# Patient Record
Sex: FEMALE | Race: WHITE | Employment: OTHER | URBAN - NONMETROPOLITAN AREA
[De-identification: names, ages, dates, MRNs, and addresses within clinical notes are randomized per-mention and may not be internally consistent; named-entity substitution may affect disease eponyms.]

---

## 2017-01-03 ENCOUNTER — CARE COORDINATION (OUTPATIENT)
Dept: CARE COORDINATION | Age: 82
End: 2017-01-03

## 2017-01-03 ENCOUNTER — TELEPHONE (OUTPATIENT)
Dept: INTERNAL MEDICINE | Age: 82
End: 2017-01-03

## 2017-01-03 DIAGNOSIS — R11.0 NAUSEA: Primary | ICD-10-CM

## 2017-01-05 ENCOUNTER — ANTI-COAG VISIT (OUTPATIENT)
Dept: OTHER | Age: 82
End: 2017-01-05

## 2017-01-05 DIAGNOSIS — Z86.718 HISTORY OF DVT (DEEP VEIN THROMBOSIS): ICD-10-CM

## 2017-01-05 DIAGNOSIS — Z86.711 HISTORY OF PULMONARY EMBOLISM: ICD-10-CM

## 2017-01-05 RX ORDER — ONDANSETRON 4 MG/1
4 TABLET, FILM COATED ORAL EVERY 8 HOURS PRN
Qty: 20 TABLET | Refills: 0 | Status: SHIPPED | OUTPATIENT
Start: 2017-01-05 | End: 2017-04-05

## 2017-01-09 ENCOUNTER — TELEPHONE (OUTPATIENT)
Dept: INTERNAL MEDICINE | Age: 82
End: 2017-01-09

## 2017-01-10 ENCOUNTER — TELEPHONE (OUTPATIENT)
Dept: INTERNAL MEDICINE | Age: 82
End: 2017-01-10

## 2017-01-11 PROBLEM — E83.52 HYPERCALCEMIA: Status: ACTIVE | Noted: 2017-01-11

## 2017-01-11 PROBLEM — K56.41 FECAL IMPACTION IN RECTUM (HCC): Status: ACTIVE | Noted: 2017-01-11

## 2017-01-16 ENCOUNTER — ANTI-COAG VISIT (OUTPATIENT)
Dept: OTHER | Age: 82
End: 2017-01-16

## 2017-01-16 ENCOUNTER — TELEPHONE (OUTPATIENT)
Dept: OTHER | Age: 82
End: 2017-01-16

## 2017-01-16 DIAGNOSIS — Z86.718 HISTORY OF DVT (DEEP VEIN THROMBOSIS): ICD-10-CM

## 2017-01-16 DIAGNOSIS — Z86.711 HISTORY OF PULMONARY EMBOLISM: ICD-10-CM

## 2017-01-23 ENCOUNTER — ANTI-COAG VISIT (OUTPATIENT)
Dept: OTHER | Age: 82
End: 2017-01-23

## 2017-01-23 ENCOUNTER — OFFICE VISIT (OUTPATIENT)
Dept: INTERNAL MEDICINE | Age: 82
End: 2017-01-23

## 2017-01-23 VITALS
BODY MASS INDEX: 30.74 KG/M2 | WEIGHT: 202.2 LBS | DIASTOLIC BLOOD PRESSURE: 100 MMHG | HEART RATE: 68 BPM | SYSTOLIC BLOOD PRESSURE: 150 MMHG

## 2017-01-23 VITALS
DIASTOLIC BLOOD PRESSURE: 88 MMHG | SYSTOLIC BLOOD PRESSURE: 160 MMHG | HEIGHT: 68 IN | HEART RATE: 68 BPM | WEIGHT: 202 LBS | BODY MASS INDEX: 30.62 KG/M2

## 2017-01-23 DIAGNOSIS — I63.9 ACUTE EMBOLIC STROKE (HCC): ICD-10-CM

## 2017-01-23 DIAGNOSIS — N18.30 TYPE 2 DIABETES MELLITUS WITH STAGE 3 CHRONIC KIDNEY DISEASE, WITHOUT LONG-TERM CURRENT USE OF INSULIN (HCC): ICD-10-CM

## 2017-01-23 DIAGNOSIS — Z86.718 HISTORY OF DVT (DEEP VEIN THROMBOSIS): ICD-10-CM

## 2017-01-23 DIAGNOSIS — I48.19 PERSISTENT ATRIAL FIBRILLATION (HCC): ICD-10-CM

## 2017-01-23 DIAGNOSIS — G25.81 RLS (RESTLESS LEGS SYNDROME): ICD-10-CM

## 2017-01-23 DIAGNOSIS — E78.5 HYPERLIPIDEMIA, UNSPECIFIED HYPERLIPIDEMIA TYPE: ICD-10-CM

## 2017-01-23 DIAGNOSIS — Z95.0 PACEMAKER: ICD-10-CM

## 2017-01-23 DIAGNOSIS — L89.92 DECUBITUS SKIN ULCER, STAGE II: ICD-10-CM

## 2017-01-23 DIAGNOSIS — Z86.73 HISTORY OF CVA (CEREBROVASCULAR ACCIDENT): ICD-10-CM

## 2017-01-23 DIAGNOSIS — Z95.5 S/P DRUG ELUTING CORONARY STENT PLACEMENT: ICD-10-CM

## 2017-01-23 DIAGNOSIS — Z86.711 HISTORY OF PULMONARY EMBOLISM: ICD-10-CM

## 2017-01-23 DIAGNOSIS — I47.20 VENTRICULAR TACHYARRHYTHMIA: ICD-10-CM
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
## 2017-01-23 DIAGNOSIS — K56.41 FECAL IMPACTION IN RECTUM (HCC): Primary | ICD-10-CM

## 2017-01-23 DIAGNOSIS — I35.0 MILD AORTIC STENOSIS: ICD-10-CM

## 2017-01-23 DIAGNOSIS — Z96.642 STATUS POST TOTAL REPLACEMENT OF LEFT HIP: ICD-10-CM

## 2017-01-23 DIAGNOSIS — Z98.61 S/P PERCUTANEOUS TRANSLUMINAL CORONARY ANGIOPLASTY: ICD-10-CM

## 2017-01-23 DIAGNOSIS — I50.42 CHRONIC COMBINED SYSTOLIC AND DIASTOLIC CHF (CONGESTIVE HEART FAILURE) (HCC): ICD-10-CM

## 2017-01-23 DIAGNOSIS — E11.22 TYPE 2 DIABETES MELLITUS WITH STAGE 3 CHRONIC KIDNEY DISEASE, WITHOUT LONG-TERM CURRENT USE OF INSULIN (HCC): ICD-10-CM

## 2017-01-23 DIAGNOSIS — I20.9 ANGINA PECTORIS (HCC): ICD-10-CM

## 2017-01-23 DIAGNOSIS — Z85.72 HISTORY OF NON-HODGKIN'S LYMPHOMA: ICD-10-CM

## 2017-01-23 DIAGNOSIS — I25.10 ASCVD (ARTERIOSCLEROTIC CARDIOVASCULAR DISEASE): ICD-10-CM

## 2017-01-23 DIAGNOSIS — Z86.73 HISTORY OF EMBOLIC STROKE: ICD-10-CM

## 2017-01-23 DIAGNOSIS — I49.5 SSS (SICK SINUS SYNDROME) (HCC): ICD-10-CM

## 2017-01-23 LAB — POC INR: 1.8 (ref 0.8–1.2)

## 2017-01-23 PROCEDURE — G8419 CALC BMI OUT NRM PARAM NOF/U: HCPCS | Performed by: NURSE PRACTITIONER

## 2017-01-23 PROCEDURE — G8427 DOCREV CUR MEDS BY ELIG CLIN: HCPCS | Performed by: NURSE PRACTITIONER

## 2017-01-23 PROCEDURE — G8484 FLU IMMUNIZE NO ADMIN: HCPCS | Performed by: INTERNAL MEDICINE

## 2017-01-23 PROCEDURE — G8598 ASA/ANTIPLAT THER USED: HCPCS | Performed by: INTERNAL MEDICINE

## 2017-01-23 PROCEDURE — 99999 PR OFFICE/OUTPT VISIT,PROCEDURE ONLY: CPT | Performed by: NURSE PRACTITIONER

## 2017-01-23 PROCEDURE — 1111F DSCHRG MED/CURRENT MED MERGE: CPT | Performed by: INTERNAL MEDICINE

## 2017-01-23 PROCEDURE — 4040F PNEUMOC VAC/ADMIN/RCVD: CPT | Performed by: INTERNAL MEDICINE

## 2017-01-23 PROCEDURE — 1123F ACP DISCUSS/DSCN MKR DOCD: CPT | Performed by: INTERNAL MEDICINE

## 2017-01-23 PROCEDURE — G8428 CUR MEDS NOT DOCUMENT: HCPCS | Performed by: INTERNAL MEDICINE

## 2017-01-23 PROCEDURE — G8598 ASA/ANTIPLAT THER USED: HCPCS | Performed by: NURSE PRACTITIONER

## 2017-01-23 PROCEDURE — 1090F PRES/ABSN URINE INCON ASSESS: CPT | Performed by: INTERNAL MEDICINE

## 2017-01-23 PROCEDURE — 85610 PROTHROMBIN TIME: CPT | Performed by: NURSE PRACTITIONER

## 2017-01-23 PROCEDURE — 4040F PNEUMOC VAC/ADMIN/RCVD: CPT | Performed by: NURSE PRACTITIONER

## 2017-01-23 PROCEDURE — 99214 OFFICE O/P EST MOD 30 MIN: CPT | Performed by: INTERNAL MEDICINE

## 2017-01-23 PROCEDURE — G8419 CALC BMI OUT NRM PARAM NOF/U: HCPCS | Performed by: INTERNAL MEDICINE

## 2017-01-23 PROCEDURE — G8400 PT W/DXA NO RESULTS DOC: HCPCS | Performed by: INTERNAL MEDICINE

## 2017-01-23 PROCEDURE — 1036F TOBACCO NON-USER: CPT | Performed by: INTERNAL MEDICINE

## 2017-01-23 RX ORDER — PRAVASTATIN SODIUM 20 MG
20 TABLET ORAL EVERY EVENING
Qty: 30 TABLET | Refills: 5 | Status: SHIPPED | OUTPATIENT
Start: 2017-01-23 | End: 2017-05-02 | Stop reason: SDUPTHER

## 2017-01-23 ASSESSMENT — ENCOUNTER SYMPTOMS
BLOOD IN STOOL: 0
DIARRHEA: 0
CONSTIPATION: 0

## 2017-01-30 ENCOUNTER — OFFICE VISIT (OUTPATIENT)
Dept: CARDIOLOGY | Age: 82
End: 2017-01-30

## 2017-01-30 ENCOUNTER — TELEPHONE (OUTPATIENT)
Dept: OTHER | Age: 82
End: 2017-01-30

## 2017-01-30 ENCOUNTER — PROCEDURE VISIT (OUTPATIENT)
Dept: CARDIOLOGY | Age: 82
End: 2017-01-30

## 2017-01-30 VITALS
SYSTOLIC BLOOD PRESSURE: 130 MMHG | WEIGHT: 201 LBS | HEART RATE: 81 BPM | DIASTOLIC BLOOD PRESSURE: 78 MMHG | BODY MASS INDEX: 30.46 KG/M2 | HEIGHT: 68 IN

## 2017-01-30 DIAGNOSIS — I25.5 ISCHEMIC CARDIOMYOPATHY: ICD-10-CM

## 2017-01-30 DIAGNOSIS — Z95.0 PACEMAKER: Primary | ICD-10-CM

## 2017-01-30 DIAGNOSIS — Z95.0 S/P CARDIAC PACEMAKER PROCEDURE: Primary | ICD-10-CM

## 2017-01-30 DIAGNOSIS — I47.20 VENTRICULAR TACHYARRHYTHMIA: ICD-10-CM

## 2017-01-30 DIAGNOSIS — I25.10 CORONARY ARTERY DISEASE INVOLVING NATIVE CORONARY ARTERY OF NATIVE HEART WITHOUT ANGINA PECTORIS: ICD-10-CM

## 2017-01-30 DIAGNOSIS — Z98.61 S/P PERCUTANEOUS TRANSLUMINAL CORONARY ANGIOPLASTY: ICD-10-CM

## 2017-01-30 DIAGNOSIS — I50.22 HEART FAILURE, SYSTOLIC, CHRONIC (HCC): ICD-10-CM

## 2017-01-30 PROCEDURE — 1036F TOBACCO NON-USER: CPT | Performed by: PHYSICIAN ASSISTANT

## 2017-01-30 PROCEDURE — 93279 PRGRMG DEV EVAL PM/LDLS PM: CPT | Performed by: INTERNAL MEDICINE

## 2017-01-30 PROCEDURE — 1111F DSCHRG MED/CURRENT MED MERGE: CPT | Performed by: PHYSICIAN ASSISTANT

## 2017-01-30 PROCEDURE — G8400 PT W/DXA NO RESULTS DOC: HCPCS | Performed by: PHYSICIAN ASSISTANT

## 2017-01-30 PROCEDURE — 1123F ACP DISCUSS/DSCN MKR DOCD: CPT | Performed by: PHYSICIAN ASSISTANT

## 2017-01-30 PROCEDURE — G8419 CALC BMI OUT NRM PARAM NOF/U: HCPCS | Performed by: PHYSICIAN ASSISTANT

## 2017-01-30 PROCEDURE — G8484 FLU IMMUNIZE NO ADMIN: HCPCS | Performed by: PHYSICIAN ASSISTANT

## 2017-01-30 PROCEDURE — 99213 OFFICE O/P EST LOW 20 MIN: CPT | Performed by: PHYSICIAN ASSISTANT

## 2017-01-30 PROCEDURE — G8598 ASA/ANTIPLAT THER USED: HCPCS | Performed by: PHYSICIAN ASSISTANT

## 2017-01-30 PROCEDURE — 4040F PNEUMOC VAC/ADMIN/RCVD: CPT | Performed by: PHYSICIAN ASSISTANT

## 2017-01-30 PROCEDURE — G8427 DOCREV CUR MEDS BY ELIG CLIN: HCPCS | Performed by: PHYSICIAN ASSISTANT

## 2017-01-30 PROCEDURE — 1090F PRES/ABSN URINE INCON ASSESS: CPT | Performed by: PHYSICIAN ASSISTANT

## 2017-02-02 ENCOUNTER — CARE COORDINATION (OUTPATIENT)
Dept: CARE COORDINATION | Age: 82
End: 2017-02-02

## 2017-02-02 ENCOUNTER — ANTI-COAG VISIT (OUTPATIENT)
Dept: OTHER | Age: 82
End: 2017-02-02

## 2017-02-02 VITALS — SYSTOLIC BLOOD PRESSURE: 110 MMHG | TEMPERATURE: 97.5 F | HEART RATE: 64 BPM | DIASTOLIC BLOOD PRESSURE: 80 MMHG

## 2017-02-02 DIAGNOSIS — Z86.718 HISTORY OF DVT (DEEP VEIN THROMBOSIS): ICD-10-CM

## 2017-02-02 DIAGNOSIS — Z86.711 HISTORY OF PULMONARY EMBOLISM: ICD-10-CM

## 2017-02-02 DIAGNOSIS — I48.19 PERSISTENT ATRIAL FIBRILLATION (HCC): ICD-10-CM

## 2017-02-02 DIAGNOSIS — Z86.73 HISTORY OF EMBOLIC STROKE: ICD-10-CM

## 2017-02-02 LAB — POC INR: 2.2 (ref 0.8–1.2)

## 2017-02-02 PROCEDURE — G8400 PT W/DXA NO RESULTS DOC: HCPCS | Performed by: NURSE PRACTITIONER

## 2017-02-02 PROCEDURE — G8419 CALC BMI OUT NRM PARAM NOF/U: HCPCS | Performed by: NURSE PRACTITIONER

## 2017-02-02 PROCEDURE — 1036F TOBACCO NON-USER: CPT | Performed by: NURSE PRACTITIONER

## 2017-02-02 PROCEDURE — 99212 OFFICE O/P EST SF 10 MIN: CPT | Performed by: NURSE PRACTITIONER

## 2017-02-02 PROCEDURE — 1090F PRES/ABSN URINE INCON ASSESS: CPT | Performed by: NURSE PRACTITIONER

## 2017-02-02 PROCEDURE — G8427 DOCREV CUR MEDS BY ELIG CLIN: HCPCS | Performed by: NURSE PRACTITIONER

## 2017-02-02 PROCEDURE — G8598 ASA/ANTIPLAT THER USED: HCPCS | Performed by: NURSE PRACTITIONER

## 2017-02-02 PROCEDURE — 1111F DSCHRG MED/CURRENT MED MERGE: CPT | Performed by: NURSE PRACTITIONER

## 2017-02-02 PROCEDURE — 85610 PROTHROMBIN TIME: CPT | Performed by: NURSE PRACTITIONER

## 2017-02-02 PROCEDURE — G8484 FLU IMMUNIZE NO ADMIN: HCPCS | Performed by: NURSE PRACTITIONER

## 2017-02-02 PROCEDURE — 4040F PNEUMOC VAC/ADMIN/RCVD: CPT | Performed by: NURSE PRACTITIONER

## 2017-02-02 PROCEDURE — 1123F ACP DISCUSS/DSCN MKR DOCD: CPT | Performed by: NURSE PRACTITIONER

## 2017-02-02 ASSESSMENT — ENCOUNTER SYMPTOMS
DIARRHEA: 0
BLOOD IN STOOL: 0
SHORTNESS OF BREATH: 0
CONSTIPATION: 0

## 2017-02-08 ENCOUNTER — CARE COORDINATION (OUTPATIENT)
Dept: CARE COORDINATION | Age: 82
End: 2017-02-08

## 2017-02-09 ENCOUNTER — CARE COORDINATION (OUTPATIENT)
Dept: CARE COORDINATION | Age: 82
End: 2017-02-09

## 2017-02-13 ENCOUNTER — OFFICE VISIT (OUTPATIENT)
Dept: INTERNAL MEDICINE | Age: 82
End: 2017-02-13

## 2017-02-13 ENCOUNTER — TELEPHONE (OUTPATIENT)
Dept: INTERNAL MEDICINE | Age: 82
End: 2017-02-13

## 2017-02-13 ENCOUNTER — CARE COORDINATION (OUTPATIENT)
Dept: CARE COORDINATION | Age: 82
End: 2017-02-13

## 2017-02-13 ENCOUNTER — TELEPHONE (OUTPATIENT)
Dept: CARDIOLOGY | Age: 82
End: 2017-02-13

## 2017-02-13 VITALS
WEIGHT: 201 LBS | OXYGEN SATURATION: 97 % | SYSTOLIC BLOOD PRESSURE: 186 MMHG | HEIGHT: 68 IN | BODY MASS INDEX: 30.46 KG/M2 | HEART RATE: 66 BPM | DIASTOLIC BLOOD PRESSURE: 114 MMHG

## 2017-02-13 DIAGNOSIS — E78.5 HYPERLIPIDEMIA, UNSPECIFIED HYPERLIPIDEMIA TYPE: ICD-10-CM

## 2017-02-13 DIAGNOSIS — I48.0 PAF (PAROXYSMAL ATRIAL FIBRILLATION) (HCC): ICD-10-CM

## 2017-02-13 DIAGNOSIS — Z86.718 HISTORY OF DVT (DEEP VEIN THROMBOSIS): ICD-10-CM

## 2017-02-13 DIAGNOSIS — N18.30 TYPE 2 DIABETES MELLITUS WITH STAGE 3 CHRONIC KIDNEY DISEASE, WITHOUT LONG-TERM CURRENT USE OF INSULIN (HCC): ICD-10-CM

## 2017-02-13 DIAGNOSIS — E11.22 TYPE 2 DIABETES MELLITUS WITH STAGE 3 CHRONIC KIDNEY DISEASE, WITHOUT LONG-TERM CURRENT USE OF INSULIN (HCC): ICD-10-CM

## 2017-02-13 DIAGNOSIS — K59.00 CONSTIPATION, UNSPECIFIED CONSTIPATION TYPE: ICD-10-CM

## 2017-02-13 DIAGNOSIS — I10 ESSENTIAL HYPERTENSION: ICD-10-CM

## 2017-02-13 DIAGNOSIS — R06.02 SOB (SHORTNESS OF BREATH): Primary | ICD-10-CM

## 2017-02-13 DIAGNOSIS — I25.10 ASCVD (ARTERIOSCLEROTIC CARDIOVASCULAR DISEASE): ICD-10-CM

## 2017-02-13 PROCEDURE — 4040F PNEUMOC VAC/ADMIN/RCVD: CPT | Performed by: INTERNAL MEDICINE

## 2017-02-13 PROCEDURE — 99214 OFFICE O/P EST MOD 30 MIN: CPT | Performed by: INTERNAL MEDICINE

## 2017-02-13 PROCEDURE — G8484 FLU IMMUNIZE NO ADMIN: HCPCS | Performed by: INTERNAL MEDICINE

## 2017-02-13 PROCEDURE — G8400 PT W/DXA NO RESULTS DOC: HCPCS | Performed by: INTERNAL MEDICINE

## 2017-02-13 PROCEDURE — G8598 ASA/ANTIPLAT THER USED: HCPCS | Performed by: INTERNAL MEDICINE

## 2017-02-13 PROCEDURE — 1123F ACP DISCUSS/DSCN MKR DOCD: CPT | Performed by: INTERNAL MEDICINE

## 2017-02-13 PROCEDURE — 93000 ELECTROCARDIOGRAM COMPLETE: CPT | Performed by: INTERNAL MEDICINE

## 2017-02-13 PROCEDURE — G8427 DOCREV CUR MEDS BY ELIG CLIN: HCPCS | Performed by: INTERNAL MEDICINE

## 2017-02-13 PROCEDURE — G8417 CALC BMI ABV UP PARAM F/U: HCPCS | Performed by: INTERNAL MEDICINE

## 2017-02-13 PROCEDURE — 1036F TOBACCO NON-USER: CPT | Performed by: INTERNAL MEDICINE

## 2017-02-13 PROCEDURE — 1090F PRES/ABSN URINE INCON ASSESS: CPT | Performed by: INTERNAL MEDICINE

## 2017-02-14 RX ORDER — METOLAZONE 2.5 MG/1
2.5 TABLET ORAL DAILY
Qty: 2 TABLET | Refills: 0 | OUTPATIENT
Start: 2017-02-14 | End: 2017-03-09 | Stop reason: SDUPTHER

## 2017-02-15 ENCOUNTER — OFFICE VISIT (OUTPATIENT)
Dept: INTERNAL MEDICINE | Age: 82
End: 2017-02-15

## 2017-02-15 VITALS
HEART RATE: 76 BPM | DIASTOLIC BLOOD PRESSURE: 80 MMHG | SYSTOLIC BLOOD PRESSURE: 118 MMHG | OXYGEN SATURATION: 97 % | HEIGHT: 68 IN

## 2017-02-15 DIAGNOSIS — N18.30 TYPE 2 DIABETES MELLITUS WITH STAGE 3 CHRONIC KIDNEY DISEASE, WITHOUT LONG-TERM CURRENT USE OF INSULIN (HCC): ICD-10-CM

## 2017-02-15 DIAGNOSIS — I25.10 CORONARY ARTERY DISEASE INVOLVING NATIVE CORONARY ARTERY OF NATIVE HEART WITHOUT ANGINA PECTORIS: ICD-10-CM

## 2017-02-15 DIAGNOSIS — Z86.711 HISTORY OF PULMONARY EMBOLISM: ICD-10-CM

## 2017-02-15 DIAGNOSIS — I10 ESSENTIAL HYPERTENSION: ICD-10-CM

## 2017-02-15 DIAGNOSIS — E11.22 TYPE 2 DIABETES MELLITUS WITH STAGE 3 CHRONIC KIDNEY DISEASE, WITHOUT LONG-TERM CURRENT USE OF INSULIN (HCC): ICD-10-CM

## 2017-02-15 DIAGNOSIS — I25.10 ASCVD (ARTERIOSCLEROTIC CARDIOVASCULAR DISEASE): ICD-10-CM

## 2017-02-15 DIAGNOSIS — K56.41 FECAL IMPACTION IN RECTUM (HCC): ICD-10-CM

## 2017-02-15 DIAGNOSIS — E78.5 HYPERLIPIDEMIA, UNSPECIFIED HYPERLIPIDEMIA TYPE: ICD-10-CM

## 2017-02-15 DIAGNOSIS — Z86.718 HISTORY OF DVT (DEEP VEIN THROMBOSIS): ICD-10-CM

## 2017-02-15 DIAGNOSIS — I48.0 PAF (PAROXYSMAL ATRIAL FIBRILLATION) (HCC): ICD-10-CM

## 2017-02-15 DIAGNOSIS — I50.33 ACUTE ON CHRONIC DIASTOLIC CONGESTIVE HEART FAILURE (HCC): Primary | ICD-10-CM

## 2017-02-15 PROCEDURE — G8417 CALC BMI ABV UP PARAM F/U: HCPCS | Performed by: INTERNAL MEDICINE

## 2017-02-15 PROCEDURE — G8428 CUR MEDS NOT DOCUMENT: HCPCS | Performed by: INTERNAL MEDICINE

## 2017-02-15 PROCEDURE — 1090F PRES/ABSN URINE INCON ASSESS: CPT | Performed by: INTERNAL MEDICINE

## 2017-02-15 PROCEDURE — 1123F ACP DISCUSS/DSCN MKR DOCD: CPT | Performed by: INTERNAL MEDICINE

## 2017-02-15 PROCEDURE — 4040F PNEUMOC VAC/ADMIN/RCVD: CPT | Performed by: INTERNAL MEDICINE

## 2017-02-15 PROCEDURE — 99213 OFFICE O/P EST LOW 20 MIN: CPT | Performed by: INTERNAL MEDICINE

## 2017-02-15 PROCEDURE — G8484 FLU IMMUNIZE NO ADMIN: HCPCS | Performed by: INTERNAL MEDICINE

## 2017-02-15 PROCEDURE — G8598 ASA/ANTIPLAT THER USED: HCPCS | Performed by: INTERNAL MEDICINE

## 2017-02-15 PROCEDURE — 1036F TOBACCO NON-USER: CPT | Performed by: INTERNAL MEDICINE

## 2017-02-15 PROCEDURE — G8400 PT W/DXA NO RESULTS DOC: HCPCS | Performed by: INTERNAL MEDICINE

## 2017-02-17 RX ORDER — FUROSEMIDE 20 MG/1
20 TABLET ORAL DAILY
Qty: 30 TABLET | Refills: 5 | OUTPATIENT
Start: 2017-02-17 | End: 2017-05-02 | Stop reason: SDUPTHER

## 2017-02-28 RX ORDER — POTASSIUM CHLORIDE 750 MG/1
10 TABLET, EXTENDED RELEASE ORAL DAILY
Qty: 30 TABLET | Refills: 5 | Status: SHIPPED | OUTPATIENT
Start: 2017-02-28 | End: 2017-05-02 | Stop reason: SDUPTHER

## 2017-03-06 ENCOUNTER — ANTI-COAG VISIT (OUTPATIENT)
Dept: OTHER | Age: 82
End: 2017-03-06

## 2017-03-06 VITALS
HEART RATE: 73 BPM | WEIGHT: 206 LBS | DIASTOLIC BLOOD PRESSURE: 92 MMHG | SYSTOLIC BLOOD PRESSURE: 140 MMHG | BODY MASS INDEX: 31.22 KG/M2

## 2017-03-06 DIAGNOSIS — Z86.711 HISTORY OF PULMONARY EMBOLISM: ICD-10-CM

## 2017-03-06 DIAGNOSIS — Z86.73 HISTORY OF EMBOLIC STROKE: ICD-10-CM

## 2017-03-06 DIAGNOSIS — I48.19 PERSISTENT ATRIAL FIBRILLATION (HCC): ICD-10-CM

## 2017-03-06 DIAGNOSIS — Z86.718 HISTORY OF DVT (DEEP VEIN THROMBOSIS): ICD-10-CM

## 2017-03-06 LAB — POC INR: 2.6 (ref 0.8–1.2)

## 2017-03-06 PROCEDURE — 85610 PROTHROMBIN TIME: CPT | Performed by: NURSE PRACTITIONER

## 2017-03-06 PROCEDURE — 4040F PNEUMOC VAC/ADMIN/RCVD: CPT | Performed by: NURSE PRACTITIONER

## 2017-03-06 PROCEDURE — 99212 OFFICE O/P EST SF 10 MIN: CPT | Performed by: NURSE PRACTITIONER

## 2017-03-06 PROCEDURE — G8598 ASA/ANTIPLAT THER USED: HCPCS | Performed by: NURSE PRACTITIONER

## 2017-03-06 PROCEDURE — 1090F PRES/ABSN URINE INCON ASSESS: CPT | Performed by: NURSE PRACTITIONER

## 2017-03-06 PROCEDURE — G8427 DOCREV CUR MEDS BY ELIG CLIN: HCPCS | Performed by: NURSE PRACTITIONER

## 2017-03-06 PROCEDURE — G8484 FLU IMMUNIZE NO ADMIN: HCPCS | Performed by: NURSE PRACTITIONER

## 2017-03-06 PROCEDURE — 1123F ACP DISCUSS/DSCN MKR DOCD: CPT | Performed by: NURSE PRACTITIONER

## 2017-03-06 PROCEDURE — 1036F TOBACCO NON-USER: CPT | Performed by: NURSE PRACTITIONER

## 2017-03-06 PROCEDURE — G8417 CALC BMI ABV UP PARAM F/U: HCPCS | Performed by: NURSE PRACTITIONER

## 2017-03-06 PROCEDURE — G8400 PT W/DXA NO RESULTS DOC: HCPCS | Performed by: NURSE PRACTITIONER

## 2017-03-06 ASSESSMENT — ENCOUNTER SYMPTOMS
DIARRHEA: 0
SHORTNESS OF BREATH: 1
BLOOD IN STOOL: 0
CONSTIPATION: 0

## 2017-03-09 ENCOUNTER — CARE COORDINATION (OUTPATIENT)
Dept: CARE COORDINATION | Age: 82
End: 2017-03-09

## 2017-03-09 ENCOUNTER — TELEPHONE (OUTPATIENT)
Dept: INTERNAL MEDICINE | Age: 82
End: 2017-03-09

## 2017-03-09 DIAGNOSIS — I50.33 DIASTOLIC CHF, ACUTE ON CHRONIC (HCC): Primary | Chronic | ICD-10-CM

## 2017-03-09 RX ORDER — METOLAZONE 2.5 MG/1
2.5 TABLET ORAL DAILY
Qty: 2 TABLET | Refills: 0 | Status: SHIPPED | OUTPATIENT
Start: 2017-03-09 | End: 2017-03-15 | Stop reason: SDUPTHER

## 2017-03-15 ENCOUNTER — TELEPHONE (OUTPATIENT)
Dept: INTERNAL MEDICINE | Age: 82
End: 2017-03-15

## 2017-03-15 ENCOUNTER — CARE COORDINATION (OUTPATIENT)
Dept: CARE COORDINATION | Age: 82
End: 2017-03-15

## 2017-03-15 DIAGNOSIS — I50.33 DIASTOLIC CHF, ACUTE ON CHRONIC (HCC): Chronic | ICD-10-CM

## 2017-03-15 DIAGNOSIS — G62.9 NEUROPATHY: Primary | ICD-10-CM

## 2017-03-16 RX ORDER — METOLAZONE 2.5 MG/1
2.5 TABLET ORAL DAILY PRN
Qty: 30 TABLET | Refills: 0 | Status: SHIPPED | OUTPATIENT
Start: 2017-03-16 | End: 2018-10-25 | Stop reason: SDUPTHER

## 2017-03-20 ENCOUNTER — CARE COORDINATION (OUTPATIENT)
Dept: CARE COORDINATION | Age: 82
End: 2017-03-20

## 2017-03-20 ENCOUNTER — TELEPHONE (OUTPATIENT)
Dept: INTERNAL MEDICINE | Age: 82
End: 2017-03-20

## 2017-03-22 ENCOUNTER — OFFICE VISIT (OUTPATIENT)
Dept: INTERNAL MEDICINE | Age: 82
End: 2017-03-22

## 2017-03-22 ENCOUNTER — TELEPHONE (OUTPATIENT)
Dept: INTERNAL MEDICINE | Age: 82
End: 2017-03-22

## 2017-03-22 ENCOUNTER — CARE COORDINATION (OUTPATIENT)
Dept: CARE COORDINATION | Age: 82
End: 2017-03-22

## 2017-03-22 VITALS — HEIGHT: 68 IN | HEART RATE: 64 BPM | DIASTOLIC BLOOD PRESSURE: 80 MMHG | SYSTOLIC BLOOD PRESSURE: 130 MMHG

## 2017-03-22 DIAGNOSIS — N18.30 TYPE 2 DIABETES MELLITUS WITH STAGE 3 CHRONIC KIDNEY DISEASE, WITHOUT LONG-TERM CURRENT USE OF INSULIN (HCC): ICD-10-CM

## 2017-03-22 DIAGNOSIS — Z86.718 HISTORY OF DVT (DEEP VEIN THROMBOSIS): ICD-10-CM

## 2017-03-22 DIAGNOSIS — M79.674 PAIN IN TOES OF BOTH FEET: Primary | ICD-10-CM

## 2017-03-22 DIAGNOSIS — S72.002A CLOSED DISPLACED FRACTURE OF NECK OF LEFT FEMUR (HCC): ICD-10-CM

## 2017-03-22 DIAGNOSIS — I63.9 ACUTE EMBOLIC STROKE (HCC): ICD-10-CM

## 2017-03-22 DIAGNOSIS — I73.9 CLAUDICATION OF BOTH LOWER EXTREMITIES (HCC): ICD-10-CM

## 2017-03-22 DIAGNOSIS — I25.10 ASCVD (ARTERIOSCLEROTIC CARDIOVASCULAR DISEASE): ICD-10-CM

## 2017-03-22 DIAGNOSIS — E11.22 TYPE 2 DIABETES MELLITUS WITH STAGE 3 CHRONIC KIDNEY DISEASE, WITHOUT LONG-TERM CURRENT USE OF INSULIN (HCC): ICD-10-CM

## 2017-03-22 DIAGNOSIS — E78.5 HYPERLIPIDEMIA, UNSPECIFIED HYPERLIPIDEMIA TYPE: ICD-10-CM

## 2017-03-22 DIAGNOSIS — Z86.73 HISTORY OF CVA (CEREBROVASCULAR ACCIDENT): ICD-10-CM

## 2017-03-22 DIAGNOSIS — G25.81 RLS (RESTLESS LEGS SYNDROME): ICD-10-CM

## 2017-03-22 DIAGNOSIS — M79.675 PAIN IN TOES OF BOTH FEET: Primary | ICD-10-CM

## 2017-03-22 DIAGNOSIS — I10 ESSENTIAL HYPERTENSION: ICD-10-CM

## 2017-03-22 DIAGNOSIS — I47.20 VENTRICULAR TACHYARRHYTHMIA: ICD-10-CM

## 2017-03-22 DIAGNOSIS — Z95.0 S/P CARDIAC PACEMAKER PROCEDURE: ICD-10-CM

## 2017-03-22 PROCEDURE — G8484 FLU IMMUNIZE NO ADMIN: HCPCS | Performed by: INTERNAL MEDICINE

## 2017-03-22 PROCEDURE — 99214 OFFICE O/P EST MOD 30 MIN: CPT | Performed by: INTERNAL MEDICINE

## 2017-03-22 PROCEDURE — G8400 PT W/DXA NO RESULTS DOC: HCPCS | Performed by: INTERNAL MEDICINE

## 2017-03-22 PROCEDURE — G8598 ASA/ANTIPLAT THER USED: HCPCS | Performed by: INTERNAL MEDICINE

## 2017-03-22 PROCEDURE — G8428 CUR MEDS NOT DOCUMENT: HCPCS | Performed by: INTERNAL MEDICINE

## 2017-03-22 PROCEDURE — 1090F PRES/ABSN URINE INCON ASSESS: CPT | Performed by: INTERNAL MEDICINE

## 2017-03-22 PROCEDURE — 1123F ACP DISCUSS/DSCN MKR DOCD: CPT | Performed by: INTERNAL MEDICINE

## 2017-03-22 PROCEDURE — 1036F TOBACCO NON-USER: CPT | Performed by: INTERNAL MEDICINE

## 2017-03-22 PROCEDURE — G8417 CALC BMI ABV UP PARAM F/U: HCPCS | Performed by: INTERNAL MEDICINE

## 2017-03-22 PROCEDURE — 4040F PNEUMOC VAC/ADMIN/RCVD: CPT | Performed by: INTERNAL MEDICINE

## 2017-03-22 RX ORDER — OXYCODONE HYDROCHLORIDE AND ACETAMINOPHEN 5; 325 MG/1; MG/1
1 TABLET ORAL EVERY 6 HOURS PRN
Qty: 30 TABLET | Refills: 0 | Status: SHIPPED | OUTPATIENT
Start: 2017-03-22 | End: 2017-03-29

## 2017-03-28 ENCOUNTER — CARE COORDINATION (OUTPATIENT)
Dept: CARE COORDINATION | Age: 82
End: 2017-03-28

## 2017-03-29 DIAGNOSIS — Z86.718 HISTORY OF DVT (DEEP VEIN THROMBOSIS): Primary | ICD-10-CM

## 2017-04-03 ENCOUNTER — ANTI-COAG VISIT (OUTPATIENT)
Dept: OTHER | Age: 82
End: 2017-04-03

## 2017-04-03 ENCOUNTER — TELEPHONE (OUTPATIENT)
Dept: INTERNAL MEDICINE | Age: 82
End: 2017-04-03

## 2017-04-03 VITALS — TEMPERATURE: 99.1 F | HEART RATE: 60 BPM | DIASTOLIC BLOOD PRESSURE: 68 MMHG | SYSTOLIC BLOOD PRESSURE: 128 MMHG

## 2017-04-03 DIAGNOSIS — I48.19 PERSISTENT ATRIAL FIBRILLATION (HCC): ICD-10-CM

## 2017-04-03 DIAGNOSIS — Z86.711 HISTORY OF PULMONARY EMBOLISM: ICD-10-CM

## 2017-04-03 DIAGNOSIS — Z86.73 HISTORY OF EMBOLIC STROKE: ICD-10-CM

## 2017-04-03 DIAGNOSIS — Z86.718 HISTORY OF DVT (DEEP VEIN THROMBOSIS): ICD-10-CM

## 2017-04-03 LAB — POC INR: 3 (ref 0.8–1.2)

## 2017-04-03 ASSESSMENT — ENCOUNTER SYMPTOMS
SHORTNESS OF BREATH: 0
DIARRHEA: 0
BLOOD IN STOOL: 0
CONSTIPATION: 0

## 2017-04-04 ENCOUNTER — CARE COORDINATION (OUTPATIENT)
Dept: CARE COORDINATION | Age: 82
End: 2017-04-04

## 2017-04-04 RX ORDER — OXYCODONE HYDROCHLORIDE AND ACETAMINOPHEN 5; 325 MG/1; MG/1
1 TABLET ORAL EVERY 6 HOURS PRN
Status: ON HOLD | COMMUNITY
End: 2017-04-05 | Stop reason: RX

## 2017-04-05 ENCOUNTER — CARE COORDINATION (OUTPATIENT)
Dept: CARE COORDINATION | Age: 82
End: 2017-04-05

## 2017-04-11 ENCOUNTER — OFFICE VISIT (OUTPATIENT)
Dept: INTERNAL MEDICINE | Age: 82
End: 2017-04-11

## 2017-04-11 ENCOUNTER — CARE COORDINATION (OUTPATIENT)
Dept: CARE COORDINATION | Age: 82
End: 2017-04-11

## 2017-04-11 VITALS — HEIGHT: 68 IN | HEART RATE: 68 BPM | SYSTOLIC BLOOD PRESSURE: 132 MMHG | DIASTOLIC BLOOD PRESSURE: 70 MMHG

## 2017-04-11 DIAGNOSIS — E11.22 TYPE 2 DIABETES MELLITUS WITH STAGE 3 CHRONIC KIDNEY DISEASE, WITHOUT LONG-TERM CURRENT USE OF INSULIN (HCC): ICD-10-CM

## 2017-04-11 DIAGNOSIS — R10.30 LOWER ABDOMINAL PAIN: Primary | ICD-10-CM

## 2017-04-11 DIAGNOSIS — I50.42 CHRONIC COMBINED SYSTOLIC AND DIASTOLIC CHF (CONGESTIVE HEART FAILURE) (HCC): ICD-10-CM

## 2017-04-11 DIAGNOSIS — N18.30 TYPE 2 DIABETES MELLITUS WITH STAGE 3 CHRONIC KIDNEY DISEASE, WITHOUT LONG-TERM CURRENT USE OF INSULIN (HCC): ICD-10-CM

## 2017-04-11 DIAGNOSIS — Z86.718 HISTORY OF DVT (DEEP VEIN THROMBOSIS): ICD-10-CM

## 2017-04-11 DIAGNOSIS — I20.9 ANGINA PECTORIS (HCC): ICD-10-CM

## 2017-04-11 DIAGNOSIS — N30.00 ACUTE CYSTITIS WITHOUT HEMATURIA: ICD-10-CM

## 2017-04-11 DIAGNOSIS — I25.10 ASCVD (ARTERIOSCLEROTIC CARDIOVASCULAR DISEASE): ICD-10-CM

## 2017-04-11 DIAGNOSIS — Z85.72 HISTORY OF NON-HODGKIN'S LYMPHOMA: ICD-10-CM

## 2017-04-11 DIAGNOSIS — Z86.711 HISTORY OF PULMONARY EMBOLISM: ICD-10-CM

## 2017-04-11 DIAGNOSIS — I35.0 MILD AORTIC STENOSIS: ICD-10-CM

## 2017-04-11 DIAGNOSIS — Z95.0 S/P CARDIAC PACEMAKER PROCEDURE: ICD-10-CM

## 2017-04-11 DIAGNOSIS — E66.09 NON MORBID OBESITY DUE TO EXCESS CALORIES: ICD-10-CM

## 2017-04-11 DIAGNOSIS — Z95.5 S/P DRUG ELUTING CORONARY STENT PLACEMENT: ICD-10-CM

## 2017-04-11 DIAGNOSIS — B49 FUNGAL INFECTION: ICD-10-CM

## 2017-04-11 DIAGNOSIS — R19.7 DIARRHEA, UNSPECIFIED TYPE: ICD-10-CM

## 2017-04-11 DIAGNOSIS — I48.21 PERMANENT ATRIAL FIBRILLATION (HCC): ICD-10-CM

## 2017-04-11 DIAGNOSIS — G25.81 RLS (RESTLESS LEGS SYNDROME): ICD-10-CM

## 2017-04-11 DIAGNOSIS — R62.7 FAILURE TO THRIVE IN ADULT: ICD-10-CM

## 2017-04-11 PROCEDURE — 1123F ACP DISCUSS/DSCN MKR DOCD: CPT | Performed by: INTERNAL MEDICINE

## 2017-04-11 PROCEDURE — 1090F PRES/ABSN URINE INCON ASSESS: CPT | Performed by: INTERNAL MEDICINE

## 2017-04-11 PROCEDURE — 4040F PNEUMOC VAC/ADMIN/RCVD: CPT | Performed by: INTERNAL MEDICINE

## 2017-04-11 PROCEDURE — 99214 OFFICE O/P EST MOD 30 MIN: CPT | Performed by: INTERNAL MEDICINE

## 2017-04-11 PROCEDURE — G8598 ASA/ANTIPLAT THER USED: HCPCS | Performed by: INTERNAL MEDICINE

## 2017-04-11 PROCEDURE — G8400 PT W/DXA NO RESULTS DOC: HCPCS | Performed by: INTERNAL MEDICINE

## 2017-04-11 PROCEDURE — 1036F TOBACCO NON-USER: CPT | Performed by: INTERNAL MEDICINE

## 2017-04-11 PROCEDURE — G8427 DOCREV CUR MEDS BY ELIG CLIN: HCPCS | Performed by: INTERNAL MEDICINE

## 2017-04-11 PROCEDURE — 1111F DSCHRG MED/CURRENT MED MERGE: CPT | Performed by: INTERNAL MEDICINE

## 2017-04-11 PROCEDURE — G8417 CALC BMI ABV UP PARAM F/U: HCPCS | Performed by: INTERNAL MEDICINE

## 2017-04-11 RX ORDER — OXYCODONE HYDROCHLORIDE AND ACETAMINOPHEN 5; 325 MG/1; MG/1
1 TABLET ORAL EVERY 6 HOURS PRN
Status: ON HOLD | COMMUNITY
Start: 2017-03-22 | End: 2017-04-14 | Stop reason: SDUPTHER

## 2017-04-11 RX ORDER — OXYCODONE HYDROCHLORIDE AND ACETAMINOPHEN 5; 325 MG/1; MG/1
1 TABLET ORAL EVERY 6 HOURS PRN
Qty: 60 TABLET | Refills: 0 | Status: SHIPPED | OUTPATIENT
Start: 2017-04-11 | End: 2017-04-18

## 2017-04-11 RX ORDER — CEFUROXIME AXETIL 250 MG/1
250 TABLET ORAL 2 TIMES DAILY
Status: ON HOLD | COMMUNITY
Start: 2017-04-07 | End: 2017-04-14 | Stop reason: ALTCHOICE

## 2017-04-12 ENCOUNTER — CARE COORDINATION (OUTPATIENT)
Dept: CARE COORDINATION | Age: 82
End: 2017-04-12

## 2017-04-17 ENCOUNTER — TELEPHONE (OUTPATIENT)
Dept: OTHER | Age: 82
End: 2017-04-17

## 2017-04-18 ENCOUNTER — CARE COORDINATION (OUTPATIENT)
Dept: INTERNAL MEDICINE | Age: 82
End: 2017-04-18

## 2017-04-21 PROBLEM — R10.9 RIGHT FLANK PAIN: Status: ACTIVE | Noted: 2017-04-21

## 2017-04-21 PROBLEM — R10.9 ABDOMINAL PAIN: Status: ACTIVE | Noted: 2017-04-21

## 2017-04-22 PROBLEM — G58.0 INTERCOSTAL NEUROPATHY: Status: ACTIVE | Noted: 2017-04-22

## 2017-04-22 PROBLEM — S22.31XA CLOSED FRACTURE OF RIB OF RIGHT SIDE: Status: ACTIVE | Noted: 2017-04-22

## 2017-04-22 PROBLEM — M48.00 SPINAL STENOSIS: Status: ACTIVE | Noted: 2017-04-22

## 2017-04-22 PROBLEM — R07.81 RIB PAIN: Status: ACTIVE | Noted: 2017-04-22

## 2017-04-26 PROBLEM — M48.061 SPINAL STENOSIS OF LUMBAR REGION: Status: ACTIVE | Noted: 2017-04-22

## 2017-04-27 ENCOUNTER — CARE COORDINATION (OUTPATIENT)
Dept: CARE COORDINATION | Age: 82
End: 2017-04-27

## 2017-04-27 ENCOUNTER — TELEPHONE (OUTPATIENT)
Dept: INTERNAL MEDICINE | Age: 82
End: 2017-04-27

## 2017-05-02 ENCOUNTER — OFFICE VISIT (OUTPATIENT)
Dept: INTERNAL MEDICINE | Age: 82
End: 2017-05-02

## 2017-05-02 ENCOUNTER — ANTI-COAG VISIT (OUTPATIENT)
Dept: OTHER | Age: 82
End: 2017-05-02

## 2017-05-02 ENCOUNTER — TELEPHONE (OUTPATIENT)
Dept: INTERNAL MEDICINE | Age: 82
End: 2017-05-02

## 2017-05-02 VITALS — TEMPERATURE: 98.5 F | HEART RATE: 72 BPM | SYSTOLIC BLOOD PRESSURE: 136 MMHG | DIASTOLIC BLOOD PRESSURE: 88 MMHG

## 2017-05-02 VITALS — HEIGHT: 68 IN | HEART RATE: 64 BPM | DIASTOLIC BLOOD PRESSURE: 76 MMHG | SYSTOLIC BLOOD PRESSURE: 138 MMHG

## 2017-05-02 DIAGNOSIS — I25.5 ISCHEMIC CARDIOMYOPATHY: ICD-10-CM

## 2017-05-02 DIAGNOSIS — Z86.711 HISTORY OF PULMONARY EMBOLISM: ICD-10-CM

## 2017-05-02 DIAGNOSIS — E11.59 TYPE 2 DIABETES MELLITUS WITH OTHER CIRCULATORY COMPLICATION, WITH LONG-TERM CURRENT USE OF INSULIN (HCC): ICD-10-CM

## 2017-05-02 DIAGNOSIS — G25.81 RLS (RESTLESS LEGS SYNDROME): ICD-10-CM

## 2017-05-02 DIAGNOSIS — R53.81 DEBILITY: ICD-10-CM

## 2017-05-02 DIAGNOSIS — N18.3 CKD (CHRONIC KIDNEY DISEASE), STAGE 3 (MODERATE): ICD-10-CM

## 2017-05-02 DIAGNOSIS — I48.19 PERSISTENT ATRIAL FIBRILLATION (HCC): ICD-10-CM

## 2017-05-02 DIAGNOSIS — Z86.718 HISTORY OF DVT (DEEP VEIN THROMBOSIS): ICD-10-CM

## 2017-05-02 DIAGNOSIS — I48.20 CHRONIC ATRIAL FIBRILLATION (HCC): ICD-10-CM

## 2017-05-02 DIAGNOSIS — R10.10 PAIN OF UPPER ABDOMEN: ICD-10-CM

## 2017-05-02 DIAGNOSIS — I35.0 MILD AORTIC STENOSIS: ICD-10-CM

## 2017-05-02 DIAGNOSIS — I10 ESSENTIAL HYPERTENSION: ICD-10-CM

## 2017-05-02 DIAGNOSIS — N17.9 AKI (ACUTE KIDNEY INJURY) (HCC): ICD-10-CM

## 2017-05-02 DIAGNOSIS — Z95.5 S/P DRUG ELUTING CORONARY STENT PLACEMENT: ICD-10-CM

## 2017-05-02 DIAGNOSIS — E78.5 HYPERLIPIDEMIA, UNSPECIFIED HYPERLIPIDEMIA TYPE: ICD-10-CM

## 2017-05-02 DIAGNOSIS — Z85.72 HISTORY OF NON-HODGKIN'S LYMPHOMA: ICD-10-CM

## 2017-05-02 DIAGNOSIS — Z86.73 HISTORY OF CVA (CEREBROVASCULAR ACCIDENT): ICD-10-CM

## 2017-05-02 DIAGNOSIS — I49.5 TACHY-BRADY SYNDROME (HCC): ICD-10-CM

## 2017-05-02 DIAGNOSIS — I25.10 ASCVD (ARTERIOSCLEROTIC CARDIOVASCULAR DISEASE): ICD-10-CM

## 2017-05-02 DIAGNOSIS — Z79.4 TYPE 2 DIABETES MELLITUS WITH OTHER CIRCULATORY COMPLICATION, WITH LONG-TERM CURRENT USE OF INSULIN (HCC): ICD-10-CM

## 2017-05-02 DIAGNOSIS — K56.41 FECAL IMPACTION IN RECTUM (HCC): ICD-10-CM

## 2017-05-02 DIAGNOSIS — Z86.73 HISTORY OF EMBOLIC STROKE: ICD-10-CM

## 2017-05-02 DIAGNOSIS — G58.0 INTERCOSTAL NEUROPATHY: Primary | ICD-10-CM

## 2017-05-02 DIAGNOSIS — E66.9 OBESITY (BMI 30-39.9): ICD-10-CM

## 2017-05-02 DIAGNOSIS — I63.9 ACUTE EMBOLIC STROKE (HCC): ICD-10-CM

## 2017-05-02 LAB — POC INR: 2.4 (ref 0.8–1.2)

## 2017-05-02 PROCEDURE — 99496 TRANSJ CARE MGMT HIGH F2F 7D: CPT | Performed by: INTERNAL MEDICINE

## 2017-05-02 RX ORDER — POTASSIUM CHLORIDE 750 MG/1
10 TABLET, EXTENDED RELEASE ORAL DAILY
Qty: 90 TABLET | Refills: 3 | Status: SHIPPED | OUTPATIENT
Start: 2017-05-02 | End: 2018-04-13 | Stop reason: SDUPTHER

## 2017-05-02 RX ORDER — FUROSEMIDE 20 MG/1
20 TABLET ORAL DAILY
Qty: 90 TABLET | Refills: 3 | Status: SHIPPED | OUTPATIENT
Start: 2017-05-02 | End: 2018-04-13 | Stop reason: SDUPTHER

## 2017-05-02 RX ORDER — AMLODIPINE BESYLATE 5 MG/1
5 TABLET ORAL DAILY
Qty: 90 TABLET | Refills: 3 | Status: ON HOLD | OUTPATIENT
Start: 2017-05-02 | End: 2017-12-10

## 2017-05-02 RX ORDER — ISOSORBIDE MONONITRATE 60 MG/1
60 TABLET, EXTENDED RELEASE ORAL DAILY
Qty: 90 TABLET | Refills: 3 | Status: SHIPPED | OUTPATIENT
Start: 2017-05-02 | End: 2018-04-13 | Stop reason: SDUPTHER

## 2017-05-02 RX ORDER — METOPROLOL SUCCINATE 50 MG/1
90 TABLET, EXTENDED RELEASE ORAL DAILY
Qty: 90 TABLET | Refills: 3 | Status: SHIPPED | OUTPATIENT
Start: 2017-05-02 | End: 2017-06-05

## 2017-05-02 RX ORDER — PRAVASTATIN SODIUM 20 MG
20 TABLET ORAL EVERY EVENING
Qty: 90 TABLET | Refills: 3 | Status: SHIPPED | OUTPATIENT
Start: 2017-05-02 | End: 2018-04-13 | Stop reason: SDUPTHER

## 2017-05-02 ASSESSMENT — ENCOUNTER SYMPTOMS
CONSTIPATION: 0
DIARRHEA: 0
SHORTNESS OF BREATH: 0
BLOOD IN STOOL: 0

## 2017-05-04 ENCOUNTER — CARE COORDINATION (OUTPATIENT)
Dept: CARE COORDINATION | Age: 82
End: 2017-05-04

## 2017-05-05 ENCOUNTER — CARE COORDINATION (OUTPATIENT)
Dept: CARE COORDINATION | Age: 82
End: 2017-05-05

## 2017-05-05 ENCOUNTER — TELEPHONE (OUTPATIENT)
Dept: CARDIOLOGY | Age: 82
End: 2017-05-05

## 2017-05-18 RX ORDER — CLOPIDOGREL BISULFATE 75 MG/1
75 TABLET ORAL DAILY
Qty: 30 TABLET | Refills: 1 | Status: SHIPPED | OUTPATIENT
Start: 2017-05-18 | End: 2017-06-05 | Stop reason: SDUPTHER

## 2017-05-19 ENCOUNTER — CARE COORDINATION (OUTPATIENT)
Dept: CARE COORDINATION | Age: 82
End: 2017-05-19

## 2017-05-19 ENCOUNTER — PROCEDURE VISIT (OUTPATIENT)
Dept: CARDIOLOGY | Age: 82
End: 2017-05-19

## 2017-05-19 DIAGNOSIS — Z95.0 S/P CARDIAC PACEMAKER PROCEDURE: Primary | ICD-10-CM

## 2017-05-19 PROCEDURE — 93296 REM INTERROG EVL PM/IDS: CPT | Performed by: INTERNAL MEDICINE

## 2017-05-19 PROCEDURE — 93294 REM INTERROG EVL PM/LDLS PM: CPT | Performed by: INTERNAL MEDICINE

## 2017-05-23 ENCOUNTER — ANTI-COAG VISIT (OUTPATIENT)
Dept: OTHER | Age: 82
End: 2017-05-23

## 2017-05-23 VITALS — DIASTOLIC BLOOD PRESSURE: 62 MMHG | HEART RATE: 64 BPM | SYSTOLIC BLOOD PRESSURE: 136 MMHG

## 2017-05-23 DIAGNOSIS — I48.19 PERSISTENT ATRIAL FIBRILLATION (HCC): ICD-10-CM

## 2017-05-23 DIAGNOSIS — Z86.718 HISTORY OF DVT (DEEP VEIN THROMBOSIS): ICD-10-CM

## 2017-05-23 DIAGNOSIS — Z86.711 HISTORY OF PULMONARY EMBOLISM: ICD-10-CM

## 2017-05-23 DIAGNOSIS — Z86.73 HISTORY OF EMBOLIC STROKE: ICD-10-CM

## 2017-05-23 LAB — POC INR: 2.2 (ref 0.8–1.2)

## 2017-05-23 RX ORDER — PREDNISOLONE ACETATE 10 MG/ML
1 SUSPENSION/ DROPS OPHTHALMIC 3 TIMES DAILY
COMMUNITY
End: 2017-07-31

## 2017-05-23 ASSESSMENT — ENCOUNTER SYMPTOMS
CONSTIPATION: 0
BLOOD IN STOOL: 0
DIARRHEA: 0
SHORTNESS OF BREATH: 0

## 2017-06-05 ENCOUNTER — OFFICE VISIT (OUTPATIENT)
Dept: CARDIOLOGY | Age: 82
End: 2017-06-05

## 2017-06-05 ENCOUNTER — CARE COORDINATION (OUTPATIENT)
Dept: CARE COORDINATION | Age: 82
End: 2017-06-05

## 2017-06-05 VITALS
SYSTOLIC BLOOD PRESSURE: 112 MMHG | BODY MASS INDEX: 30.77 KG/M2 | HEIGHT: 68 IN | HEART RATE: 60 BPM | DIASTOLIC BLOOD PRESSURE: 64 MMHG | WEIGHT: 203 LBS

## 2017-06-05 DIAGNOSIS — I50.22 HEART FAILURE, SYSTOLIC, CHRONIC (HCC): ICD-10-CM

## 2017-06-05 DIAGNOSIS — Z95.0 S/P CARDIAC PACEMAKER PROCEDURE: ICD-10-CM

## 2017-06-05 DIAGNOSIS — I25.10 CORONARY ARTERY DISEASE INVOLVING NATIVE CORONARY ARTERY OF NATIVE HEART WITHOUT ANGINA PECTORIS: Primary | ICD-10-CM

## 2017-06-05 DIAGNOSIS — Z95.820 S/P ANGIOPLASTY WITH STENT: ICD-10-CM

## 2017-06-05 PROCEDURE — G8400 PT W/DXA NO RESULTS DOC: HCPCS | Performed by: PHYSICIAN ASSISTANT

## 2017-06-05 PROCEDURE — 1036F TOBACCO NON-USER: CPT | Performed by: PHYSICIAN ASSISTANT

## 2017-06-05 PROCEDURE — G8417 CALC BMI ABV UP PARAM F/U: HCPCS | Performed by: PHYSICIAN ASSISTANT

## 2017-06-05 PROCEDURE — G8598 ASA/ANTIPLAT THER USED: HCPCS | Performed by: PHYSICIAN ASSISTANT

## 2017-06-05 PROCEDURE — 1090F PRES/ABSN URINE INCON ASSESS: CPT | Performed by: PHYSICIAN ASSISTANT

## 2017-06-05 PROCEDURE — 99213 OFFICE O/P EST LOW 20 MIN: CPT | Performed by: PHYSICIAN ASSISTANT

## 2017-06-05 PROCEDURE — 4040F PNEUMOC VAC/ADMIN/RCVD: CPT | Performed by: PHYSICIAN ASSISTANT

## 2017-06-05 PROCEDURE — 1123F ACP DISCUSS/DSCN MKR DOCD: CPT | Performed by: PHYSICIAN ASSISTANT

## 2017-06-05 PROCEDURE — G8427 DOCREV CUR MEDS BY ELIG CLIN: HCPCS | Performed by: PHYSICIAN ASSISTANT

## 2017-06-05 RX ORDER — CLOPIDOGREL BISULFATE 75 MG/1
75 TABLET ORAL DAILY
Qty: 90 TABLET | Refills: 0 | Status: SHIPPED | OUTPATIENT
Start: 2017-06-05 | End: 2017-08-24 | Stop reason: SDUPTHER

## 2017-06-05 RX ORDER — HYDROCODONE BITARTRATE AND ACETAMINOPHEN 5; 325 MG/1; MG/1
1 TABLET ORAL EVERY 6 HOURS PRN
COMMUNITY
End: 2017-06-08

## 2017-06-05 RX ORDER — METOPROLOL SUCCINATE 50 MG/1
50 TABLET, EXTENDED RELEASE ORAL DAILY
Qty: 90 TABLET | Refills: 0 | Status: SHIPPED | OUTPATIENT
Start: 2017-06-05 | End: 2017-08-23 | Stop reason: SDUPTHER

## 2017-06-05 RX ORDER — METOPROLOL SUCCINATE 50 MG/1
50 TABLET, EXTENDED RELEASE ORAL DAILY
COMMUNITY
End: 2017-06-05 | Stop reason: SDUPTHER

## 2017-06-07 ENCOUNTER — CARE COORDINATION (OUTPATIENT)
Dept: INTERNAL MEDICINE | Age: 82
End: 2017-06-07

## 2017-06-07 ENCOUNTER — CARE COORDINATION (OUTPATIENT)
Dept: CARE COORDINATION | Age: 82
End: 2017-06-07

## 2017-06-08 RX ORDER — OXYCODONE HYDROCHLORIDE AND ACETAMINOPHEN 5; 325 MG/1; MG/1
1 TABLET ORAL EVERY 6 HOURS PRN
Qty: 15 TABLET | Refills: 0 | Status: SHIPPED | OUTPATIENT
Start: 2017-06-08 | End: 2017-06-15

## 2017-06-08 RX ORDER — OXYCODONE HYDROCHLORIDE AND ACETAMINOPHEN 5; 325 MG/1; MG/1
1 TABLET ORAL EVERY 6 HOURS PRN
COMMUNITY
End: 2017-06-11 | Stop reason: SDUPTHER

## 2017-06-19 ENCOUNTER — CARE COORDINATION (OUTPATIENT)
Dept: CARE COORDINATION | Age: 82
End: 2017-06-19

## 2017-06-26 ENCOUNTER — TELEPHONE (OUTPATIENT)
Dept: INTERNAL MEDICINE | Age: 82
End: 2017-06-26

## 2017-06-26 ENCOUNTER — ANTI-COAG VISIT (OUTPATIENT)
Dept: OTHER | Age: 82
End: 2017-06-26

## 2017-06-26 VITALS — DIASTOLIC BLOOD PRESSURE: 80 MMHG | SYSTOLIC BLOOD PRESSURE: 130 MMHG | HEART RATE: 68 BPM | TEMPERATURE: 99 F

## 2017-06-26 DIAGNOSIS — Z86.718 HISTORY OF DVT (DEEP VEIN THROMBOSIS): ICD-10-CM

## 2017-06-26 DIAGNOSIS — Z86.73 HISTORY OF EMBOLIC STROKE: ICD-10-CM

## 2017-06-26 DIAGNOSIS — Z86.711 HISTORY OF PULMONARY EMBOLISM: ICD-10-CM

## 2017-06-26 DIAGNOSIS — I48.19 PERSISTENT ATRIAL FIBRILLATION (HCC): ICD-10-CM

## 2017-06-26 PROBLEM — R10.9 ABDOMINAL PAIN: Status: RESOLVED | Noted: 2017-04-21 | Resolved: 2017-06-26

## 2017-06-26 PROBLEM — S22.31XA CLOSED FRACTURE OF RIB OF RIGHT SIDE: Status: RESOLVED | Noted: 2017-04-22 | Resolved: 2017-06-26

## 2017-06-26 PROBLEM — K56.41 FECAL IMPACTION IN RECTUM (HCC): Status: RESOLVED | Noted: 2017-01-11 | Resolved: 2017-06-26

## 2017-06-26 PROBLEM — R10.9 RIGHT FLANK PAIN: Status: RESOLVED | Noted: 2017-04-21 | Resolved: 2017-06-26

## 2017-06-26 PROBLEM — R07.81 RIB PAIN: Status: RESOLVED | Noted: 2017-04-22 | Resolved: 2017-06-26

## 2017-06-26 LAB — POC INR: 2.5 (ref 0.8–1.2)

## 2017-06-26 ASSESSMENT — ENCOUNTER SYMPTOMS
DIARRHEA: 0
SHORTNESS OF BREATH: 1
BLOOD IN STOOL: 0
CONSTIPATION: 0

## 2017-07-17 DIAGNOSIS — Z86.711 HISTORY OF PULMONARY EMBOLISM: ICD-10-CM

## 2017-07-17 DIAGNOSIS — Z86.718 HISTORY OF DVT (DEEP VEIN THROMBOSIS): ICD-10-CM

## 2017-07-17 RX ORDER — WARFARIN SODIUM 2.5 MG/1
TABLET ORAL
Qty: 90 TABLET | Refills: 2 | OUTPATIENT
Start: 2017-07-17

## 2017-07-17 RX ORDER — WARFARIN SODIUM 2.5 MG/1
TABLET ORAL
Qty: 90 TABLET | Refills: 0 | Status: SHIPPED | OUTPATIENT
Start: 2017-07-17 | End: 2017-10-18 | Stop reason: SDUPTHER

## 2017-07-18 ENCOUNTER — CARE COORDINATION (OUTPATIENT)
Dept: CARE COORDINATION | Age: 82
End: 2017-07-18

## 2017-07-24 ENCOUNTER — TELEPHONE (OUTPATIENT)
Dept: CARDIOLOGY CLINIC | Age: 82
End: 2017-07-24

## 2017-07-31 ENCOUNTER — HOSPITAL ENCOUNTER (OUTPATIENT)
Dept: PHARMACY | Age: 82
Setting detail: THERAPIES SERIES
Discharge: HOME | End: 2017-07-31
Payer: MEDICARE

## 2017-07-31 VITALS
BODY MASS INDEX: 32.81 KG/M2 | DIASTOLIC BLOOD PRESSURE: 82 MMHG | HEART RATE: 52 BPM | WEIGHT: 215.8 LBS | SYSTOLIC BLOOD PRESSURE: 128 MMHG

## 2017-07-31 DIAGNOSIS — Z86.718 HISTORY OF DVT (DEEP VEIN THROMBOSIS): ICD-10-CM

## 2017-07-31 DIAGNOSIS — I48.19 PERSISTENT ATRIAL FIBRILLATION (HCC): ICD-10-CM

## 2017-07-31 DIAGNOSIS — Z86.711 HISTORY OF PULMONARY EMBOLISM: ICD-10-CM

## 2017-07-31 DIAGNOSIS — Z86.73 HISTORY OF EMBOLIC STROKE: ICD-10-CM

## 2017-07-31 LAB — POC INR: 3.6 (ref 0.8–1.2)

## 2017-07-31 PROCEDURE — 36416 COLLJ CAPILLARY BLOOD SPEC: CPT

## 2017-07-31 PROCEDURE — 85610 PROTHROMBIN TIME: CPT

## 2017-07-31 PROCEDURE — 99211 OFF/OP EST MAY X REQ PHY/QHP: CPT

## 2017-07-31 ASSESSMENT — ENCOUNTER SYMPTOMS
SHORTNESS OF BREATH: 0
BLOOD IN STOOL: 0
DIARRHEA: 0
CONSTIPATION: 0

## 2017-08-02 ENCOUNTER — PROCEDURE VISIT (OUTPATIENT)
Dept: CARDIOLOGY CLINIC | Age: 82
End: 2017-08-02
Payer: MEDICARE

## 2017-08-02 DIAGNOSIS — Z95.0 S/P CARDIAC PACEMAKER PROCEDURE: Primary | ICD-10-CM

## 2017-08-02 PROCEDURE — 93296 REM INTERROG EVL PM/IDS: CPT | Performed by: INTERNAL MEDICINE

## 2017-08-02 PROCEDURE — 93294 REM INTERROG EVL PM/LDLS PM: CPT | Performed by: INTERNAL MEDICINE

## 2017-08-08 ENCOUNTER — CARE COORDINATION (OUTPATIENT)
Dept: CARE COORDINATION | Age: 82
End: 2017-08-08

## 2017-08-08 ENCOUNTER — OFFICE VISIT (OUTPATIENT)
Dept: INTERNAL MEDICINE CLINIC | Age: 82
End: 2017-08-08
Payer: MEDICARE

## 2017-08-08 VITALS
DIASTOLIC BLOOD PRESSURE: 70 MMHG | WEIGHT: 215 LBS | HEART RATE: 72 BPM | BODY MASS INDEX: 32.58 KG/M2 | SYSTOLIC BLOOD PRESSURE: 116 MMHG | HEIGHT: 68 IN

## 2017-08-08 DIAGNOSIS — E66.9 OBESITY (BMI 30-39.9): ICD-10-CM

## 2017-08-08 DIAGNOSIS — Z95.5 S/P DRUG ELUTING CORONARY STENT PLACEMENT: ICD-10-CM

## 2017-08-08 DIAGNOSIS — I48.21 PERMANENT ATRIAL FIBRILLATION (HCC): ICD-10-CM

## 2017-08-08 DIAGNOSIS — G89.4 CHRONIC PAIN SYNDROME: ICD-10-CM

## 2017-08-08 DIAGNOSIS — Z86.718 HISTORY OF DVT (DEEP VEIN THROMBOSIS): ICD-10-CM

## 2017-08-08 DIAGNOSIS — Z86.73 HISTORY OF EMBOLIC STROKE: ICD-10-CM

## 2017-08-08 DIAGNOSIS — Z95.0 S/P CARDIAC PACEMAKER PROCEDURE: ICD-10-CM

## 2017-08-08 DIAGNOSIS — R53.81 PHYSICAL DECONDITIONING: ICD-10-CM

## 2017-08-08 DIAGNOSIS — I25.10 ASCVD (ARTERIOSCLEROTIC CARDIOVASCULAR DISEASE): Primary | ICD-10-CM

## 2017-08-08 DIAGNOSIS — I35.0 MILD AORTIC STENOSIS: ICD-10-CM

## 2017-08-08 DIAGNOSIS — G25.81 RLS (RESTLESS LEGS SYNDROME): ICD-10-CM

## 2017-08-08 DIAGNOSIS — E78.5 HYPERLIPIDEMIA, UNSPECIFIED HYPERLIPIDEMIA TYPE: ICD-10-CM

## 2017-08-08 PROCEDURE — 1123F ACP DISCUSS/DSCN MKR DOCD: CPT | Performed by: INTERNAL MEDICINE

## 2017-08-08 PROCEDURE — G8598 ASA/ANTIPLAT THER USED: HCPCS | Performed by: INTERNAL MEDICINE

## 2017-08-08 PROCEDURE — 4040F PNEUMOC VAC/ADMIN/RCVD: CPT | Performed by: INTERNAL MEDICINE

## 2017-08-08 PROCEDURE — 1090F PRES/ABSN URINE INCON ASSESS: CPT | Performed by: INTERNAL MEDICINE

## 2017-08-08 PROCEDURE — G8400 PT W/DXA NO RESULTS DOC: HCPCS | Performed by: INTERNAL MEDICINE

## 2017-08-08 PROCEDURE — G8417 CALC BMI ABV UP PARAM F/U: HCPCS | Performed by: INTERNAL MEDICINE

## 2017-08-08 PROCEDURE — G8427 DOCREV CUR MEDS BY ELIG CLIN: HCPCS | Performed by: INTERNAL MEDICINE

## 2017-08-08 PROCEDURE — 99214 OFFICE O/P EST MOD 30 MIN: CPT | Performed by: INTERNAL MEDICINE

## 2017-08-08 PROCEDURE — 1036F TOBACCO NON-USER: CPT | Performed by: INTERNAL MEDICINE

## 2017-08-08 RX ORDER — OXYCODONE HYDROCHLORIDE AND ACETAMINOPHEN 5; 325 MG/1; MG/1
1 TABLET ORAL EVERY 12 HOURS PRN
Qty: 60 TABLET | Refills: 0 | Status: CANCELLED | OUTPATIENT
Start: 2017-08-08 | End: 2017-08-15

## 2017-08-08 RX ORDER — TRAMADOL HYDROCHLORIDE 50 MG/1
50 TABLET ORAL EVERY 6 HOURS PRN
Qty: 30 TABLET | Refills: 0 | Status: SHIPPED | OUTPATIENT
Start: 2017-08-08 | End: 2018-01-25 | Stop reason: SDUPTHER

## 2017-08-21 ENCOUNTER — TELEPHONE (OUTPATIENT)
Dept: INTERNAL MEDICINE CLINIC | Age: 82
End: 2017-08-21

## 2017-08-23 RX ORDER — METOPROLOL SUCCINATE 50 MG/1
TABLET, EXTENDED RELEASE ORAL
Qty: 90 TABLET | Refills: 1 | Status: SHIPPED | OUTPATIENT
Start: 2017-08-23 | End: 2018-02-19 | Stop reason: SDUPTHER

## 2017-08-24 RX ORDER — CLOPIDOGREL BISULFATE 75 MG/1
TABLET ORAL
Qty: 90 TABLET | Refills: 3 | Status: SHIPPED | OUTPATIENT
Start: 2017-08-24 | End: 2018-08-20 | Stop reason: SDUPTHER

## 2017-08-30 ENCOUNTER — HOSPITAL ENCOUNTER (OUTPATIENT)
Dept: WOUND CARE | Age: 82
Discharge: HOME | End: 2017-08-30
Payer: MEDICARE

## 2017-08-30 VITALS
BODY MASS INDEX: 34.89 KG/M2 | SYSTOLIC BLOOD PRESSURE: 168 MMHG | TEMPERATURE: 97.8 F | HEIGHT: 68 IN | RESPIRATION RATE: 18 BRPM | DIASTOLIC BLOOD PRESSURE: 79 MMHG | WEIGHT: 230.2 LBS | HEART RATE: 79 BPM | OXYGEN SATURATION: 94 %

## 2017-08-30 PROCEDURE — A4649 SURGICAL SUPPLIES: HCPCS

## 2017-08-30 PROCEDURE — A6454 SELF-ADHER BAND W>=3" <5"/YD: HCPCS

## 2017-08-30 PROCEDURE — 29581 APPL MULTLAYER CMPRN SYS LEG: CPT

## 2017-08-30 PROCEDURE — 29580 STRAPPING UNNA BOOT: CPT

## 2017-08-30 PROCEDURE — G0463 HOSPITAL OUTPT CLINIC VISIT: HCPCS

## 2017-08-30 PROCEDURE — A6197 ALGINATE DRSG >16 <=48 SQ IN: HCPCS

## 2017-08-30 RX ORDER — POLYETHYLENE GLYCOL 3350 17 G/17G
17 POWDER, FOR SOLUTION ORAL DAILY
COMMUNITY
End: 2017-10-19 | Stop reason: ALTCHOICE

## 2017-08-30 RX ORDER — TRAMADOL HYDROCHLORIDE 50 MG/1
50 TABLET ORAL EVERY 6 HOURS PRN
COMMUNITY
End: 2017-09-19 | Stop reason: SDUPTHER

## 2017-08-30 ASSESSMENT — PAIN DESCRIPTION - PAIN TYPE: TYPE: CHRONIC PAIN

## 2017-08-30 ASSESSMENT — PAIN DESCRIPTION - FREQUENCY: FREQUENCY: INTERMITTENT

## 2017-08-30 ASSESSMENT — PAIN DESCRIPTION - DESCRIPTORS: DESCRIPTORS: CONSTANT

## 2017-08-30 ASSESSMENT — PAIN DESCRIPTION - ORIENTATION: ORIENTATION: RIGHT

## 2017-08-30 ASSESSMENT — PAIN DESCRIPTION - LOCATION: LOCATION: TOE (COMMENT WHICH ONE)

## 2017-08-30 ASSESSMENT — PAIN SCALES - GENERAL: PAINLEVEL_OUTOF10: 3

## 2017-09-05 ENCOUNTER — HOSPITAL ENCOUNTER (OUTPATIENT)
Dept: PHARMACY | Age: 82
Setting detail: THERAPIES SERIES
Discharge: HOME | End: 2017-09-05
Payer: MEDICARE

## 2017-09-05 VITALS — DIASTOLIC BLOOD PRESSURE: 80 MMHG | TEMPERATURE: 96.3 F | SYSTOLIC BLOOD PRESSURE: 122 MMHG | HEART RATE: 80 BPM

## 2017-09-05 DIAGNOSIS — I48.19 PERSISTENT ATRIAL FIBRILLATION (HCC): ICD-10-CM

## 2017-09-05 DIAGNOSIS — Z86.711 HISTORY OF PULMONARY EMBOLISM: ICD-10-CM

## 2017-09-05 DIAGNOSIS — Z86.718 HISTORY OF DVT (DEEP VEIN THROMBOSIS): ICD-10-CM

## 2017-09-05 DIAGNOSIS — Z86.73 HISTORY OF EMBOLIC STROKE: ICD-10-CM

## 2017-09-05 LAB — POC INR: 2.4 (ref 0.8–1.2)

## 2017-09-05 PROCEDURE — 36416 COLLJ CAPILLARY BLOOD SPEC: CPT

## 2017-09-05 PROCEDURE — 99211 OFF/OP EST MAY X REQ PHY/QHP: CPT

## 2017-09-05 PROCEDURE — 85610 PROTHROMBIN TIME: CPT

## 2017-09-05 ASSESSMENT — ENCOUNTER SYMPTOMS
COLOR CHANGE: 0
CONSTIPATION: 0
DIARRHEA: 0
SHORTNESS OF BREATH: 0
ANAL BLEEDING: 0
NAUSEA: 0

## 2017-09-14 ENCOUNTER — CARE COORDINATION (OUTPATIENT)
Dept: CARE COORDINATION | Age: 82
End: 2017-09-14

## 2017-09-18 ENCOUNTER — CARE COORDINATION (OUTPATIENT)
Dept: CARE COORDINATION | Age: 82
End: 2017-09-18

## 2017-09-20 ENCOUNTER — HOSPITAL ENCOUNTER (OUTPATIENT)
Dept: WOUND CARE | Age: 82
Discharge: HOME | End: 2017-09-20
Payer: MEDICARE

## 2017-09-20 ENCOUNTER — TELEPHONE (OUTPATIENT)
Dept: INTERNAL MEDICINE CLINIC | Age: 82
End: 2017-09-20

## 2017-09-20 VITALS
TEMPERATURE: 97.8 F | RESPIRATION RATE: 18 BRPM | DIASTOLIC BLOOD PRESSURE: 72 MMHG | SYSTOLIC BLOOD PRESSURE: 152 MMHG | OXYGEN SATURATION: 96 % | HEART RATE: 77 BPM

## 2017-09-20 PROCEDURE — A6454 SELF-ADHER BAND W>=3" <5"/YD: HCPCS

## 2017-09-20 PROCEDURE — 99213 OFFICE O/P EST LOW 20 MIN: CPT

## 2017-09-20 RX ORDER — TRAMADOL HYDROCHLORIDE 50 MG/1
50 TABLET ORAL EVERY 6 HOURS PRN
Qty: 30 TABLET | Refills: 0 | Status: SHIPPED | OUTPATIENT
Start: 2017-09-20 | End: 2017-10-19 | Stop reason: SDUPTHER

## 2017-09-20 ASSESSMENT — PAIN SCALES - GENERAL: PAINLEVEL_OUTOF10: 0

## 2017-10-10 ENCOUNTER — HOSPITAL ENCOUNTER (OUTPATIENT)
Dept: PHARMACY | Age: 82
Setting detail: THERAPIES SERIES
Discharge: HOME | End: 2017-10-10
Payer: MEDICARE

## 2017-10-10 VITALS — HEART RATE: 73 BPM | SYSTOLIC BLOOD PRESSURE: 150 MMHG | DIASTOLIC BLOOD PRESSURE: 83 MMHG | TEMPERATURE: 96.5 F

## 2017-10-10 DIAGNOSIS — Z86.73 HISTORY OF EMBOLIC STROKE: ICD-10-CM

## 2017-10-10 DIAGNOSIS — I48.19 PERSISTENT ATRIAL FIBRILLATION (HCC): ICD-10-CM

## 2017-10-10 DIAGNOSIS — Z86.718 HISTORY OF DVT (DEEP VEIN THROMBOSIS): ICD-10-CM

## 2017-10-10 DIAGNOSIS — Z86.711 HISTORY OF PULMONARY EMBOLISM: ICD-10-CM

## 2017-10-10 LAB
HCT VFR BLD CALC: 46.3 % (ref 37–47)
HEMOGLOBIN: 15.3 GM/DL (ref 12–16)
POC INR: 2.9 (ref 0.8–1.2)

## 2017-10-10 PROCEDURE — 85610 PROTHROMBIN TIME: CPT

## 2017-10-10 PROCEDURE — 99211 OFF/OP EST MAY X REQ PHY/QHP: CPT

## 2017-10-10 PROCEDURE — 36416 COLLJ CAPILLARY BLOOD SPEC: CPT

## 2017-10-10 ASSESSMENT — ENCOUNTER SYMPTOMS
SHORTNESS OF BREATH: 0
BLOOD IN STOOL: 0
CONSTIPATION: 0
DIARRHEA: 0

## 2017-10-18 DIAGNOSIS — Z86.711 HISTORY OF PULMONARY EMBOLISM: ICD-10-CM

## 2017-10-18 DIAGNOSIS — Z86.718 HISTORY OF DVT (DEEP VEIN THROMBOSIS): ICD-10-CM

## 2017-10-19 ENCOUNTER — CARE COORDINATION (OUTPATIENT)
Dept: CARE COORDINATION | Age: 82
End: 2017-10-19

## 2017-10-19 DIAGNOSIS — G89.4 CHRONIC PAIN SYNDROME: Primary | ICD-10-CM

## 2017-10-19 RX ORDER — FOLIC ACID/MULTIVIT,IRON,MINER .4-18-35
1 TABLET,CHEWABLE ORAL DAILY
COMMUNITY
End: 2018-10-05 | Stop reason: ALTCHOICE

## 2017-10-19 NOTE — CARE COORDINATION
Received VM message from daughter Betsy Torres stating that Naila Valero is down to 2 ultram and is requesting new script. Continues to have pain. Is only taking 1 pain pill daily and that is at HS.    10/18-attempted to reach bryan Torres. No answer. Left VM to return call. Ambulatory Care Coordination Note  10/19/2017  CM Risk Score: 11  Toney Mortality Risk Score: 63.49    ACC: Bebo Mazariegos, RN    Summary Note: spoke with daughter Betsy Torres. Betsy Torres states that Naila Valero is doing ok. She is no longer receiving HH services. Was seeing Dr. Kay Leigh for ble wounds. Daughter states that she was wrapping ble at home, but did not feel they were helping so she has since stopped. Pt was recently seen by Dr. Solorio-dermatology and she evaluated wounds. Pt now applying bactroban twice daily. Daughter states that swelling she noticed improvement in swelling of ble after pt received stem cell inj in back and knee. States that she received that 1mth ago. Continues to have been, but reports that it is not as severe as it had been. Pain continues to be mainly in her bilat toes. Taking 1 ultram at bedtime for pain. Continues to receive neuro biofeedback sessions for chronic pain. Denies any recent falls. She is not monitoring daily wts at home. Concerned that pt is not steady enough to stand on scales to get accurate wt. Reviewed s/s of exac of CHF and stressed importance of early recognition and reporting of symptoms. Verbalizes understanding. Reports that appetite is fair. Encouraged elevation of ble and wrapping legs with ace wraps if swelling worsens. Also stressed importance of monitoring open areas to ble and reviewed s/s of infection. Advised to contact either Dr. Kay Leigh or Dr. Brito if develops more wounds or worsening or present wounds. Pt in need of new ultram script. Med refill encounter routed to PCP. Informed MA that med encounter was routed.     Diabetes Assessment    Medic Alert ID:  No  Meal Planning: pt. )         Goals Addressed     None          Prior to Admission medications    Medication Sig Start Date End Date Taking?  Authorizing Provider   mupirocin (BACTROBAN) 2 % ointment Apply 1 each topically 2 times daily   Yes Historical Provider, MD   multivitamin-iron-minerals-folic acid (CENTRUM) chewable tablet Take 1 tablet by mouth daily   Yes Historical Provider, MD   traMADol (ULTRAM) 50 MG tablet Take 1 tablet by mouth every 6 hours as needed for Pain (severe pain) 9/20/17  Yes Shae Epstein MD   clopidogrel (PLAVIX) 75 MG tablet TAKE 1 TABLET DAILY 8/24/17  Yes Blane Villa MD   metoprolol succinate (TOPROL XL) 50 MG extended release tablet TAKE 1 TABLET DAILY 8/23/17  Yes Woodford Oppenheim Buettner, PA-C   warfarin (COUMADIN) 2.5 MG tablet Take as directed by the [facility], 90 tablets for 90 days 7/17/17  Yes Shae Epstein MD   bisacodyl (DULCOLAX) 5 MG EC tablet Take 5 mg by mouth daily as needed for Constipation   Yes Historical Provider, MD   furosemide (LASIX) 20 MG tablet Take 1 tablet by mouth daily 5/2/17  Yes Shae Epstein MD   potassium chloride (KLOR-CON M) 10 MEQ extended release tablet Take 1 tablet by mouth daily 5/2/17  Yes Shae Epstein MD   amLODIPine (NORVASC) 5 MG tablet Take 1 tablet by mouth daily 5/2/17  Yes Shae Epstein MD   pravastatin (PRAVACHOL) 20 MG tablet Take 1 tablet by mouth every evening 5/2/17  Yes Shae Epstein MD   isosorbide mononitrate (IMDUR) 60 MG extended release tablet Take 1 tablet by mouth daily 5/2/17  Yes Shae Epstein MD   aspirin 81 MG EC tablet Take 1 tablet by mouth daily 11/22/16  Yes Arely Mcginnis MD   acetaminophen (TYLENOL) 325 MG tablet Take 2 tablets by mouth every 4 hours as needed for Pain 4/27/17   Tremayne Patel MD   metolazone (ZAROXOLYN) 2.5 MG tablet Take 1 tablet by mouth daily as needed (SOB) 3/16/17   Shae Epstein MD   Glucose Blood (BLOOD GLUCOSE TEST STRIPS) STRP 1 strip by In Vitro route daily 11/22/16   Eva Palomino MD       Future Appointments  Date Time Provider Thang Fleming   11/14/2017 10:30 AM Jenn Carson, RN [facility] [place]   12/12/2017 12:30 PM Melissa Valdovinos MD SRPX Physic [facility] [place]   1/24/2018 10:45 AM MD GUILLERMO VannX Heart [facility] AM [place]   1/29/2018 10:00 AM SCHEDULE, SRPS PACER NURSE [facility] [place]

## 2017-10-20 RX ORDER — WARFARIN SODIUM 2.5 MG/1
TABLET ORAL
Qty: 90 TABLET | Refills: 0 | Status: SHIPPED | OUTPATIENT
Start: 2017-10-20 | End: 2018-01-18 | Stop reason: SDUPTHER

## 2017-10-20 RX ORDER — TRAMADOL HYDROCHLORIDE 50 MG/1
50 TABLET ORAL EVERY 6 HOURS PRN
Qty: 30 TABLET | Refills: 0 | Status: SHIPPED | OUTPATIENT
Start: 2017-10-20 | End: 2017-11-16 | Stop reason: SDUPTHER

## 2017-11-01 ENCOUNTER — PROCEDURE VISIT (OUTPATIENT)
Dept: CARDIOLOGY CLINIC | Age: 82
End: 2017-11-01
Payer: MEDICARE

## 2017-11-01 DIAGNOSIS — Z95.0 S/P CARDIAC PACEMAKER PROCEDURE: Primary | ICD-10-CM

## 2017-11-01 PROCEDURE — 93296 REM INTERROG EVL PM/IDS: CPT | Performed by: INTERNAL MEDICINE

## 2017-11-01 PROCEDURE — 93294 REM INTERROG EVL PM/LDLS PM: CPT | Performed by: INTERNAL MEDICINE

## 2017-11-14 ENCOUNTER — HOSPITAL ENCOUNTER (OUTPATIENT)
Dept: PHARMACY | Age: 82
Setting detail: THERAPIES SERIES
Discharge: HOME | End: 2017-11-14
Payer: MEDICARE

## 2017-11-14 VITALS
HEART RATE: 68 BPM | DIASTOLIC BLOOD PRESSURE: 88 MMHG | TEMPERATURE: 97.9 F | SYSTOLIC BLOOD PRESSURE: 148 MMHG | RESPIRATION RATE: 16 BRPM

## 2017-11-14 DIAGNOSIS — Z86.718 HISTORY OF DVT (DEEP VEIN THROMBOSIS): ICD-10-CM

## 2017-11-14 DIAGNOSIS — Z86.73 HISTORY OF EMBOLIC STROKE: ICD-10-CM

## 2017-11-14 DIAGNOSIS — Z86.711 HISTORY OF PULMONARY EMBOLISM: ICD-10-CM

## 2017-11-14 DIAGNOSIS — I48.19 PERSISTENT ATRIAL FIBRILLATION (HCC): ICD-10-CM

## 2017-11-14 LAB — POC INR: 2.6 (ref 0.8–1.2)

## 2017-11-14 PROCEDURE — 99211 OFF/OP EST MAY X REQ PHY/QHP: CPT

## 2017-11-14 PROCEDURE — 36416 COLLJ CAPILLARY BLOOD SPEC: CPT

## 2017-11-14 PROCEDURE — 85610 PROTHROMBIN TIME: CPT

## 2017-11-14 ASSESSMENT — ENCOUNTER SYMPTOMS
SHORTNESS OF BREATH: 0
BLOOD IN STOOL: 0
CONSTIPATION: 0
DIARRHEA: 0

## 2017-11-16 ENCOUNTER — CARE COORDINATION (OUTPATIENT)
Dept: CARE COORDINATION | Age: 82
End: 2017-11-16

## 2017-11-16 DIAGNOSIS — G89.4 CHRONIC PAIN SYNDROME: ICD-10-CM

## 2017-11-16 RX ORDER — CLOBETASOL PROPIONATE 0.5 MG/G
1 CREAM TOPICAL 2 TIMES DAILY
COMMUNITY
End: 2018-01-29 | Stop reason: ALTCHOICE

## 2017-11-16 NOTE — CARE COORDINATION
Ambulatory Care Coordination Note  11/16/2017  CM Risk Score: 11  Toney Mortality Risk Score: 63.49    ACC: Davide Weems, RN    Summary Note: spoke with daughter Ruel George today. States that Anton Alex is doing ok. Continues to have back and bilat toe pain. Continues to see chiropractor routinely. Recently had decompression session at chiropractor. Ruel George states that it did seem to help her. Requesting new script for ultram.  Pt taking pain medicine at night. Ruel George states that she has pain all the time, but is able to manage without taking pain medicine other than at night. She denies any recent falls. Continues to have some open areas on bilat lower ext's. Reports that she was just at dermatology and dermatologist ordered another cream for her to apply to ble. Cream added to med list. States that they are healing. Med refill requests sent for bisacodyl and ultram.  Pt reports that her appetite is good and she is drinking adequate amts of fluid. Not monitoring daily wts d/t unsteady gait. Reviewed s/s of exacerbation of CHF. Advised to monitor closely and call with any concerns. Stressed importance of early symptom recognition and reporting. Denies other needs or concerns at this time. Encouraged to monitor open areas on bilat lower ext's. Encouraged close f/u with dermatologist.  [place] to call if open areas worsen. Verbalizes understanding. Diabetes Assessment    Medic Alert ID:  No  Meal Planning:  Avoidance of concentrated sweets   How often do you test your blood sugar?:  No Testing   Do you have barriers with adherence to non-pharmacologic self-management interventions?  (Nutrition/Exercise/Self-Monitoring):  Yes   Have you ever had to go to the ED for symptoms of low blood sugar?:  No       Do you have hyperglycemia symptoms?:  No   Do you have hypoglycemia symptoms?:  No   Blood Sugar Monitoring Regimen:  Not Testing       and   Congestive Heart Failure Assessment    Are you currently restricting fluids?:  No Restriction  Do you understand a low sodium diet?:  Yes  Do you understand how to read food labels?:  Yes  How many restaurant meals do you eat per week?:  0  Do you salt your food before tasting it?:  No         Symptoms:   CHF associated angina: Neg, CHF associated dyspnea on exertion: Pos, CHF associated fatigue: Neg, CHF associated leg swelling: Pos, CHF associated orthostatic hypotension: Neg, CHF associated PND: Neg, CHF associated shortness of breath: Neg, CHF associated weakness: Neg (Comment: mild leg swelling. Yennifer Courser reports chronic in nature. )      Symptom course:  stable  Patient-reported weight (lb):  (Comment: not monitoring wts daily d/t unsteady gait)  Salt intake watch compared to last visit:  stable                 Care Coordination Interventions    Program Enrollment:  Complex Care  Referral from Primary Care Provider:  Yes  Suggested Interventions and [address] [place] [address] Hwy:  Completed (Comment: completed HH)  Medi Set or Pill Pack:  Completed (Comment: daughter managing meds and setting them up weekly in pill box)  Physical Therapy:  Completed (Comment: no longer receiving PT)  Other Therapy Services:  Completed (Comment: receiving neurofeedback tx's at [facility])  Transportation Support:  Declined  Zone Management Tools:  Completed (Comment: CHF zone mgmt tool sheet mailed to pt. )         Goals Addressed             Most Recent     Conditions and Symptoms   Improving (11/16/2017)             I will schedule office visits, as directed by my provider. I will keep my appointment or reschedule if I have to cancel. I will notify my provider of any barriers to my plan of care. I will follow my Zone Management tool to seek urgent or emergent care. I will notify my provider of any symptoms that indicate a worsening of my condition.     Barriers: overwhelmed by complexity of regimen  Plan for overcoming my barriers: daughter to provide tablet by mouth daily as needed (SOB) 3/16/17   Mag Patrick MD   Glucose Blood (BLOOD GLUCOSE TEST STRIPS) STRP 1 strip by In Vitro route daily 11/22/16   Greta Libman, MD       Future Appointments  Date Time Provider Thang Fleming   12/12/2017 12:30 PM Mag Patrick MD SRPX Physic [place] - [place] [address]   12/19/2017 1:00 PM Frida Lazcano RN STR MED MGMT [facility] [place] [address]   1/24/2018 10:45 AM J[facility] Villegas [facility] [place]   1/29/2018 10:00 AM SCHEDULE, SRPS PACER NURSE SRPX PACER [facility] [place] [address] [address]

## 2017-11-16 NOTE — TELEPHONE ENCOUNTER
Spoke with daughter [place] ([facility]) today. She states that Manjit Caballero continues to take ultram for back and bilat toe  pain. Pt currently only taking 1 pill at night. Reports that she has 9 pills left. Requesting new script for ultram.  Pt also takes bisacodyl as needed for constipation and is getting low on medication. Eliseo chacko states that she does not have to use it very often. She is requesting script for this also. meds pended and routed. Please advise.

## 2017-11-17 RX ORDER — TRAMADOL HYDROCHLORIDE 50 MG/1
50 TABLET ORAL EVERY 6 HOURS PRN
Qty: 30 TABLET | Refills: 0 | Status: SHIPPED | OUTPATIENT
Start: 2017-11-17 | End: 2017-12-12 | Stop reason: SDUPTHER

## 2017-11-20 NOTE — TELEPHONE ENCOUNTER
0900 received call back from bryan Matthias Soliman. Informed her that ultram script available at office for . Verbalizes understanding. In[facility] Verbalizes understanding.

## 2017-12-04 ENCOUNTER — APPOINTMENT (OUTPATIENT)
Dept: CT IMAGING | Age: 82
DRG: 690 | End: 2017-12-04
Payer: MEDICARE

## 2017-12-04 ENCOUNTER — HOSPITAL ENCOUNTER (INPATIENT)
Age: 82
LOS: 4 days | Discharge: HOME | DRG: 690 | End: 2017-12-10
Attending: INTERNAL MEDICINE | Admitting: INTERNAL MEDICINE
Payer: MEDICARE

## 2017-12-04 ENCOUNTER — APPOINTMENT (OUTPATIENT)
Dept: GENERAL RADIOLOGY | Age: 82
DRG: 690 | End: 2017-12-04
Payer: MEDICARE

## 2017-12-04 DIAGNOSIS — I10 ESSENTIAL HYPERTENSION: ICD-10-CM

## 2017-12-04 PROBLEM — M25.559 HIP PAIN: Status: ACTIVE | Noted: 2017-12-04

## 2017-12-04 LAB
ALBUMIN SERPL-MCNC: 3.9 G/DL (ref 3.5–5.1)
ALP BLD-CCNC: 76 U/L (ref 38–126)
ALT SERPL-CCNC: 12 U/L (ref 11–66)
ANION GAP SERPL CALCULATED.3IONS-SCNC: 14 MEQ/L (ref 8–16)
ANISOCYTOSIS: ABNORMAL
AST SERPL-CCNC: 17 U/L (ref 5–40)
BACTERIA: ABNORMAL
BASOPHILS # BLD: 0.4 %
BASOPHILS ABSOLUTE: 0 THOU/MM3 (ref 0–0.1)
BILIRUB SERPL-MCNC: 1.3 MG/DL (ref 0.3–1.2)
BILIRUBIN URINE: NEGATIVE
BLOOD, URINE: ABNORMAL
BUN BLDV-MCNC: 15 MG/DL (ref 7–22)
CALCIUM SERPL-MCNC: 8.9 MG/DL (ref 8.5–10.5)
CASTS: ABNORMAL /LPF
CASTS: ABNORMAL /LPF
CHARACTER, URINE: ABNORMAL
CHLORIDE BLD-SCNC: 99 MEQ/L (ref 98–111)
CO2: 27 MEQ/L (ref 23–33)
COLOR: YELLOW
CREAT SERPL-MCNC: 1.2 MG/DL (ref 0.4–1.2)
CRYSTALS: ABNORMAL
EKG ATRIAL RATE: 66 BPM
EKG Q-T INTERVAL: 452 MS
EKG QRS DURATION: 152 MS
EKG QTC CALCULATION (BAZETT): 497 MS
EKG R AXIS: 41 DEGREES
EKG T AXIS: -30 DEGREES
EKG VENTRICULAR RATE: 73 BPM
EOSINOPHIL # BLD: 0.2 %
EOSINOPHILS ABSOLUTE: 0 THOU/MM3 (ref 0–0.4)
EPITHELIAL CELLS, UA: ABNORMAL /HPF
GFR SERPL CREATININE-BSD FRML MDRD: 43 ML/MIN/1.73M2
GLUCOSE BLD-MCNC: 191 MG/DL (ref 70–108)
GLUCOSE, URINE: NEGATIVE MG/DL
HCT VFR BLD CALC: 48.6 % (ref 37–47)
HEMOGLOBIN: 16 GM/DL (ref 12–16)
KETONES, URINE: NEGATIVE
LEUKOCYTE ESTERASE, URINE: ABNORMAL
LYMPHOCYTES # BLD: 7.9 %
LYMPHOCYTES ABSOLUTE: 0.8 THOU/MM3 (ref 1–4.8)
MCH RBC QN AUTO: 29.1 PG (ref 27–31)
MCHC RBC AUTO-ENTMCNC: 32.9 GM/DL (ref 33–37)
MCV RBC AUTO: 88.4 FL (ref 81–99)
MISCELLANEOUS LAB TEST RESULT: ABNORMAL
MONOCYTES # BLD: 7.8 %
MONOCYTES ABSOLUTE: 0.8 THOU/MM3 (ref 0.4–1.3)
NITRITE, URINE: POSITIVE
NUCLEATED RED BLOOD CELLS: 0 /100 WBC
OSMOLALITY CALCULATION: 285.4 MOSMOL/KG (ref 275–300)
PDW BLD-RTO: 17.3 % (ref 11.5–14.5)
PH UA: 7.5
PLATELET # BLD: 158 THOU/MM3 (ref 130–400)
PMV BLD AUTO: 9 MCM (ref 7.4–10.4)
POTASSIUM SERPL-SCNC: 4.1 MEQ/L (ref 3.5–5.2)
PROTEIN UA: 100 MG/DL
RBC # BLD: 5.5 MILL/MM3 (ref 4.2–5.4)
RBC URINE: > 100 /HPF
RENAL EPITHELIAL, UA: ABNORMAL
SEG NEUTROPHILS: 83.7 %
SEGMENTED NEUTROPHILS ABSOLUTE COUNT: 8.5 THOU/MM3 (ref 1.8–7.7)
SODIUM BLD-SCNC: 140 MEQ/L (ref 135–145)
SPECIFIC GRAVITY UA: 1.02 (ref 1–1.03)
TOTAL PROTEIN: 6.6 G/DL (ref 6.1–8)
TROPONIN T: < 0.01 NG/ML
UROBILINOGEN, URINE: 0.2 EU/DL
WBC # BLD: 10.1 THOU/MM3 (ref 4.8–10.8)
WBC UA: > 100 /HPF
YEAST: ABNORMAL

## 2017-12-04 PROCEDURE — 87040 BLOOD CULTURE FOR BACTERIA: CPT

## 2017-12-04 PROCEDURE — 6360000002 HC RX W HCPCS

## 2017-12-04 PROCEDURE — 6360000002 HC RX W HCPCS: Performed by: PHYSICIAN ASSISTANT

## 2017-12-04 PROCEDURE — 73700 CT LOWER EXTREMITY W/O DYE: CPT

## 2017-12-04 PROCEDURE — 93005 ELECTROCARDIOGRAM TRACING: CPT

## 2017-12-04 PROCEDURE — 83036 HEMOGLOBIN GLYCOSYLATED A1C: CPT

## 2017-12-04 PROCEDURE — G0378 HOSPITAL OBSERVATION PER HR: HCPCS

## 2017-12-04 PROCEDURE — 96374 THER/PROPH/DIAG INJ IV PUSH: CPT

## 2017-12-04 PROCEDURE — 96375 TX/PRO/DX INJ NEW DRUG ADDON: CPT

## 2017-12-04 PROCEDURE — 80053 COMPREHEN METABOLIC PANEL: CPT

## 2017-12-04 PROCEDURE — 2580000003 HC RX 258: Performed by: PHYSICIAN ASSISTANT

## 2017-12-04 PROCEDURE — 81001 URINALYSIS AUTO W/SCOPE: CPT

## 2017-12-04 PROCEDURE — 73502 X-RAY EXAM HIP UNI 2-3 VIEWS: CPT

## 2017-12-04 PROCEDURE — 85025 COMPLETE CBC W/AUTO DIFF WBC: CPT

## 2017-12-04 PROCEDURE — 72125 CT NECK SPINE W/O DYE: CPT

## 2017-12-04 PROCEDURE — 99285 EMERGENCY DEPT VISIT HI MDM: CPT

## 2017-12-04 PROCEDURE — 99222 1ST HOSP IP/OBS MODERATE 55: CPT | Performed by: INTERNAL MEDICINE

## 2017-12-04 PROCEDURE — 36415 COLL VENOUS BLD VENIPUNCTURE: CPT

## 2017-12-04 PROCEDURE — 71010 XR CHEST LIMITED ONE VIEW: CPT

## 2017-12-04 PROCEDURE — 70450 CT HEAD/BRAIN W/O DYE: CPT

## 2017-12-04 PROCEDURE — 84484 ASSAY OF TROPONIN QUANT: CPT

## 2017-12-04 RX ORDER — MORPHINE SULFATE 4 MG/ML
4 INJECTION, SOLUTION INTRAMUSCULAR; INTRAVENOUS ONCE
Status: COMPLETED | OUTPATIENT
Start: 2017-12-04 | End: 2017-12-04

## 2017-12-04 RX ORDER — ONDANSETRON 2 MG/ML
INJECTION INTRAMUSCULAR; INTRAVENOUS
Status: COMPLETED
Start: 2017-12-04 | End: 2017-12-04

## 2017-12-04 RX ORDER — PRAVASTATIN SODIUM 20 MG
20 TABLET ORAL EVERY EVENING
Status: DISCONTINUED | OUTPATIENT
Start: 2017-12-05 | End: 2017-12-10 | Stop reason: HOSPADM

## 2017-12-04 RX ORDER — ONDANSETRON 2 MG/ML
4 INJECTION INTRAMUSCULAR; INTRAVENOUS EVERY 6 HOURS PRN
Status: DISCONTINUED | OUTPATIENT
Start: 2017-12-04 | End: 2017-12-10 | Stop reason: HOSPADM

## 2017-12-04 RX ORDER — NICOTINE POLACRILEX 4 MG
15 LOZENGE BUCCAL PRN
Status: DISCONTINUED | OUTPATIENT
Start: 2017-12-04 | End: 2017-12-10 | Stop reason: HOSPADM

## 2017-12-04 RX ORDER — OXYCODONE HYDROCHLORIDE 5 MG/1
5 TABLET ORAL EVERY 4 HOURS PRN
Status: DISCONTINUED | OUTPATIENT
Start: 2017-12-04 | End: 2017-12-10 | Stop reason: HOSPADM

## 2017-12-04 RX ORDER — CLOBETASOL PROPIONATE 0.5 MG/G
CREAM TOPICAL 2 TIMES DAILY
Status: DISCONTINUED | OUTPATIENT
Start: 2017-12-05 | End: 2017-12-10 | Stop reason: HOSPADM

## 2017-12-04 RX ORDER — SODIUM CHLORIDE 0.9 % (FLUSH) 0.9 %
10 SYRINGE (ML) INJECTION PRN
Status: DISCONTINUED | OUTPATIENT
Start: 2017-12-04 | End: 2017-12-10 | Stop reason: HOSPADM

## 2017-12-04 RX ORDER — DIPHENHYDRAMINE HYDROCHLORIDE 50 MG/ML
INJECTION INTRAMUSCULAR; INTRAVENOUS
Status: COMPLETED
Start: 2017-12-04 | End: 2017-12-04

## 2017-12-04 RX ORDER — SODIUM CHLORIDE 0.9 % (FLUSH) 0.9 %
10 SYRINGE (ML) INJECTION EVERY 12 HOURS SCHEDULED
Status: DISCONTINUED | OUTPATIENT
Start: 2017-12-04 | End: 2017-12-10 | Stop reason: HOSPADM

## 2017-12-04 RX ORDER — DEXTROSE AND SODIUM CHLORIDE 5; .9 G/100ML; G/100ML
100 INJECTION, SOLUTION INTRAVENOUS PRN
Status: DISCONTINUED | OUTPATIENT
Start: 2017-12-04 | End: 2017-12-10 | Stop reason: HOSPADM

## 2017-12-04 RX ORDER — SODIUM CHLORIDE 9 MG/ML
INJECTION, SOLUTION INTRAVENOUS ONCE
Status: COMPLETED | OUTPATIENT
Start: 2017-12-05 | End: 2017-12-05

## 2017-12-04 RX ORDER — MORPHINE SULFATE 4 MG/ML
INJECTION, SOLUTION INTRAMUSCULAR; INTRAVENOUS
Status: COMPLETED
Start: 2017-12-04 | End: 2017-12-04

## 2017-12-04 RX ORDER — AMLODIPINE BESYLATE 5 MG/1
5 TABLET ORAL DAILY
Status: DISCONTINUED | OUTPATIENT
Start: 2017-12-05 | End: 2017-12-10

## 2017-12-04 RX ORDER — POTASSIUM CHLORIDE 20 MEQ/1
40 TABLET, EXTENDED RELEASE ORAL PRN
Status: DISCONTINUED | OUTPATIENT
Start: 2017-12-04 | End: 2017-12-10 | Stop reason: HOSPADM

## 2017-12-04 RX ORDER — METOPROLOL SUCCINATE 50 MG/1
50 TABLET, EXTENDED RELEASE ORAL DAILY
Status: DISCONTINUED | OUTPATIENT
Start: 2017-12-05 | End: 2017-12-10 | Stop reason: HOSPADM

## 2017-12-04 RX ORDER — POLYETHYLENE GLYCOL 3350 17 G/17G
17 POWDER, FOR SOLUTION ORAL DAILY
Status: DISCONTINUED | OUTPATIENT
Start: 2017-12-05 | End: 2017-12-10 | Stop reason: HOSPADM

## 2017-12-04 RX ORDER — DEXTROSE MONOHYDRATE 25 G/50ML
12.5 INJECTION, SOLUTION INTRAVENOUS PRN
Status: DISCONTINUED | OUTPATIENT
Start: 2017-12-04 | End: 2017-12-10 | Stop reason: HOSPADM

## 2017-12-04 RX ORDER — ONDANSETRON 2 MG/ML
4 INJECTION INTRAMUSCULAR; INTRAVENOUS ONCE
Status: COMPLETED | OUTPATIENT
Start: 2017-12-04 | End: 2017-12-04

## 2017-12-04 RX ORDER — POTASSIUM CHLORIDE 20MEQ/15ML
40 LIQUID (ML) ORAL PRN
Status: DISCONTINUED | OUTPATIENT
Start: 2017-12-04 | End: 2017-12-10 | Stop reason: HOSPADM

## 2017-12-04 RX ORDER — POTASSIUM CHLORIDE 7.45 MG/ML
10 INJECTION INTRAVENOUS PRN
Status: DISCONTINUED | OUTPATIENT
Start: 2017-12-04 | End: 2017-12-10 | Stop reason: HOSPADM

## 2017-12-04 RX ORDER — DIPHENHYDRAMINE HYDROCHLORIDE 50 MG/ML
25 INJECTION INTRAMUSCULAR; INTRAVENOUS ONCE
Status: COMPLETED | OUTPATIENT
Start: 2017-12-04 | End: 2017-12-04

## 2017-12-04 RX ORDER — ISOSORBIDE MONONITRATE 60 MG/1
60 TABLET, EXTENDED RELEASE ORAL DAILY
Status: DISCONTINUED | OUTPATIENT
Start: 2017-12-05 | End: 2017-12-10 | Stop reason: HOSPADM

## 2017-12-04 RX ORDER — ACETAMINOPHEN 325 MG/1
650 TABLET ORAL EVERY 4 HOURS PRN
Status: DISCONTINUED | OUTPATIENT
Start: 2017-12-04 | End: 2017-12-10 | Stop reason: HOSPADM

## 2017-12-04 RX ADMIN — ONDANSETRON 4 MG: 2 INJECTION INTRAMUSCULAR; INTRAVENOUS at 19:05

## 2017-12-04 RX ADMIN — CEFTRIAXONE 1 G: 1 INJECTION, POWDER, FOR SOLUTION INTRAMUSCULAR; INTRAVENOUS at 22:56

## 2017-12-04 RX ADMIN — MORPHINE SULFATE 4 MG: 4 INJECTION, SOLUTION INTRAMUSCULAR; INTRAVENOUS at 19:05

## 2017-12-04 RX ADMIN — MORPHINE SULFATE 4 MG: 4 INJECTION INTRAVENOUS at 19:05

## 2017-12-04 RX ADMIN — DIPHENHYDRAMINE HYDROCHLORIDE 25 MG: 50 INJECTION INTRAMUSCULAR; INTRAVENOUS at 19:04

## 2017-12-04 RX ADMIN — DIPHENHYDRAMINE HYDROCHLORIDE 25 MG: 50 INJECTION, SOLUTION INTRAMUSCULAR; INTRAVENOUS at 19:04

## 2017-12-04 RX ADMIN — MORPHINE SULFATE 4 MG: 4 INJECTION INTRAVENOUS at 20:53

## 2017-12-04 ASSESSMENT — PAIN SCALES - GENERAL
PAINLEVEL_OUTOF10: 10
PAINLEVEL_OUTOF10: 10
PAINLEVEL_OUTOF10: 6

## 2017-12-04 ASSESSMENT — PAIN DESCRIPTION - PAIN TYPE: TYPE: ACUTE PAIN

## 2017-12-04 NOTE — ED NOTES
Bed: 014A  Expected date: 12/4/17  Expected time: 6:25 PM  Means of arrival:   Comments:  [facility]  12/04/17 182

## 2017-12-04 NOTE — ED TRIAGE NOTES
Pt to room 14. Pt presents to ED with c/o left hip pain. Report states that last night pt stated she felt a \"pop\" in her left leg. Pt did go to the chiropractor today who told her to give it 24 hrs. When patient arrived home she went to climb stairs. Pt got to the second stair and couldn't lift her right leg. Pt's daughter states she then noticed pt's left leg angled funny. Pt then with c/o feeling dizzy so daughter helped to lower her to the ground and called 911. Currently pt is alert, oriented. Pt is moaning in pain. Deformity noted in left leg with outward turn.

## 2017-12-05 ENCOUNTER — APPOINTMENT (OUTPATIENT)
Dept: GENERAL RADIOLOGY | Age: 82
DRG: 690 | End: 2017-12-05
Payer: MEDICARE

## 2017-12-05 LAB
ANION GAP SERPL CALCULATED.3IONS-SCNC: 11 MEQ/L (ref 8–16)
AVERAGE GLUCOSE: 132 MG/DL (ref 70–126)
BUN BLDV-MCNC: 15 MG/DL (ref 7–22)
CALCIUM SERPL-MCNC: 8.6 MG/DL (ref 8.5–10.5)
CHLORIDE BLD-SCNC: 102 MEQ/L (ref 98–111)
CO2: 27 MEQ/L (ref 23–33)
CREAT SERPL-MCNC: 1 MG/DL (ref 0.4–1.2)
GFR SERPL CREATININE-BSD FRML MDRD: 53 ML/MIN/1.73M2
GLUCOSE BLD-MCNC: 123 MG/DL (ref 70–108)
GLUCOSE BLD-MCNC: 155 MG/DL (ref 70–108)
GLUCOSE BLD-MCNC: 180 MG/DL (ref 70–108)
GLUCOSE BLD-MCNC: 183 MG/DL (ref 70–108)
HBA1C MFR BLD: 6.4 % (ref 4.4–6.4)
HCT VFR BLD CALC: 43.3 % (ref 37–47)
HEMOGLOBIN: 14.6 GM/DL (ref 12–16)
MAGNESIUM: 1.9 MG/DL (ref 1.6–2.4)
MCH RBC QN AUTO: 29.8 PG (ref 27–31)
MCHC RBC AUTO-ENTMCNC: 33.6 GM/DL (ref 33–37)
MCV RBC AUTO: 88.7 FL (ref 81–99)
OSMOLALITY CALCULATION: 283.4 MOSMOL/KG (ref 275–300)
PDW BLD-RTO: 17.4 % (ref 11.5–14.5)
PLATELET # BLD: 151 THOU/MM3 (ref 130–400)
PMV BLD AUTO: 9.1 MCM (ref 7.4–10.4)
POTASSIUM SERPL-SCNC: 3.5 MEQ/L (ref 3.5–5.2)
RBC # BLD: 4.88 MILL/MM3 (ref 4.2–5.4)
SODIUM BLD-SCNC: 140 MEQ/L (ref 135–145)
WBC # BLD: 7.4 THOU/MM3 (ref 4.8–10.8)

## 2017-12-05 PROCEDURE — G0378 HOSPITAL OBSERVATION PER HR: HCPCS

## 2017-12-05 PROCEDURE — 72100 X-RAY EXAM L-S SPINE 2/3 VWS: CPT

## 2017-12-05 PROCEDURE — 85027 COMPLETE CBC AUTOMATED: CPT

## 2017-12-05 PROCEDURE — 80048 BASIC METABOLIC PNL TOTAL CA: CPT

## 2017-12-05 PROCEDURE — 6360000002 HC RX W HCPCS: Performed by: HOSPITALIST

## 2017-12-05 PROCEDURE — 36415 COLL VENOUS BLD VENIPUNCTURE: CPT

## 2017-12-05 PROCEDURE — 2580000003 HC RX 258: Performed by: HOSPITALIST

## 2017-12-05 PROCEDURE — 87086 URINE CULTURE/COLONY COUNT: CPT

## 2017-12-05 PROCEDURE — 83735 ASSAY OF MAGNESIUM: CPT

## 2017-12-05 PROCEDURE — 96375 TX/PRO/DX INJ NEW DRUG ADDON: CPT

## 2017-12-05 PROCEDURE — 99222 1ST HOSP IP/OBS MODERATE 55: CPT | Performed by: HOSPITALIST

## 2017-12-05 PROCEDURE — 82948 REAGENT STRIP/BLOOD GLUCOSE: CPT

## 2017-12-05 PROCEDURE — 6370000000 HC RX 637 (ALT 250 FOR IP): Performed by: INTERNAL MEDICINE

## 2017-12-05 PROCEDURE — 96376 TX/PRO/DX INJ SAME DRUG ADON: CPT

## 2017-12-05 PROCEDURE — 2580000003 HC RX 258: Performed by: INTERNAL MEDICINE

## 2017-12-05 RX ORDER — KETOROLAC TROMETHAMINE 30 MG/ML
15 INJECTION, SOLUTION INTRAMUSCULAR; INTRAVENOUS EVERY 6 HOURS PRN
Status: DISPENSED | OUTPATIENT
Start: 2017-12-05 | End: 2017-12-10

## 2017-12-05 RX ORDER — 0.9 % SODIUM CHLORIDE 0.9 %
500 INTRAVENOUS SOLUTION INTRAVENOUS ONCE
Status: DISCONTINUED | OUTPATIENT
Start: 2017-12-05 | End: 2017-12-10 | Stop reason: HOSPADM

## 2017-12-05 RX ADMIN — OXYCODONE HYDROCHLORIDE 5 MG: 5 TABLET ORAL at 22:20

## 2017-12-05 RX ADMIN — OXYCODONE HYDROCHLORIDE 5 MG: 5 TABLET ORAL at 09:25

## 2017-12-05 RX ADMIN — KETOROLAC TROMETHAMINE 15 MG: 30 INJECTION, SOLUTION INTRAMUSCULAR at 18:01

## 2017-12-05 RX ADMIN — Medication 10 ML: at 03:08

## 2017-12-05 RX ADMIN — CLOBETASOL PROPIONATE: 0.5 CREAM TOPICAL at 20:24

## 2017-12-05 RX ADMIN — Medication 10 ML: at 20:26

## 2017-12-05 RX ADMIN — METOPROLOL SUCCINATE 50 MG: 50 TABLET, FILM COATED, EXTENDED RELEASE ORAL at 09:21

## 2017-12-05 RX ADMIN — CLOBETASOL PROPIONATE: 0.5 CREAM TOPICAL at 09:21

## 2017-12-05 RX ADMIN — SODIUM CHLORIDE: 9 INJECTION, SOLUTION INTRAVENOUS at 03:08

## 2017-12-05 RX ADMIN — CEFTRIAXONE SODIUM 1 G: 10 INJECTION, POWDER, FOR SOLUTION INTRAVENOUS at 23:19

## 2017-12-05 RX ADMIN — AMLODIPINE BESYLATE 5 MG: 5 TABLET ORAL at 09:21

## 2017-12-05 RX ADMIN — MUPIROCIN: 20 OINTMENT TOPICAL at 20:24

## 2017-12-05 RX ADMIN — OXYCODONE HYDROCHLORIDE 5 MG: 5 TABLET ORAL at 14:58

## 2017-12-05 RX ADMIN — MUPIROCIN: 20 OINTMENT TOPICAL at 09:21

## 2017-12-05 RX ADMIN — PRAVASTATIN SODIUM 20 MG: 20 TABLET ORAL at 20:24

## 2017-12-05 RX ADMIN — ISOSORBIDE MONONITRATE 60 MG: 60 TABLET ORAL at 09:21

## 2017-12-05 ASSESSMENT — PAIN DESCRIPTION - PAIN TYPE
TYPE: ACUTE PAIN

## 2017-12-05 ASSESSMENT — PAIN DESCRIPTION - LOCATION
LOCATION: BACK;HIP

## 2017-12-05 ASSESSMENT — PAIN DESCRIPTION - ORIENTATION
ORIENTATION: LEFT

## 2017-12-05 ASSESSMENT — PAIN DESCRIPTION - DESCRIPTORS
DESCRIPTORS: ACHING

## 2017-12-05 ASSESSMENT — PAIN DESCRIPTION - FREQUENCY
FREQUENCY: CONTINUOUS

## 2017-12-05 ASSESSMENT — PAIN SCALES - GENERAL
PAINLEVEL_OUTOF10: 5
PAINLEVEL_OUTOF10: 6
PAINLEVEL_OUTOF10: 5
PAINLEVEL_OUTOF10: 6
PAINLEVEL_OUTOF10: 6
PAINLEVEL_OUTOF10: 5
PAINLEVEL_OUTOF10: 5

## 2017-12-05 ASSESSMENT — PAIN DESCRIPTION - PROGRESSION
CLINICAL_PROGRESSION: NOT CHANGED

## 2017-12-05 ASSESSMENT — PAIN DESCRIPTION - ONSET
ONSET: ON-GOING

## 2017-12-05 NOTE — ED NOTES
Pt transported to [facility] by cart in stable condition. Pt monitored on telemetry. IV fluids running and IV is patent.     NS       [employer]  12/04/17 1724

## 2017-12-05 NOTE — H&P
History & Physical        Patient:  Augusto Gramajo  YOB: 1935    MRN: [id number]     Acct: [de-identified]    PCP: Chloe Moyer MD    Date of Admission: 12/4/2017    Date of Service: Pt seen/examined on 12/4/17. Placed in Observation. Chief Complaint:  Left hip pain      History Of Present Illness:    81 y.o. female who presented to [place] [address]  the emergency department complaining of left hip pain. Patient states that she was woken up out of a sound sleep this afternoon with sudden onset of extremely sharp and strong left hip pain. She was trying to go up stairs with assit but could not make up and developed severe pain and the family called 9-1-1. Patient states is no trauma. The patient states that the pain is intense. Sharp, continuous, unremitting. Exacerbated by movement, and not relieved by rest.  Also complains of neck pain after being placed on a cervical spine board by EMS. Patient denies other complaints at this time. Denied associated chest pain,sob,n/v/d or sweating. Denied cough,fever or chills. Past Medical History:          Diagnosis Date    Arthritis     Blood circulation, collateral     Cerebral artery occlusion with cerebral infarction (HCC)     Chronic anticoagulation     Chronic combined systolic and diastolic CH[facility]congestive heart failure) [facility]     CKD (chronic kidney disease) stage 3, GFR 30-59 ml/min     Degenerative joint disease     Fracture of femoral head ([facility] 10/09/2016    Left with intrarticular extension    GERD (gastroesophageal reflux disease)     History of DVT (deep vein thrombosis)     Hyperlipidemia     Hypertension     Ischemic heart disease     Macular degeneration, wet ([facility]     NHL (non-Hodgkin's lymphoma) ([facility] 06/2012    Dr. Travis Garcia Obesity (BMI 30.0-34. 9)     Permanent atrial fibrillation (HCC)     Presence of IVC filter     Pulmonary embolism, bilateral (HCC)     S/P drug eluting coronary stent release tablet TAKE 1 TABLET DAILY 8/23/17  Yes Fidelia Chowdary PA-C   furosemide (LASIX) 20 MG tablet Take 1 tablet by mouth daily 5/2/17  Yes Wilfrido Zapata MD   potassium chloride (KLOR-CON M) 10 MEQ extended release tablet Take 1 tablet by mouth daily 5/2/17  Yes Wilfrido Zapata MD   amLODIPine (NORVASC) 5 MG tablet Take 1 tablet by mouth daily 5/2/17  Yes Wilfrido Zapata MD   pravastatin (PRAVACHOL) 20 MG tablet Take 1 tablet by mouth every evening 5/2/17  Yes Wilfrido Zapata MD   isosorbide mononitrate (IMDUR) 60 MG extended release tablet Take 1 tablet by mouth daily 5/2/17  Yes Wilfrido Zapata MD   acetaminophen (TYLENOL) 325 MG tablet Take 2 tablets by mouth every 4 hours as needed for Pain 4/27/17  Yes Shawn Siddiqui MD   metolazone (ZAROXOLYN) 2.5 MG tablet Take 1 tablet by mouth daily as needed (SOB) 3/16/17  Yes Wilfrido Zapata MD   aspirin 81 MG EC tablet Take 1 tablet by mouth daily 11/22/16  Yes Chano Del Angel MD   Glucose Blood (BLOOD GLUCOSE TEST STRIPS) STRP 1 strip by In Vitro route daily 11/22/16  Yes Chano Del Angel MD       Allergies:  Brilinta [ticagrelor]; Amoxicillin-pot clavulanate; Ezetimibe-simvastatin; Gabapentin; Levofloxacin; Lipitor; Losartan; Lyrica [pregabalin]; Morphine; Pcn [penicillins]; and Welchol [colesevelam hcl]    Social History:      The patient currently lives at home with family    TOBACCO:   reports that she has never smoked. She has never used smokeless tobacco.  ETOH:   reports that she does not drink alcohol. Family History:       Reviewed in detail and negative for DM, CAD, Cancer, CVA. Positive as follows:        Problem Relation Age of Onset    Stroke Father     Cancer Sister     Heart Disease Brother        Diet:  DIET GENERAL;    REVIEW OF SYSTEMS:   Pertinent positives as noted in the HPI. All other systems reviewed and negative.     PHYSICAL EXAM:    BP (!) 194/88   Pulse 80   Temp 98.4 °F (36.9 °C) (Oral)   Resp 26   Ht 5' 8\" (1.727 m)   Wt 220 lb (99.8 kg)   SpO2 92%   BMI 33.45 kg/m²     General appearance:  No apparent distress, appears stated age and cooperative. HEENT:  Normal cephalic, atraumatic without obvious deformity. Pupils equal, round, and reactive to light. Extra ocular muscles intact. Conjunctivae/corneas clear. Neck: Supple, with full range of motion. No jugular venous distention. Trachea midline. Respiratory:  Normal respiratory effort. Clear to auscultation, bilaterally without Rales/Wheezes/Rhonchi. Cardiovascular:  Regular rate and rhythm with normal S1/S2 without murmurs, rubs or gallops. Abdomen: Soft, non-tender, non-distended with normal bowel sounds. Musculoskeletal: left hip tender,deformed with limited RoM. Skin: Skin color, texture, turgor normal.  No rashes or lesions. Neurologic:  Neurovascularly intact without any focal sensory/motor deficits. Cranial nerves: II-XII intact, grossly non-focal.  Psychiatric:  Alert and oriented, thought content appropriate, normal insight  Capillary Refill: Brisk,< 3 seconds   Peripheral Pulses: +2 palpable, equal bilaterally       Labs:     Recent Labs      12/04/17 1912   WBC  10.1   HGB  16.0   HCT  48.6*   PLT  158     Recent Labs      12/04/17 1912   NA  140   K  4.1   CL  99   CO2  27   BUN  15   CREATININE  1.2   CALCIUM  8.9     Recent Labs      12/04/17 1912   AST  17   ALT  12   BILITOT  1.3*   ALKPHOS  76     No results for input(s): INR in the last 72 hours. No results for input(s): [place] Pitch in the last 72 hours. Urinalysis:      Lab Results   Component Value Date    NITRU POSITIVE 12/04/2017    WBCUA > 100 12/04/2017    BACTERIA MANY 12/04/2017    RBCUA > 100 12/04/2017    BLOODU LARGE 12/04/2017    SPECGRAV 1.016 12/04/2017    GLUCOSEU NEGATIVE 04/13/2017       Radiology:         CT CERVICAL SPINE WO CONTRAST   Final Result   No acute osseous abnormality.  Stable partially imaged right paraspinal mass right apex possibly a nerve genic tumor such as a schwannoma            **This report has been created using voice recognition software. It may contain minor errors which are inherent in voice recognition technology. **      Final report electronically signed by Dr. Terri Arora on 12/4/2017 8:37 PM      CT HEAD WO CONTRAST   Final Result   Chronic small vessel ischemic disease. No acute intracranial pathology and no change            **This report has been created using voice recognition software. It may contain minor errors which are inherent in voice recognition technology. **      Final report electronically signed by Dr. Terri Arora on 12/4/2017 8:33 PM      XR HIP LEFT (2-3 VIEWS)   Final Result   No acute abnormality            **This report has been created using voice recognition software. It may contain minor errors which are inherent in voice recognition technology. **      Final report electronically signed by Dr. Terri Arora on 12/4/2017 8:17 PM      XR CHEST 1 VW   Final Result   Cardiomegaly            **This report has been created using voice recognition software. It may contain minor errors which are inherent in voice recognition technology. **      Final report electronically signed by Dr. Terri Arora on 12/4/2017 8:16 PM      CT HIP LEFT WO CONTRAST    (Results Pending)            DVT prophylaxis: [] Lovenox                                 [x] SCDs                                 [] SQ Heparin                                 [] Encourage ambulation           [] Already on Anticoagulation    Code Status: Full Code      PT/OT Eval Status:pending    Disposition:    [x] Home       [] TCU       [] Rehab       [] Psych       [] SNF       [] [place]       [] Other-    ASSESSMENT:    C/Tatiana Dubois [address] Problems    Diagnosis Date Noted    Hyperglycemia [R73.9]     Polycythemia [D75.1] 08/30/2016    Urinary tract infection [N39.0] 01/16/2013    CKD stage 3 due to type 2 diabetes mellitus ([facility] [E11.22, N18.3]

## 2017-12-05 NOTE — ED PROVIDER NOTES
eMERGENCY dEPARTMENT eNCOUnter        [facility]    Chief Complaint   Patient presents with    Hip Injury     left       HPI    Kayla Escoto is a 81 y.o. female who presents to the emergency department complaining of left hip pain. Patient states that she was woken up out of a sound sleep this afternoon with sudden onset of extremely sharp and strong left hip pain. Patient states is no trauma. The patient states that the pain is intense. Sharp, continuous, unremitting. Exacerbated by movement, and not relieved by rest.  Also complains of neck pain after being placed on a cervical spine board by EMS. Patient denies other complaints at this time. Past medical history was reviewed. An 10 point review of systems was otherwise unremarkable. REVIEW OF SYSTEMS    See HPI for further details. Review of systems otherwise negative. PAST MEDICAL HISTORY    Past Medical History:   Diagnosis Date    Arthritis     Blood circulation, collateral     Cerebral artery occlusion with cerebral infarction (HCC)     Chronic anticoagulation     Chronic combined systolic and diastolic CHF (congestive heart failure) (HCC)     CKD (chronic kidney disease) stage 3, GFR 30-59 ml/min     Degenerative joint disease     Fracture of femoral head ([facility] 10/09/2016    Left with intrarticular extension    GERD (gastroesophageal reflux disease)     History of DVT (deep vein thrombosis)     Hyperlipidemia     Hypertension     Ischemic heart disease     Macular degeneration, wet ([address]     NHL (non-Hodgkin's lymphoma) ([facility] 06/2012    Dr. Cherri Campbell Obesity (BMI 30.0-34. 9)     Permanent atrial fibrillation (HCC)     Presence of IVC filter     Pulmonary embolism, bilateral (HCC)     S/P drug eluting coronary stent placement     Subdural hematoma (HCC)     s/p evacuation    Triple vessel coronary artery disease     Type II or unspecified type diabetes mellitus without mention of complication, not stated as

## 2017-12-05 NOTE — CARE COORDINATION
12/5/17, 1:50 PM      Ressie Krabbe       Admitted from: ED 12/4/2017/ [facility] day: 0   Location: 7-04/004-A Reason for admit: Hip pain [M25.559] Status: observation  Admit order signed?: yes  PMH:  has a past medical history of Arthritis; Blood circulation, collateral; Cerebral artery occlusion with cerebral infarction ([address] Chronic anticoagulation; Chronic combined systolic and diastolic CHF (congestive heart failure) (HCC); CKD (chronic kidney disease) stage 3, GFR 30-59 ml/min; Degenerative joint disease; Fracture of femoral head ([address] GERD (gastroesophageal reflux disease); History of DVT (deep vein thrombosis); Hyperlipidemia; Hypertension; Ischemic heart disease; Macular degeneration, wet ([address] NHL (non-Hodgkin's lymphoma) ([address] Obesity (BMI 30.0-34.9); Permanent atrial fibrillation ([facility] Presence of IVC filter; Pulmonary embolism, bilateral ([facility]); S/P drug eluting coronary stent placement; Subdural hematoma ([address] Triple vessel coronary artery disease; and Type II or unspecified type diabetes mellitus without mention of complication, not stated as uncontrolled. Procedure: none  Pertinent abnormal Imaging:none  Medications:  Scheduled Meds:   sodium chloride flush  10 mL Intravenous 2 times per day    polyethylene glycol  17 g Oral Daily    amLODIPine  5 mg Oral Daily    clobetasol   Topical BID    insulin lispro  0-6 Units Subcutaneous TID WC    insulin lispro  0-3 Units Subcutaneous Nightly    isosorbide mononitrate  60 mg Oral Daily    metoprolol succinate  50 mg Oral Daily    mupirocin   Topical BID    pravastatin  20 mg Oral QPM     Continuous Infusions:   dextrose 5 % and 0.9 % NaCl        Pertinent Info/Orders/Treatment Plan: Iv fluids, pain management, diabetes management, neurovascular checks  Diet: DIET GENERAL;   DVT Prophylaxis: SCD's ordered and on  Smoking status:  reports that she has never smoked.  She has never used smokeless tobacco.   Influenza Vaccination Screening Completed: yes  Pneumonia Vaccination Screening Completed: yes  Core measures: monitor, vte  PCP: Wilfrido Zapata MD  Readmission:   no  Risk Score: 24.75     Discharge Planning  Current Residence:  Private Residence  Living Arrangements:  Spouse/Significant Other, Children   Support Systems:  Spouse/Significant Other, Children  Current Services PTA:     Potential Assistance Needed:  N/A  Potential Assistance Purchasing Medications:  No  Does patient want to participate in local refill/ meds to beds program?  No  Type of Home Care Services:  None  Patient expects to be discharged to:  home with spouse and daughter  Expected Discharge date:  12/07/17  Follow Up Appointment: Best Day/ Time:  (depends on the day)    Discharge Plan:met with client. Lives at home with spouse and daughter. Has walker, quad cane and shower with bench at home. Has had HH in the past, client is receptive to receiving thatt service again.      Evaluation: yes

## 2017-12-05 NOTE — PROGRESS NOTES
Hospitalist Progress Note    Patient:  Roge Wakefield      Unit/Bed:7K-04/004-A    YOB: 1935    MRN: [id number]       Acct: [de-identified]     PCP: Melly Balbuena MD    Date of Admission: 12/4/2017    Chief Complaint: lower back and left hip pain    Hospital Course: 81 y/ obese female presenting with sudden left hip and back pain. CT unremarkable. Orthro consulted. Pain meds adjusted, as well as pain management consulted, as there appears some chronicity to pain issues. Subjective: Examined with nursing, daughter and  at bedside. Pain controlled, worse when she moves. Afebrile. Denies any parathesia, has a baron       Medications:  Reviewed    Infusion Medications    dextrose 5 % and 0.9 % NaCl       Scheduled Medications    cefTRIAXone (ROCEPHIN) IV  1 g Intravenous Q24H    sodium chloride  500 mL Intravenous Once    sodium chloride flush  10 mL Intravenous 2 times per day    polyethylene glycol  17 g Oral Daily    amLODIPine  5 mg Oral Daily    clobetasol   Topical BID    isosorbide mononitrate  60 mg Oral Daily    metoprolol succinate  50 mg Oral Daily    mupirocin   Topical BID    pravastatin  20 mg Oral QPM     PRN Meds: ketorolac, sodium chloride flush, acetaminophen, magnesium hydroxide, ondansetron, potassium chloride **OR** potassium chloride **OR** potassium chloride, magnesium sulfate, oxyCODONE, HYDROmorphone, bisacodyl, glucose, dextrose, glucagon (rDNA), dextrose 5 % and 0.9 % NaCl      Intake/Output Summary (Last 24 hours) at 12/05/17 1641  Last data filed at 12/05/17 1312   Gross per 24 hour   Intake              574 ml   Output              750 ml   Net             -176 ml       Diet:  DIET GENERAL;    Exam:  BP (!) 116/55   Pulse 61   Temp 98.4 °F (36.9 °C) (Oral)   Resp 16   Ht 5' 8\" (1.727 m)   Wt 220 lb (99.8 kg)   SpO2 91%   BMI 33.45 kg/m²     General appearance: No apparent distress, appears stated age and cooperative.   HEENT: Pupils equal, round, and reactive to light. Conjunctivae/corneas clear. Mucosa dry, lips dry  Neck: Supple, with full range of motion. No jugular venous distention. Trachea midline. Respiratory:  Normal respiratory effort. Clear to auscultation, bilaterally without Rales/Wheezes/Rhonchi. Cardiovascular: irregular irregular rate and rhythm with normal S1/S2 without murmurs, rubs or gallops. Pacer in place  Abdomen: Soft, obese, non-tender, non-distended with normal bowel sounds. Musculoskeletal: No clubbing, cyanosis or edema bilaterally. Pain with movement , especially of left hip. Skin: Skin color, texture, turgor normal.  No rashes or lesions. Small abrasion near buttock  Neurologic:  Neurovascularly intact without any focal sensory/motor deficits. Cranial nerves: II-XII intact, grossly non-focal.  Psychiatric: Alert and oriented, thought content appropriate, normal insight  Capillary Refill: Brisk,< 3 seconds   Peripheral Pulses: +2 palpable, equal bilaterally       Labs:   Recent Labs      12/04/17 1912 12/05/17   0605   WBC  10.1  7.4   HGB  16.0  14.6   HCT  48.6*  43.3   PLT  158  151     Recent Labs      12/04/17 1912 12/05/17   0605   NA  140  140   K  4.1  3.5   CL  99  102   CO2  27  27   BUN  15  15   CREATININE  1.2  1.0   CALCIUM  8.9  8.6     Recent Labs      12/04/17 1912   AST  17   ALT  12   BILITOT  1.3*   ALKPHOS  76     No results for input(s): INR in the last 72 hours. No results for input(s): Sehrrell Sandra in the last 72 hours. Urinalysis:    Lab Results   Component Value Date    NITRU POSITIVE 12/04/2017    WBCUA > 100 12/04/2017    BACTERIA MANY 12/04/2017    RBCUA > 100 12/04/2017    BLOODU LARGE 12/04/2017    SPECGRAV 1.016 12/04/2017    GLUCOSEU NEGATIVE 04/13/2017       Radiology:  CT HIP LEFT WO CONTRAST   Final Result   1. No dislocation of the proximal left femoral prosthesis. Prosthesis looks intact.  This exam does not evaluate for loosening of the prosthesis within the [] SQ Heparin                                 [] Encourage ambulation           [] Already on Anticoagulation     Disposition:    [x] Home       [] TCU       [] Rehab       [] Psych       [] SNF       [] [place]       [] Other-    Code Status: Full Code    PT/OT Eval Status: will eval after seen by Orthopedics    Assessment/Plan:    Anticipated Discharge in : in 2-3 days    Active Hospital Problems    Diagnosis Date Noted    Hip pain [M25.559] 12/04/2017    Lower back pain [M54.5]     Polycythemia [D75.1] 08/30/2016    Urinary tract infection [N39.0] 01/16/2013    CKD stage 3 due to type 2 diabetes mellitus ([facility] [address] [E11.22, N18.3] 06/27/2012    Essential hypertension [I10]     Diabetes mellitus type 2, diet-controlled ([facility] [address] [E11.9]      Asst:   1) Left  hip pain , Acute- CT unremarkable, continues to have left hip pain, and some lower lumbar pain. There is some level of chronicity, opened to pain management and seeing Othropedics. Will try toradol and multi modal treatment for pain      2)DM-2- Diet control, HbA1c 6.4     3)afib PPM on anticoagulation- being held until seen by Yonny Burks and no plans for surgical intervention.  If no intervenion , will restart home  plavix/coumadin     4)UTI by UA-check c/s, Rocephin until final cultures, she received dose in ER , with no ill affects     5)CKD stage 2-3 monitor     6)Polycythemia--monitor    7) DVT- SCDs          Electronically signed by Andres Cuenca MD on 12/5/2017 at 4:41 PM

## 2017-12-05 NOTE — PLAN OF CARE
Problem: Falls - Risk of  Goal: Absence of falls  Outcome: Ongoing  No falls this shift. Bedrest continues. Problem: Risk for Impaired Skin Integrity  Goal: Tissue integrity - skin and mucous membranes  Structural intactness and normal physiological function of skin and  mucous membranes. Outcome: Ongoing  No new skin breakdown noted. Redness to sacrum continues. Turn q2 and prn. Problem: DAILY CARE  Goal: Daily care needs are met  Outcome: Ongoing  Patient participates in adls. Problem: DISCHARGE BARRIERS  Goal: Patient's continuum of care needs are met  Outcome: Ongoing  Patient discharge pending back to home. Problem: Pain:  Goal: Pain level will decrease  Pain level will decrease   Outcome: Ongoing  Patient continues to take roxicodone for pain with relief. Comments: Care plan reviewed with patient and family. Patient and family verbalize understanding of the plan of care and contribute to goal setting.

## 2017-12-05 NOTE — PROGRESS NOTES
Nutrition Assessment    Type and Reason for Visit: Initial, Positive Nutrition Screen (chewing swallowing difficulties)    Nutrition Recommendations: Diet per Dr.  Monitor glucose. Malnutrition Assessment:  · Malnutrition Status: No malnutrition    Nutrition Diagnosis:   · Problem: Inadequate oral intake  · Etiology: related to Insufficient energy/nutrient consumption     Signs and symptoms:  as evidenced by  (decreased alertness currently)    Nutrition Assessment:  · Subjective Assessment: Pt admitted with hip pain seen lying in bed & sleeping soundly. Per Daughter pt swallows fine, has dentures in currently, but gettnig new dentures because sometimes has some issues with chewing, but does OK. Daughter reports overall pt with good intake pta. A1c 6.4%. · Wound Type: None (reported)  · Current Nutrition Therapies:  · Oral Diet Orders: General   · Oral Diet intake:  (pt too lethargic now for po, but per daughter pt with good intake pta. )  · Anthropometric Measures:  · Ht: 5' 8\" (172.7 cm)   · Current Body Wt: 220 lb 0.3 oz (99.8 kg)  · Admission Body Wt: 220 lb 0.3 oz (99.8 kg) (12/5 no edema)  · Usual Body Wt:  (215-230# per medical record)  · Ideal Body Wt: 140 lb (63.5 kg),BMI Classification: BMI 30.0 - 34.9 Obese Class I (33.5)  · Comparative Standards (Estimated Nutrition Needs):  · Estimated Daily Total Kcal: ~1800 kcals  · Estimated Daily Protein (g): ~83 grams    Estimated Intake vs Estimated Needs: Intake Less Than Needs    Nutrition Risk Level: Moderate    Nutrition Interventions:    (Diet per Dr)  Continued Inpatient Monitoring, Education not appropriate at this time    Nutrition Evaluation:   · Evaluation: Goals set   · Goals: 75% or more of po intake during LOS    · Monitoring: Diet Progression, Diet Tolerance, Skin Integrity, Weight, Pertinent Labs    See Adult Nutrition Doc Flowsheet for more detail.      Electronically signed by Manfred Montgomery RD, LD on 12/5/17 at 2:51 PM    Contact Number:

## 2017-12-06 ENCOUNTER — APPOINTMENT (OUTPATIENT)
Dept: GENERAL RADIOLOGY | Age: 82
DRG: 690 | End: 2017-12-06
Payer: MEDICARE

## 2017-12-06 LAB
ANION GAP SERPL CALCULATED.3IONS-SCNC: 12 MEQ/L (ref 8–16)
BUN BLDV-MCNC: 18 MG/DL (ref 7–22)
C-REACTIVE PROTEIN: 11.88 MG/DL (ref 0–1)
CALCIUM SERPL-MCNC: 8.4 MG/DL (ref 8.5–10.5)
CHLORIDE BLD-SCNC: 101 MEQ/L (ref 98–111)
CO2: 25 MEQ/L (ref 23–33)
CREAT SERPL-MCNC: 1.3 MG/DL (ref 0.4–1.2)
GFR SERPL CREATININE-BSD FRML MDRD: 39 ML/MIN/1.73M2
GLUCOSE BLD-MCNC: 162 MG/DL (ref 70–108)
HCT VFR BLD CALC: 43.8 % (ref 37–47)
HEMOGLOBIN: 14.4 GM/DL (ref 12–16)
INR BLD: 2.5 (ref 0.85–1.13)
MCH RBC QN AUTO: 29.7 PG (ref 27–31)
MCHC RBC AUTO-ENTMCNC: 33 GM/DL (ref 33–37)
MCV RBC AUTO: 90.1 FL (ref 81–99)
PDW BLD-RTO: 17.4 % (ref 11.5–14.5)
PLATELET # BLD: 148 THOU/MM3 (ref 130–400)
PMV BLD AUTO: 9.2 MCM (ref 7.4–10.4)
POTASSIUM SERPL-SCNC: 3.8 MEQ/L (ref 3.5–5.2)
RBC # BLD: 4.86 MILL/MM3 (ref 4.2–5.4)
SEDIMENTATION RATE, ERYTHROCYTE: 25 MM/HR (ref 0–20)
SODIUM BLD-SCNC: 138 MEQ/L (ref 135–145)
WBC # BLD: 7.8 THOU/MM3 (ref 4.8–10.8)

## 2017-12-06 PROCEDURE — 73552 X-RAY EXAM OF FEMUR 2/>: CPT

## 2017-12-06 PROCEDURE — 85651 RBC SED RATE NONAUTOMATED: CPT

## 2017-12-06 PROCEDURE — 6370000000 HC RX 637 (ALT 250 FOR IP): Performed by: INTERNAL MEDICINE

## 2017-12-06 PROCEDURE — 36415 COLL VENOUS BLD VENIPUNCTURE: CPT

## 2017-12-06 PROCEDURE — G0378 HOSPITAL OBSERVATION PER HR: HCPCS

## 2017-12-06 PROCEDURE — 73590 X-RAY EXAM OF LOWER LEG: CPT

## 2017-12-06 PROCEDURE — 2580000003 HC RX 258: Performed by: INTERNAL MEDICINE

## 2017-12-06 PROCEDURE — 2580000003 HC RX 258: Performed by: HOSPITALIST

## 2017-12-06 PROCEDURE — 99233 SBSQ HOSP IP/OBS HIGH 50: CPT | Performed by: HOSPITALIST

## 2017-12-06 PROCEDURE — 1200000000 HC SEMI PRIVATE

## 2017-12-06 PROCEDURE — 73600 X-RAY EXAM OF ANKLE: CPT

## 2017-12-06 PROCEDURE — 86140 C-REACTIVE PROTEIN: CPT

## 2017-12-06 PROCEDURE — 80048 BASIC METABOLIC PNL TOTAL CA: CPT

## 2017-12-06 PROCEDURE — 6370000000 HC RX 637 (ALT 250 FOR IP): Performed by: PHYSICIAN ASSISTANT

## 2017-12-06 PROCEDURE — 0S9B3ZZ DRAINAGE OF LEFT HIP JOINT, PERCUTANEOUS APPROACH: ICD-10-PCS | Performed by: INTERNAL MEDICINE

## 2017-12-06 PROCEDURE — 6360000002 HC RX W HCPCS: Performed by: HOSPITALIST

## 2017-12-06 PROCEDURE — 73560 X-RAY EXAM OF KNEE 1 OR 2: CPT

## 2017-12-06 PROCEDURE — 85027 COMPLETE CBC AUTOMATED: CPT

## 2017-12-06 PROCEDURE — 73620 X-RAY EXAM OF FOOT: CPT

## 2017-12-06 PROCEDURE — 85610 PROTHROMBIN TIME: CPT

## 2017-12-06 RX ORDER — PHYTONADIONE 5 MG/1
5 TABLET ORAL ONCE
Status: COMPLETED | OUTPATIENT
Start: 2017-12-06 | End: 2017-12-06

## 2017-12-06 RX ADMIN — MUPIROCIN: 20 OINTMENT TOPICAL at 21:17

## 2017-12-06 RX ADMIN — METOPROLOL SUCCINATE 50 MG: 50 TABLET, FILM COATED, EXTENDED RELEASE ORAL at 08:03

## 2017-12-06 RX ADMIN — CEFTRIAXONE SODIUM 1 G: 10 INJECTION, POWDER, FOR SOLUTION INTRAVENOUS at 23:19

## 2017-12-06 RX ADMIN — PRAVASTATIN SODIUM 20 MG: 20 TABLET ORAL at 21:17

## 2017-12-06 RX ADMIN — MUPIROCIN: 20 OINTMENT TOPICAL at 08:03

## 2017-12-06 RX ADMIN — AMLODIPINE BESYLATE 5 MG: 5 TABLET ORAL at 08:03

## 2017-12-06 RX ADMIN — PHYTONADIONE 5 MG: 5 TABLET ORAL at 16:24

## 2017-12-06 RX ADMIN — ACETAMINOPHEN 650 MG: 325 TABLET ORAL at 05:48

## 2017-12-06 RX ADMIN — CLOBETASOL PROPIONATE: 0.5 CREAM TOPICAL at 08:03

## 2017-12-06 RX ADMIN — Medication 10 ML: at 21:17

## 2017-12-06 RX ADMIN — KETOROLAC TROMETHAMINE 15 MG: 30 INJECTION, SOLUTION INTRAMUSCULAR at 07:09

## 2017-12-06 RX ADMIN — KETOROLAC TROMETHAMINE 15 MG: 30 INJECTION, SOLUTION INTRAMUSCULAR at 16:24

## 2017-12-06 RX ADMIN — ISOSORBIDE MONONITRATE 60 MG: 60 TABLET ORAL at 08:03

## 2017-12-06 RX ADMIN — KETOROLAC TROMETHAMINE 15 MG: 30 INJECTION, SOLUTION INTRAMUSCULAR at 23:19

## 2017-12-06 ASSESSMENT — PAIN DESCRIPTION - PROGRESSION
CLINICAL_PROGRESSION: NOT CHANGED

## 2017-12-06 ASSESSMENT — PAIN DESCRIPTION - PAIN TYPE
TYPE: ACUTE PAIN;SURGICAL PAIN

## 2017-12-06 ASSESSMENT — PAIN DESCRIPTION - ONSET
ONSET: ON-GOING

## 2017-12-06 ASSESSMENT — PAIN DESCRIPTION - LOCATION
LOCATION: BACK;HIP
LOCATION: HIP;BACK
LOCATION: BACK;HIP

## 2017-12-06 ASSESSMENT — PAIN DESCRIPTION - DESCRIPTORS
DESCRIPTORS: ACHING

## 2017-12-06 ASSESSMENT — PAIN DESCRIPTION - FREQUENCY
FREQUENCY: CONTINUOUS

## 2017-12-06 ASSESSMENT — PAIN DESCRIPTION - ORIENTATION
ORIENTATION: LEFT

## 2017-12-06 ASSESSMENT — PAIN SCALES - GENERAL
PAINLEVEL_OUTOF10: 6
PAINLEVEL_OUTOF10: 6
PAINLEVEL_OUTOF10: 8
PAINLEVEL_OUTOF10: 4
PAINLEVEL_OUTOF10: 8
PAINLEVEL_OUTOF10: 6
PAINLEVEL_OUTOF10: 4
PAINLEVEL_OUTOF10: 4
PAINLEVEL_OUTOF10: 3

## 2017-12-06 NOTE — PLAN OF CARE
Problem: Falls - Risk of  Goal: Absence of falls  Outcome: Ongoing  No falls this shift. Remains x 1 assist.     Problem: Risk for Impaired Skin Integrity  Goal: Tissue integrity - skin and mucous membranes  Structural intactness and normal physiological function of skin and  mucous membranes. Outcome: Ongoing  No new skin breakdown noted. Continue to turn q2 hours and prn. Problem: DAILY CARE  Goal: Daily care needs are met  Outcome: Ongoing  Patient participates in adls. Problem: DISCHARGE BARRIERS  Goal: Patient's continuum of care needs are met  Outcome: Ongoing  Discharge to home when stable. Problem: Pain:  Goal: Pain level will decrease  Pain level will decrease   Outcome: Ongoing  Patient continues to take toradol for pain with relief. Problem: Nutrition  Goal: Optimal nutrition therapy  Outcome: Ongoing  Patient with adequate appetite this shift. Comments: Care plan reviewed with patient and family. Patient and family verbalize understanding of the plan of care and contribute to goal setting.

## 2017-12-06 NOTE — PROGRESS NOTES
with full range of motion. No jugular venous distention. Trachea midline. Respiratory:  Normal respiratory effort. Clear to auscultation, bilaterally without Rales/Wheezes/Rhonchi. Cardiovascular: Regular rate and rhythm with normal S1/S2 without murmurs, rubs or gallops. Abdomen: Soft, non-tender, non-distended with normal bowel sounds. Musculoskeletal: No clubbing, cyanosis or edema bilaterally. Full range of motion without deformity. Pain to left hip joint  Skin: Skin color, texture, turgor normal.  No rashes or lesions. Neurologic:  Neurovascularly intact without any focal sensory/motor deficits. Cranial nerves: II-XII intact, grossly non-focal.  Psychiatric: Alert and oriented, thought content appropriate, normal insight  Capillary Refill: Brisk,< 3 seconds   Peripheral Pulses: +2 palpable, equal bilaterally       Labs:   Recent Labs      12/04/17 1912 12/05/17 0605 12/06/17 0619   WBC  10.1  7.4  7.8   HGB  16.0  14.6  14.4   HCT  48.6*  43.3  43.8   PLT  158  151  148     Recent Labs      12/04/17 1912 12/05/17   0605  12/06/17   0619   NA  140  140  138   K  4.1  3.5  3.8   CL  99  102  101   CO2  27  27  25   BUN  15  15  18   CREATININE  1.2  1.0  1.3*   CALCIUM  8.9  8.6  8.4*     Recent Labs      12/04/17 1912   AST  17   ALT  12   BILITOT  1.3*   ALKPHOS  76     Recent Labs      12/06/17   0905   INR  2.50*     No results for input(s): Hillman Broom in the last 72 hours. Urinalysis:    Lab Results   Component Value Date    NITRU POSITIVE 12/04/2017    WBCUA > 100 12/04/2017    BACTERIA MANY 12/04/2017    RBCUA > 100 12/04/2017    BLOODU LARGE 12/04/2017    SPECGRAV 1.016 12/04/2017    GLUCOSEU NEGATIVE 04/13/2017       Radiology:  XR TIBIA FIBULA LEFT (2 VIEWS)   Final Result       No acute fracture, dislocation or suspicious osseous lesion is identified. **This report has been created using voice recognition software.  It may contain minor errors which are

## 2017-12-06 NOTE — CARE COORDINATION
Update: AB for UTI continued. Ortho consulted for hip pain. Left message for Castillo Washburn re: status clarification.  Client plans home with spouse, has cane, walker, open to [facility] if needed,  following  12/06/17  11:09 AM

## 2017-12-06 NOTE — CARE COORDINATION
DISCHARGE BARRIERS  12/6/17, 1:40 PM    Reason for Referral: discharge needs   Mental Status:  Alert and oriented   Decision Making: daughter and  assist with decisions   Family/Social/Home Environment:  Hernandez Dickson lives at home with her  and her daughter. Her family assists with her care as needed. Hernandez Dickson has been able to manage her own shower, with daughter helping her in and out. Daughter manages household tasks and meals, can assist with medications. Current Services:  None   Current Equipment: walker, cane, quad cane, bedside commode   Payment Source: medicare   Concerns or Barriers to Discharge: family is uncertain of needs yet, wanting more information from physicians on patient's condition  Collabrative List of ECF/HH were provided: declined at this time     Teach Back Method used with daughter regarding care plan and discharge plan  Patient's daughter verbalizes understanding of the plan of care and contributes to goal setting. Anticipated Needs/Discharge Plan:  Spoke with daughter about discharge plan. Patient is present for discussion, but does not contribute. Daughter hopes patient will return home with family. Daughter can assist with care. She feels she needs more information about patient's condition before deciding on discharge plan. Patient has had [facility] before, and they would consider this again, if needed. They do not want to consider ecf at this time.      Electronically signed by MALLORY Reaves on 12/6/2017 at 1:40 PM

## 2017-12-07 ENCOUNTER — APPOINTMENT (OUTPATIENT)
Dept: INTERVENTIONAL RADIOLOGY/VASCULAR | Age: 82
DRG: 690 | End: 2017-12-07
Payer: MEDICARE

## 2017-12-07 LAB
INR BLD: 1.5 (ref 0.85–1.13)
URINE CULTURE, ROUTINE: NORMAL

## 2017-12-07 PROCEDURE — 2580000003 HC RX 258

## 2017-12-07 PROCEDURE — 1200000000 HC SEMI PRIVATE

## 2017-12-07 PROCEDURE — 2580000003 HC RX 258: Performed by: INTERNAL MEDICINE

## 2017-12-07 PROCEDURE — 77002 NEEDLE LOCALIZATION BY XRAY: CPT | Performed by: RADIOLOGY

## 2017-12-07 PROCEDURE — 2500000003 HC RX 250 WO HCPCS

## 2017-12-07 PROCEDURE — 20610 DRAIN/INJ JOINT/BURSA W/O US: CPT | Performed by: RADIOLOGY

## 2017-12-07 PROCEDURE — 36415 COLL VENOUS BLD VENIPUNCTURE: CPT

## 2017-12-07 PROCEDURE — 99231 SBSQ HOSP IP/OBS SF/LOW 25: CPT | Performed by: HOSPITALIST

## 2017-12-07 PROCEDURE — 6360000002 HC RX W HCPCS: Performed by: INTERNAL MEDICINE

## 2017-12-07 PROCEDURE — 6370000000 HC RX 637 (ALT 250 FOR IP): Performed by: INTERNAL MEDICINE

## 2017-12-07 PROCEDURE — 6370000000 HC RX 637 (ALT 250 FOR IP): Performed by: HOSPITALIST

## 2017-12-07 PROCEDURE — 6360000002 HC RX W HCPCS: Performed by: HOSPITALIST

## 2017-12-07 PROCEDURE — A6402 STERILE GAUZE <= 16 SQ IN: HCPCS

## 2017-12-07 PROCEDURE — 85610 PROTHROMBIN TIME: CPT

## 2017-12-07 PROCEDURE — A6413 ADHESIVE BANDAGE, FIRST-AID: HCPCS

## 2017-12-07 PROCEDURE — 2580000003 HC RX 258: Performed by: HOSPITALIST

## 2017-12-07 RX ORDER — WARFARIN SODIUM 5 MG/1
5 TABLET ORAL
Status: COMPLETED | OUTPATIENT
Start: 2017-12-07 | End: 2017-12-07

## 2017-12-07 RX ORDER — WARFARIN SODIUM 2.5 MG/1
2.5 TABLET ORAL DAILY
Status: DISCONTINUED | OUTPATIENT
Start: 2017-12-07 | End: 2017-12-07

## 2017-12-07 RX ORDER — CLOPIDOGREL BISULFATE 75 MG/1
75 TABLET ORAL DAILY
Status: DISCONTINUED | OUTPATIENT
Start: 2017-12-07 | End: 2017-12-10 | Stop reason: HOSPADM

## 2017-12-07 RX ADMIN — ISOSORBIDE MONONITRATE 60 MG: 60 TABLET ORAL at 10:57

## 2017-12-07 RX ADMIN — METOPROLOL SUCCINATE 50 MG: 50 TABLET, FILM COATED, EXTENDED RELEASE ORAL at 10:58

## 2017-12-07 RX ADMIN — KETOROLAC TROMETHAMINE 15 MG: 30 INJECTION, SOLUTION INTRAMUSCULAR at 06:20

## 2017-12-07 RX ADMIN — AMLODIPINE BESYLATE 5 MG: 5 TABLET ORAL at 10:57

## 2017-12-07 RX ADMIN — CLOBETASOL PROPIONATE: 0.5 CREAM TOPICAL at 08:47

## 2017-12-07 RX ADMIN — ENOXAPARIN SODIUM 100 MG: 100 INJECTION SUBCUTANEOUS at 18:07

## 2017-12-07 RX ADMIN — PRAVASTATIN SODIUM 20 MG: 20 TABLET ORAL at 21:52

## 2017-12-07 RX ADMIN — CLOBETASOL PROPIONATE: 0.5 CREAM TOPICAL at 21:53

## 2017-12-07 RX ADMIN — POLYETHYLENE GLYCOL 3350 17 G: 17 POWDER, FOR SOLUTION ORAL at 08:48

## 2017-12-07 RX ADMIN — WARFARIN SODIUM 5 MG: 5 TABLET ORAL at 18:07

## 2017-12-07 RX ADMIN — CEFTRIAXONE SODIUM 1 G: 10 INJECTION, POWDER, FOR SOLUTION INTRAVENOUS at 23:48

## 2017-12-07 RX ADMIN — OXYCODONE HYDROCHLORIDE 5 MG: 5 TABLET ORAL at 21:52

## 2017-12-07 RX ADMIN — MUPIROCIN: 20 OINTMENT TOPICAL at 08:51

## 2017-12-07 RX ADMIN — OXYCODONE HYDROCHLORIDE 5 MG: 5 TABLET ORAL at 11:24

## 2017-12-07 RX ADMIN — CLOPIDOGREL 75 MG: 75 TABLET, FILM COATED ORAL at 18:42

## 2017-12-07 RX ADMIN — Medication 10 ML: at 10:58

## 2017-12-07 RX ADMIN — ONDANSETRON 4 MG: 2 INJECTION INTRAMUSCULAR; INTRAVENOUS at 06:24

## 2017-12-07 RX ADMIN — Medication 10 ML: at 21:52

## 2017-12-07 RX ADMIN — MUPIROCIN: 20 OINTMENT TOPICAL at 21:52

## 2017-12-07 ASSESSMENT — PAIN DESCRIPTION - LOCATION: LOCATION: HEAD

## 2017-12-07 ASSESSMENT — PAIN DESCRIPTION - FREQUENCY: FREQUENCY: CONTINUOUS

## 2017-12-07 ASSESSMENT — PAIN - FUNCTIONAL ASSESSMENT: PAIN_FUNCTIONAL_ASSESSMENT: 0-10

## 2017-12-07 ASSESSMENT — PAIN SCALES - GENERAL
PAINLEVEL_OUTOF10: 4
PAINLEVEL_OUTOF10: 3
PAINLEVEL_OUTOF10: 4
PAINLEVEL_OUTOF10: 2
PAINLEVEL_OUTOF10: 0

## 2017-12-07 ASSESSMENT — PAIN DESCRIPTION - ONSET: ONSET: ON-GOING

## 2017-12-07 ASSESSMENT — PAIN DESCRIPTION - PAIN TYPE: TYPE: ACUTE PAIN

## 2017-12-07 ASSESSMENT — PAIN DESCRIPTION - DESCRIPTORS: DESCRIPTORS: ACHING

## 2017-12-07 NOTE — PLAN OF CARE
Problem: Falls - Risk of  Goal: Absence of falls  Outcome: Ongoing  PT HAS NOT BEEN UP THIS SHIFT. NO FALLS. Problem: Risk for Impaired Skin Integrity  Goal: Tissue integrity - skin and mucous membranes  Structural intactness and normal physiological function of skin and  mucous membranes. Outcome: Ongoing  REDNESS TO COCCYX. OPEN WOUNDS ON THE RIGHT LOWER EXTREMITY NOTED. NO OTHER SKIN ISSUES NOTED. PT ALLOWING TURNING AND REPOSITIONING. Problem: DAILY CARE  Goal: Daily care needs are met  Outcome: Ongoing  DAILY CARE NEEDS MET WITH ASSISTANCE FROM STAFF. Problem: DISCHARGE BARRIERS  Goal: Patient's continuum of care needs are met  Outcome: Ongoing  PT PLANS HOME AT DISCHARGE. Problem: Pain:  Goal: Pain level will decrease  Pain level will decrease   Outcome: Ongoing  PT COMPLAINING OF PAIN OF  3-4/10 THIS SHIFT. PRN PAIN MEDICATION GIVEN WHEN AVAILABLE. Problem: Nutrition  Goal: Optimal nutrition therapy  Outcome: Ongoing  PT TOLERATING GENERAL DIET WELL. Comments: Care plan reviewed with patient and family. Patient and family verbalize understanding of the plan of care and contribute to goal setting.

## 2017-12-07 NOTE — PROGRESS NOTES
Department of Radiology  Post Procedure Progress Note    Pre-Procedure Diagnosis:  Hip pain    Procedure Performed:  Attempted left hip aspiration    Anesthesia: local     Findings: No fluid could be aspirated    Immediate Complications:  None    Estimated Blood Loss: minimal    SEE DICTATED PROCEDURE NOTE FOR COMPLETE DETAILS.     Tushar Mccauley MD   12/7/2017 3:33 PM

## 2017-12-07 NOTE — PROGRESS NOTES
Clinical Pharmacy Note    Kayla Escoto is a 81 y.o. female for whom pharmacy has been asked to manage warfarin therapy. Reason for Admission: Hip pain    Consulting Physician: Devon Coleman MD  Warfarin dose prior to admission: 2.5 mg po daily  Warfarin indication: afib  Target INR range: 2-3   Outpatient warfarin provider: [facility]    Past Medical History:   Diagnosis Date    Arthritis     Blood circulation, collateral     Cerebral artery occlusion with cerebral infarction ([facility] [address]     Chronic anticoagulation     Chronic combined systolic and diastolic CHF (congestive heart failure) (HCC)     CKD (chronic kidney disease) stage 3, GFR 30-59 ml/min     Degenerative joint disease     Fracture of femoral head ([facility] [address] 10/09/2016    Left with intrarticular extension    GERD (gastroesophageal reflux disease)     History of DVT (deep vein thrombosis)     Hyperlipidemia     Hypertension     Ischemic heart disease     Macular degeneration, wet ([facility] [address]     [facility]-Hodgkin's [facility] 06[facility]2012    Dr. Cherri Campbell Obesity (BMI 30.0-34. 9)     Permanent atrial fibrillation (HCC)     Presence of IVC filter     Pulmonary embolism, bilateral (HCC)     S/P drug eluting coronary stent placement     Subdural hematoma (HCC)     s/p evacuation    Triple vessel coronary artery disease     Type II or unspecified type diabetes mellitus without mention of complication, not stated as uncontrolled                 Recent Labs      12/07/17   0654   INR  1.50*     Recent Labs      12/04/17   1912  12/05/17   0605  12/06/17   0619   HGB  16.0  14.6  14.4   HCT  48.6*  43.3  43.8   PLT  158  151  148       Current warfarin drug-drug interactions: enoxaparin 1 mg/kg q24h (corrected CrCl 33), clopidogrel      Date INR Warfarin Dose   12/7/17 1.5 5 mg                                   Daily PT/INR until stable within therapeutic range. Thank you for the consult.

## 2017-12-07 NOTE — PROGRESS NOTES
3:09 PM  Patient arrival to specials room 2. Procedure explained, allergies and blood thinners reviewed. Consent signed by patient and her . 3:18 PM  Dr. Dawson Skelton to room to speak with patient. Patient moved to procedure bed and placed in supine  position. 1521 Procedure begins for left hip aspiration per Dr. Dawson Skelton.  3:31 PM Fluid aspiration attempted multiple times but unsuccessful per Dr. Dawson Skelton. Procedure complete, site covered with bandaid, no bleeding, drainage, or swelling present. No new numbness or tingling per patient.    3:36 PM  Patient moved back to cart and transported to [address] [place] [address]

## 2017-12-07 NOTE — PROGRESS NOTES
are inherent in voice recognition technology. **      Final report electronically signed by Dr. Hodan Anthony on 12/6/2017 10:41 AM      XR KNEE LEFT (1-2 VIEWS)   Final Result       No acute fracture or dislocation is identified. There are tricompartmental degenerative changes with joint space narrowing and bony spurring. A trace joint effusion is also noted. **This report has been created using voice recognition software. It may contain minor errors which are inherent in voice recognition technology. **      Final report electronically signed by Dr. Hodan Anthony on 12/6/2017 10:38 AM      XR FOOT LEFT (2 VIEWS)   Final Result       No acute fracture or dislocation is identified. There are mild degenerative changes with bony spurring and joint space narrowing at the tarsometatarsal joints. Bony osteophyte formation is also noted at the posterior and plantar aspects of the    calcaneus. **This report has been created using voice recognition software. It may contain minor errors which are inherent in voice recognition technology. **      Final report electronically signed by Dr. Hodan Anthony on 12/6/2017 10:34 AM      XR ANKLE LEFT (2 VIEWS)   Final Result         1. There is linear lucency extending to the lateral aspect of the lateral malleolus. This may represent overlapping shadows versus a fracture. Correlation with point tenderness may be beneficial for further evaluation. 2. Bulky osteophytes at the posterior and plantar aspects of the calcaneus. **This report has been created using voice recognition software. It may contain minor errors which are inherent in voice recognition technology. **      Final report electronically signed by Dr. Hodan Anthony on 12/6/2017 10:31 AM      XR FEMUR LEFT (MIN 2 VIEWS)   Final Result      1. Intact left hip total arthroplasty.       2. Advanced degenerative changes involving the knee joint with without hematuria [N39.0] 01/16/2013    CKD stage 3 due to type 2 diabetes mellitus ([facility] [address] [E11.22, N18.3] 06/27/2012    Essential hypertension [I10]     Diabetes mellitus type 2, diet-controlled ([facility] [E11.9]      1. Left  hip pain , Acute- CT unremarkable, continues to have left hip pain, and some lower lumbar pain. Appreciate Orthopedics, Sed rate elevated, films reviewed, plan for aspiration of joint today  Her coumadin and plavix as per surgery- ordered Vit K,  if no plan for surgery, would like to restart when ok with surgery. Will ask Pharmacy to assist with dosing.    2)DM-2- Diet control, HbA1c 6.4     3)afib PPM on anticoagulation- Being held for procedure.  INR today 2.5-->1.5   Will restart coumadin/plavix when ok with Surgery     4)UTI by UA-check c/s, Rocephin until final cultures, she received dose in ER , will continue IV Abx until final cultures     5)CKD stage 2-3 monitor     6)Polycythemia--monitor     7) DVT- SCDs          Electronically signed by Dotty Longoria MD on 12/7/2017 at 4:15 PM

## 2017-12-07 NOTE — PROGRESS NOTES
Orthopaedic Progress Note      SUBJECTIVE   Ms. Luis Clayton is hospital day day # 1     Patient notes left hip left knee left ankle   No pain located in left ankle  Pain located in the left knee noted osteoarthritis       OBJECTIVE      Physical    VITALS:  BP (!) 165/77   Pulse 51   Temp 97.4 °F (36.3 °C) (Axillary)   Resp 16   Ht 5' 8\" (1.727 m)   Wt 220 lb (99.8 kg)   SpO2 94%   BMI 33.45 kg/m²   I/O last 3 completed shifts: In: 250 [P.O.:250]  Out: 525 [Urine:525]      Pain located behind left knee.  Noted osteoarthritis of the left knee, recent stem cell injection  No pain located in the left ankle  Pain with movement of the left hip log roll       Data  CBC:   Lab Results   Component Value Date    WBC 7.8 12/06/2017    HGB 14.4 12/06/2017     12/06/2017     BMP:    Lab Results   Component Value Date     12/06/2017    K 3.8 12/06/2017     12/06/2017    CO2 25 12/06/2017    BUN 18 12/06/2017    CREATININE 1.3 12/06/2017    CALCIUM 8.4 12/06/2017    GLUCOSE 162 12/06/2017    GLUCOSE 116 05/21/2012     Uric Acid:  No components found for: URIC  PT/INR:    Lab Results   Component Value Date    INR 2.50 12/06/2017     Troponin:    Lab Results   Component Value Date    TROPONINI 0.085 01/17/2013     Urine Culture:  No components found for: CURINE      Current Inpatient Medications    Current Facility-Administered Medications: cefTRIAXone (ROCEPHIN) 1 g in sterile water 10 mL IV syringe, 1 g, Intravenous, Q24H  ketorolac (TORADOL) injection 15 mg, 15 mg, Intravenous, Q6H PRN  0.9 % sodium chloride bolus, 500 mL, Intravenous, Once  sodium chloride flush 0.9 % injection 10 mL, 10 mL, Intravenous, 2 times per day  sodium chloride flush 0.9 % injection 10 mL, 10 mL, Intravenous, PRN  acetaminophen (TYLENOL) tablet 650 mg, 650 mg, Oral, Q4H PRN  magnesium hydroxide (MILK OF MAGNESIA) 400 MG/5ML suspension 30 mL, 30 mL, Oral, Daily PRN  ondansetron (ZOFRAN) injection 4 mg, 4 mg, Intravenous, Q6H PRN  potassium chloride (KLOR-CON M) extended release tablet 40 mEq, 40 mEq, Oral, PRN **OR** potassium chloride 20 MEQ/15ML (10%) oral solution 40 mEq, 40 mEq, Oral, PRN **OR** potassium chloride 10 mEq/100 mL IVPB (Peripheral Line), 10 mEq, Intravenous, PRN  magnesium sulfate 1 g in dextrose 5 % 100 mL IVPB, 1 g, Intravenous, PRN  oxyCODONE (ROXICODONE) immediate release tablet 5 mg, 5 mg, Oral, Q4H PRN  HYDROmorphone (DILAUDID) injection 0.5 mg, 0.5 mg, Intravenous, Q3H PRN  polyethylene glycol (GLYCOLAX) packet 17 g, 17 g, Oral, Daily  amLODIPine (NORVASC) tablet 5 mg, 5 mg, Oral, Daily  bisacodyl (DULCOLAX) EC tablet 5 mg, 5 mg, Oral, Daily PRN  clobetasol (TEMOVATE) 0.05 % cream, , Topical, BID  glucose (GLUTOSE) 40 % oral gel 15 g, 15 g, Oral, PRN  dextrose 50 % solution 12.5 g, 12.5 g, Intravenous, PRN  glucagon (rDNA) injection 1 mg, 1 mg, Intramuscular, PRN  dextrose 5 % and 0.9 % sodium chloride infusion, 100 mL/hr, Intravenous, PRN  isosorbide mononitrate (IMDUR) extended release tablet 60 mg, 60 mg, Oral, Daily  metoprolol succinate (TOPROL XL) extended release tablet 50 mg, 50 mg, Oral, Daily  mupirocin (BACTROBAN) 2 % ointment, , Topical, BID  pravastatin (PRAVACHOL) tablet 20 mg, 20 mg, Oral, QPM        PLAN    Awaiting hip aspiration,    Concern for hip infection

## 2017-12-07 NOTE — CONSULTS
replacement, vena cava filter placed in the past,  pacemaker placed, and cataracts removed. SOCIAL HISTORY:  There is no reported alcohol, tobacco, or illicit drug  use. REVIEW OF SYSTEMS:  No current weakness, weight change, chills, fevers,  night sweats, or fatigue. SKIN:  No rashes, cellulitis, or lesions. EYES:  No double vision or blurred vision. EARS, NOSE, AND THROAT:  No rhinorrhea  or sore throat. HEART:  No chest pains or palpitations. LUNGS:  No  wheezing or asthma. GI:  No nausea or vomiting. :  No dysuria or  hematuria. All other review of systems reviewed and per the chart. PHYSICAL EXAMINATION:  VITAL SIGNS:  Currently, her blood pressure 117/63, pulse 76, respirations  16, temperature is 98.2 degrees Fahrenheit, and pulse oximetry is 93% on  room air. GENERAL:  She is an age-appropriate 81-year-old female with appropriate  body habitus, alert and oriented to person, place, and time. Her daughter  is present at bedside. Most medical history is obtained through her. ORTHOPEDIC/NEUROVASCULAR:  Examination of the entire left lower extremity  reveals a well-healed incision at the lateral aspect of the hip. There is  no erythema, increased warmth, or cellulitis. No pain to palpation over  the hip per se. She does, however, have pain with internal and external  rotation of the hips localized into the left thigh. Examination of the  left knee reveals no obvious deformity. She does have significant crepitus  with knee flexion and extension. She can straight leg raise on her own at this time. Examination of the left ankle reveals pain to  palpation over the lateral malleolus, albeit inconsistent as she did not  have any pain there except the second attempted palpation. At this time,  she does seem to have good toe flexion and extension. She is grossly  neurovascularly more intact to the foot.     DATABASE RESULTS:  Laboratory studies at this time:  She has a white blood  cell count of 7.8. Sedimentation rate is 25. C-reactive protein is 11. Urinalysis reveals certain amount of leukocyte esterase consistent with  urinary tract infection. IMAGING STUDIES:  CT scan of her head obtained during this admission  reveals no acute intracranial pathology. CT of her cervical spine reveals  no acute abnormality. There is a stable partially imaged right paraspinal  mass that has not increased in size since the 04/22/2017 evaluation. CT  scan of the left hip reveals a stable-appearing prosthesis with no acute  bony abnormalities or abscesses identified. Left hip x-rays again reveal  well-seated press-fit hemiarthroplasty in good position. Left femur x-rays  reveal advanced general changes tricompartmental in nature in the knee, but  no acute bony abnormalities were identified. Left knee x-rays again  re-demonstrate severe tricompartmental osteoarthritis. Tib-fib x-rays  obtained during this admission on the left side reveal no acute fracture or  bony abnormalities. Left ankle x-rays at this admission do raise suspicion  for a lateral malleolus ankle fracture, difficult to say at this time as  there is overlapping of the bones. She has tenderness area however. Left  foot x-rays reveal no acute fractures or dislocations. Lumbar spine x-rays  at the time of this admission reveal advanced degenerative changes at L4-L5  and L5-S1 with moderate severe facet arthropathy noted. IMPRESSION:  1. Left hip pain. 2.  Status post left hip hemiarthroplasty a year out. 3.  Questionable lateral malleolus ankle fracture. PLAN:  This case and complete medical history were discussed in detail with  Dr. Elder Luong, orthopedic attending, who recommends the following:   Continue to hold her Coumadin. Give her 5 vitamin K p.o.   Once the INR  dropped down to acceptable level, we asked the radiologist to aspirate her  left hip checking for infection as she has had problems with infection  following her hip replacement surgery. With regards to her left ankle, we  will reevaluate this ankle tomorrow to see if she actually has pinpoint  tenderness directly over the lateral malleolus. If she does remain, put  her in a boot for nonoperative treatment of lateral malleolus ankle  fracture.         Cholo Christian P.A.-C.    D: 12/06/2017 14:42:58       T: 12/06/2017 18:52:41     JEANETTE_TREVOR  Job#: [id number]     Doc#: [id number]    CC:

## 2017-12-08 LAB
D-DIMER QUANTITATIVE: 454 NG/ML FEU (ref 0–500)
EKG ATRIAL RATE: 71 BPM
EKG ATRIAL RATE: 79 BPM
EKG Q-T INTERVAL: 422 MS
EKG Q-T INTERVAL: 432 MS
EKG QRS DURATION: 134 MS
EKG QRS DURATION: 136 MS
EKG QTC CALCULATION (BAZETT): 469 MS
EKG QTC CALCULATION (BAZETT): 483 MS
EKG R AXIS: 35 DEGREES
EKG R AXIS: 49 DEGREES
EKG T AXIS: 16 DEGREES
EKG T AXIS: 25 DEGREES
EKG VENTRICULAR RATE: 71 BPM
EKG VENTRICULAR RATE: 79 BPM
INR BLD: 1.21 (ref 0.85–1.13)
TROPONIN T: < 0.01 NG/ML

## 2017-12-08 PROCEDURE — 99232 SBSQ HOSP IP/OBS MODERATE 35: CPT | Performed by: HOSPITALIST

## 2017-12-08 PROCEDURE — 97530 THERAPEUTIC ACTIVITIES: CPT

## 2017-12-08 PROCEDURE — G8978 MOBILITY CURRENT STATUS: HCPCS

## 2017-12-08 PROCEDURE — 2580000003 HC RX 258: Performed by: HOSPITALIST

## 2017-12-08 PROCEDURE — 6360000002 HC RX W HCPCS: Performed by: HOSPITALIST

## 2017-12-08 PROCEDURE — 1200000000 HC SEMI PRIVATE

## 2017-12-08 PROCEDURE — 97161 PT EVAL LOW COMPLEX 20 MIN: CPT

## 2017-12-08 PROCEDURE — 6370000000 HC RX 637 (ALT 250 FOR IP): Performed by: PHARMACIST

## 2017-12-08 PROCEDURE — 6370000000 HC RX 637 (ALT 250 FOR IP): Performed by: HOSPITALIST

## 2017-12-08 PROCEDURE — 97535 SELF CARE MNGMENT TRAINING: CPT

## 2017-12-08 PROCEDURE — 6370000000 HC RX 637 (ALT 250 FOR IP): Performed by: INTERNAL MEDICINE

## 2017-12-08 PROCEDURE — 97166 OT EVAL MOD COMPLEX 45 MIN: CPT

## 2017-12-08 PROCEDURE — 84484 ASSAY OF TROPONIN QUANT: CPT

## 2017-12-08 PROCEDURE — 93005 ELECTROCARDIOGRAM TRACING: CPT

## 2017-12-08 PROCEDURE — 85379 FIBRIN DEGRADATION QUANT: CPT

## 2017-12-08 PROCEDURE — 2580000003 HC RX 258: Performed by: INTERNAL MEDICINE

## 2017-12-08 PROCEDURE — 85610 PROTHROMBIN TIME: CPT

## 2017-12-08 PROCEDURE — 36415 COLL VENOUS BLD VENIPUNCTURE: CPT

## 2017-12-08 PROCEDURE — G8987 SELF CARE CURRENT STATUS: HCPCS

## 2017-12-08 PROCEDURE — 97110 THERAPEUTIC EXERCISES: CPT

## 2017-12-08 PROCEDURE — G8979 MOBILITY GOAL STATUS: HCPCS

## 2017-12-08 PROCEDURE — 6360000002 HC RX W HCPCS: Performed by: INTERNAL MEDICINE

## 2017-12-08 PROCEDURE — G8988 SELF CARE GOAL STATUS: HCPCS

## 2017-12-08 RX ORDER — WARFARIN SODIUM 5 MG/1
5 TABLET ORAL ONCE
Status: COMPLETED | OUTPATIENT
Start: 2017-12-08 | End: 2017-12-08

## 2017-12-08 RX ADMIN — Medication 10 ML: at 09:24

## 2017-12-08 RX ADMIN — Medication 10 ML: at 21:02

## 2017-12-08 RX ADMIN — ENOXAPARIN SODIUM 100 MG: 100 INJECTION SUBCUTANEOUS at 09:23

## 2017-12-08 RX ADMIN — KETOROLAC TROMETHAMINE 15 MG: 30 INJECTION, SOLUTION INTRAMUSCULAR at 23:46

## 2017-12-08 RX ADMIN — CLOBETASOL PROPIONATE: 0.5 CREAM TOPICAL at 21:02

## 2017-12-08 RX ADMIN — PRAVASTATIN SODIUM 20 MG: 20 TABLET ORAL at 21:02

## 2017-12-08 RX ADMIN — KETOROLAC TROMETHAMINE 15 MG: 30 INJECTION, SOLUTION INTRAMUSCULAR at 09:23

## 2017-12-08 RX ADMIN — ONDANSETRON 4 MG: 2 INJECTION INTRAMUSCULAR; INTRAVENOUS at 04:35

## 2017-12-08 RX ADMIN — OXYCODONE HYDROCHLORIDE 5 MG: 5 TABLET ORAL at 21:11

## 2017-12-08 RX ADMIN — MAGNESIUM HYDROXIDE 30 ML: 400 SUSPENSION ORAL at 04:36

## 2017-12-08 RX ADMIN — CEFTRIAXONE SODIUM 1 G: 10 INJECTION, POWDER, FOR SOLUTION INTRAVENOUS at 23:27

## 2017-12-08 RX ADMIN — MUPIROCIN: 20 OINTMENT TOPICAL at 21:02

## 2017-12-08 RX ADMIN — ISOSORBIDE MONONITRATE 60 MG: 60 TABLET ORAL at 09:22

## 2017-12-08 RX ADMIN — WARFARIN SODIUM 5 MG: 5 TABLET ORAL at 17:37

## 2017-12-08 RX ADMIN — KETOROLAC TROMETHAMINE 15 MG: 30 INJECTION, SOLUTION INTRAMUSCULAR at 15:30

## 2017-12-08 RX ADMIN — CLOPIDOGREL 75 MG: 75 TABLET, FILM COATED ORAL at 09:22

## 2017-12-08 RX ADMIN — AMLODIPINE BESYLATE 5 MG: 5 TABLET ORAL at 09:22

## 2017-12-08 RX ADMIN — METOPROLOL SUCCINATE 50 MG: 50 TABLET, FILM COATED, EXTENDED RELEASE ORAL at 09:22

## 2017-12-08 RX ADMIN — MUPIROCIN: 20 OINTMENT TOPICAL at 09:23

## 2017-12-08 ASSESSMENT — PAIN SCALES - GENERAL
PAINLEVEL_OUTOF10: 4
PAINLEVEL_OUTOF10: 5
PAINLEVEL_OUTOF10: 8
PAINLEVEL_OUTOF10: 5
PAINLEVEL_OUTOF10: 4
PAINLEVEL_OUTOF10: 2
PAINLEVEL_OUTOF10: 0
PAINLEVEL_OUTOF10: 10
PAINLEVEL_OUTOF10: 3
PAINLEVEL_OUTOF10: 2

## 2017-12-08 ASSESSMENT — PAIN DESCRIPTION - ONSET
ONSET: ON-GOING

## 2017-12-08 ASSESSMENT — PAIN DESCRIPTION - FREQUENCY
FREQUENCY: CONTINUOUS

## 2017-12-08 ASSESSMENT — PAIN DESCRIPTION - LOCATION
LOCATION: HIP
LOCATION: TOE (COMMENT WHICH ONE)
LOCATION: ABDOMEN;FOOT
LOCATION: HIP
LOCATION: HIP
LOCATION: TOE (COMMENT WHICH ONE)
LOCATION: HIP;LEG

## 2017-12-08 ASSESSMENT — PAIN DESCRIPTION - DESCRIPTORS
DESCRIPTORS: ACHING

## 2017-12-08 ASSESSMENT — PAIN DESCRIPTION - PAIN TYPE
TYPE: ACUTE PAIN

## 2017-12-08 ASSESSMENT — PAIN DESCRIPTION - PROGRESSION
CLINICAL_PROGRESSION: NOT CHANGED

## 2017-12-08 ASSESSMENT — PAIN DESCRIPTION - ORIENTATION
ORIENTATION: LEFT
ORIENTATION: LEFT
ORIENTATION: RIGHT;LEFT
ORIENTATION: LEFT
ORIENTATION: LEFT
ORIENTATION: RIGHT;LEFT
ORIENTATION: RIGHT;LEFT

## 2017-12-08 NOTE — PROGRESS NOTES
drug eluting coronary stent placement     Subdural hematoma (HCC)     s/p evacuation    Triple vessel coronary artery disease     Type II or unspecified type diabetes mellitus without mention of complication, not stated as uncontrolled      Past Surgical History:   Procedure Laterality Date    BRAIN SURGERY      Drained brain bleed    CARDIOVASCULAR STRESS TEST      with Dr. Helton No- no acute findings    CENTRAL VENOUS CATHETER      Medi port placement    DIAGNOSTIC CARDIAC CATH LAB PROCEDURE      EYE SURGERY      FRACTURE SURGERY      Bipolar femur    HIP FRACTURE SURGERY Left 10/10/2016    Bipolar Hip    JOINT REPLACEMENT      right knee    OTHER SURGICAL HISTORY Left 10/24/2016    I and D left hip, component exchange, Dr. Elizabeth Self pacer   [address] Right     VENA CAVA FILTER PLACEMENT      VENTRICULAR CARDIAC PACEMAKER INSERTION  10/04/2016    Dr. Heaven Mcfarland           Subjective  Chart Reviewed: Yes  Patient assessed for rehabilitation services?: Yes  Family / Caregiver Present: Yes    Subjective: RN okayed OT session. Upon arrival patient was sitting on commode requesting to get off. Pt was agreeable to OT session. Spouse and daughter present during session.      General:  Overall Orientation Status: Within Functional Limits    Pain:  Pain Assessment  Patient Currently in Pain: Yes  Pain Assessment: 0-10  Pain Level: 4  Pain Type: Acute pain  Pain Location: Hip  Pain Orientation: Left       Social/Functional:  Lives With: Family (Spouse and Daughter)  Type of Home: House  Home Layout: Two level, Laundry in basement, Able to Live on Main level with bedroom/bathroom, Performs ADL's on one level  Home Access: Stairs to enter with rails  Entrance Stairs - Number of Steps: 4 MADHU  Entrance Stairs - Rails: Left  Home Equipment: Standard walker     Bathroom Shower/Tub: Walk-in shower  Bathroom Toilet: Handicap height  Bathroom Equipment: Grab bars in shower, Grab bars around toilet  Bathroom Accessibility: Accessible  IADL Comments: Daughter completes grocery shopping tasks. ADL Assistance: Needs assistance (Daughter assists with Showers and dressing tasks. Pt reports Indep with toileting. )  Homemaking Assistance: Needs assistance (Daughter and spouse assists with cleaning and laundry. Pt occasionally cooks when feeling well. )  Homemaking Responsibilities: Yes    Ambulation Assistance: Independent  Transfer Assistance: Independent    Active : No  Occupation: Retired  Additional Comments: Daughter reports pt walks short distrances around home with walker. Daughter unsure if walker is standatrd or RW. Objective  Overall Cognitive Status: Exceptions  Following Commands: Follows one step commands with increased time, Follows one step commands with repetition  Attention Span: Attends with cues to redirect  Memory: Decreased recall of precautions, Decreased short term memory  Problem Solving: Decreased awareness of errors  Insights: Decreased awareness of deficits  Initiation: Requires cues for all  Sequencing: Requires cues for all  Cognition Comment: Slow processing     Sensation  Overall Sensation Status: WFL          LUE AROM (degrees)  LUE AROM : WFL     RUE AROM (degrees)  RUE AROM : WFL     LUE Strength  Gross LUE Strength: WFL  L Hand Grasp: 4/5  L Hand Release: 4/5  LUE Strength Comment: Grossly 4/5     RUE Strength  Gross RUE Strength: WFL  R Hand Grasp: 4/5  R Hand Release: 4/5  RUE Strength Comment: Grossly 4/5    ADL  LE Dressing: Maximum assistance (To don B socks. )  Toileting: Maximum assistance (For toilet hygiene while standing. )     Bed mobility  Comment: Upon arrival patient was sitting up on BSC. Transfers  Sit to stand:  Moderate assistance (From [facility] with Mod vc for technique. )  Stand to sit: Minimal assistance (Onto recliner. )  Toilet Transfers  Toilet - Technique: Ambulating  Equipment Used: Standard bedside commode  Toilet Transfer: Moderate assistance    Balance  Sitting Balance: Stand by assistance  Standing Balance: Contact guard assistance     Time: 2 minutes  Activity: self cares      Functional Mobility  Functional - Mobility Device: Rolling Walker  Activity: Other  Assist Level: Contact guard assistance  Functional Mobility Comments: Ambulated around bed, Slow pace, No LOB, 1 standing rest breaks. Activity Tolerance:  Activity Tolerance: Patient limited by fatigue, Patient limited by pain    Treatment Initiated:  OT Evaluation completed. See above for details. Assessment:  Assessment: Pt requires skilled OT intervention to increase indep and endurance with all self cares, transfers, and functional mobility to decrease fall risk and return to PLOF. Performance deficits / Impairments: Decreased functional mobility , Decreased ADL status, Decreased safe awareness, Decreased endurance, Decreased high-level IADLs  Prognosis: Fair  Discharge Recommendations: Continue to assess pending progress, 24 hour supervision or assist, Home with Home health OT    Clinical Decision Making: Clinical Decision making was of Moderate Complexity as the result of analysis of data from a detailed assessment, a consideration of several treatment options, the presence of comorbidities affecting the plan of care and the need for minimal to moderate modifications or assistance required to complete the evaluation. Patient Education:  Patient Education: OT POC, OT Role, Transfer safety, walker safety, Self cares   Barriers to Learning: Fatigue     Equipment Recommendations:  Equipment Needed: No  Other: Monitor need for [facility]. Safety:  Safety Devices in place: Yes  Type of devices:  All fall risk precautions in place, Call light within reach, Gait belt, Patient at risk for falls, Left in chair, Chair alarm in place    Plan:  Times per week: 5x  Current Treatment Recommendations: Strengthening, Endurance Training, Functional Mobility Training,

## 2017-12-08 NOTE — PLAN OF CARE
Problem: Falls - Risk of  Goal: Absence of falls  Outcome: Ongoing  Patient absent of falls this shift. Patient in bed, side rails up 2/4, call light within reach, bed alarm on. Patient repositions with assistance. Non-skid socks on. Problem: Risk for Impaired Skin Integrity  Goal: Tissue integrity - skin and mucous membranes  Structural intactness and normal physiological function of skin and  mucous membranes. Outcome: Ongoing  Redness to bilateral lower legs. Open wound to right lower lateral leg, antibiotic ointment applied. Redness to buttocks and abdominal folds. Legs elevated off bed. Patient repositions frequently with assistance. Problem: DAILY CARE  Goal: Daily care needs are met  Outcome: Ongoing  Patient able to perform ADLs with assistance. Siu catheter in place. Patient repositions with assistance. Problem: DISCHARGE BARRIERS  Goal: Patient's continuum of care needs are met  Outcome: Ongoing  Patient plans on discharge to home with daughter     Problem: Pain:  Goal: Pain level will decrease  Pain level will decrease   Outcome: Ongoing  Patient states pain ranging from 0-4 on scale to headache and hips. Pain managed with Roxicodone and repositioning. Patient satisfied with pain management     Problem: Nutrition  Goal: Optimal nutrition therapy  Outcome: Ongoing  Patient encouraged to increase fluid intake. Patient denies nausea. Problem: Cardiovascular  Goal: No DVT, peripheral vascular complications  Outcome: Ongoing  Patient denies calf pain or tenderness. Peripheral vascular within normal limits. Patient on plavix and lovenox for history of Afib. Problem: Respiratory  Goal: O2 Sat > 90%  Outcome: Ongoing  Patient on 2L nasal cannula and sating at and above 92% on 2 liters. Patient encouraged to cough and deep breathe and use incentive spirometer. Lung sounds clear throughout. Problem: GI  Goal: No bowel complications  Outcome: Ongoing  Bowel sounds active.  Abdomen soft and non-tender     Problem:   Goal: Adequate urinary output  Outcome: Ongoing  Patient has adequate output with baron in place. Patient encouraged to increase fluid intake     Problem: Musculor/Skeletal Functional Status  Goal: Highest potential functional level  Outcome: Ongoing  Patient has strong hand  and moderate pedal push and pull bilaterally. Patient repositions in the bed with assistance. Comments: Care plan reviewed with patient. Patient verbalize understanding of the plan of care and contribute to goal setting.

## 2017-12-08 NOTE — PROGRESS NOTES
[facility]  INPATIENT PHYSICAL THERAPY  EVALUATION  [facility] ORTHOPEDICS 7K    Time In:   Time Out: 1024  Timed Code Treatment Minutes: 30 Minutes  Minutes: 46          Date: 2017  Patient Name: Roge Wakefield,  Gender:  female        MRN: [id number]  : 1935  (81 y.o.)         Diagnosis: Hip pain  Additional Pertinent Hx: Pt admitted through ED due to Lt hip pain. Hx Arthritix, CKD, DJD, Lt hip fx, GERD,Db, CAD, Rt TKR, pacemaker. UTI this admission. Past Medical History:   Diagnosis Date    Arthritis     Blood circulation, collateral     Cerebral artery occlusion with cerebral infarction (HCC)     Chronic anticoagulation     Chronic combined systolic and diastolic CHF (congestive heart failure) (HCC)     CKD (chronic kidney disease) stage 3, GFR 30-59 ml/min     Degenerative joint disease     Fracture of femoral head ([facility] [address] [facility] 10/09/2016    Left with intrarticular extension    GERD (gastroesophageal reflux disease)     History of DVT (deep vein thrombosis)     Hyperlipidemia     Hypertension     Ischemic heart disease     Macular degeneration, wet ([facility] [address] [facility]     NHL (non-Hodgkin's lymphoma) ([facility] [address] [facility] 2012    Dr. Berenice Reinoso Obesity (BMI 30.0-34. 9)     Permanent atrial fibrillation (HCC)     Presence of IVC filter     Pulmonary embolism, bilateral (HCC)     S/P drug eluting coronary stent placement     Subdural hematoma (HCC)     s/p evacuation    Triple vessel coronary artery disease     Type II or unspecified type diabetes mellitus without mention of complication, not stated as uncontrolled      Past Surgical History:   Procedure Laterality Date    BRAIN SURGERY      Drained brain bleed    CARDIOVASCULAR STRESS TEST      with Dr. Lyndsey Espino- no acute findings    CENTRAL VENOUS CATHETER      Medi port placement    DIAGNOSTIC CARDIAC CATH LAB PROCEDURE      EYE SURGERY      FRACTURE SURGERY      Bipolar femur    HIP FRACTURE SURGERY Left 10/10/2016    Bipolar Hip    JOINT REPLACEMENT      right knee    OTHER SURGICAL HISTORY Left 10/24/2016    I and D left hip, component exchange, Dr. Javad Naidu pacer   [address] Right     VENA CAVA FILTER PLACEMENT      VENTRICULAR CARDIAC PACEMAKER INSERTION  10/04/2016    Dr. Stew Guo       Restrictions/Precautions:  Restrictions/Precautions: General Precautions, Fall Risk, Contact Precautions            Position Activity Restriction  Other position/activity restrictions: Activity as tolerated          Subjective:  Chart Reviewed: Yes  Patient assessed for rehabilitation services?: Yes  Subjective: Pt on commode. Continent of bowel. Therapist assisted with pericare. General:  Overall Orientation Status: Within Normal Limits  Follows Commands: Within Functional Limits    Vision: Within Functional Limits    Hearing: Within functional limits         Pain:   .  Pain Assessment  Pain Level: 0       Social/Functional History:    Lives With: Family (Spouse and daughter.)  Type of Home: House  Home Layout: Two level, Laundry in basement, Able to Live on Main level with bedroom/bathroom, Performs ADL's on one level  Home Access: Stairs to enter with rails  Entrance Stairs - Number of Steps: 4 MADHU  Entrance Stairs - Rails: Left  Home Equipment: Rolling walker     Bathroom Shower/Tub: Walk-in shower  Bathroom Toilet: Handicap height  Bathroom Equipment: Grab bars in shower, Grab bars around toilet  Bathroom Accessibility: Accessible  IADL Comments: Daughter completes grocery shopping tasks. ADL Assistance: Needs assistance (Daughter assists with Showers and dressing tasks. Pt reports Indep with toileting. )  Homemaking Assistance: Needs assistance (Daughter and spouse assists with cleaning and laundry.  Pt occasionally cooks when feeling well. )  Homemaking Responsibilities: Yes  Ambulation Assistance: Independent  Transfer Assistance: Independent    Active : No  Mode of Transportation: SUV  Occupation: Retired  Additional Comments: Daughter reports pt walks short distrances around home with walker. Objective:     ROM RLE: WFL  ROM LLE: ankle df to neutral, pf WFL, hip WFL,  knee ext lacking approx 15 degrees to neutral.  Flexion WFL    Strength RLE: WFL    Strength LLE: WFL  Comment: except 3+ Rt knee due to arthritis       Sensation  Overall Sensation Status: WFL       Balance  Standing - Dynamic: Good (with RW support for pericare, SBA)    Sit to Supine: Stand by assistance (Bed flat, no rail.)    Transfers  Sit to Stand: Contact guard assistance (from commode with armrests, recliner)  Stand to sit: Contact guard assistance (Cues to keep self in RW and to fully align up to next seated surface.)  Stand Pivot Transfers: Contact guard assistance (with RW)       Ambulation 1  Surface: level tile  Device: Rolling Walker  Assistance: Stand by assistance  Quality of Gait: slow pace, forward head, kyphotic trunk. Improved trunk extension minimally with cues. Minimal shortness of breath at that distance. Distance: 25 ft      EXERCISES:  The following exercises were completed to increase range of motion and strength for increased independence with functional mobility:    EXERCISE REPS/SETS   Glut Sets 10/1   Quads Sets 10/1 BLEs   Ankle Pumps 10/1 BLEs   Heel Slides    Short Arc Quads 5/1 LLE, 10/1 RLE   Long Arc Quads    Hip ABDuction 10/1 BLEs with CGA   Straight Leg Raise 5/1 BLEs with CGA LLE   Hamstring Curls    Hamstring Curls with Theraband           Activity Tolerance:  Activity Tolerance: Patient Tolerated treatment well;Patient limited by endurance    Treatment Initiated: ex as noted above. Transfers trials as noted above. Assessment: Body structures, Functions, Activity limitations: Decreased functional mobility , Decreased endurance  Assessment: Pt showing good improvement with functional mobility per daughter report with overall marked less pain.   Pt is showing decreased endurance

## 2017-12-08 NOTE — PROGRESS NOTES
appropriate for left hip aspiration with VIR. She had Left knee films-showed degenerative changes, left tibia and left foot films were unremarkable. In addition, she had films of her lower back, which also illustrated degenerative changes. She could not get MRI. AOC held for VIR--> no fluid aspirated. Aggressive PT/OT, and will likely need to continue at home, ? HHS     2)DM-2- Diet control, HbA1c 6.4     3) Afib PPM on anticoagulation-  AOC was held for procedure. INR today 1.2, subtherapeutic, continue to bridge with coumadin/lovenox and Pharmacy assisting with dosing of coumadin       4) Urinary tract infection- She is also being treated empirically for UTI. Initially UA were not remarkable, however these samples were taken after she was on abx. Elected to have her complete a 3 day IV course.      5)CKD stage 2-3 monitor     6)Polycythemia--monitor     7) DVT- SCDs and [facility]        Electronically signed by Gildardo Bauer MD on 12/8/2017 at 4:23 PM

## 2017-12-09 LAB
GLUCOSE BLD-MCNC: 171 MG/DL (ref 70–108)
INR BLD: 1.26 (ref 0.85–1.13)

## 2017-12-09 PROCEDURE — 2580000003 HC RX 258: Performed by: HOSPITALIST

## 2017-12-09 PROCEDURE — 1200000000 HC SEMI PRIVATE

## 2017-12-09 PROCEDURE — 6370000000 HC RX 637 (ALT 250 FOR IP): Performed by: HOSPITALIST

## 2017-12-09 PROCEDURE — 36415 COLL VENOUS BLD VENIPUNCTURE: CPT

## 2017-12-09 PROCEDURE — 6360000002 HC RX W HCPCS: Performed by: HOSPITALIST

## 2017-12-09 PROCEDURE — 2580000003 HC RX 258: Performed by: INTERNAL MEDICINE

## 2017-12-09 PROCEDURE — 97116 GAIT TRAINING THERAPY: CPT

## 2017-12-09 PROCEDURE — 85610 PROTHROMBIN TIME: CPT

## 2017-12-09 PROCEDURE — 82948 REAGENT STRIP/BLOOD GLUCOSE: CPT

## 2017-12-09 PROCEDURE — 99232 SBSQ HOSP IP/OBS MODERATE 35: CPT | Performed by: HOSPITALIST

## 2017-12-09 PROCEDURE — 97110 THERAPEUTIC EXERCISES: CPT

## 2017-12-09 PROCEDURE — 6370000000 HC RX 637 (ALT 250 FOR IP): Performed by: INTERNAL MEDICINE

## 2017-12-09 RX ORDER — WARFARIN SODIUM 7.5 MG/1
7.5 TABLET ORAL
Status: COMPLETED | OUTPATIENT
Start: 2017-12-09 | End: 2017-12-09

## 2017-12-09 RX ADMIN — CLOPIDOGREL 75 MG: 75 TABLET, FILM COATED ORAL at 09:35

## 2017-12-09 RX ADMIN — ACETAMINOPHEN 650 MG: 325 TABLET ORAL at 21:43

## 2017-12-09 RX ADMIN — CLOBETASOL PROPIONATE: 0.5 CREAM TOPICAL at 20:32

## 2017-12-09 RX ADMIN — PRAVASTATIN SODIUM 20 MG: 20 TABLET ORAL at 20:32

## 2017-12-09 RX ADMIN — OXYCODONE HYDROCHLORIDE 5 MG: 5 TABLET ORAL at 03:26

## 2017-12-09 RX ADMIN — MUPIROCIN: 20 OINTMENT TOPICAL at 09:37

## 2017-12-09 RX ADMIN — WARFARIN SODIUM 7.5 MG: 7.5 TABLET ORAL at 18:40

## 2017-12-09 RX ADMIN — ENOXAPARIN SODIUM 100 MG: 100 INJECTION SUBCUTANEOUS at 09:35

## 2017-12-09 RX ADMIN — CLOBETASOL PROPIONATE: 0.5 CREAM TOPICAL at 09:37

## 2017-12-09 RX ADMIN — CEFTRIAXONE SODIUM 1 G: 10 INJECTION, POWDER, FOR SOLUTION INTRAVENOUS at 22:57

## 2017-12-09 RX ADMIN — MUPIROCIN: 20 OINTMENT TOPICAL at 20:32

## 2017-12-09 RX ADMIN — Medication 10 ML: at 09:35

## 2017-12-09 RX ADMIN — Medication 10 ML: at 20:31

## 2017-12-09 RX ADMIN — ISOSORBIDE MONONITRATE 60 MG: 60 TABLET ORAL at 09:35

## 2017-12-09 RX ADMIN — METOPROLOL SUCCINATE 50 MG: 50 TABLET, FILM COATED, EXTENDED RELEASE ORAL at 09:35

## 2017-12-09 RX ADMIN — AMLODIPINE BESYLATE 5 MG: 5 TABLET ORAL at 09:35

## 2017-12-09 RX ADMIN — POLYETHYLENE GLYCOL 3350 17 G: 17 POWDER, FOR SOLUTION ORAL at 09:35

## 2017-12-09 ASSESSMENT — PAIN SCALES - GENERAL
PAINLEVEL_OUTOF10: 3
PAINLEVEL_OUTOF10: 4
PAINLEVEL_OUTOF10: 3
PAINLEVEL_OUTOF10: 4

## 2017-12-09 ASSESSMENT — PAIN DESCRIPTION - PAIN TYPE: TYPE: ACUTE PAIN

## 2017-12-09 ASSESSMENT — PAIN DESCRIPTION - ONSET: ONSET: ON-GOING

## 2017-12-09 ASSESSMENT — PAIN DESCRIPTION - ORIENTATION: ORIENTATION: RIGHT;LEFT

## 2017-12-09 ASSESSMENT — PAIN DESCRIPTION - FREQUENCY: FREQUENCY: CONTINUOUS

## 2017-12-09 ASSESSMENT — PAIN DESCRIPTION - LOCATION: LOCATION: TOE (COMMENT WHICH ONE)

## 2017-12-09 ASSESSMENT — PAIN - FUNCTIONAL ASSESSMENT: PAIN_FUNCTIONAL_ASSESSMENT: 0-10

## 2017-12-09 ASSESSMENT — PAIN DESCRIPTION - DESCRIPTORS: DESCRIPTORS: ACHING

## 2017-12-09 NOTE — PROGRESS NOTES
Hospitalist Progress Note    Patient:  [place]      Unit/Bed:7K-04/004-A    YOB: 1935    MRN: [id number]       Acct: [de-identified]     PCP: Nataly Coon MD    Date of Admission: 12/4/2017    Chief Complaint: left hip pain     Hospital Course: 81 y/ obese female presenting with sudden left hip and back pain. CT unremarkable. Orthro consulted. Pain meds adjusted. She has some component of chronic pain issues. Initially pain management were consulted, however were unavailable. Orthro saw patient and felt she was appropriate for left hip aspiration with VIR. She had Left knee films-showed degenerative changes, left tibia and left foot films were unremarkable. In addition, she had films of her lower back, which also illustrated degenerative changes.      She has a pacemaker that is not compatible with MRI machine.      In order for her to have procedure, her [facility] was hold. VIR attempted aspiration of left hip, however no fluid were aspirated. She received aggressive PT/OT therapy     She is also being treated empirically for UTI. Initially UA were not remarkable, however these samples were taken after she was on abx. Elected to have her complete a 3 day IV course.      The morning on 12/8/17 she complained of chest pain in the early morning hours, this appears to be secondary to anxiety. EKG was reviewed and unchanged from previous. Trops were cycle and unremarkable. Ddimer was checked, and it was normal. With direction her symptoms improved     She is now being bridge with coumadin/hep, getting PT/OT and completing course of IV Abx. ( 3 days of Rocephin)    Discussed with daughter to give her Lovenox/Coumadin bridge at home, she is open to doing this, however did not want to leave with the snow    Subjective:  feeling better, is moving more and more awake. Less pain in left hip. Denies cp, palpitations, sob, nausea or emesis.    Examined with nursing      Medications:  Reviewed    Infusion Medications    dextrose 5 % and 0.9 % NaCl       Scheduled Medications    clopidogrel  75 mg Oral Daily    enoxaparin  1 mg/kg Subcutaneous Daily    warfarin (COUMADIN) daily dosing (placeholder)   Other RX Placeholder    cefTRIAXone (ROCEPHIN) IV  1 g Intravenous Q24H    sodium chloride  500 mL Intravenous Once    sodium chloride flush  10 mL Intravenous 2 times per day    polyethylene glycol  17 g Oral Daily    amLODIPine  5 mg Oral Daily    clobetasol   Topical BID    isosorbide mononitrate  60 mg Oral Daily    metoprolol succinate  50 mg Oral Daily    mupirocin   Topical BID    pravastatin  20 mg Oral QPM     PRN Meds: ketorolac, sodium chloride flush, acetaminophen, magnesium hydroxide, ondansetron, potassium chloride **OR** potassium chloride **OR** potassium chloride, magnesium sulfate, oxyCODONE, HYDROmorphone, bisacodyl, glucose, dextrose, glucagon (rDNA), dextrose 5 % and 0.9 % NaCl      Intake/Output Summary (Last 24 hours) at 12/09/17 1246  Last data filed at 12/09/17 0616   Gross per 24 hour   Intake             1385 ml   Output             2675 ml   Net            -1290 ml       Diet:  DIET GENERAL;    Exam:  BP (!) 149/72   Pulse 76   Temp 98.1 °F (36.7 °C) (Oral)   Resp 16   Ht 5' 8\" (1.727 m)   Wt 220 lb (99.8 kg)   SpO2 91%   BMI 33.45 kg/m²     General appearance: No apparent distress, appears stated age and cooperative. HEENT: Pupils equal, round, and reactive to light. Conjunctivae/corneas clear. Neck: Supple, with full range of motion. No jugular venous distention. Trachea midline. Respiratory:  Normal respiratory effort. Clear to auscultation, bilaterally without Rales/Wheezes/Rhonchi. Cardiovascular: irregular irregular, no rub/click/or murmur appreciated  Abdomen: Soft, non-tender, non-distended with normal bowel sounds. Musculoskeletal: No clubbing, cyanosis or edema bilaterally. Full range of motion without deformity.  Less pain with palpation to left hip  Skin: tricompartmental degenerative changes with joint space narrowing and bony spurring. A trace joint effusion is also noted. **This report has been created using voice recognition software. It may contain minor errors which are inherent in voice recognition technology. **      Final report electronically signed by Dr. Gaston Esparza on 12/6/2017 10:38 AM      XR FOOT LEFT (2 VIEWS)   Final Result       No acute fracture or dislocation is identified. There are mild degenerative changes with bony spurring and joint space narrowing at the tarsometatarsal joints. Bony osteophyte formation is also noted at the posterior and plantar aspects of the    calcaneus. **This report has been created using voice recognition software. It may contain minor errors which are inherent in voice recognition technology. **      Final report electronically signed by Dr. Gaston Esparza on 12/6/2017 10:34 AM      XR ANKLE LEFT (2 VIEWS)   Final Result         1. There is linear lucency extending to the lateral aspect of the lateral malleolus. This may represent overlapping shadows versus a fracture. Correlation with point tenderness may be beneficial for further evaluation. 2. Bulky osteophytes at the posterior and plantar aspects of the calcaneus. **This report has been created using voice recognition software. It may contain minor errors which are inherent in voice recognition technology. **      Final report electronically signed by Dr. Gaston Esparza on 12/6/2017 10:31 AM      XR FEMUR LEFT (MIN 2 VIEWS)   Final Result      1. Intact left hip total arthroplasty. 2. Advanced degenerative changes involving the knee joint with tricompartmental joint space narrowing and bony spurring. A small knee joint effusion is also present. **This report has been created using voice recognition software.  It may contain minor errors which are inherent in voice recognition small vessel ischemic disease. No acute intracranial pathology and no change            **This report has been created using voice recognition software. It may contain minor errors which are inherent in voice recognition technology. **      Final report electronically signed by Dr. Kathrin Lenz on 12/4/2017 8:33 PM      XR HIP LEFT (2-3 VIEWS)   Final Result   No acute abnormality            **This report has been created using voice recognition software. It may contain minor errors which are inherent in voice recognition technology. **      Final report electronically signed by Dr. Kathrin Lenz on 12/4/2017 8:17 PM      XR CHEST 1 VW   Final Result   Cardiomegaly            **This report has been created using voice recognition software. It may contain minor errors which are inherent in voice recognition technology. **      Final report electronically signed by Dr. Kathrin Lenz on 12/4/2017 8:16 PM          Diet: DIET GENERAL;    DVT prophylaxis: [] Lovenox                                 [] SCDs                                 [] SQ Heparin                                 [] Encourage ambulation           [x] Already on Anticoagulation     Disposition:    [x] Home       [] TCU       [] Rehab       [] Psych       [] SNF       [] [place]       [] Other-    Code Status: Full Code    PT/OT Eval Status: completed    Assessment/Plan:    Anticipated Discharge in : 1 day    RIC[facility]/Tatiana Dubois 1106 Problems    Diagnosis Date Noted    Chronic atrial fibrillation ([facility] [address] [I48.2]      Priority: High    Hip pain [M25.559] 12/04/2017    Lower back pain [M54.5]     Polycythemia [D75.1] 08/30/2016    Urinary tract infection without hematuria [N39.0] 01/16/2013    CKD stage 3 due to type 2 diabetes mellitus ([address] [E11.22, N18.3] 06/27/2012    Essential hypertension [I10]     Diabetes mellitus type 2, diet-controlled ([address] [E11.9]      . Left  hip pain , Acute- CT unremarkable. Orthro consulted. Pain meds adjusted.  She has some component of chronic pain issues. Initially pain management were consulted, however were unavailable. Orthro saw patient and felt she was appropriate for left hip aspiration with VIR. She had Left knee films-showed degenerative changes, left tibia and left foot films were unremarkable. In addition, she had films of her lower back, which also illustrated degenerative changes. She could not get MRI. AOC held for VIR--> no fluid aspirated. Aggressive PT/OT, and will likely need to continue at home, ? [facility]     2)DM-2- Diet control, HbA1c 6.4     3) Afib PPM on anticoagulation-  AOC was held for procedure. INR today 1.2-->1.26 subtherapeutic, continue to bridge with coumadin/lovenox and Pharmacy assisting with dosing of coumadin        4) Urinary tract infection- She is also being treated empirically for UTI. Initially UA were not remarkable, however these samples were taken after she was on abx.  Elected to have her complete a 3 day IV course.      5)CKD stage 2-3 monitor     6)Polycythemia--monitor     7) DVT- SCDs and [facility]        Electronically signed by Madhavi Smith MD on 12/9/2017 at 12:46 PM

## 2017-12-09 NOTE — PROGRESS NOTES
Physical Therapy   [facility]    Time In: 1520  Time Out: 0831  Timed Code Treatment Minutes: 23 Minutes  Minutes: 23          Date: 2017  Patient Name: Kathleen Stoddard,  Gender:  female        MRN: [id number]  : 1935  (81 y.o.)        Diagnosis: Hip pain  Additional Pertinent Hx: Pt admitted through ED due to Lt hip pain. Hx Arthritix, CKD, DJD, Lt hip fx, GERD,Db, CAD, Rt TKR, pacemaker. UTI this admission. Past Medical History:   Diagnosis Date    Arthritis     Blood circulation, collateral     Cerebral artery occlusion with cerebral infarction (HCC)     Chronic anticoagulation     Chronic combined systolic and diastolic CHF (congestive heart failure) (HCC)     CKD (chronic kidney disease) stage 3, GFR 30-59 ml/min     Degenerative joint disease     Fracture of femoral head ([facility] [address] 10/09/2016    Left with intrarticular extension    GERD (gastroesophageal reflux disease)     History of DVT (deep vein thrombosis)     Hyperlipidemia     Hypertension     Ischemic heart disease     Macular degeneration, wet ([facility] [address]     NHL (non-Hodgkin's lymphoma) ([facility] [address] 2012    Dr. Prashant Vigil Obesity (BMI 30.0-34. 9)     Permanent atrial fibrillation (HCC)     Presence of IVC filter     Pulmonary embolism, bilateral (HCC)     S/P drug eluting coronary stent placement     Subdural hematoma (HCC)     s/p evacuation    Triple vessel coronary artery disease     Type II or unspecified type diabetes mellitus without mention of complication, not stated as uncontrolled      Past Surgical History:   Procedure Laterality Date    BRAIN SURGERY      Drained brain bleed    CARDIOVASCULAR STRESS TEST      with Dr. Chidi Liao- no acute findings    CENTRAL VENOUS CATHETER      Medi port placement    DIAGNOSTIC CARDIAC CATH LAB PROCEDURE      EYE SURGERY      FRACTURE SURGERY      Bipolar femur    HIP FRACTURE SURGERY Left 10/10/2016

## 2017-12-09 NOTE — PLAN OF CARE
Problem: Falls - Risk of  Goal: Absence of falls  Outcome: Ongoing  Patient in bed, side rails up 2/4, call light within reach, bed alarm on, family at bedside. Patient repositions with assistance. Uses call light appropriately. Patient up with 1-2 assist to commode. Problem: Risk for Impaired Skin Integrity  Goal: Tissue integrity - skin and mucous membranes  Structural intactness and normal physiological function of skin and  mucous membranes. Outcome: Ongoing  Redness to bilateral lower legs. Abrasion to right leg. Antibiotic ointments to bilateral lower legs. Excoriation and redness under abdominal folds, EPC applied. Patient reposition with assistance and turned frequently. Legs elevated off bed. Problem: DAILY CARE  Goal: Daily care needs are met  Outcome: Ongoing  Patient able to perform ADLS with assistance. Patient repositions with assistance. Siu catheter in place. Table within reach, call light within reach. Family at bedside. Problem: DISCHARGE BARRIERS  Goal: Patient's continuum of care needs are met  Outcome: Ongoing  Patient plans on discharge to home with daughter possibly today. Problem: Pain:  Goal: Pain level will decrease  Pain level will decrease   Outcome: Completed Date Met: 12/09/17  Patient states pain ranging from 2-4 on scale to bilateral toes. Pain managed with Roxicodone, Toradol, and repositioning. Patient able to sleep. Patient able to reach pain goal of 2 on scale    Problem: Nutrition  Goal: Optimal nutrition therapy  Outcome: Ongoing  Patient has adequate intake. Patient encouraged to increase fluid intake. Denies nausea    Problem: Cardiovascular  Goal: No DVT, peripheral vascular complications  Outcome: Ongoing  SCDs to bilateral lower legs, denies calf pain or tenderness. Elevated blood pressure, will continue to monitor. Peripheral vascular within normal limits.      Problem: Respiratory  Goal: O2 Sat > 90%  Outcome: Ongoing  Oxygen saturation at 88% on room air. Patient placed on 2L and weaned down. Oxygen saturation above 90% on 2L nasal cannula. Lung sounds clear throughout. Patient encouraged to cough and deep breathe and uses incentive spirometer. Problem: GI  Goal: No bowel complications  Outcome: Ongoing  Patient passing gas, abdomen soft and non-tender. Patient had several bowel movements during the day. Problem:   Goal: Adequate urinary output  Outcome: Ongoing  Siu catheter in place draining clear yellow urine. Patient encouraged to increase fluid intake. Problem: Musculor/Skeletal Functional Status  Goal: Highest potential functional level  Outcome: Ongoing  Patient repositions with assistance and turned frequently. Patient up with 1-2 assist. Bed in lowest position, side rails up 2/4, legs elevated off bed. Family at bedside. Comments: Care plan reviewed with patient, daughter, and . Patient, daughter, and  verbalize understanding of the plan of care and contribute to goal setting.

## 2017-12-10 VITALS
DIASTOLIC BLOOD PRESSURE: 80 MMHG | HEART RATE: 72 BPM | WEIGHT: 220 LBS | HEIGHT: 68 IN | TEMPERATURE: 98.1 F | SYSTOLIC BLOOD PRESSURE: 151 MMHG | BODY MASS INDEX: 33.34 KG/M2 | OXYGEN SATURATION: 96 % | RESPIRATION RATE: 18 BRPM

## 2017-12-10 LAB
BLOOD CULTURE, ROUTINE: NORMAL
BLOOD CULTURE, ROUTINE: NORMAL
INR BLD: 1.54 (ref 0.85–1.13)

## 2017-12-10 PROCEDURE — 36415 COLL VENOUS BLD VENIPUNCTURE: CPT

## 2017-12-10 PROCEDURE — 6370000000 HC RX 637 (ALT 250 FOR IP): Performed by: INTERNAL MEDICINE

## 2017-12-10 PROCEDURE — 97116 GAIT TRAINING THERAPY: CPT

## 2017-12-10 PROCEDURE — 6360000002 HC RX W HCPCS: Performed by: HOSPITALIST

## 2017-12-10 PROCEDURE — 85610 PROTHROMBIN TIME: CPT

## 2017-12-10 PROCEDURE — 99239 HOSP IP/OBS DSCHRG MGMT >30: CPT | Performed by: INTERNAL MEDICINE

## 2017-12-10 PROCEDURE — 97110 THERAPEUTIC EXERCISES: CPT

## 2017-12-10 PROCEDURE — 6370000000 HC RX 637 (ALT 250 FOR IP): Performed by: HOSPITALIST

## 2017-12-10 PROCEDURE — 2580000003 HC RX 258: Performed by: INTERNAL MEDICINE

## 2017-12-10 PROCEDURE — 2700000000 HC OXYGEN THERAPY PER DAY

## 2017-12-10 RX ORDER — AMLODIPINE BESYLATE 10 MG/1
10 TABLET ORAL DAILY
Status: DISCONTINUED | OUTPATIENT
Start: 2017-12-11 | End: 2017-12-10 | Stop reason: HOSPADM

## 2017-12-10 RX ORDER — AMLODIPINE BESYLATE 5 MG/1
5 TABLET ORAL ONCE
Status: COMPLETED | OUTPATIENT
Start: 2017-12-10 | End: 2017-12-10

## 2017-12-10 RX ORDER — AMLODIPINE BESYLATE 10 MG/1
10 TABLET ORAL DAILY
Qty: 30 TABLET | Refills: 0 | Status: SHIPPED | OUTPATIENT
Start: 2017-12-10 | End: 2018-04-03 | Stop reason: ALTCHOICE

## 2017-12-10 RX ORDER — FUROSEMIDE 20 MG/1
20 TABLET ORAL DAILY
Status: DISCONTINUED | OUTPATIENT
Start: 2017-12-10 | End: 2017-12-10 | Stop reason: HOSPADM

## 2017-12-10 RX ORDER — POLYETHYLENE GLYCOL 3350 17 G/17G
17 POWDER, FOR SOLUTION ORAL DAILY PRN
Qty: 527 G | Refills: 0 | Status: SHIPPED | OUTPATIENT
Start: 2017-12-10 | End: 2018-01-09

## 2017-12-10 RX ORDER — OXYCODONE HYDROCHLORIDE 5 MG/1
5 TABLET ORAL EVERY 8 HOURS PRN
Qty: 15 TABLET | Refills: 0 | Status: SHIPPED | OUTPATIENT
Start: 2017-12-10 | End: 2018-01-29 | Stop reason: ALTCHOICE

## 2017-12-10 RX ADMIN — ACETAMINOPHEN 650 MG: 325 TABLET ORAL at 15:13

## 2017-12-10 RX ADMIN — FUROSEMIDE 20 MG: 20 TABLET ORAL at 13:24

## 2017-12-10 RX ADMIN — AMLODIPINE BESYLATE 5 MG: 5 TABLET ORAL at 07:55

## 2017-12-10 RX ADMIN — MAGNESIUM HYDROXIDE 30 ML: 400 SUSPENSION ORAL at 11:29

## 2017-12-10 RX ADMIN — AMLODIPINE BESYLATE 5 MG: 5 TABLET ORAL at 13:24

## 2017-12-10 RX ADMIN — METOPROLOL SUCCINATE 50 MG: 50 TABLET, FILM COATED, EXTENDED RELEASE ORAL at 07:55

## 2017-12-10 RX ADMIN — CLOPIDOGREL 75 MG: 75 TABLET, FILM COATED ORAL at 07:56

## 2017-12-10 RX ADMIN — MUPIROCIN: 20 OINTMENT TOPICAL at 11:27

## 2017-12-10 RX ADMIN — ENOXAPARIN SODIUM 100 MG: 100 INJECTION SUBCUTANEOUS at 11:28

## 2017-12-10 RX ADMIN — Medication 10 ML: at 11:30

## 2017-12-10 RX ADMIN — CLOBETASOL PROPIONATE: 0.5 CREAM TOPICAL at 11:27

## 2017-12-10 RX ADMIN — ISOSORBIDE MONONITRATE 60 MG: 60 TABLET ORAL at 07:55

## 2017-12-10 ASSESSMENT — PAIN SCALES - GENERAL
PAINLEVEL_OUTOF10: 0
PAINLEVEL_OUTOF10: 4

## 2017-12-10 NOTE — DISCHARGE SUMMARY
Bones are osteopenic. Images are limited due to patient condition. There is no fracture or dislocation. Hip prosthesis appears intact. There is heterotopic ossification adjacent to the greater trochanter. No acute abnormality **This report has been created using voice recognition software. It may contain minor errors which are inherent in voice recognition technology. ** Final report electronically signed by Dr. Monica Poole on 12/4/2017 8:17 PM    Xr Femur Left (min 2 Views)    Result Date: 12/6/2017  PROCEDURE: XR FEMUR LEFT STANDARD CLINICAL INFORMATION: pain, . COMPARISON: Correlation is made to radiographs of the left hip dated December 4, 2017 TECHNIQUE: 4 AP and lateral radiographs of the left femur. FINDINGS:  Surgical hardware is present from a total left hip arthroplasty. The components of the prosthesis are intact without evidence of dislocation or loosening. The femoral stem is well-seated within the proximal femur. The osseous structures are diffusely osteopenic. No acute fracture or dislocation is identified within the remainder of the left femur. There are advanced degenerative changes at the knee joint with tricompartmental joint space narrowing and bony spurring. A small joint effusion is also present. Degenerative changes are also noted involving the left sacroiliac joint. Vascular calcifications are present within the soft tissues of the left thigh. 1. Intact left hip total arthroplasty. 2. Advanced degenerative changes involving the knee joint with tricompartmental joint space narrowing and bony spurring. A small knee joint effusion is also present. **This report has been created using voice recognition software. It may contain minor errors which are inherent in voice recognition technology. ** Final report electronically signed by Dr. Ruben Kee on 12/6/2017 10:27 AM    Xr Knee Left (1-2 Views)    Result Date: 12/6/2017  PROCEDURE: XR KNEE LEFT LIMITED CLINICAL INFORMATION: pain. COMPARISON: Radiographs of the left knee dated October 13, 2016. TECHNIQUE: AP and lateral views of the left knee FINDINGS: The osseous structures are diffusely osteopenic. No acute fracture or dislocation is identified. There are advanced tricompartmental degenerative changes with narrowing of the joint spaces and bony osteophyte formation. These changes are similar to the prior exam. No focal, bony destructive process is identified. There is a trace joint effusion. Vascular calcifications are present within the surrounding soft tissues. No acute fracture or dislocation is identified. There are tricompartmental degenerative changes with joint space narrowing and bony spurring. A trace joint effusion is also noted. **This report has been created using voice recognition software. It may contain minor errors which are inherent in voice recognition technology. ** Final report electronically signed by Dr. Sergio Guo on 12/6/2017 10:38 AM    Xr Tibia Fibula Left (2 Views)    Result Date: 12/6/2017  PROCEDURE: XR TIBIA FIBULA LEFT STANDARD CLINICAL INFORMATION: pain, . COMPARISON: None available. TECHNIQUE: AP and lateral views of the left lower leg. FINDINGS: The osseous structures are diffusely osteopenic. There is no fracture. The tibia and fibula are intact. The soft tissues are unremarkable. No suspicious osseous lesions are present. Degenerative changes are present at the knee joint. The ankle joint  appears intact. Note is made of bony spurring at the posterior aspect of the calcaneus. No acute fracture, dislocation or suspicious osseous lesion is identified. **This report has been created using voice recognition software. It may contain minor errors which are inherent in voice recognition technology. ** Final report electronically signed by Dr. Sergio Guo on 12/6/2017 10:41 AM    Xr Ankle Left (2 Views)    Result Date: 12/6/2017  PROCEDURE: XR ANKLE LEFT LIMITED CLINICAL INFORMATION: pain, . COMPARISON: None available. TECHNIQUE: 2 views of the left ankle. FINDINGS: There is lucency which extends to the cortical surface at the lateral aspect of the lateral malleolus on the AP radiograph. The ankle mortise is otherwise congruent with the talar dome. No suspicious osseous lesions are present. There is bulky osteophytic spurring at the plantar and posterior aspects of the calcaneus. The base of the fifth metatarsal is unremarkable. The soft tissues are also unremarkable. .     1. There is linear lucency extending to the lateral aspect of the lateral malleolus. This may represent overlapping shadows versus a fracture. Correlation with point tenderness may be beneficial for further evaluation. 2. Bulky osteophytes at the posterior and plantar aspects of the calcaneus. **This report has been created using voice recognition software. It may contain minor errors which are inherent in voice recognition technology. ** Final report electronically signed by Dr. Nino Quinteros on 12/6/2017 10:31 AM    Xr Foot Left (2 Views)    Result Date: 12/6/2017  PROCEDURE: XR FOOT LEFT LIMITED CLINICAL INFORMATION: pain. COMPARISON: None available. TECHNIQUE: AP and lateral views of the left foot. FINDINGS: The osseous structures are diffusely osteopenic. There is no fracture or dislocation. A slight hallux valgus configuration is noted. The metatarsals are intact. The metatarsals are normally aligned with their respective tarsal bones. The base of the fifth metatarsal is normal. There are degenerative changes with mild joint space narrowing and spurring at the tarsometatarsal joints. The calcaneus demonstrates large osteophytic spurring at the posterior and plantar aspects. The soft tissues are unremarkable. No acute fracture or dislocation is identified. There are mild degenerative changes with bony spurring and joint space narrowing at the tarsometatarsal joints.  Bony osteophyte formation is upper chest. This does not appear to have increased in size since the examination of 4/22/2017 and could again represent a neurogenic tumor. No acute osseous abnormality. Stable partially imaged right paraspinal mass right apex possibly a nerve genic tumor such as a schwannoma **This report has been created using voice recognition software. It may contain minor errors which are inherent in voice recognition technology. ** Final report electronically signed by Dr. Alyssa Boone on 12/4/2017 8:37 PM    Ir Scott Morales Guided Needle Placement    Result Date: 12/7/2017  PROCEDURE: IR FLUORO GUIDED NEEDLE PLACEMENT CLINICAL INFORMATION: Left hip prosthesis. Left hip pain. Site for attempted aspiration: Left hip Fluid obtained: None FLUOROSCOPY TIME: 2 minutes 20 seconds FLUOROSCOPIC IMAGES: 6 Performed by: Jose Martin Florentino M.D. Procedure: Signed informed consent was obtained prior to performing this procedure. With the patient on the fluoroscopic table, the site of interest was evaluated fluoroscopically. The skin was marked, prepped, and draped in a sterile fashion. Following local anesthesia and utilizing aseptic technique, a 22-gauge spinal needle was successfully inserted, making contact with the left femoral neck anteriorly as well as anterolaterally. Multiple attempts were made to aspirate fluid from the left hip joint but no fluid could be obtained. Fluoroscopic images were obtained to confirm appropriate needle position. .. Impression: Attempted left hip aspiration, unsuccessful. No fluid could be aspirated. **This report has been created using voice recognition software. It may contain minor errors which are inherent in voice recognition technology. ** Final report electronically signed by Dr. Glee Canavan on 12/7/2017 4:30 PM    Xr Chest 1 Vw    Result Date: 12/4/2017  PROCEDURE: XR CHEST LIMITED ONE VIEW CLINICAL INFORMATION: hip pain,  . COMPARISON: 4/22/2017 TECHNIQUE: AP supine FINDINGS: Cardiomegaly. by mouth every evening     traMADol 50 MG tablet  Commonly known as:  ULTRAM  Take 1 tablet by mouth every 6 hours as needed for Pain (severe pain) . warfarin 2.5 MG tablet  Commonly known as:  COUMADIN  TAKE AS DIRECTED BY THE COUMADIN CLINIC           Where to Get Your Medications      These medications were [facility] to [facility]  [address] [place] [address] Drive, [address] [place] [address]    Phone:  [phone]   · amLODIPine 10 MG tablet     You can get these medications from any pharmacy    Bring a paper prescription for each of these medications  · enoxaparin 100 MG/ML injection  · oxyCODONE 5 MG immediate release tablet  · polyethylene glycol packet         Activity: activity as tolerated  Diet: DIET GENERAL;      Disposition: home  Discharged Condition: Stable  Follow Up:   Shae Epstein MD  [address] [address] [address]    Schedule an appointment as soon as possible for a visit in 2 days  INR check in 2 days    Bubba Kwon, [address]    Schedule an appointment as soon as possible for a visit in 1 week      Code status:  Full Code       Total time spent on discharge, finalizing medications, referrals and arranging outpatient follow up was more than 30 minutes      Thank you Dr. Shae Epstein MD for the opportunity to be involved in this patients care.

## 2017-12-10 NOTE — PROGRESS NOTES
T-Scale Score : 43.03  Mobility Inpatient CMS 0-100% Score: 47.43  Mobility Inpatient without Stair CMS G-Code Modifier : CK

## 2017-12-10 NOTE — PROGRESS NOTES
Pt discharged home, daughter transporting in private car. Discharge instructions given and explained, prescriptions given. . All belongings packed and sent with pt.  Pt and family voice no concerns about discharge at this time

## 2017-12-10 NOTE — PLAN OF CARE
Problem: Falls - Risk of  Goal: Absence of falls  Outcome: Met This Shift  Patient has not had any falls during this shift. Bed in lowest position with wheels locked and call light in reach. Patient compliant with using call light to request assistance from staff when needed. Fall prevention measures in place and patient compliant. Hourly rounding in place and bed alarm on. Problem: Risk for Impaired Skin Integrity  Goal: Tissue integrity - skin and mucous membranes  Structural intactness and normal physiological function of skin and  mucous membranes. Outcome: Ongoing  No new skin issues noted to patient this shift. Patient with redness to bilateral legs and small scratch marks. Patient has abrasion to coccyx. Patient turned in bed every two hours and heels elevated off of bed with pillow. EPC cream applied to coccyx. Will continue to monitor. Problem: DAILY CARE  Goal: Daily care needs are met  Outcome: Ongoing  Patient requires assistance from staff with ADLs. Patient is able to use call light to request assistance when needed. Will continue to address patient's needs as they arise. Problem: DISCHARGE BARRIERS  Goal: Patient's continuum of care needs are met  Outcome: Ongoing  Patient planning on returning home at discharge with help from daughter.  assisting patient and family with discharge needs. Problem: Nutrition  Goal: Optimal nutrition therapy  Outcome: Ongoing  Patient on general diet and tolerating well. No nausea/vomiting noted to patient this shift. Problem: Cardiovascular  Goal: No DVT, peripheral vascular complications  Outcome: Met This Shift  Patient requires assistance from staff with ADLs. Patient is able to use call light to request assistance when needed. Will continue to address patient's needs as they arise. Problem: Respiratory  Goal: O2 Sat > 90%  Outcome: Ongoing  Patient on 3 liters of oxygen per nasal cannula with oxygen above 92%.  Patient encouraged to use incentive spirometer and is able to achieve 750 ml. Patient states she does not wear oxygen at home. Will continue to wean patient off of oxygen as appropriate. Problem: GI  Goal: No bowel complications  Outcome: Ongoing  Patient had a large BM yesterday. Bowel sounds active. Patient states she has not been passing gas. Will monitor. Problem:   Goal: Adequate urinary output  Outcome: Met This Shift  Adequate urinary output through baron catheter this shift. Urine is josefina in color. Patient admitted for UTI. Problem: Musculor/Skeletal Functional Status  Goal: Highest potential functional level  Outcome: Ongoing  Patient up with one assist and walker and tolerating ambulation well. Comments: Care plan reviewed with patient and family. Patient and family verbalize understanding of the plan of care and contribute to goal setting.

## 2017-12-11 ENCOUNTER — CARE COORDINATION (OUTPATIENT)
Dept: CARE COORDINATION | Age: 82
End: 2017-12-11

## 2017-12-11 NOTE — CARE COORDINATION
Informed her CTC will continue to f/u for next couple of wks, but to call myself or PCP office with any new questions or concerns. Daughter very appreciative. Care Coordination Interventions    Program Enrollment:  Complex Care  Referral from Primary Care Provider:  Yes  Suggested Interventions and [address]:  Completed (Comment: completed HH)  Medi Set or Pill Pack:  Completed (Comment: daughter managing meds and setting them up weekly in pill box)  Physical Therapy:  Completed (Comment: no longer receiving PT)  Other Therapy Services:  Completed (Comment: receiving neurofeedback tx's at [facility])  Transportation Support:  Declined  Zone Management Tools:  Completed (Comment: CHF zone mgmt tool sheet mailed to pt. )         Goals Addressed             Most Recent     Conditions and Symptoms   On track (12/11/2017)             I will schedule office visits, as directed by my provider. I will keep my appointment or reschedule if I have to cancel. I will notify my provider of any barriers to my plan of care. I will follow my Zone Management tool to seek urgent or emergent care. I will notify my provider of any symptoms that indicate a worsening of my condition. Barriers: overwhelmed by complexity of regimen  Plan for overcoming my barriers: daughter to provide support  Confidence: 8/10  Anticipated Goal Completion Date: 1/7/18              Prior to Admission medications    Medication Sig Start Date End Date Taking? Authorizing Provider   oxyCODONE (ROXICODONE) 5 MG immediate release tablet Take 1 tablet by mouth every 8 hours as needed for Pain .  Earliest Fill Date: 12/10/17 12/10/17   Allison Hoyt MD   enoxaparin (LOVENOX) 100 MG/ML injection Inject 1 mL into the skin daily for 5 days 12/10/17 12/15/17  Allison Hoyt MD   polyethylene glycol Micaela Staple) packet Take 17 g by mouth daily as needed for Constipation 12/10/17 1/9/18  Vinh CHANG Alma Cervantes MD   amLODIPine (NORVASC) 10 MG tablet Take 1 tablet by mouth daily 12/10/17   Nereida Oshea MD   bisacodyl (DULCOLAX) 5 MG EC tablet Take 1 tablet by mouth daily as needed for Constipation 11/17/17   Chris Wen MD   traMADol (ULTRAM) 50 MG tablet Take 1 tablet by mouth every 6 hours as needed for Pain (severe pain) . 11/17/17   Chris Wen MD   clobetasol (TEMOVATE) 0.05 % cream Apply 1 applicator topically 2 times daily Apply topically 2 times daily.     Historical Provider, MD   warfarin (COUMADIN) 2.5 MG tablet TAKE AS DIRECTED BY THE COUMADIN CLINIC 10/20/17   Chris Wen MD   mupirocin (BACTROBAN) 2 % ointment Apply 1 each topically 2 times daily    Historical Provider, MD   multivitamin-iron-minerals-folic acid (CENTRUM) chewable tablet Take 1 tablet by mouth daily    Historical Provider, MD   clopidogrel (PLAVIX) 75 MG tablet TAKE 1 TABLET DAILY 8/24/17   Shayy Bernal MD   metoprolol succinate (TOPROL XL) 50 MG extended release tablet TAKE 1 TABLET DAILY 8/23/17   Tunde Rutledge PA-C   furosemide (LASIX) 20 MG tablet Take 1 tablet by mouth daily 5/2/17   Chris Wen MD   potassium chloride (KLOR-CON M) 10 MEQ extended release tablet Take 1 tablet by mouth daily 5/2/17   Chris Wen MD   pravastatin (PRAVACHOL) 20 MG tablet Take 1 tablet by mouth every evening 5/2/17   Chris Wen MD   isosorbide mononitrate (IMDUR) 60 MG extended release tablet Take 1 tablet by mouth daily 5/2/17   Chris Wen MD   acetaminophen (TYLENOL) 325 MG tablet Take 2 tablets by mouth every 4 hours as needed for Pain 4/27/17   Alexandr Matute MD   metolazone (ZAROXOLYN) 2.5 MG tablet Take 1 tablet by mouth daily as needed (SOB) 3/16/17   Chris Wen MD   aspirin 81 MG EC tablet Take 1 tablet by mouth daily 11/22/16   Shelby Garcia MD   Glucose Blood (BLOOD GLUCOSE TEST STRIPS) STRP 1 strip by In Vitro route daily 11/22/16   Shelby Garcia MD       Future

## 2017-12-12 ENCOUNTER — TELEPHONE (OUTPATIENT)
Dept: INTERNAL MEDICINE CLINIC | Age: 82
End: 2017-12-12

## 2017-12-12 ENCOUNTER — OFFICE VISIT (OUTPATIENT)
Dept: INTERNAL MEDICINE CLINIC | Age: 82
End: 2017-12-12
Payer: MEDICARE

## 2017-12-12 VITALS
HEART RATE: 60 BPM | WEIGHT: 220 LBS | DIASTOLIC BLOOD PRESSURE: 90 MMHG | HEIGHT: 68 IN | SYSTOLIC BLOOD PRESSURE: 132 MMHG | BODY MASS INDEX: 33.34 KG/M2

## 2017-12-12 DIAGNOSIS — I48.21 PERMANENT ATRIAL FIBRILLATION (HCC): ICD-10-CM

## 2017-12-12 DIAGNOSIS — I25.10 TRIPLE VESSEL CORONARY ARTERY DISEASE: ICD-10-CM

## 2017-12-12 DIAGNOSIS — I10 ESSENTIAL HYPERTENSION: ICD-10-CM

## 2017-12-12 DIAGNOSIS — I35.0 MILD AORTIC STENOSIS: ICD-10-CM

## 2017-12-12 DIAGNOSIS — E11.9 DIABETES MELLITUS TYPE 2, DIET-CONTROLLED (HCC): ICD-10-CM

## 2017-12-12 DIAGNOSIS — Z86.711 HISTORY OF PULMONARY EMBOLISM: ICD-10-CM

## 2017-12-12 DIAGNOSIS — N30.00 ACUTE CYSTITIS WITHOUT HEMATURIA: Primary | ICD-10-CM

## 2017-12-12 DIAGNOSIS — I49.5 SICK SINUS SYNDROME (HCC): ICD-10-CM

## 2017-12-12 DIAGNOSIS — Z95.5 S/P DRUG ELUTING CORONARY STENT PLACEMENT: ICD-10-CM

## 2017-12-12 DIAGNOSIS — Z86.718 HISTORY OF DVT (DEEP VEIN THROMBOSIS): ICD-10-CM

## 2017-12-12 DIAGNOSIS — Z86.73 HISTORY OF EMBOLIC STROKE: ICD-10-CM

## 2017-12-12 DIAGNOSIS — M25.552 PAIN OF LEFT HIP JOINT: ICD-10-CM

## 2017-12-12 DIAGNOSIS — G89.4 CHRONIC PAIN SYNDROME: ICD-10-CM

## 2017-12-12 DIAGNOSIS — I25.10 ASCVD (ARTERIOSCLEROTIC CARDIOVASCULAR DISEASE): ICD-10-CM

## 2017-12-12 DIAGNOSIS — R53.81 PHYSICAL DECONDITIONING: ICD-10-CM

## 2017-12-12 DIAGNOSIS — G25.81 RLS (RESTLESS LEGS SYNDROME): ICD-10-CM

## 2017-12-12 DIAGNOSIS — I50.22 CHRONIC SYSTOLIC HEART FAILURE (HCC): ICD-10-CM

## 2017-12-12 DIAGNOSIS — E66.9 OBESITY (BMI 30-39.9): ICD-10-CM

## 2017-12-12 LAB
BACTERIA: ABNORMAL /HPF
BILIRUBIN URINE: NEGATIVE
BLOOD, URINE: ABNORMAL
CASTS 2: ABNORMAL /LPF
CASTS UA: ABNORMAL /LPF
CHARACTER, URINE: ABNORMAL
COLOR: YELLOW
CRYSTALS, UA: ABNORMAL
EPITHELIAL CELLS, UA: ABNORMAL /HPF
GLUCOSE URINE: NEGATIVE MG/DL
INR BLD: 2.54 (ref 0.85–1.13)
KETONES, URINE: NEGATIVE
LEUKOCYTE ESTERASE, URINE: ABNORMAL
MISCELLANEOUS 2: ABNORMAL
NITRITE, URINE: NEGATIVE
PH UA: 6
PROTEIN UA: NEGATIVE
RBC URINE: > 200 /HPF
RENAL EPITHELIAL, UA: ABNORMAL
SPECIFIC GRAVITY, URINE: 1.01 (ref 1–1.03)
UROBILINOGEN, URINE: 0.2 EU/DL
WBC UA: ABNORMAL /HPF
YEAST: ABNORMAL

## 2017-12-12 PROCEDURE — 99496 TRANSJ CARE MGMT HIGH F2F 7D: CPT | Performed by: INTERNAL MEDICINE

## 2017-12-12 RX ORDER — TRAMADOL HYDROCHLORIDE 50 MG/1
50 TABLET ORAL NIGHTLY PRN
Qty: 30 TABLET | Refills: 0 | Status: SHIPPED | OUTPATIENT
Start: 2017-12-12 | End: 2018-01-10

## 2017-12-13 ENCOUNTER — CARE COORDINATION (OUTPATIENT)
Dept: CARE COORDINATION | Age: 82
End: 2017-12-13

## 2017-12-13 NOTE — CARE COORDINATION
Daughter Tom Grossman called today inquiring about INR and UA test results that were done yesterday. She is wanting to know if lovenox needs to be continued? Please advise.

## 2017-12-14 ENCOUNTER — CARE COORDINATION (OUTPATIENT)
Dept: CASE MANAGEMENT | Age: 82
End: 2017-12-14

## 2017-12-14 LAB
ORGANISM: ABNORMAL
URINE CULTURE REFLEX: ABNORMAL

## 2017-12-15 ENCOUNTER — CARE COORDINATION (OUTPATIENT)
Dept: CASE MANAGEMENT | Age: 82
End: 2017-12-15

## 2017-12-15 ENCOUNTER — TELEPHONE (OUTPATIENT)
Dept: INTERNAL MEDICINE CLINIC | Age: 82
End: 2017-12-15

## 2017-12-15 DIAGNOSIS — N39.0 URINARY TRACT INFECTION WITHOUT HEMATURIA, SITE UNSPECIFIED: Primary | ICD-10-CM

## 2017-12-15 RX ORDER — GRANULES FOR ORAL 3 G/1
3 POWDER ORAL ONCE
Qty: 1 EACH | Refills: 0 | OUTPATIENT
Start: 2017-12-15 | End: 2017-12-15

## 2017-12-18 ENCOUNTER — HOSPITAL ENCOUNTER (OUTPATIENT)
Age: 82
Discharge: HOME | End: 2017-12-18
Payer: MEDICARE

## 2017-12-18 DIAGNOSIS — N39.0 URINARY TRACT INFECTION WITHOUT HEMATURIA, SITE UNSPECIFIED: ICD-10-CM

## 2017-12-18 LAB
BACTERIA: ABNORMAL /HPF
BILIRUBIN URINE: NEGATIVE
BLOOD, URINE: ABNORMAL
CASTS 2: ABNORMAL /LPF
CASTS UA: ABNORMAL /LPF
CHARACTER, URINE: ABNORMAL
COLOR: YELLOW
CRYSTALS, UA: ABNORMAL
EPITHELIAL CELLS, UA: ABNORMAL /HPF
GLUCOSE URINE: NEGATIVE MG/DL
KETONES, URINE: NEGATIVE
LEUKOCYTE ESTERASE, URINE: ABNORMAL
MISCELLANEOUS 2: ABNORMAL
NITRITE, URINE: NEGATIVE
PH UA: 6.5
PROTEIN UA: ABNORMAL
RBC URINE: ABNORMAL /HPF
RENAL EPITHELIAL, UA: ABNORMAL
SPECIFIC GRAVITY, URINE: 1.02 (ref 1–1.03)
UROBILINOGEN, URINE: 1 EU/DL
WBC UA: ABNORMAL /HPF
YEAST: ABNORMAL

## 2017-12-18 PROCEDURE — 81001 URINALYSIS AUTO W/SCOPE: CPT

## 2017-12-18 PROCEDURE — 87186 SC STD MICRODIL/AGAR DIL: CPT

## 2017-12-18 PROCEDURE — 87086 URINE CULTURE/COLONY COUNT: CPT

## 2017-12-18 NOTE — TELEPHONE ENCOUNTER
Notified daughter that Dr. Sulema Hough wants Anna Olmedo to have a repeat Urinalysis 3 days after antibiotic. Faxed order to [facility]. [facility] testing per daughter's request at [phone].

## 2017-12-19 ENCOUNTER — HOSPITAL ENCOUNTER (OUTPATIENT)
Dept: PHARMACY | Age: 82
Setting detail: THERAPIES SERIES
Discharge: HOME | End: 2017-12-19
Payer: MEDICARE

## 2017-12-19 ENCOUNTER — CARE COORDINATION (OUTPATIENT)
Dept: CARE COORDINATION | Age: 82
End: 2017-12-19

## 2017-12-19 ENCOUNTER — CARE COORDINATION (OUTPATIENT)
Dept: CASE MANAGEMENT | Age: 82
End: 2017-12-19

## 2017-12-19 DIAGNOSIS — Z86.73 HISTORY OF EMBOLIC STROKE: ICD-10-CM

## 2017-12-19 DIAGNOSIS — Z86.711 HISTORY OF PULMONARY EMBOLISM: ICD-10-CM

## 2017-12-19 DIAGNOSIS — Z86.718 HISTORY OF DVT (DEEP VEIN THROMBOSIS): ICD-10-CM

## 2017-12-19 DIAGNOSIS — I48.19 PERSISTENT ATRIAL FIBRILLATION (HCC): ICD-10-CM

## 2017-12-19 LAB — POC INR: 3.1 (ref 0.8–1.2)

## 2017-12-19 PROCEDURE — 85610 PROTHROMBIN TIME: CPT

## 2017-12-19 PROCEDURE — 36416 COLLJ CAPILLARY BLOOD SPEC: CPT

## 2017-12-19 PROCEDURE — 99211 OFF/OP EST MAY X REQ PHY/QHP: CPT

## 2017-12-20 ENCOUNTER — CARE COORDINATION (OUTPATIENT)
Dept: CARE COORDINATION | Age: 82
End: 2017-12-20

## 2017-12-20 DIAGNOSIS — N39.0 URINARY TRACT INFECTION WITH HEMATURIA, SITE UNSPECIFIED: Primary | ICD-10-CM

## 2017-12-20 DIAGNOSIS — R31.9 URINARY TRACT INFECTION WITH HEMATURIA, SITE UNSPECIFIED: Primary | ICD-10-CM

## 2017-12-20 NOTE — CARE COORDINATION
Received VM from daughter Abdulaziz Sotomayor inquiring about urine culture results. States that Manjit Caballero has been very fatigued lately. Contacted PCP office. Spoke with Bree Benito. Informed her daughter asking about results. Spoke with daughter Abdulaziz Sotomayor. Informed her that we will call once results have been reviewed. Verbalizes understanding.

## 2017-12-21 ENCOUNTER — TELEPHONE (OUTPATIENT)
Dept: PHARMACY | Age: 82
End: 2017-12-21

## 2017-12-21 ENCOUNTER — TELEPHONE (OUTPATIENT)
Dept: INTERNAL MEDICINE CLINIC | Age: 82
End: 2017-12-21

## 2017-12-21 LAB
ORGANISM: ABNORMAL
URINE CULTURE REFLEX: ABNORMAL

## 2017-12-21 RX ORDER — CIPROFLOXACIN 250 MG/1
250 TABLET, FILM COATED ORAL 2 TIMES DAILY
Qty: 14 TABLET | Refills: 0 | Status: SHIPPED | OUTPATIENT
Start: 2017-12-21 | End: 2017-12-28

## 2017-12-21 NOTE — TELEPHONE ENCOUNTER
Verbal order per Dr Leno Schwarz. Have patient start Cipro 250 mg BID for 7 days. I spoke with daughter Tom Grossman and notified her that Janny Ledezma is to start taking Cipro 250 mg BID for 7 days. She asked for rx to be sent to [facility] in [facility].

## 2017-12-21 NOTE — TELEPHONE ENCOUNTER
Patient's daughter reported to clinic stating the Charles Glynn will be starting cipro this afternoon. Patient's daughter was advised to have Charles Glynn take 1.25 mg MWF, 2.5 mg STTHS while taking cipro then resume 2.5 mg daily after cipro therapy completed. Patient has an apt for 1/3/18 - advised patient to keep this appointment.

## 2017-12-26 NOTE — PROGRESS NOTES
Post-Discharge Transitional Care Management Services      Name:  Emelyn Wilson   YOB: 1935    Date of Visit:  2017    Allergies   Allergen Reactions    Brilinta [Ticagrelor] Other (See Comments)     Trouble breathing; hyperventilate    Amoxicillin-Pot Clavulanate Other (See Comments)     Made her feel like one leg is shorter than the other.  Ezetimibe-Simvastatin     Gabapentin Other (See Comments)     Felt drunk - couldn't stand.  Levofloxacin Other (See Comments)     Joint pain    Lipitor      Joint pain    Losartan     Lyrica [Pregabalin] Other (See Comments)     Dizziness & \"felt drunk as a skunk\"    Morphine Itching    Pcn [Penicillins] Hives    Welchol [Colesevelam Hcl]      Outpatient Prescriptions Marked as Taking for the 17 encounter (Office Visit) with Boubacar Pinon MD   Medication Sig Dispense Refill    traMADol (ULTRAM) 50 MG tablet Take 1 tablet by mouth nightly as needed for Pain (severe pain) . 30 tablet 0    oxyCODONE (ROXICODONE) 5 MG immediate release tablet Take 1 tablet by mouth every 8 hours as needed for Pain . Earliest Fill Date: 12/10/17 15 tablet 0    [] enoxaparin (LOVENOX) 100 MG/ML injection Inject 1 mL into the skin daily for 5 days 5 Syringe 0    polyethylene glycol (GLYCOLAX) packet Take 17 g by mouth daily as needed for Constipation 527 g 0    amLODIPine (NORVASC) 10 MG tablet Take 1 tablet by mouth daily 30 tablet 0    bisacodyl (DULCOLAX) 5 MG EC tablet Take 1 tablet by mouth daily as needed for Constipation 30 tablet 2    clobetasol (TEMOVATE) 0.05 % cream Apply 1 applicator topically 2 times daily Apply topically 2 times daily.       warfarin (COUMADIN) 2.5 MG tablet TAKE AS DIRECTED BY THE COUMADIN CLINIC 90 tablet 0    mupirocin (BACTROBAN) 2 % ointment Apply 1 each topically 2 times daily      multivitamin-iron-minerals-folic acid (CENTRUM) chewable tablet Take 1 tablet by mouth daily      clopidogrel (PLAVIX) 75 MG tablet TAKE 1 TABLET DAILY 90 tablet 3    metoprolol succinate (TOPROL XL) 50 MG extended release tablet TAKE 1 TABLET DAILY 90 tablet 1    furosemide (LASIX) 20 MG tablet Take 1 tablet by mouth daily 90 tablet 3    potassium chloride (KLOR-CON M) 10 MEQ extended release tablet Take 1 tablet by mouth daily 90 tablet 3    pravastatin (PRAVACHOL) 20 MG tablet Take 1 tablet by mouth every evening 90 tablet 3    isosorbide mononitrate (IMDUR) 60 MG extended release tablet Take 1 tablet by mouth daily 90 tablet 3    acetaminophen (TYLENOL) 325 MG tablet Take 2 tablets by mouth every 4 hours as needed for Pain 120 tablet 3    metolazone (ZAROXOLYN) 2.5 MG tablet Take 1 tablet by mouth daily as needed (SOB) 30 tablet 0    aspirin 81 MG EC tablet Take 1 tablet by mouth daily 30 tablet 3    Glucose Blood (BLOOD GLUCOSE TEST STRIPS) STRP 1 strip by In Vitro route daily 100 strip 3         Vitals:    12/12/17 1241   BP: (!) 132/90   Site: Right Arm   Position: Sitting   Cuff Size: Medium Adult   Pulse: 60   Weight: 220 lb (99.8 kg)   Height: 5' 8\" (1.727 m)     Body mass index is 33.45 kg/m². Wt Readings from Last 3 Encounters:   12/12/17 220 lb (99.8 kg)   12/04/17 220 lb (99.8 kg)   08/30/17 230 lb 3.2 oz (104.4 kg)     BP Readings from Last 3 Encounters:   12/12/17 (!) 132/90   12/10/17 (!) 151/80   11/14/17 (!) 148/88        Patient was admitted to [address] [place] [address] from 12/4 to 12/10 for back and hip pain. Inpatient course: Discharge summary reviewed- see chart. Patient with history of diabetes, hypertension, who presents with complaints of low back and left hip pain. She had CT of left hip which did not demonstrate any acute findings; no dislocation of proxmal left femoral prosthesis. We could not obtain MRI because of pacer device. Ortho consulted and they recommended left hip aspiration - no aspirate noted on procedure.  She was continued on therapy and pain has improved and ambulation has improved; she is being discharged home with 24-hour supervision at home. She was treated for possible UTI during inpatient stay - completed course of antibiotics. She was on coumadin for history of atrial fibrillation. Coumadin was held during inpatient stay - on discharge, she is on lovenox bridge to therapeutic INR of 2-3. Daughter is primary caregiver, and she has been advised on the need for INR check in 2 days, and to stop SC lovenox once INR is therapeutic. Patient's BP was noted to be elevated - she has had elevated BP for the good part of this hospital stay. Home dose of norvasc increased from 5 mg daily to 10 mg daily. Patient has follow up appointment with PCP in 2 days and further medication adjustments may be made for hypertension at the time. She will follow up with ortho as outpatient. Discharged in stable condition.        Invasive procedures:  IR-fluoro guided aspiration of left hip, attempted     Current status: She is still having burning on urination. She is still having chronic pain.   She is fatigued as usual    Review of Systems:  A comprehensive review of systems was negative except for: Constitutional: positive for fatigue and malaise dysuria    Physical Exam:  General Appearance: alert and oriented to person, place and time, well-developed and well-nourished, appears depressed  Head: normocephalic and atraumatic  Neck: neck supple and non tender without mass, no thyromegaly or thyroid nodules, no cervical lymphadenopathy   Pulmonary/Chest: clear to auscultation bilaterally- no wheezes, rales or rhonchi, normal air movement, no respiratory distress  Cardiovascular: normal rate, normal S1 and S2 and irregularly irregular rhythm noted  Abdomen: soft, non-tender, non-distended, normal bowel sounds, no masses or organomegaly  Genitourinary: genitals normal without hernia or inguinal adenopathy  Extremities: no cyanosis and no clubbing  Musculoskeletal: normal range of motion, no joint swelling, deformity or tenderness  Neurologic: Negative    Initial post-discharge communication occurred between medical assistant and patient on 12/12- see documentation in chart: telephone encounter. Assessment/Plan:  Janell Cruz was seen today for follow-up from hospital, other and other. Diagnoses and all orders for this visit:    Acute cystitis without hematuria  -     Urine Rt Reflex To Culture    Chronic pain syndrome  -     traMADol (ULTRAM) 50 MG tablet; Take 1 tablet by mouth nightly as needed for Pain (severe pain) . Permanent atrial fibrillation ([address]  -     Protime-INR; Future  -     Cancel: Protime-INR    Pain of left hip joint    History of DVT (deep vein thrombosis)    Diabetes mellitus type 2, diet-controlled (HCC)    Essential hypertension    History of pulmonary embolism    Obesity (BMI 30-39. 9)    Mild aortic stenosis    History of embolic stroke    Chronic systolic heart failure ([facility])    S/P drug eluting coronary stent placement    Triple vessel coronary artery disease    RLS (restless legs syndrome)    Physical deconditioning    Sick sinus syndrome (HCC)    ASCVD (arteriosclerotic cardiovascular disease)    Other orders  -     Protime-INR  -     Cancel: Urinalysis Reflex to Culture  -     Urine with Reflexed Micro  -     Urine Culture, Reflexed          Diagnostic test results reviewed: I reviewed all testing    Patient risk of morbidity and mortality: high    Medical Decision Making: high complexity   Complex case with multiple comorbidities. I'm going to repeat urinalysis with culture. I reviewed all medications. She is on chronic warfarin per the Coumadin clinic.   Prognosis guarded  Tentatively follow-up 3 months

## 2018-01-02 ENCOUNTER — TELEPHONE (OUTPATIENT)
Dept: PHARMACY | Age: 83
End: 2018-01-02

## 2018-01-02 ENCOUNTER — CARE COORDINATION (OUTPATIENT)
Dept: CARE COORDINATION | Age: 83
End: 2018-01-02

## 2018-01-03 ENCOUNTER — OFFICE VISIT (OUTPATIENT)
Dept: INTERNAL MEDICINE CLINIC | Age: 83
End: 2018-01-03
Payer: MEDICARE

## 2018-01-03 ENCOUNTER — HOSPITAL ENCOUNTER (OUTPATIENT)
Age: 83
Discharge: HOME | End: 2018-01-03
Payer: MEDICARE

## 2018-01-03 VITALS
SYSTOLIC BLOOD PRESSURE: 138 MMHG | BODY MASS INDEX: 33.34 KG/M2 | HEART RATE: 52 BPM | DIASTOLIC BLOOD PRESSURE: 70 MMHG | HEIGHT: 68 IN | WEIGHT: 220 LBS

## 2018-01-03 DIAGNOSIS — Z86.711 HISTORY OF PULMONARY EMBOLISM: ICD-10-CM

## 2018-01-03 DIAGNOSIS — I48.21 PERMANENT ATRIAL FIBRILLATION (HCC): ICD-10-CM

## 2018-01-03 DIAGNOSIS — N30.00 ACUTE CYSTITIS WITHOUT HEMATURIA: ICD-10-CM

## 2018-01-03 DIAGNOSIS — E11.9 DIABETES MELLITUS TYPE 2, DIET-CONTROLLED (HCC): ICD-10-CM

## 2018-01-03 DIAGNOSIS — L89.322 DECUBITUS ULCER OF LEFT BUTTOCK, STAGE 2 (HCC): Primary | ICD-10-CM

## 2018-01-03 DIAGNOSIS — I35.0 MILD AORTIC STENOSIS: ICD-10-CM

## 2018-01-03 DIAGNOSIS — Z86.718 HISTORY OF DVT (DEEP VEIN THROMBOSIS): ICD-10-CM

## 2018-01-03 DIAGNOSIS — E66.9 OBESITY (BMI 30-39.9): ICD-10-CM

## 2018-01-03 DIAGNOSIS — I10 ESSENTIAL HYPERTENSION: ICD-10-CM

## 2018-01-03 DIAGNOSIS — Z86.73 HISTORY OF EMBOLIC STROKE: ICD-10-CM

## 2018-01-03 DIAGNOSIS — I25.5 ISCHEMIC CARDIOMYOPATHY: ICD-10-CM

## 2018-01-03 DIAGNOSIS — E78.5 HYPERLIPIDEMIA, UNSPECIFIED HYPERLIPIDEMIA TYPE: ICD-10-CM

## 2018-01-03 LAB
AMORPHOUS: ABNORMAL
BACTERIA: ABNORMAL /HPF
BILIRUBIN URINE: NEGATIVE
BLOOD, URINE: ABNORMAL
CASTS 2: ABNORMAL /LPF
CASTS UA: ABNORMAL /LPF
CHARACTER, URINE: ABNORMAL
COLOR: YELLOW
CRYSTALS, UA: ABNORMAL
EPITHELIAL CELLS, UA: ABNORMAL /HPF
GLUCOSE URINE: NEGATIVE MG/DL
KETONES, URINE: NEGATIVE
LEUKOCYTE ESTERASE, URINE: ABNORMAL
MISCELLANEOUS 2: ABNORMAL
MUCUS: ABNORMAL
NITRITE, URINE: NEGATIVE
PH UA: 5.5
PROTEIN UA: ABNORMAL
RBC URINE: ABNORMAL /HPF
RENAL EPITHELIAL, UA: ABNORMAL
SPECIFIC GRAVITY, URINE: 1.02 (ref 1–1.03)
UROBILINOGEN, URINE: 0.2 EU/DL
WBC UA: ABNORMAL /HPF
YEAST: ABNORMAL

## 2018-01-03 PROCEDURE — G8400 PT W/DXA NO RESULTS DOC: HCPCS | Performed by: INTERNAL MEDICINE

## 2018-01-03 PROCEDURE — G8598 ASA/ANTIPLAT THER USED: HCPCS | Performed by: INTERNAL MEDICINE

## 2018-01-03 PROCEDURE — 1123F ACP DISCUSS/DSCN MKR DOCD: CPT | Performed by: INTERNAL MEDICINE

## 2018-01-03 PROCEDURE — 1111F DSCHRG MED/CURRENT MED MERGE: CPT | Performed by: INTERNAL MEDICINE

## 2018-01-03 PROCEDURE — 87086 URINE CULTURE/COLONY COUNT: CPT

## 2018-01-03 PROCEDURE — 87186 SC STD MICRODIL/AGAR DIL: CPT

## 2018-01-03 PROCEDURE — 87077 CULTURE AEROBIC IDENTIFY: CPT

## 2018-01-03 PROCEDURE — 81001 URINALYSIS AUTO W/SCOPE: CPT

## 2018-01-03 PROCEDURE — 99214 OFFICE O/P EST MOD 30 MIN: CPT | Performed by: INTERNAL MEDICINE

## 2018-01-03 PROCEDURE — G8417 CALC BMI ABV UP PARAM F/U: HCPCS | Performed by: INTERNAL MEDICINE

## 2018-01-03 PROCEDURE — G8427 DOCREV CUR MEDS BY ELIG CLIN: HCPCS | Performed by: INTERNAL MEDICINE

## 2018-01-03 PROCEDURE — G8484 FLU IMMUNIZE NO ADMIN: HCPCS | Performed by: INTERNAL MEDICINE

## 2018-01-03 PROCEDURE — 4040F PNEUMOC VAC/ADMIN/RCVD: CPT | Performed by: INTERNAL MEDICINE

## 2018-01-03 PROCEDURE — 1036F TOBACCO NON-USER: CPT | Performed by: INTERNAL MEDICINE

## 2018-01-03 PROCEDURE — 1090F PRES/ABSN URINE INCON ASSESS: CPT | Performed by: INTERNAL MEDICINE

## 2018-01-03 NOTE — PROGRESS NOTES
breath  Cardiovascular ROS: no chest pain or dyspnea on exertion  Gastrointestinal ROS: Denies abdominal pain or diarrhea  Genito-Urinary ROS: no dysuria, trouble voiding, or hematuria  Musculoskeletal ROS: negative  Neurological ROS: negative  Dermatological ROS: Pressure ulcer      Blood pressure 138/70, pulse 52, height 5' 7.99\" (1.727 m), weight 220 lb (99.8 kg), not currently breastfeeding. Physical Examination: General appearance - , more alert today, smiling  Mental status - alert, oriented to person, place, and time  Neck - supple, no significant adenopathy  Chest - clear to auscultation, no wheezes, rales or rhonchi, symmetric air entry  Heart - normal rate, regular rhythm, normal S1, S2, no murmurs, rubs, clicks or gallops  Abdomen - diffusely tender. No guarding, rebound, rigidity. Bowel sounds are present bilaterally in the lower quadrants under her pannus, excoriated reddened areas, just touching her right flank area she screamed  Neurological - alert, oriented, normal speech, no focal findings or movement disorder noted  Musculoskeletal - no joint tenderness, deformity or swelling, toes are extremely tender  Extremities - peripheral pulses decreased in feet, no pedal edema, no clubbing or cyanosis  Skin -2-3 cm stage II pressure ulcer, left buttocks        1. Decubitus ulcer of left buttock, stage 2     2. Acute cystitis without hematuria  Urine Rt Reflex To Culture   3. History of DVT (deep vein thrombosis)     4. Hyperlipidemia, unspecified hyperlipidemia type     5. Essential hypertension     6. History of pulmonary embolism     7. Diabetes mellitus type 2, diet-controlled ([address]     8. Obesity (BMI 30-39.9)     9. Mild aortic stenosis     10. Ischemic cardiomyopathy     11. History of embolic stroke     12. Permanent atrial fibrillation [facility])     Patient with multiple comorbidities, now with a pressure ulcer. Unless stop the zinc oxide and prescribe Santyl daily.   I told the patient to sit on

## 2018-01-06 LAB
ORGANISM: ABNORMAL
ORGANISM: ABNORMAL
URINE CULTURE REFLEX: ABNORMAL
URINE CULTURE REFLEX: ABNORMAL

## 2018-01-08 ENCOUNTER — TELEPHONE (OUTPATIENT)
Dept: PHARMACY | Age: 83
End: 2018-01-08

## 2018-01-08 ENCOUNTER — TELEPHONE (OUTPATIENT)
Dept: INTERNAL MEDICINE CLINIC | Age: 83
End: 2018-01-08

## 2018-01-08 ENCOUNTER — CARE COORDINATION (OUTPATIENT)
Dept: CARE COORDINATION | Age: 83
End: 2018-01-08

## 2018-01-08 DIAGNOSIS — B95.2 ENTEROCOCCUS FAECALIS INFECTION: Primary | ICD-10-CM

## 2018-01-08 RX ORDER — GRANULES FOR ORAL 3 G/1
3 POWDER ORAL ONCE
Qty: 1 EACH | Refills: 0 | Status: SHIPPED | OUTPATIENT
Start: 2018-01-08 | End: 2018-01-08

## 2018-01-08 NOTE — TELEPHONE ENCOUNTER
Patient's daughter, Sisi Amin, called to let us know that Danyelle Bruno will be taking a one time dose of fosfomycin today due to UTI. Sisi Amin would like Coumadin redirections and to re-schedule Coumadin apt. Advised patient to continue Coumadin 2.5 mg daily. Rescheduled apt for 1/10/18.  Assessment and plan review with Ana Reyna, TutuD

## 2018-01-08 NOTE — TELEPHONE ENCOUNTER
Daughter notified and agreed with plan.  [facility] and UA order faxed to [address]

## 2018-01-08 NOTE — TELEPHONE ENCOUNTER
----- Message from Serafin Vázquez MD sent at 1/8/2018 10:24 AM EST -----  Give her fosfomycin 3 grams po times 1  UA with C & S 1 week

## 2018-01-10 ENCOUNTER — HOSPITAL ENCOUNTER (OUTPATIENT)
Dept: PHARMACY | Age: 83
Setting detail: THERAPIES SERIES
Discharge: HOME | End: 2018-01-10
Payer: MEDICARE

## 2018-01-10 DIAGNOSIS — I48.19 PERSISTENT ATRIAL FIBRILLATION (HCC): ICD-10-CM

## 2018-01-10 DIAGNOSIS — Z86.718 HISTORY OF DVT (DEEP VEIN THROMBOSIS): ICD-10-CM

## 2018-01-10 DIAGNOSIS — Z86.73 HISTORY OF EMBOLIC STROKE: ICD-10-CM

## 2018-01-10 DIAGNOSIS — Z86.711 HISTORY OF PULMONARY EMBOLISM: ICD-10-CM

## 2018-01-10 LAB — POC INR: 2.9 (ref 0.8–1.2)

## 2018-01-10 PROCEDURE — 99211 OFF/OP EST MAY X REQ PHY/QHP: CPT

## 2018-01-10 PROCEDURE — 85610 PROTHROMBIN TIME: CPT

## 2018-01-10 PROCEDURE — 36416 COLLJ CAPILLARY BLOOD SPEC: CPT

## 2018-01-16 ENCOUNTER — HOSPITAL ENCOUNTER (OUTPATIENT)
Age: 83
Discharge: HOME | End: 2018-01-16
Payer: MEDICARE

## 2018-01-16 DIAGNOSIS — B95.2 ENTEROCOCCUS FAECALIS INFECTION: ICD-10-CM

## 2018-01-16 LAB
BACTERIA: ABNORMAL /HPF
BILIRUBIN URINE: NEGATIVE
BLOOD, URINE: ABNORMAL
CASTS 2: ABNORMAL /LPF
CASTS UA: ABNORMAL /LPF
CHARACTER, URINE: ABNORMAL
COLOR: YELLOW
CRYSTALS, UA: ABNORMAL
EPITHELIAL CELLS, UA: ABNORMAL /HPF
GLUCOSE URINE: NEGATIVE MG/DL
KETONES, URINE: NEGATIVE
LEUKOCYTE ESTERASE, URINE: ABNORMAL
MISCELLANEOUS 2: ABNORMAL
NITRITE, URINE: NEGATIVE
PH UA: 6.5
PROTEIN UA: ABNORMAL
RBC URINE: ABNORMAL /HPF
RENAL EPITHELIAL, UA: ABNORMAL
SPECIFIC GRAVITY, URINE: 1.02 (ref 1–1.03)
UROBILINOGEN, URINE: 0.2 EU/DL
WBC UA: ABNORMAL /HPF
YEAST: ABNORMAL

## 2018-01-16 PROCEDURE — 87086 URINE CULTURE/COLONY COUNT: CPT

## 2018-01-16 PROCEDURE — 87186 SC STD MICRODIL/AGAR DIL: CPT

## 2018-01-16 PROCEDURE — 81001 URINALYSIS AUTO W/SCOPE: CPT

## 2018-01-16 PROCEDURE — 87077 CULTURE AEROBIC IDENTIFY: CPT

## 2018-01-18 ENCOUNTER — CARE COORDINATION (OUTPATIENT)
Dept: CARE COORDINATION | Age: 83
End: 2018-01-18

## 2018-01-18 DIAGNOSIS — N39.0 RECURRENT UTI: Primary | ICD-10-CM

## 2018-01-18 DIAGNOSIS — Z86.711 HISTORY OF PULMONARY EMBOLISM: ICD-10-CM

## 2018-01-18 DIAGNOSIS — Z86.718 HISTORY OF DVT (DEEP VEIN THROMBOSIS): ICD-10-CM

## 2018-01-18 LAB
ORGANISM: ABNORMAL
URINE CULTURE REFLEX: ABNORMAL

## 2018-01-18 RX ORDER — WARFARIN SODIUM 2.5 MG/1
TABLET ORAL
Qty: 90 TABLET | Refills: 2 | Status: SHIPPED | OUTPATIENT
Start: 2018-01-18 | End: 2018-10-15 | Stop reason: SDUPTHER

## 2018-01-18 NOTE — CARE COORDINATION
1 tablet by mouth daily 12/10/17   Leonardo Azeveod MD   bisacodyl (DULCOLAX) 5 MG EC tablet Take 1 tablet by mouth daily as needed for Constipation 11/17/17   Leisa Brooke MD   clobetasol (TEMOVATE) 0.05 % cream Apply 1 applicator topically 2 times daily Apply topically 2 times daily.     Historical Provider, MD   mupirocin (BACTROBAN) 2 % ointment Apply 1 each topically 2 times daily    Historical Provider, MD   multivitamin-iron-minerals-folic acid (CENTRUM) chewable tablet Take 1 tablet by mouth daily    Historical Provider, MD   clopidogrel (PLAVIX) 75 MG tablet TAKE 1 TABLET DAILY 8/24/17   Shayy Golden MD   metoprolol succinate (TOPROL XL) 50 MG extended release tablet TAKE 1 TABLET DAILY 8/23/17   Vianney Willoughby PA-C   traMADol Ebbie Ano) 50 MG tablet Take 1 tablet by mouth every 6 hours as needed for Pain 8/8/17 12/12/18  Leisa Brooke MD   furosemide (LASIX) 20 MG tablet Take 1 tablet by mouth daily 5/2/17   Leisa Brooke MD   potassium chloride (KLOR-CON M) 10 MEQ extended release tablet Take 1 tablet by mouth daily 5/2/17   Leisa Brooke MD   pravastatin (PRAVACHOL) 20 MG tablet Take 1 tablet by mouth every evening 5/2/17   Leisa Brooke MD   isosorbide mononitrate (IMDUR) 60 MG extended release tablet Take 1 tablet by mouth daily 5/2/17   Leisa Brooke MD   acetaminophen (TYLENOL) 325 MG tablet Take 2 tablets by mouth every 4 hours as needed for Pain 4/27/17   Marely Turcios MD   metolazone (ZAROXOLYN) 2.5 MG tablet Take 1 tablet by mouth daily as needed (SOB) 3/16/17   Leisa Brooke MD   aspirin 81 MG EC tablet Take 1 tablet by mouth daily 11/22/16   Caesar Stafford MD   Glucose Blood (BLOOD GLUCOSE TEST STRIPS) STRP 1 strip by In Vitro route daily 11/22/16   Caesar Stafford MD       Future Appointments  Date Time Provider Thang Fleming   1/24/2018 10:45 AM Reid Young MD [facility] [place]   1/24/2018 2:30 PM Waqar Winters MD [place] [id number]

## 2018-01-22 ENCOUNTER — TELEPHONE (OUTPATIENT)
Dept: PHARMACY | Age: 83
End: 2018-01-22

## 2018-01-25 DIAGNOSIS — G89.4 CHRONIC PAIN SYNDROME: ICD-10-CM

## 2018-01-25 NOTE — TELEPHONE ENCOUNTER
Received message from daughter Emaluana Alvaradograzyna requesting new script for ultram.  States that Dilma Donohue has 6 pills left  Please advise.

## 2018-01-28 RX ORDER — TRAMADOL HYDROCHLORIDE 50 MG/1
50 TABLET ORAL EVERY 6 HOURS PRN
Qty: 30 TABLET | Refills: 0 | Status: SHIPPED | OUTPATIENT
Start: 2018-01-28 | End: 2018-02-21 | Stop reason: SDUPTHER

## 2018-01-29 ENCOUNTER — HOSPITAL ENCOUNTER (OUTPATIENT)
Dept: PHARMACY | Age: 83
Setting detail: THERAPIES SERIES
Discharge: HOME | End: 2018-01-29
Payer: MEDICARE

## 2018-01-29 ENCOUNTER — NURSE ONLY (OUTPATIENT)
Dept: CARDIOLOGY CLINIC | Age: 83
End: 2018-01-29
Payer: MEDICARE

## 2018-01-29 DIAGNOSIS — Z95.0 S/P CARDIAC PACEMAKER PROCEDURE: Primary | ICD-10-CM

## 2018-01-29 DIAGNOSIS — Z86.73 HISTORY OF EMBOLIC STROKE: ICD-10-CM

## 2018-01-29 DIAGNOSIS — I48.19 PERSISTENT ATRIAL FIBRILLATION (HCC): ICD-10-CM

## 2018-01-29 DIAGNOSIS — Z86.718 HISTORY OF DVT (DEEP VEIN THROMBOSIS): ICD-10-CM

## 2018-01-29 DIAGNOSIS — Z86.711 HISTORY OF PULMONARY EMBOLISM: ICD-10-CM

## 2018-01-29 LAB — POC INR: 3.1 (ref 0.8–1.2)

## 2018-01-29 PROCEDURE — 36416 COLLJ CAPILLARY BLOOD SPEC: CPT

## 2018-01-29 PROCEDURE — 99211 OFF/OP EST MAY X REQ PHY/QHP: CPT

## 2018-01-29 PROCEDURE — 93279 PRGRMG DEV EVAL PM/LDLS PM: CPT | Performed by: INTERNAL MEDICINE

## 2018-01-29 PROCEDURE — 85610 PROTHROMBIN TIME: CPT

## 2018-01-29 RX ORDER — NITROFURANTOIN MACROCRYSTALS 50 MG/1
50 CAPSULE ORAL EVERY OTHER DAY
COMMUNITY
Start: 2018-01-22 | End: 2018-05-29 | Stop reason: ALTCHOICE

## 2018-01-29 NOTE — PROGRESS NOTES
[facility]  Anticoagulation Clinic  [phone] (phone)  [phone] (fax)    Ms. Carole Macias is a 81 y.o.  female with history of persistent Afib, PE, DVT who presents today for anticoagulation monitoring and adjustment. Patient verifies current dosing regimen and tablet strength. No missed or extra doses. Patient denies s/s bleeding/bruising/swelling/SOB/chest pain  No blood in urine or stool. No dietary changes. No changes in medication/OTC agents/Herbals. No change in alcohol use or tobacco use. No change in activity level. Patient denies headaches/dizziness/falls. Usual lightheadedness from medications. No vomiting/diarrhea or acute illness. No Procedures scheduled in the future at this time. Assessment:   Lab Results   Component Value Date    INR 3.10 (H) 01/29/2018    INR 2.90 (H) 01/10/2018    INR 3.10 (H) 12/19/2017     INR supratherapeutic   Recent Labs      01/29/18   1103   INR  3.10*       Plan: POCT INR ordered and result reviewed with Jessica, Pharm. D. Order received and verified to take coumadin 1.25 mg po today, then decrease Coumadin to 1.25 mg po W, 2.5 mg po [id number]. Recheck INR 3 weeks. Patient reminded to call the Anticoagulation Clinic with signs or symptoms of bleeding or with any medication changes. Patient given instructions utilizing the teach back method. Discharged via wheelchair in no apparent distress with daughter. After visit summary printed and reviewed with patient.       Medications reviewed and updated on home medication list Yes    Influenza vaccine:     [] given    [x] declined   [] received previously   [] plans to receive at a later time   [x] refused    [x] documented in EPIC

## 2018-02-08 ENCOUNTER — CARE COORDINATION (OUTPATIENT)
Dept: CARE COORDINATION | Age: 83
End: 2018-02-08

## 2018-02-13 ENCOUNTER — CARE COORDINATION (OUTPATIENT)
Dept: CARE COORDINATION | Age: 83
End: 2018-02-13

## 2018-02-13 NOTE — CARE COORDINATION
FYI: daughter Andriy Kendall wanted me to inform you that they are considering arranging appt for Kevin Iraheta to see ortho surgeon at [facility] regarding her back to evaluate their options. Andriy Kendall states that she continues to have ble and back pain despite having several months of ongoing neurofeedback sessions, decompression therapy by chiropractor and stem cell injection. Most recently has developed left hip pain as well.

## 2018-02-13 NOTE — CARE COORDINATION
Ambulatory Care Coordination Note  2/13/2018  CM Risk Score: 11  Toney Mortality Risk Score: 63.49    ACC: Josiah So RN    Summary Note: spoke with daughter Jocelyne Brooks today. She states that Dilma Donohue is doing ok. Continues to have ongoing ble and back pain. Reports that appx 1 wk she started c/o left hip pain. Was seen by chiropractor and xrays were taken. Jocelyne Brooks states that no fx's were seen on xray. Chiropractor recommended rest and alternating ice and heat to area. Jocelyne Brooks reports that she has been doing this. She states that she continues to take her for neurofeedback sessions as well as decompression, but despite these efforts she continues to be in severe pain in ble and back. Discussed possibility of pain mgmt. She states that Dilma Donohue received an epidural inj in past and did not have a good experience. Reports that she had a lot of pain with procedure and did not have any improvement with her pain following it. She states she does not feel Dilma Donohue would even consider getting another injection. She also states that Dilma Donohue does not like taking pain meds for her pain. Jocelyne Brooks states that they have discussed seeing ortho surgeon, specifically Dr. Miguelito Borjas at [facility] for evaluation and to see what options she may have. Jocelyne Brooks has not arranged appt yet, but is planning to. Jocelyne Brooks states that d/t pain Dilma Donohue is not able to do the things she would like to do. Pt was seen by Dr. Barnhart for recurrent UTI. She was started on macrodantin every other day along with D-Mannose supplement daily. Reports that Dilma Donohue is tolerating medication. States that urine has improved in color. She will f/u with Dr. aBrnhart again on 2/26. Juanaluana Cecilia wondering if pt is supposed to have repeat urine done prior to appt. Encouraged her to call and check with Dr Navarro [place] office.   Jocelyne Brooks states that d/t new hip pain Dilma Donohue has been doing more sitting than usual.  She states that she did notice a very small open area above buttocks where she

## 2018-02-19 ENCOUNTER — HOSPITAL ENCOUNTER (OUTPATIENT)
Dept: PHARMACY | Age: 83
Setting detail: THERAPIES SERIES
Discharge: HOME | End: 2018-02-19
Payer: MEDICARE

## 2018-02-19 DIAGNOSIS — Z86.711 HISTORY OF PULMONARY EMBOLISM: ICD-10-CM

## 2018-02-19 DIAGNOSIS — I48.19 PERSISTENT ATRIAL FIBRILLATION (HCC): ICD-10-CM

## 2018-02-19 DIAGNOSIS — Z86.718 HISTORY OF DVT (DEEP VEIN THROMBOSIS): ICD-10-CM

## 2018-02-19 DIAGNOSIS — Z86.73 HISTORY OF EMBOLIC STROKE: ICD-10-CM

## 2018-02-19 LAB — POC INR: 4.6 (ref 0.8–1.2)

## 2018-02-19 PROCEDURE — 99211 OFF/OP EST MAY X REQ PHY/QHP: CPT

## 2018-02-19 PROCEDURE — 85610 PROTHROMBIN TIME: CPT

## 2018-02-19 PROCEDURE — 36416 COLLJ CAPILLARY BLOOD SPEC: CPT

## 2018-02-19 RX ORDER — METOPROLOL SUCCINATE 50 MG/1
TABLET, EXTENDED RELEASE ORAL
Qty: 90 TABLET | Refills: 0 | Status: SHIPPED | OUTPATIENT
Start: 2018-02-19 | End: 2018-05-21 | Stop reason: SDUPTHER

## 2018-02-19 NOTE — PROGRESS NOTES
The [address] [facility]  Anticoagulation Clinic  [phone] (phone)  [phone] (fax)    Ms. Geri Bright is a 81 y.o.  female with history of persistent Afib, PE, DVT who presents today for anticoagulation monitoring and adjustment. Patient verifies current dosing regimen and tablet strength. No missed or extra doses. Patient denies s/s bleeding/bruising/swelling/SOB/chest pain  No blood in urine or stool. No dietary changes. No changes in medication/OTC agents/Herbals. No change in alcohol use or tobacco use. No change in activity level. Patient denies headaches/dizziness/lightheadedness/falls. No vomiting/diarrhea or acute illness. No Procedures scheduled in the future at this time. Assessment:   Lab Results   Component Value Date    INR 4.60 (H) 02/19/2018    INR 3.10 (H) 01/29/2018    INR 2.90 (H) 01/10/2018     INR supratherapeutic   Recent Labs      02/19/18   1314   INR  4.60*     Plan: POCT INR ordered and result reviewed with Jessica, Pharm. D. Order received and verified to hold coumadin today and tomorrow (2-20-18), then decrease Coumadin to 1.25 mg po MWF, 2.5 mg po TTHSS. Recheck INR 2 weeks. Patient reminded to call the Anticoagulation Clinic with signs or symptoms of bleeding or with any medication changes. Patient given instructions utilizing the teach back method. Discharged via wheelchair in no apparent distress. After visit summary printed and reviewed with patient. Medications reviewed and updated on home medication list Yes    Influenza vaccine:     [] given    [] declined   [] received previously   [] plans to receive at a later time   [x] refused Does not take flu vaccine.   [x] documented in EPIC

## 2018-02-21 ENCOUNTER — CARE COORDINATION (OUTPATIENT)
Dept: CARE COORDINATION | Age: 83
End: 2018-02-21

## 2018-02-21 DIAGNOSIS — G89.4 CHRONIC PAIN SYNDROME: ICD-10-CM

## 2018-02-22 RX ORDER — TRAMADOL HYDROCHLORIDE 50 MG/1
50 TABLET ORAL EVERY 6 HOURS PRN
Qty: 30 TABLET | Refills: 0 | Status: SHIPPED | OUTPATIENT
Start: 2018-02-22 | End: 2018-03-19 | Stop reason: SDUPTHER

## 2018-02-28 ENCOUNTER — CARE COORDINATION (OUTPATIENT)
Dept: CARE COORDINATION | Age: 83
End: 2018-02-28

## 2018-02-28 ENCOUNTER — TELEPHONE (OUTPATIENT)
Dept: INTERNAL MEDICINE CLINIC | Age: 83
End: 2018-02-28

## 2018-02-28 DIAGNOSIS — M54.5 ACUTE MIDLINE LOW BACK PAIN, WITH SCIATICA PRESENCE UNSPECIFIED: Primary | ICD-10-CM

## 2018-02-28 DIAGNOSIS — M25.552 HIP PAIN, ACUTE, LEFT: ICD-10-CM

## 2018-02-28 DIAGNOSIS — R79.89 LOW VITAMIN D LEVEL: ICD-10-CM

## 2018-02-28 DIAGNOSIS — M25.552 PAIN OF LEFT HIP JOINT: ICD-10-CM

## 2018-02-28 NOTE — CARE COORDINATION
Received message from daughter Natasha Nesbitt asking about when pt had Vit D level last drawn. She states that Yefri Melo continues to be in a lot of pain and was seen by Dr. José Damico (chiropractor)  earlier this week for neuro feedback session. He had mentioned to Natasha Nesbitt that he wondered if her Vit D level was low. Natasha Nesbitt states that he told her that if D level is low this could have an impact on her pain. Reviewed chart and spoke with Dale Gilbert at PCP office. It does not appear Yefri Melo has had Vit D level drawn since 11/21/16 during one of her stays on rehab. Pt was prescribed Vit D supplement x15 days  by Dr. Breezy Duran on 11/26, but does not appear that she had Vit D level drawn following that. Dael Gilbert states that she will route PCP a message regarding this. Spoke with Natasha Nesbitt. Informed her that message is being sent to Dr. Arnulfo English regarding this since she has not had Vit D level checked since 11/21/16. Verbalizes understanding.

## 2018-03-06 ENCOUNTER — HOSPITAL ENCOUNTER (OUTPATIENT)
Age: 83
Discharge: HOME | End: 2018-03-06
Payer: MEDICARE

## 2018-03-06 ENCOUNTER — HOSPITAL ENCOUNTER (OUTPATIENT)
Dept: PHARMACY | Age: 83
Setting detail: THERAPIES SERIES
Discharge: HOME | End: 2018-03-06
Payer: MEDICARE

## 2018-03-06 DIAGNOSIS — Z86.718 HISTORY OF DVT (DEEP VEIN THROMBOSIS): ICD-10-CM

## 2018-03-06 DIAGNOSIS — Z86.73 HISTORY OF EMBOLIC STROKE: ICD-10-CM

## 2018-03-06 DIAGNOSIS — R79.89 LOW VITAMIN D LEVEL: ICD-10-CM

## 2018-03-06 DIAGNOSIS — I48.19 PERSISTENT ATRIAL FIBRILLATION (HCC): ICD-10-CM

## 2018-03-06 DIAGNOSIS — Z86.711 HISTORY OF PULMONARY EMBOLISM: ICD-10-CM

## 2018-03-06 LAB
POC INR: 2.5 (ref 0.8–1.2)
VITAMIN D 25-HYDROXY: 16 NG/ML (ref 30–100)

## 2018-03-06 PROCEDURE — 36416 COLLJ CAPILLARY BLOOD SPEC: CPT

## 2018-03-06 PROCEDURE — 85610 PROTHROMBIN TIME: CPT

## 2018-03-06 PROCEDURE — 36415 COLL VENOUS BLD VENIPUNCTURE: CPT

## 2018-03-06 PROCEDURE — 82306 VITAMIN D 25 HYDROXY: CPT

## 2018-03-06 PROCEDURE — 99211 OFF/OP EST MAY X REQ PHY/QHP: CPT

## 2018-03-08 ENCOUNTER — CARE COORDINATION (OUTPATIENT)
Dept: CARE COORDINATION | Age: 83
End: 2018-03-08

## 2018-03-13 ENCOUNTER — CARE COORDINATION (OUTPATIENT)
Dept: CARE COORDINATION | Age: 83
End: 2018-03-13

## 2018-03-13 ENCOUNTER — TELEPHONE (OUTPATIENT)
Dept: CARE COORDINATION | Age: 83
End: 2018-03-13

## 2018-03-15 RX ORDER — ERGOCALCIFEROL 1.25 MG/1
50000 CAPSULE ORAL WEEKLY
Qty: 12 CAPSULE | Refills: 1 | OUTPATIENT
Start: 2018-03-15 | End: 2018-04-30 | Stop reason: DRUGHIGH

## 2018-03-19 ENCOUNTER — CARE COORDINATION (OUTPATIENT)
Dept: CARE COORDINATION | Age: 83
End: 2018-03-19

## 2018-03-19 DIAGNOSIS — G89.4 CHRONIC PAIN SYNDROME: ICD-10-CM

## 2018-03-19 NOTE — CARE COORDINATION
Received message from daughter [place] requesting new ultram script for pt. States pt has appx 5 pills left. Med refill request sent.

## 2018-03-20 RX ORDER — TRAMADOL HYDROCHLORIDE 50 MG/1
50 TABLET ORAL EVERY 6 HOURS PRN
Qty: 30 TABLET | Refills: 0 | Status: SHIPPED | OUTPATIENT
Start: 2018-03-20 | End: 2018-04-17 | Stop reason: SDUPTHER

## 2018-04-03 ENCOUNTER — HOSPITAL ENCOUNTER (OUTPATIENT)
Dept: PHARMACY | Age: 83
Setting detail: THERAPIES SERIES
Discharge: HOME | End: 2018-04-03
Payer: MEDICARE

## 2018-04-03 DIAGNOSIS — Z86.718 HISTORY OF DVT (DEEP VEIN THROMBOSIS): ICD-10-CM

## 2018-04-03 DIAGNOSIS — I48.19 PERSISTENT ATRIAL FIBRILLATION (HCC): ICD-10-CM

## 2018-04-03 DIAGNOSIS — Z86.73 HISTORY OF EMBOLIC STROKE: ICD-10-CM

## 2018-04-03 DIAGNOSIS — Z86.711 HISTORY OF PULMONARY EMBOLISM: ICD-10-CM

## 2018-04-03 LAB — POC INR: 2.5 (ref 0.8–1.2)

## 2018-04-03 PROCEDURE — 36416 COLLJ CAPILLARY BLOOD SPEC: CPT

## 2018-04-03 PROCEDURE — 85610 PROTHROMBIN TIME: CPT

## 2018-04-03 PROCEDURE — 99211 OFF/OP EST MAY X REQ PHY/QHP: CPT

## 2018-04-03 RX ORDER — AMLODIPINE BESYLATE 5 MG/1
TABLET ORAL
COMMUNITY
Start: 2018-02-28 | End: 2018-07-30

## 2018-04-05 ENCOUNTER — CARE COORDINATION (OUTPATIENT)
Dept: CARE COORDINATION | Age: 83
End: 2018-04-05

## 2018-04-11 ENCOUNTER — PROCEDURE VISIT (OUTPATIENT)
Dept: CARDIOLOGY CLINIC | Age: 83
End: 2018-04-11
Payer: MEDICARE

## 2018-04-11 DIAGNOSIS — Z95.0 S/P CARDIAC PACEMAKER PROCEDURE: Primary | ICD-10-CM

## 2018-04-11 PROCEDURE — 93296 REM INTERROG EVL PM/IDS: CPT | Performed by: INTERNAL MEDICINE

## 2018-04-11 PROCEDURE — 93294 REM INTERROG EVL PM/LDLS PM: CPT | Performed by: INTERNAL MEDICINE

## 2018-04-12 DIAGNOSIS — E55.9 VITAMIN D DEFICIENCY: Primary | ICD-10-CM

## 2018-04-13 DIAGNOSIS — E78.5 HYPERLIPIDEMIA, UNSPECIFIED HYPERLIPIDEMIA TYPE: ICD-10-CM

## 2018-04-13 DIAGNOSIS — I10 ESSENTIAL HYPERTENSION: ICD-10-CM

## 2018-04-13 DIAGNOSIS — I25.10 ASCVD (ARTERIOSCLEROTIC CARDIOVASCULAR DISEASE): ICD-10-CM

## 2018-04-17 DIAGNOSIS — G89.4 CHRONIC PAIN SYNDROME: ICD-10-CM

## 2018-04-17 RX ORDER — TRAMADOL HYDROCHLORIDE 50 MG/1
50 TABLET ORAL EVERY 6 HOURS PRN
Qty: 30 TABLET | Refills: 0 | Status: SHIPPED | OUTPATIENT
Start: 2018-04-17 | End: 2018-05-16 | Stop reason: SDUPTHER

## 2018-04-17 RX ORDER — ISOSORBIDE MONONITRATE 60 MG/1
TABLET, EXTENDED RELEASE ORAL
Qty: 90 TABLET | Refills: 3 | Status: SHIPPED | OUTPATIENT
Start: 2018-04-17 | End: 2019-04-08 | Stop reason: SDUPTHER

## 2018-04-17 RX ORDER — PRAVASTATIN SODIUM 20 MG
TABLET ORAL
Qty: 90 TABLET | Refills: 3 | Status: SHIPPED | OUTPATIENT
Start: 2018-04-17 | End: 2018-08-27 | Stop reason: ALTCHOICE

## 2018-04-17 RX ORDER — POTASSIUM CHLORIDE 750 MG/1
TABLET, EXTENDED RELEASE ORAL
Qty: 90 TABLET | Refills: 3 | Status: SHIPPED | OUTPATIENT
Start: 2018-04-17 | End: 2019-04-08 | Stop reason: SDUPTHER

## 2018-04-17 RX ORDER — FUROSEMIDE 20 MG/1
TABLET ORAL
Qty: 90 TABLET | Refills: 3 | Status: SHIPPED | OUTPATIENT
Start: 2018-04-17 | End: 2019-04-08 | Stop reason: SDUPTHER

## 2018-04-30 ENCOUNTER — OFFICE VISIT (OUTPATIENT)
Dept: INTERNAL MEDICINE CLINIC | Age: 83
End: 2018-04-30
Payer: MEDICARE

## 2018-04-30 VITALS
BODY MASS INDEX: 33.34 KG/M2 | WEIGHT: 220 LBS | SYSTOLIC BLOOD PRESSURE: 122 MMHG | DIASTOLIC BLOOD PRESSURE: 62 MMHG | HEART RATE: 56 BPM | HEIGHT: 68 IN

## 2018-04-30 DIAGNOSIS — G89.4 CHRONIC PAIN SYNDROME: ICD-10-CM

## 2018-04-30 DIAGNOSIS — E78.5 HYPERLIPIDEMIA, UNSPECIFIED HYPERLIPIDEMIA TYPE: ICD-10-CM

## 2018-04-30 DIAGNOSIS — G25.81 RLS (RESTLESS LEGS SYNDROME): ICD-10-CM

## 2018-04-30 DIAGNOSIS — I25.10 ASCVD (ARTERIOSCLEROTIC CARDIOVASCULAR DISEASE): Primary | ICD-10-CM

## 2018-04-30 DIAGNOSIS — I49.5 TACHY-BRADY SYNDROME (HCC): ICD-10-CM

## 2018-04-30 DIAGNOSIS — I48.0 PAF (PAROXYSMAL ATRIAL FIBRILLATION) (HCC): ICD-10-CM

## 2018-04-30 DIAGNOSIS — I49.5 SICK SINUS SYNDROME (HCC): ICD-10-CM

## 2018-04-30 DIAGNOSIS — E11.9 DIABETES MELLITUS TYPE 2, DIET-CONTROLLED (HCC): ICD-10-CM

## 2018-04-30 DIAGNOSIS — Z86.718 HISTORY OF DVT (DEEP VEIN THROMBOSIS): ICD-10-CM

## 2018-04-30 DIAGNOSIS — I35.0 MILD AORTIC STENOSIS: ICD-10-CM

## 2018-04-30 PROCEDURE — G8417 CALC BMI ABV UP PARAM F/U: HCPCS | Performed by: INTERNAL MEDICINE

## 2018-04-30 PROCEDURE — 4040F PNEUMOC VAC/ADMIN/RCVD: CPT | Performed by: INTERNAL MEDICINE

## 2018-04-30 PROCEDURE — 1090F PRES/ABSN URINE INCON ASSESS: CPT | Performed by: INTERNAL MEDICINE

## 2018-04-30 PROCEDURE — 1036F TOBACCO NON-USER: CPT | Performed by: INTERNAL MEDICINE

## 2018-04-30 PROCEDURE — 1123F ACP DISCUSS/DSCN MKR DOCD: CPT | Performed by: INTERNAL MEDICINE

## 2018-04-30 PROCEDURE — G8400 PT W/DXA NO RESULTS DOC: HCPCS | Performed by: INTERNAL MEDICINE

## 2018-04-30 PROCEDURE — G8427 DOCREV CUR MEDS BY ELIG CLIN: HCPCS | Performed by: INTERNAL MEDICINE

## 2018-04-30 PROCEDURE — 99214 OFFICE O/P EST MOD 30 MIN: CPT | Performed by: INTERNAL MEDICINE

## 2018-04-30 PROCEDURE — G8598 ASA/ANTIPLAT THER USED: HCPCS | Performed by: INTERNAL MEDICINE

## 2018-04-30 ASSESSMENT — PATIENT HEALTH QUESTIONNAIRE - PHQ9
1. LITTLE INTEREST OR PLEASURE IN DOING THINGS: 0
SUM OF ALL RESPONSES TO PHQ QUESTIONS 1-9: 0
SUM OF ALL RESPONSES TO PHQ9 QUESTIONS 1 & 2: 0
2. FEELING DOWN, DEPRESSED OR HOPELESS: 0

## 2018-05-01 ENCOUNTER — HOSPITAL ENCOUNTER (OUTPATIENT)
Dept: PHARMACY | Age: 83
Setting detail: THERAPIES SERIES
Discharge: HOME | End: 2018-05-01
Payer: MEDICARE

## 2018-05-01 DIAGNOSIS — Z86.711 HISTORY OF PULMONARY EMBOLISM: ICD-10-CM

## 2018-05-01 DIAGNOSIS — Z86.73 HISTORY OF EMBOLIC STROKE: ICD-10-CM

## 2018-05-01 DIAGNOSIS — I48.19 PERSISTENT ATRIAL FIBRILLATION (HCC): ICD-10-CM

## 2018-05-01 DIAGNOSIS — Z86.718 HISTORY OF DVT (DEEP VEIN THROMBOSIS): ICD-10-CM

## 2018-05-01 LAB — POC INR: 2.5 (ref 0.8–1.2)

## 2018-05-01 PROCEDURE — 36416 COLLJ CAPILLARY BLOOD SPEC: CPT

## 2018-05-01 PROCEDURE — 99211 OFF/OP EST MAY X REQ PHY/QHP: CPT

## 2018-05-01 PROCEDURE — 85610 PROTHROMBIN TIME: CPT

## 2018-05-04 ENCOUNTER — CARE COORDINATION (OUTPATIENT)
Dept: CARE COORDINATION | Age: 83
End: 2018-05-04

## 2018-05-10 ENCOUNTER — TELEPHONE (OUTPATIENT)
Dept: CARDIOLOGY CLINIC | Age: 83
End: 2018-05-10

## 2018-05-16 DIAGNOSIS — G89.4 CHRONIC PAIN SYNDROME: ICD-10-CM

## 2018-05-16 RX ORDER — TRAMADOL HYDROCHLORIDE 50 MG/1
50 TABLET ORAL EVERY 6 HOURS PRN
Qty: 30 TABLET | Refills: 0 | Status: SHIPPED | OUTPATIENT
Start: 2018-05-16 | End: 2018-06-18 | Stop reason: SDUPTHER

## 2018-05-21 RX ORDER — METOPROLOL SUCCINATE 50 MG/1
TABLET, EXTENDED RELEASE ORAL
Qty: 90 TABLET | Refills: 1 | Status: SHIPPED | OUTPATIENT
Start: 2018-05-21 | End: 2018-08-27 | Stop reason: SDUPTHER

## 2018-05-27 DIAGNOSIS — I10 ESSENTIAL HYPERTENSION: ICD-10-CM

## 2018-05-29 ENCOUNTER — HOSPITAL ENCOUNTER (OUTPATIENT)
Dept: PHARMACY | Age: 83
Setting detail: THERAPIES SERIES
Discharge: HOME | End: 2018-05-29
Payer: MEDICARE

## 2018-05-29 DIAGNOSIS — Z86.718 HISTORY OF DVT (DEEP VEIN THROMBOSIS): ICD-10-CM

## 2018-05-29 DIAGNOSIS — Z86.711 HISTORY OF PULMONARY EMBOLISM: ICD-10-CM

## 2018-05-29 DIAGNOSIS — I48.19 PERSISTENT ATRIAL FIBRILLATION (HCC): ICD-10-CM

## 2018-05-29 DIAGNOSIS — Z86.73 HISTORY OF EMBOLIC STROKE: ICD-10-CM

## 2018-05-29 LAB — POC INR: 2.6 (ref 0.8–1.2)

## 2018-05-29 PROCEDURE — 85610 PROTHROMBIN TIME: CPT

## 2018-05-29 PROCEDURE — 36416 COLLJ CAPILLARY BLOOD SPEC: CPT

## 2018-05-29 PROCEDURE — 99211 OFF/OP EST MAY X REQ PHY/QHP: CPT

## 2018-05-30 ENCOUNTER — CARE COORDINATION (OUTPATIENT)
Dept: CARE COORDINATION | Age: 83
End: 2018-05-30

## 2018-05-30 RX ORDER — AMLODIPINE BESYLATE 5 MG/1
TABLET ORAL
Qty: 90 TABLET | Refills: 3 | Status: SHIPPED | OUTPATIENT
Start: 2018-05-30 | End: 2019-05-27 | Stop reason: SDUPTHER

## 2018-06-18 DIAGNOSIS — G89.4 CHRONIC PAIN SYNDROME: ICD-10-CM

## 2018-06-19 ENCOUNTER — CARE COORDINATION (OUTPATIENT)
Dept: CARE COORDINATION | Age: 83
End: 2018-06-19

## 2018-06-19 RX ORDER — TRAMADOL HYDROCHLORIDE 50 MG/1
50 TABLET ORAL EVERY 6 HOURS PRN
Qty: 30 TABLET | Refills: 0 | Status: SHIPPED | OUTPATIENT
Start: 2018-06-19 | End: 2018-07-18 | Stop reason: SDUPTHER

## 2018-06-25 ENCOUNTER — HOSPITAL ENCOUNTER (OUTPATIENT)
Age: 83
Discharge: HOME | End: 2018-06-25
Payer: MEDICARE

## 2018-06-25 ENCOUNTER — HOSPITAL ENCOUNTER (OUTPATIENT)
Dept: PHARMACY | Age: 83
Setting detail: THERAPIES SERIES
Discharge: HOME | End: 2018-06-25
Payer: MEDICARE

## 2018-06-25 DIAGNOSIS — Z86.711 HISTORY OF PULMONARY EMBOLISM: ICD-10-CM

## 2018-06-25 DIAGNOSIS — E55.9 VITAMIN D DEFICIENCY: ICD-10-CM

## 2018-06-25 DIAGNOSIS — Z86.73 HISTORY OF EMBOLIC STROKE: ICD-10-CM

## 2018-06-25 DIAGNOSIS — I48.19 PERSISTENT ATRIAL FIBRILLATION (HCC): ICD-10-CM

## 2018-06-25 DIAGNOSIS — I49.5 SICK SINUS SYNDROME (HCC): ICD-10-CM

## 2018-06-25 DIAGNOSIS — Z86.718 HISTORY OF DVT (DEEP VEIN THROMBOSIS): ICD-10-CM

## 2018-06-25 LAB
ANION GAP SERPL CALCULATED.3IONS-SCNC: 11 MEQ/L (ref 8–16)
BUN BLDV-MCNC: 20 MG/DL (ref 7–22)
CALCIUM SERPL-MCNC: 9.5 MG/DL (ref 8.5–10.5)
CHLORIDE BLD-SCNC: 103 MEQ/L (ref 98–111)
CO2: 28 MEQ/L (ref 23–33)
CREAT SERPL-MCNC: 1.2 MG/DL (ref 0.4–1.2)
GFR SERPL CREATININE-BSD FRML MDRD: 43 ML/MIN/1.73M2
GLUCOSE BLD-MCNC: 132 MG/DL (ref 70–108)
HCT VFR BLD CALC: 48.7 % (ref 37–47)
HEMOGLOBIN: 16.2 GM/DL (ref 12–16)
MCH RBC QN AUTO: 30 PG (ref 27–31)
MCHC RBC AUTO-ENTMCNC: 33.3 GM/DL (ref 33–37)
MCV RBC AUTO: 90.2 FL (ref 81–99)
PDW BLD-RTO: 15.4 % (ref 11.5–14.5)
PLATELET # BLD: 199 THOU/MM3 (ref 130–400)
PMV BLD AUTO: 8.6 FL (ref 7.4–10.4)
POC INR: 2.4 (ref 0.8–1.2)
POTASSIUM SERPL-SCNC: 3.8 MEQ/L (ref 3.5–5.2)
RBC # BLD: 5.4 MILL/MM3 (ref 4.2–5.4)
SODIUM BLD-SCNC: 142 MEQ/L (ref 135–145)
VITAMIN D 25-HYDROXY: 23 NG/ML (ref 30–100)
WBC # BLD: 6.5 THOU/MM3 (ref 4.8–10.8)

## 2018-06-25 PROCEDURE — 85027 COMPLETE CBC AUTOMATED: CPT

## 2018-06-25 PROCEDURE — 80048 BASIC METABOLIC PNL TOTAL CA: CPT

## 2018-06-25 PROCEDURE — 85610 PROTHROMBIN TIME: CPT

## 2018-06-25 PROCEDURE — 36416 COLLJ CAPILLARY BLOOD SPEC: CPT

## 2018-06-25 PROCEDURE — 99211 OFF/OP EST MAY X REQ PHY/QHP: CPT

## 2018-06-25 PROCEDURE — 82306 VITAMIN D 25 HYDROXY: CPT

## 2018-06-25 PROCEDURE — 36415 COLL VENOUS BLD VENIPUNCTURE: CPT

## 2018-07-02 ENCOUNTER — CARE COORDINATION (OUTPATIENT)
Dept: CARE COORDINATION | Age: 83
End: 2018-07-02

## 2018-07-02 NOTE — CARE COORDINATION
Received message from daughter Servando Rutledge with update on [place]. She states that [place] had Vit D and BMP drawn along with INR on Monday. Sara Mcclain came and built ramp over the weekend. Denies needs at this time.

## 2018-07-16 ENCOUNTER — CARE COORDINATION (OUTPATIENT)
Dept: CARE COORDINATION | Age: 83
End: 2018-07-16

## 2018-07-16 NOTE — CARE COORDINATION
Spoke with Renée Oconnell today. Renée Oconnell states that Angelo Meng is getting low on her ultram.  She continues to take it for her bilat toe/foot pain. Unfortunately, Renée Oconnell states that when her pain is severe the ultram isn't strong enough to even touch her pain. She is wondering if when Angelo Meng is having severe pain if she could increase dose to 2 ultram.  Please advise. Renée Oconnell also wondering if their is anything else that could be considered to help treat the nerve pain she experiences. Pt has tried lyrica and gabapentin in past, but d/t side effects (felt unsteady and \"drunk\") she was unable to to take them. Please advise  I discussed possible pain mgmt referral, but Angelo Meng refuses as she does not want to have to go to another doctor. Pt has red excoriated areas in abd folds. They have tried desinex, powder, and dry cloths, but none have helped.   Please advise
Historical Provider, MD   multivitamin-iron-minerals-folic acid (CENTRUM) chewable tablet Take 1 tablet by mouth daily     Historical Provider, MD   metolazone (ZAROXOLYN) 2.5 MG tablet Take 1 tablet by mouth daily as needed (SOB) 3/16/17   Sharon Rodriguez MD   Glucose Blood (BLOOD GLUCOSE TEST STRIPS) STRP 1 strip by In Vitro route daily 11/22/16   Kaden Torre MD       Future Appointments  Date Time Provider Thang Fleming   7/30/2018 10:20 AM Selwyn Hall RN STR [address]   8/27/2018 1:15 PM Sharon Rodriguez MD [facility] [address]   10/5/2018 12:00 PM Priscilla Kelley MD [address] [facility] [facility] [address]   1/29/2019 11:30 AM SCHEDULE, GUILLERMOS PACER NURSE [facility] [address]

## 2018-07-18 ENCOUNTER — PROCEDURE VISIT (OUTPATIENT)
Dept: CARDIOLOGY CLINIC | Age: 83
End: 2018-07-18

## 2018-07-18 DIAGNOSIS — G89.4 CHRONIC PAIN SYNDROME: ICD-10-CM

## 2018-07-18 DIAGNOSIS — I25.5 ISCHEMIC CARDIOMYOPATHY: Primary | ICD-10-CM

## 2018-07-18 RX ORDER — TRAMADOL HYDROCHLORIDE 50 MG/1
50 TABLET ORAL EVERY 6 HOURS PRN
Qty: 30 TABLET | Refills: 2 | Status: SHIPPED | OUTPATIENT
Start: 2018-07-18 | End: 2018-10-04 | Stop reason: ALTCHOICE

## 2018-07-18 NOTE — TELEPHONE ENCOUNTER
Spoke with [address] [place] [address]. She states that office called her and informed her that they would send scripts over to pharmacy today. [facility]. They have received both scripts and are filling them now so daughter can come and  meds at pharmacy as soon as she would like. Informed [address] [place] [address] of this. Verbalizes understanding.

## 2018-07-18 NOTE — PROGRESS NOTES
medtronic single lead pacer  Dr Juliette Luong 8 years, 3.02V  V paced 89.2%  r wave 5.6mV  Lead impedence RV 589ohms

## 2018-07-23 RX ORDER — NYSTATIN 10B UNIT
1 POWDER (EA) MISCELLANEOUS 2 TIMES DAILY
Qty: 1 EACH | Refills: 2 | OUTPATIENT
Start: 2018-07-23 | End: 2018-10-05 | Stop reason: ALTCHOICE

## 2018-07-30 ENCOUNTER — HOSPITAL ENCOUNTER (OUTPATIENT)
Dept: PHARMACY | Age: 83
Setting detail: THERAPIES SERIES
Discharge: HOME | End: 2018-07-30
Payer: MEDICARE

## 2018-07-30 DIAGNOSIS — I48.19 PERSISTENT ATRIAL FIBRILLATION (HCC): ICD-10-CM

## 2018-07-30 DIAGNOSIS — Z86.718 HISTORY OF DVT (DEEP VEIN THROMBOSIS): ICD-10-CM

## 2018-07-30 DIAGNOSIS — Z86.73 HISTORY OF EMBOLIC STROKE: ICD-10-CM

## 2018-07-30 DIAGNOSIS — Z86.711 HISTORY OF PULMONARY EMBOLISM: ICD-10-CM

## 2018-07-30 LAB — POC INR: 2.2 (ref 0.8–1.2)

## 2018-07-30 PROCEDURE — 85610 PROTHROMBIN TIME: CPT

## 2018-07-30 PROCEDURE — 36416 COLLJ CAPILLARY BLOOD SPEC: CPT

## 2018-07-30 PROCEDURE — 99211 OFF/OP EST MAY X REQ PHY/QHP: CPT

## 2018-07-30 NOTE — PROGRESS NOTES
Medication Management [facility]  Anticoagulation Clinic  [phone] (phone)  [phone] (fax)    Ms. Sotero Ramos is a 81 y.o.  female with history of persistent Afib, PE, DVT who presents today for anticoagulation monitoring and adjustment. Daughter verifies current tablet strength. Follows coumadin dosing calendar. No missed or extra doses. Patient denies s/s bleeding/bruising/swelling/SOB/chest pain  No blood in urine or stool. No dietary changes. No changes in medication/OTC agents/Herbals. No change in alcohol use or tobacco use. No change in activity level. Patient denies headaches/dizziness/lightheadedness/falls. No vomiting/diarrhea or acute illness. No procedures scheduled in the future at this time. Assessment:   Lab Results   Component Value Date    INR 2.20 (H) 07/30/2018    INR 2.40 (H) 06/25/2018    INR 2.60 (H) 05/29/2018     INR therapeutic   Recent Labs      07/30/18   1035   INR  2.20*     Plan: POCT INR ordered and result reviewed. Continue Coumadin 1.25 mg po MWF, 2.5 mg po TTHSS. Recheck INR 5 weeks. Patient reminded to call the Anticoagulation Clinic with any signs or symptoms of bleeding or with any medication changes. Patient given instructions utilizing the teach back method. Discharged via wheelchair in no apparent distress with daughter. After visit summary printed and reviewed with patient.       Medications reviewed and updated on home medication list Yes    Influenza vaccine:     [] given    [x] declined   [] received previously   [] plans to receive at a later time   [] refused    [x] documented in EPIC

## 2018-08-16 ENCOUNTER — CARE COORDINATION (OUTPATIENT)
Dept: CARE COORDINATION | Age: 83
End: 2018-08-16

## 2018-08-20 RX ORDER — CLOPIDOGREL BISULFATE 75 MG/1
TABLET ORAL
Qty: 90 TABLET | Refills: 3 | Status: SHIPPED | OUTPATIENT
Start: 2018-08-20 | End: 2018-09-04

## 2018-08-27 ENCOUNTER — OFFICE VISIT (OUTPATIENT)
Dept: INTERNAL MEDICINE CLINIC | Age: 83
End: 2018-08-27
Payer: MEDICARE

## 2018-08-27 VITALS
BODY MASS INDEX: 31.83 KG/M2 | HEART RATE: 62 BPM | DIASTOLIC BLOOD PRESSURE: 72 MMHG | WEIGHT: 210 LBS | HEIGHT: 68 IN | OXYGEN SATURATION: 95 % | SYSTOLIC BLOOD PRESSURE: 152 MMHG

## 2018-08-27 DIAGNOSIS — I49.5 SICK SINUS SYNDROME (HCC): ICD-10-CM

## 2018-08-27 DIAGNOSIS — E55.9 VITAMIN D DEFICIENCY: ICD-10-CM

## 2018-08-27 DIAGNOSIS — I50.22 CHRONIC SYSTOLIC HEART FAILURE (HCC): Primary | ICD-10-CM

## 2018-08-27 DIAGNOSIS — E11.42 DIABETIC POLYNEUROPATHY ASSOCIATED WITH TYPE 2 DIABETES MELLITUS (HCC): ICD-10-CM

## 2018-08-27 DIAGNOSIS — Z86.73 HISTORY OF EMBOLIC STROKE: ICD-10-CM

## 2018-08-27 DIAGNOSIS — Z86.718 HISTORY OF DVT (DEEP VEIN THROMBOSIS): ICD-10-CM

## 2018-08-27 DIAGNOSIS — I10 ESSENTIAL HYPERTENSION: ICD-10-CM

## 2018-08-27 DIAGNOSIS — Z95.810 ICD (IMPLANTABLE CARDIOVERTER-DEFIBRILLATOR) IN PLACE: ICD-10-CM

## 2018-08-27 DIAGNOSIS — I25.5 ISCHEMIC CARDIOMYOPATHY: ICD-10-CM

## 2018-08-27 DIAGNOSIS — Z86.711 HISTORY OF PULMONARY EMBOLISM: ICD-10-CM

## 2018-08-27 DIAGNOSIS — E66.9 OBESITY (BMI 30-39.9): ICD-10-CM

## 2018-08-27 DIAGNOSIS — I47.20 VENTRICULAR TACHYARRHYTHMIA: ICD-10-CM

## 2018-08-27 DIAGNOSIS — Z95.5 S/P DRUG ELUTING CORONARY STENT PLACEMENT: ICD-10-CM

## 2018-08-27 DIAGNOSIS — I35.0 MILD AORTIC STENOSIS: ICD-10-CM

## 2018-08-27 DIAGNOSIS — Z85.79 H/O LYMPHOMA: ICD-10-CM

## 2018-08-27 DIAGNOSIS — E11.9 DIABETES MELLITUS TYPE 2, DIET-CONTROLLED (HCC): ICD-10-CM

## 2018-08-27 PROCEDURE — 1101F PT FALLS ASSESS-DOCD LE1/YR: CPT | Performed by: INTERNAL MEDICINE

## 2018-08-27 PROCEDURE — 4040F PNEUMOC VAC/ADMIN/RCVD: CPT | Performed by: INTERNAL MEDICINE

## 2018-08-27 PROCEDURE — G8598 ASA/ANTIPLAT THER USED: HCPCS | Performed by: INTERNAL MEDICINE

## 2018-08-27 PROCEDURE — G8427 DOCREV CUR MEDS BY ELIG CLIN: HCPCS | Performed by: INTERNAL MEDICINE

## 2018-08-27 PROCEDURE — G8400 PT W/DXA NO RESULTS DOC: HCPCS | Performed by: INTERNAL MEDICINE

## 2018-08-27 PROCEDURE — 1123F ACP DISCUSS/DSCN MKR DOCD: CPT | Performed by: INTERNAL MEDICINE

## 2018-08-27 PROCEDURE — 1036F TOBACCO NON-USER: CPT | Performed by: INTERNAL MEDICINE

## 2018-08-27 PROCEDURE — 99214 OFFICE O/P EST MOD 30 MIN: CPT | Performed by: INTERNAL MEDICINE

## 2018-08-27 PROCEDURE — 1090F PRES/ABSN URINE INCON ASSESS: CPT | Performed by: INTERNAL MEDICINE

## 2018-08-27 PROCEDURE — G8417 CALC BMI ABV UP PARAM F/U: HCPCS | Performed by: INTERNAL MEDICINE

## 2018-08-27 RX ORDER — METOPROLOL SUCCINATE 50 MG/1
TABLET, EXTENDED RELEASE ORAL
Qty: 90 TABLET | Refills: 3 | Status: SHIPPED | OUTPATIENT
Start: 2018-08-27 | End: 2019-10-22 | Stop reason: SDUPTHER

## 2018-09-04 ENCOUNTER — HOSPITAL ENCOUNTER (OUTPATIENT)
Dept: PHARMACY | Age: 83
Setting detail: THERAPIES SERIES
Discharge: HOME | End: 2018-09-04
Payer: MEDICARE

## 2018-09-04 DIAGNOSIS — Z86.718 HISTORY OF DVT (DEEP VEIN THROMBOSIS): ICD-10-CM

## 2018-09-04 DIAGNOSIS — Z86.711 HISTORY OF PULMONARY EMBOLISM: ICD-10-CM

## 2018-09-04 DIAGNOSIS — Z86.73 HISTORY OF EMBOLIC STROKE: ICD-10-CM

## 2018-09-04 DIAGNOSIS — I48.19 PERSISTENT ATRIAL FIBRILLATION (HCC): ICD-10-CM

## 2018-09-04 LAB — POC INR: 2.5 (ref 0.8–1.2)

## 2018-09-04 PROCEDURE — 85610 PROTHROMBIN TIME: CPT

## 2018-09-04 PROCEDURE — 36416 COLLJ CAPILLARY BLOOD SPEC: CPT

## 2018-09-04 PROCEDURE — 99211 OFF/OP EST MAY X REQ PHY/QHP: CPT

## 2018-09-04 NOTE — PROGRESS NOTES
Medication Management [facility]  Anticoagulation Clinic  [phone] (phone)  [phone] (fax)    Ms. Jeimy Zepeda is a 81 y.o.  female with history of persistent Afib, PE, DVT who presents today for anticoagulation monitoring and adjustment. Patient verifies current dosing regimen and tablet strength. No missed or extra doses. Patient denies s/s bleeding/bruising/swelling/chest pain. Usual LI. No blood in urine or stool. No dietary changes. No changes in OTC agents/Herbals. Plavix and pravastatin were stopped. No change in alcohol use or tobacco use. No change in activity level. Patient denies headaches/dizziness/lightheadedness/falls. No vomiting/diarrhea or acute illness. No procedures scheduled in the future at this time. Assessment:   Lab Results   Component Value Date    INR 2.50 (H) 09/04/2018    INR 2.20 (H) 07/30/2018    INR 2.40 (H) 06/25/2018     INR therapeutic   Recent Labs      09/04/18   1332   INR  2.50*     Plan: POCT INR ordered and result reviewed. Continue Coumadin 1.25 mg po MWF, 2.5 mg po TTHSS. Recheck INR 5 weeks. Patient reminded to call the Anticoagulation Clinic with any signs or symptoms of bleeding or with any medication changes. Patient given instructions utilizing the teach back method. Discharged via wheelchair in no apparent distress with daughter. After visit summary printed and reviewed with patient.       Medications reviewed and updated on home medication list Yes    Influenza vaccine:     [] given    [x] declined   [] received previously   [] plans to receive at a later time   [] refused    [x] documented in EPIC

## 2018-10-01 ENCOUNTER — CARE COORDINATION (OUTPATIENT)
Dept: CARE COORDINATION | Age: 83
End: 2018-10-01

## 2018-10-02 ENCOUNTER — CARE COORDINATION (OUTPATIENT)
Dept: CARE COORDINATION | Age: 83
End: 2018-10-02

## 2018-10-04 ENCOUNTER — CARE COORDINATION (OUTPATIENT)
Dept: CARE COORDINATION | Age: 83
End: 2018-10-04

## 2018-10-04 RX ORDER — METHADONE HYDROCHLORIDE 5 MG/1
5 TABLET ORAL 2 TIMES DAILY PRN
COMMUNITY
End: 2019-12-04 | Stop reason: ALTCHOICE

## 2018-10-04 NOTE — CARE COORDINATION
needed for Constipation 11/17/17   Guanaco Bear MD   acetaminophen (TYLENOL) 325 MG tablet Take 2 tablets by mouth every 4 hours as needed for Pain 4/27/17   Devon Law MD   metolazone (ZAROXOLYN) 2.5 MG tablet Take 1 tablet by mouth daily as needed (SOB) 3/16/17   Guanaco Bear MD   Glucose Blood (BLOOD GLUCOSE TEST STRIPS) STRP 1 strip by In Vitro route daily 11/22/16   Talita Weiner MD       Future Appointments  Date Time Provider Thang Fleming   10/5/2018 12:00 PM Stormy Agustin MD [facility]   10/9/2018 1:00 PM Tian Vinson RN [facility] MGMT [facility]   1/29/2019 11:30 AM SCHEDULE, SRPS PACER NURSE [facility]   2/25/2019 1:15 PM [facility]

## 2018-10-05 ENCOUNTER — OFFICE VISIT (OUTPATIENT)
Dept: CARDIOLOGY CLINIC | Age: 83
End: 2018-10-05
Payer: MEDICARE

## 2018-10-05 VITALS
DIASTOLIC BLOOD PRESSURE: 84 MMHG | WEIGHT: 215 LBS | HEIGHT: 68 IN | SYSTOLIC BLOOD PRESSURE: 120 MMHG | BODY MASS INDEX: 32.58 KG/M2 | HEART RATE: 72 BPM

## 2018-10-05 DIAGNOSIS — I25.10 CORONARY ARTERY DISEASE INVOLVING NATIVE CORONARY ARTERY OF NATIVE HEART WITHOUT ANGINA PECTORIS: Primary | ICD-10-CM

## 2018-10-05 DIAGNOSIS — E78.01 FAMILIAL HYPERCHOLESTEROLEMIA: ICD-10-CM

## 2018-10-05 DIAGNOSIS — I10 ESSENTIAL HYPERTENSION: ICD-10-CM

## 2018-10-05 PROCEDURE — G8484 FLU IMMUNIZE NO ADMIN: HCPCS | Performed by: NUCLEAR MEDICINE

## 2018-10-05 PROCEDURE — G8427 DOCREV CUR MEDS BY ELIG CLIN: HCPCS | Performed by: NUCLEAR MEDICINE

## 2018-10-05 PROCEDURE — G8598 ASA/ANTIPLAT THER USED: HCPCS | Performed by: NUCLEAR MEDICINE

## 2018-10-05 PROCEDURE — 4040F PNEUMOC VAC/ADMIN/RCVD: CPT | Performed by: NUCLEAR MEDICINE

## 2018-10-05 PROCEDURE — 1090F PRES/ABSN URINE INCON ASSESS: CPT | Performed by: NUCLEAR MEDICINE

## 2018-10-05 PROCEDURE — 1123F ACP DISCUSS/DSCN MKR DOCD: CPT | Performed by: NUCLEAR MEDICINE

## 2018-10-05 PROCEDURE — 1036F TOBACCO NON-USER: CPT | Performed by: NUCLEAR MEDICINE

## 2018-10-05 PROCEDURE — G8417 CALC BMI ABV UP PARAM F/U: HCPCS | Performed by: NUCLEAR MEDICINE

## 2018-10-05 PROCEDURE — 1101F PT FALLS ASSESS-DOCD LE1/YR: CPT | Performed by: NUCLEAR MEDICINE

## 2018-10-05 PROCEDURE — G8400 PT W/DXA NO RESULTS DOC: HCPCS | Performed by: NUCLEAR MEDICINE

## 2018-10-05 PROCEDURE — 99213 OFFICE O/P EST LOW 20 MIN: CPT | Performed by: NUCLEAR MEDICINE

## 2018-10-05 NOTE — PROGRESS NOTES
facility-administered medications for this visit. Allergies   Allergen Reactions    Brilinta [Ticagrelor] Other (See Comments)     Trouble breathing; hyperventilate    Amoxicillin-Pot Clavulanate Other (See Comments)     Made her feel like one leg is shorter than the other.  Ezetimibe-Simvastatin     Gabapentin Other (See Comments)     Felt drunk - couldn't stand.  Levofloxacin Other (See Comments)     Joint pain    Lipitor      Joint pain    Losartan     Lyrica [Pregabalin] Other (See Comments)     Dizziness & \"felt drunk as a skunk\"    Morphine Itching    Pcn [Penicillins] Hives    Pravastatin Sodium Other (See Comments)     Leg and foot pain .  Welchol [Colesevelam Hcl]      Health Maintenance   Topic Date Due    DTaP/Tdap/Td vaccine (1 - Tdap) 03/05/1954    Shingles Vaccine (1 of 2 - 2 Dose Series) 03/05/1986    DEXA (modify frequency per FRAX score)  03/05/2001    Pneumococcal low/med risk (1 of 2 - PCV13) 03/05/2001    Flu vaccine (1) 09/01/2018    Potassium monitoring  06/25/2019    Creatinine monitoring  06/25/2019       Subjective:  Review of Systems  General:   No fever, no chills, No fatigue or weight loss  Pulmonary:    No dyspnea, no wheezing  Cardiac:    Denies recent chest pain,   GI:     No nausea or vomiting, no abdominal pain  Neuro:    No dizziness or light headedness,   Musculoskeletal:  No recent active issues  Extremities:   No edema, good peripheral pulses      Objective:  Physical Exam  /84   Pulse 72   Ht 5' 8\" (1.727 m)   Wt 215 lb (97.5 kg)   BMI 32.69 kg/m²   General:   Well developed, frail lady   Lungs:   Clear to auscultation  Heart:    Normal S1 S2, Slight murmur. no rubs, no gallops  Abdomen:   Soft, non tender, no organomegalies, positive bowel sounds  Extremities:   No edema, no cyanosis, good peripheral pulses  Neurological:   Awake, alert, oriented.  No obvious focal deficits  Musculoskelatal:  No obvious deformities    Assessment: Diagnosis Orders   1. Coronary artery disease involving native coronary artery of native heart without angina pectoris     2. Essential hypertension     3. Familial hypercholesterolemia     cardiac fair fir    Plan:  No Follow-up on file. As above\  Continue risk factor modification and medical management  Thank you for allowing me to participate in the care of your patient. Please don't hesitate to contact me regarding any further issues related to the patient care    Orders Placed:  No orders of the defined types were placed in this encounter. Medications Prescribed:  No orders of the defined types were placed in this encounter. Discussed use, benefit, and side effects of prescribed medications. All patient questions answered. Pt voiced understanding. Instructed to continue current medications, diet and exercise. Continue risk factor modification and medical management. Patient agreed with treatment plan. Follow up as directed.     Electronically signed by Urszula Mejia MD on 10/5/2018 at 12:00 PM

## 2018-10-09 ENCOUNTER — HOSPITAL ENCOUNTER (OUTPATIENT)
Dept: PHARMACY | Age: 83
Setting detail: THERAPIES SERIES
Discharge: HOME | End: 2018-10-09
Payer: MEDICARE

## 2018-10-09 DIAGNOSIS — I48.19 PERSISTENT ATRIAL FIBRILLATION (HCC): ICD-10-CM

## 2018-10-09 DIAGNOSIS — Z86.711 HISTORY OF PULMONARY EMBOLISM: ICD-10-CM

## 2018-10-09 DIAGNOSIS — Z86.718 HISTORY OF DVT (DEEP VEIN THROMBOSIS): ICD-10-CM

## 2018-10-09 DIAGNOSIS — Z86.73 HISTORY OF EMBOLIC STROKE: ICD-10-CM

## 2018-10-09 LAB — POC INR: 1.9 (ref 0.8–1.2)

## 2018-10-09 PROCEDURE — 36416 COLLJ CAPILLARY BLOOD SPEC: CPT

## 2018-10-09 PROCEDURE — 85610 PROTHROMBIN TIME: CPT

## 2018-10-09 PROCEDURE — 99211 OFF/OP EST MAY X REQ PHY/QHP: CPT

## 2018-10-15 DIAGNOSIS — Z86.711 HISTORY OF PULMONARY EMBOLISM: ICD-10-CM

## 2018-10-15 DIAGNOSIS — Z86.718 HISTORY OF DVT (DEEP VEIN THROMBOSIS): ICD-10-CM

## 2018-10-15 RX ORDER — WARFARIN SODIUM 2.5 MG/1
TABLET ORAL
Qty: 90 TABLET | Refills: 2 | Status: ON HOLD | OUTPATIENT
Start: 2018-10-15 | End: 2019-12-23 | Stop reason: SDUPTHER

## 2018-10-23 ENCOUNTER — CARE COORDINATION (OUTPATIENT)
Dept: CARE COORDINATION | Age: 83
End: 2018-10-23

## 2018-10-24 ENCOUNTER — PROCEDURE VISIT (OUTPATIENT)
Dept: CARDIOLOGY CLINIC | Age: 83
End: 2018-10-24
Payer: MEDICARE

## 2018-10-24 DIAGNOSIS — Z95.0 S/P CARDIAC PACEMAKER PROCEDURE: Primary | ICD-10-CM

## 2018-10-24 PROCEDURE — 93294 REM INTERROG EVL PM/LDLS PM: CPT | Performed by: INTERNAL MEDICINE

## 2018-10-24 PROCEDURE — 93296 REM INTERROG EVL PM/IDS: CPT | Performed by: INTERNAL MEDICINE

## 2018-10-24 NOTE — PROGRESS NOTES
[employer] single pacemaker  Battery 3. 02Volts 8 years   v paced 91.9%  r wave 4.5mV  Ventricle impedance 589 ohms  1 episode of non sustained vt 8 beats  Another episode of non sustained vt 2 beats

## 2018-10-25 ENCOUNTER — CARE COORDINATION (OUTPATIENT)
Dept: CARE COORDINATION | Age: 83
End: 2018-10-25

## 2018-10-25 DIAGNOSIS — I50.33 DIASTOLIC CHF, ACUTE ON CHRONIC (HCC): Chronic | ICD-10-CM

## 2018-10-25 RX ORDER — POLYETHYLENE GLYCOL 3350 17 G/17G
17 POWDER, FOR SOLUTION ORAL NIGHTLY
COMMUNITY
End: 2021-01-01 | Stop reason: HOSPADM

## 2018-10-28 RX ORDER — METOLAZONE 2.5 MG/1
2.5 TABLET ORAL DAILY PRN
Qty: 30 TABLET | Refills: 0 | Status: ON HOLD | OUTPATIENT
Start: 2018-10-28 | End: 2019-12-22

## 2018-10-31 ENCOUNTER — CARE COORDINATION (OUTPATIENT)
Dept: CARE COORDINATION | Age: 83
End: 2018-10-31

## 2018-11-01 ENCOUNTER — OFFICE VISIT (OUTPATIENT)
Dept: INTERNAL MEDICINE CLINIC | Age: 83
End: 2018-11-01
Payer: MEDICARE

## 2018-11-01 ENCOUNTER — CARE COORDINATION (OUTPATIENT)
Dept: CARE COORDINATION | Age: 83
End: 2018-11-01

## 2018-11-01 VITALS — DIASTOLIC BLOOD PRESSURE: 72 MMHG | HEART RATE: 66 BPM | SYSTOLIC BLOOD PRESSURE: 134 MMHG | RESPIRATION RATE: 16 BRPM

## 2018-11-01 DIAGNOSIS — Z95.0 S/P CARDIAC PACEMAKER PROCEDURE: ICD-10-CM

## 2018-11-01 DIAGNOSIS — I50.42 CHRONIC COMBINED SYSTOLIC AND DIASTOLIC CONGESTIVE HEART FAILURE, NYHA CLASS 2 (HCC): ICD-10-CM

## 2018-11-01 DIAGNOSIS — Z86.718 HISTORY OF DVT (DEEP VEIN THROMBOSIS): ICD-10-CM

## 2018-11-01 DIAGNOSIS — Z86.711 HISTORY OF PULMONARY EMBOLISM: ICD-10-CM

## 2018-11-01 DIAGNOSIS — I49.5 SICK SINUS SYNDROME (HCC): ICD-10-CM

## 2018-11-01 DIAGNOSIS — E66.9 OBESITY (BMI 30-39.9): ICD-10-CM

## 2018-11-01 DIAGNOSIS — I10 ESSENTIAL HYPERTENSION: ICD-10-CM

## 2018-11-01 DIAGNOSIS — Z95.5 S/P DRUG ELUTING CORONARY STENT PLACEMENT: ICD-10-CM

## 2018-11-01 DIAGNOSIS — E11.9 DIABETES MELLITUS TYPE 2, DIET-CONTROLLED (HCC): ICD-10-CM

## 2018-11-01 DIAGNOSIS — I25.10 ASCVD (ARTERIOSCLEROTIC CARDIOVASCULAR DISEASE): ICD-10-CM

## 2018-11-01 DIAGNOSIS — G89.4 CHRONIC PAIN SYNDROME: ICD-10-CM

## 2018-11-01 DIAGNOSIS — G25.81 RLS (RESTLESS LEGS SYNDROME): ICD-10-CM

## 2018-11-01 DIAGNOSIS — E78.5 HYPERLIPIDEMIA, UNSPECIFIED HYPERLIPIDEMIA TYPE: ICD-10-CM

## 2018-11-01 DIAGNOSIS — C85.90 NON-HODGKIN'S LYMPHOMA, UNSPECIFIED BODY REGION, UNSPECIFIED NON-HODGKIN LYMPHOMA TYPE (HCC): ICD-10-CM

## 2018-11-01 DIAGNOSIS — R60.9 EDEMA, UNSPECIFIED TYPE: Primary | ICD-10-CM

## 2018-11-01 DIAGNOSIS — I87.2 CHRONIC VENOUS STASIS DERMATITIS: ICD-10-CM

## 2018-11-01 PROCEDURE — 1101F PT FALLS ASSESS-DOCD LE1/YR: CPT | Performed by: INTERNAL MEDICINE

## 2018-11-01 PROCEDURE — G8427 DOCREV CUR MEDS BY ELIG CLIN: HCPCS | Performed by: INTERNAL MEDICINE

## 2018-11-01 PROCEDURE — 4040F PNEUMOC VAC/ADMIN/RCVD: CPT | Performed by: INTERNAL MEDICINE

## 2018-11-01 PROCEDURE — 1090F PRES/ABSN URINE INCON ASSESS: CPT | Performed by: INTERNAL MEDICINE

## 2018-11-01 PROCEDURE — G8417 CALC BMI ABV UP PARAM F/U: HCPCS | Performed by: INTERNAL MEDICINE

## 2018-11-01 PROCEDURE — 99214 OFFICE O/P EST MOD 30 MIN: CPT | Performed by: INTERNAL MEDICINE

## 2018-11-01 PROCEDURE — G8484 FLU IMMUNIZE NO ADMIN: HCPCS | Performed by: INTERNAL MEDICINE

## 2018-11-01 PROCEDURE — G8400 PT W/DXA NO RESULTS DOC: HCPCS | Performed by: INTERNAL MEDICINE

## 2018-11-01 PROCEDURE — G8598 ASA/ANTIPLAT THER USED: HCPCS | Performed by: INTERNAL MEDICINE

## 2018-11-01 PROCEDURE — 1036F TOBACCO NON-USER: CPT | Performed by: INTERNAL MEDICINE

## 2018-11-01 PROCEDURE — 1123F ACP DISCUSS/DSCN MKR DOCD: CPT | Performed by: INTERNAL MEDICINE

## 2018-11-01 NOTE — PROGRESS NOTES
shin.        Patient Active Problem List   Diagnosis    Hyperlipidemia    History of DVT (deep vein thrombosis)    Diabetes mellitus type 2, diet-controlled ([facility]    Essential hypertension    CKD stage 3 due to type 2 diabetes mellitus ([facility]    History of pulmonary embolism    Coagulopathy (HCC)    NHL (non-Hodgkin's lymphoma)  Dr. Maurice Garcia  diagnosed June 2012    Urinary tract infection without hematuria    Diastolic dysfunction,  EF 55-60%    Anticoagulated on Coumadin    Polycythemia    Dyspnea    Elevated brain natriuretic peptide (BNP) level    Obesity (BMI 30-39. 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[facility]    Fall during current hospitalization    S/P cardiac pacemaker procedure    Physical deconditioning    Hip pain, acute, left    Swelling    Pre-ulcerative calluses    Status post total replacement of left hip    Coronary artery disease involving native coronary artery of native heart without angina pectoris    Sick sinus syndrome (HCC)    Hyperglycemia    Calculus of gallbladder without cholecystitis without obstruction    Chronic idiopathic constipation    Dizziness    History of coronary artery stent placement    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

## 2018-11-12 ENCOUNTER — HOSPITAL ENCOUNTER (OUTPATIENT)
Dept: PHARMACY | Age: 83
Setting detail: THERAPIES SERIES
Discharge: HOME | End: 2018-11-12
Payer: MEDICARE

## 2018-11-12 ENCOUNTER — HOSPITAL ENCOUNTER (OUTPATIENT)
Age: 83
Discharge: HOME | End: 2018-11-12
Payer: MEDICARE

## 2018-11-12 DIAGNOSIS — Z86.718 HISTORY OF DVT (DEEP VEIN THROMBOSIS): ICD-10-CM

## 2018-11-12 DIAGNOSIS — I48.19 PERSISTENT ATRIAL FIBRILLATION (HCC): ICD-10-CM

## 2018-11-12 DIAGNOSIS — Z86.73 HISTORY OF EMBOLIC STROKE: ICD-10-CM

## 2018-11-12 DIAGNOSIS — Z86.711 HISTORY OF PULMONARY EMBOLISM: ICD-10-CM

## 2018-11-12 LAB
POC INR: 1.9 (ref 0.8–1.2)
TSH SERPL DL<=0.05 MIU/L-ACNC: 3.85 UIU/ML (ref 0.4–4.2)
VITAMIN D 25-HYDROXY: 40 NG/ML (ref 30–100)

## 2018-11-12 PROCEDURE — 36415 COLL VENOUS BLD VENIPUNCTURE: CPT

## 2018-11-12 PROCEDURE — 99211 OFF/OP EST MAY X REQ PHY/QHP: CPT

## 2018-11-12 PROCEDURE — 84443 ASSAY THYROID STIM HORMONE: CPT

## 2018-11-12 PROCEDURE — 82306 VITAMIN D 25 HYDROXY: CPT

## 2018-11-12 PROCEDURE — 84481 FREE ASSAY (FT-3): CPT

## 2018-11-12 PROCEDURE — 85610 PROTHROMBIN TIME: CPT

## 2018-11-12 PROCEDURE — 36416 COLLJ CAPILLARY BLOOD SPEC: CPT

## 2018-11-12 NOTE — PROGRESS NOTES
Medication Management [facility]  Anticoagulation Clinic  [phone] (phone)  [phone] (fax)    Ms. Jose Juan Nevarez is a 81 y.o.  female with history of persistent Afib, history of PE and DVT, CVA who presents today for anticoagulation monitoring and adjustment. Patient verifies current dosing regimen and tablet strength. No extra doses. Missed coumadin on 11-10-18. Patient denies s/s bleeding/bruising/swelling/SOB/chest pain. No blood in urine or stool. No dietary changes. No changes in medication/OTC agents/Herbals. No change in alcohol use or tobacco use. No change in activity level. Patient denies headaches/dizziness/lightheadedness/falls. No vomiting/diarrhea or acute illness. No procedures scheduled in the future at this time. Assessment:   Lab Results   Component Value Date    INR 1.90 (H) 11/12/2018    INR 1.90 (H) 10/09/2018    INR 2.50 (H) 09/04/2018     INR subtherapeutic   Recent Labs      11/12/18   1301   INR  1.90*     Plan: POCT INR ordered and result reviewed with HAMILTON Duarte, Pharm. D. Order received and verified to take coumadin 2.5 mg po today, then continue Coumadin 1.25 mg po MWF, 2.5 mg po TTHSS. Recheck INR in 5 weeks. Patient reminded to call the Anticoagulation Clinic with any signs or symptoms of bleeding or with any medication changes. Patient given instructions utilizing the teach back method. Discharged via wheelchair in no apparent distress with daughter. After visit summary printed and reviewed with patient.       Medications reviewed and updated on home medication list Yes    Influenza vaccine:     [] given    [x] declined   [] received previously   [] plans to receive at a later time   [] refused    [x] documented in EPIC

## 2018-11-13 ENCOUNTER — CARE COORDINATION (OUTPATIENT)
Dept: CARE COORDINATION | Age: 83
End: 2018-11-13

## 2018-11-13 LAB — T3 FREE: 2.31 PG/ML (ref 2.02–4.43)

## 2018-11-14 ENCOUNTER — CARE COORDINATION (OUTPATIENT)
Dept: CARE COORDINATION | Age: 83
End: 2018-11-14

## 2018-11-27 ENCOUNTER — TELEPHONE (OUTPATIENT)
Dept: PHARMACY | Age: 83
End: 2018-11-27

## 2018-12-11 ENCOUNTER — CARE COORDINATION (OUTPATIENT)
Dept: CARE COORDINATION | Age: 83
End: 2018-12-11

## 2018-12-17 ENCOUNTER — HOSPITAL ENCOUNTER (OUTPATIENT)
Dept: PHARMACY | Age: 83
Setting detail: THERAPIES SERIES
Discharge: HOME | End: 2018-12-17
Payer: MEDICARE

## 2018-12-17 DIAGNOSIS — Z86.73 HISTORY OF EMBOLIC STROKE: ICD-10-CM

## 2018-12-17 DIAGNOSIS — I48.19 PERSISTENT ATRIAL FIBRILLATION (HCC): ICD-10-CM

## 2018-12-17 DIAGNOSIS — Z86.718 HISTORY OF DVT (DEEP VEIN THROMBOSIS): ICD-10-CM

## 2018-12-17 DIAGNOSIS — Z86.711 HISTORY OF PULMONARY EMBOLISM: ICD-10-CM

## 2018-12-17 LAB — POC INR: 2.1 (ref 0.8–1.2)

## 2018-12-17 PROCEDURE — 85610 PROTHROMBIN TIME: CPT

## 2018-12-17 PROCEDURE — 99211 OFF/OP EST MAY X REQ PHY/QHP: CPT

## 2018-12-17 PROCEDURE — 36416 COLLJ CAPILLARY BLOOD SPEC: CPT

## 2018-12-17 NOTE — PROGRESS NOTES
Medication Management [facility]  Anticoagulation Clinic  [phone] (phone)  [phone] (fax)    Ms. Pallavi Morrison is a 81 y.o.  female with history of persistent Afib, PE, DVT, embolic CVA who presents today for anticoagulation monitoring and adjustment. Patient verifies current dosing regimen and tablet strength. No missed or extra doses. Patient denies s/s bleeding/bruising/swelling/chest pain. Usual SOB. No blood in urine or stool. No dietary changes. No changes in medication/OTC agents/Herbals. No change in alcohol use or tobacco use. No change in activity level. Patient denies headaches/dizziness/lightheadedness/falls. No vomiting/diarrhea or acute illness. No procedures scheduled in the future at this time. Assessment:   Lab Results   Component Value Date    INR 2.10 (H) 12/17/2018    INR 1.90 (H) 11/12/2018    INR 1.90 (H) 10/09/2018     INR therapeutic       Plan: POCT INR ordered and result reviewed. Continue Coumadin 1.25 mg po MWF, 2.5 mg po TTHSS. Recheck INR in 5 weeks. Patient reminded to call the Anticoagulation Clinic with any signs or symptoms of bleeding or with any medication changes. Patient given instructions utilizing the teach back method. Discharged via wheelchair in no apparent distress with daughter. After visit summary printed and reviewed with patient.       Medications reviewed and updated on home medication list Yes    Influenza vaccine:     [] given    [x] declined   [] received previously   [] plans to receive at a later time   [x] refused    [x] documented in EPIC

## 2019-01-09 ENCOUNTER — TELEPHONE (OUTPATIENT)
Dept: PHARMACY | Age: 84
End: 2019-01-09

## 2019-01-10 ENCOUNTER — CARE COORDINATION (OUTPATIENT)
Dept: CARE COORDINATION | Age: 84
End: 2019-01-10

## 2019-01-10 ENCOUNTER — TELEPHONE (OUTPATIENT)
Dept: INTERNAL MEDICINE CLINIC | Age: 84
End: 2019-01-10

## 2019-01-10 DIAGNOSIS — Z87.440 HISTORY OF RECURRENT UTI (URINARY TRACT INFECTION): Primary | ICD-10-CM

## 2019-01-11 ENCOUNTER — HOSPITAL ENCOUNTER (OUTPATIENT)
Age: 84
Discharge: HOME | End: 2019-01-11
Payer: MEDICARE

## 2019-01-11 LAB
BACTERIA: ABNORMAL /HPF
BILIRUBIN URINE: NEGATIVE
BLOOD, URINE: ABNORMAL
CASTS 2: ABNORMAL /LPF
CASTS UA: ABNORMAL /LPF
CHARACTER, URINE: ABNORMAL
COLOR: YELLOW
CRYSTALS, UA: ABNORMAL
EPITHELIAL CELLS, UA: ABNORMAL /HPF
GLUCOSE URINE: NEGATIVE MG/DL
KETONES, URINE: NEGATIVE
LEUKOCYTE ESTERASE, URINE: ABNORMAL
MISCELLANEOUS 2: ABNORMAL
NITRITE, URINE: NEGATIVE
PH UA: 6.5
PROTEIN UA: NEGATIVE
RBC URINE: ABNORMAL /HPF
RENAL EPITHELIAL, UA: ABNORMAL
SPECIFIC GRAVITY, URINE: 1.01 (ref 1–1.03)
UROBILINOGEN, URINE: 0.2 EU/DL
WBC UA: ABNORMAL /HPF
YEAST: ABNORMAL

## 2019-01-11 PROCEDURE — 87184 SC STD DISK METHOD PER PLATE: CPT

## 2019-01-11 PROCEDURE — 81001 URINALYSIS AUTO W/SCOPE: CPT

## 2019-01-11 PROCEDURE — 87077 CULTURE AEROBIC IDENTIFY: CPT

## 2019-01-11 PROCEDURE — 87086 URINE CULTURE/COLONY COUNT: CPT

## 2019-01-11 PROCEDURE — 87186 SC STD MICRODIL/AGAR DIL: CPT

## 2019-01-13 LAB
ORGANISM: ABNORMAL
URINE CULTURE REFLEX: ABNORMAL

## 2019-01-15 ENCOUNTER — CARE COORDINATION (OUTPATIENT)
Dept: CARE COORDINATION | Age: 84
End: 2019-01-15

## 2019-01-16 ENCOUNTER — CARE COORDINATION (OUTPATIENT)
Dept: CARE COORDINATION | Age: 84
End: 2019-01-16

## 2019-01-17 ENCOUNTER — TELEPHONE (OUTPATIENT)
Dept: INTERNAL MEDICINE CLINIC | Age: 84
End: 2019-01-17

## 2019-01-21 ENCOUNTER — HOSPITAL ENCOUNTER (OUTPATIENT)
Dept: PHARMACY | Age: 84
Setting detail: THERAPIES SERIES
Discharge: HOME | End: 2019-01-21
Payer: MEDICARE

## 2019-01-21 DIAGNOSIS — Z86.711 HISTORY OF PULMONARY EMBOLISM: ICD-10-CM

## 2019-01-21 DIAGNOSIS — I48.19 PERSISTENT ATRIAL FIBRILLATION (HCC): ICD-10-CM

## 2019-01-21 DIAGNOSIS — Z86.73 HISTORY OF EMBOLIC STROKE: ICD-10-CM

## 2019-01-21 DIAGNOSIS — Z79.01 ANTICOAGULATED ON COUMADIN: Primary | ICD-10-CM

## 2019-01-21 DIAGNOSIS — Z86.718 HISTORY OF DVT (DEEP VEIN THROMBOSIS): ICD-10-CM

## 2019-01-21 LAB — POC INR: 2.8 (ref 0.8–1.2)

## 2019-01-21 PROCEDURE — 99211 OFF/OP EST MAY X REQ PHY/QHP: CPT

## 2019-01-21 PROCEDURE — 36416 COLLJ CAPILLARY BLOOD SPEC: CPT

## 2019-01-21 PROCEDURE — 85610 PROTHROMBIN TIME: CPT

## 2019-01-21 RX ORDER — SULFAMETHOXAZOLE AND TRIMETHOPRIM 800; 160 MG/1; MG/1
TABLET ORAL
Qty: 6 TABLET | Refills: 0 | OUTPATIENT
Start: 2019-01-21 | End: 2019-02-12 | Stop reason: ALTCHOICE

## 2019-01-29 ENCOUNTER — NURSE ONLY (OUTPATIENT)
Dept: CARDIOLOGY CLINIC | Age: 84
End: 2019-01-29
Payer: MEDICARE

## 2019-01-29 DIAGNOSIS — Z95.0 S/P CARDIAC PACEMAKER PROCEDURE: Primary | ICD-10-CM

## 2019-01-29 PROCEDURE — 93279 PRGRMG DEV EVAL PM/LDLS PM: CPT | Performed by: INTERNAL MEDICINE

## 2019-02-08 ENCOUNTER — CARE COORDINATION (OUTPATIENT)
Dept: CARE COORDINATION | Age: 84
End: 2019-02-08

## 2019-02-12 ENCOUNTER — CARE COORDINATION (OUTPATIENT)
Dept: CARE COORDINATION | Age: 84
End: 2019-02-12

## 2019-02-12 RX ORDER — CYANOCOBALAMIN 1000 UG/ML
1000 INJECTION INTRAMUSCULAR; SUBCUTANEOUS
COMMUNITY
End: 2021-01-01 | Stop reason: HOSPADM

## 2019-02-12 RX ORDER — CHOLECALCIFEROL (VITAMIN D3) 1250 MCG
1 CAPSULE ORAL WEEKLY
COMMUNITY
End: 2020-01-01

## 2019-02-14 ENCOUNTER — HOSPITAL ENCOUNTER (OUTPATIENT)
Age: 84
Discharge: HOME | End: 2019-02-14
Payer: MEDICARE

## 2019-02-14 DIAGNOSIS — Z86.711 HISTORY OF PULMONARY EMBOLISM: ICD-10-CM

## 2019-02-14 DIAGNOSIS — Z79.01 ANTICOAGULATED ON COUMADIN: ICD-10-CM

## 2019-02-14 LAB
FOLATE: 3.9 NG/ML (ref 4.8–24.2)
HCT VFR BLD CALC: 50.7 % (ref 37–47)
HEMOGLOBIN: 16.4 GM/DL (ref 12–16)
VITAMIN B-12: > 2000 PG/ML (ref 211–911)
VITAMIN D 25-HYDROXY: 46 NG/ML (ref 30–100)

## 2019-02-14 PROCEDURE — 36415 COLL VENOUS BLD VENIPUNCTURE: CPT

## 2019-02-14 PROCEDURE — 82306 VITAMIN D 25 HYDROXY: CPT

## 2019-02-14 PROCEDURE — 82607 VITAMIN B-12: CPT

## 2019-02-14 PROCEDURE — 85014 HEMATOCRIT: CPT

## 2019-02-14 PROCEDURE — 82746 ASSAY OF FOLIC ACID SERUM: CPT

## 2019-02-14 PROCEDURE — 85018 HEMOGLOBIN: CPT

## 2019-02-25 ENCOUNTER — OFFICE VISIT (OUTPATIENT)
Dept: INTERNAL MEDICINE CLINIC | Age: 84
End: 2019-02-25
Payer: MEDICARE

## 2019-02-25 ENCOUNTER — HOSPITAL ENCOUNTER (OUTPATIENT)
Dept: PHARMACY | Age: 84
Setting detail: THERAPIES SERIES
Discharge: HOME | End: 2019-02-25
Payer: MEDICARE

## 2019-02-25 VITALS
BODY MASS INDEX: 33.8 KG/M2 | RESPIRATION RATE: 16 BRPM | DIASTOLIC BLOOD PRESSURE: 78 MMHG | HEIGHT: 68 IN | SYSTOLIC BLOOD PRESSURE: 150 MMHG | WEIGHT: 223 LBS | HEART RATE: 69 BPM

## 2019-02-25 DIAGNOSIS — Z86.718 HISTORY OF DVT (DEEP VEIN THROMBOSIS): ICD-10-CM

## 2019-02-25 DIAGNOSIS — I10 ESSENTIAL HYPERTENSION: ICD-10-CM

## 2019-02-25 DIAGNOSIS — Z86.73 HISTORY OF EMBOLIC STROKE: ICD-10-CM

## 2019-02-25 DIAGNOSIS — Z86.711 HISTORY OF PULMONARY EMBOLISM: ICD-10-CM

## 2019-02-25 DIAGNOSIS — E11.9 DIABETES MELLITUS TYPE 2, DIET-CONTROLLED (HCC): Primary | ICD-10-CM

## 2019-02-25 DIAGNOSIS — E78.5 HYPERLIPIDEMIA, UNSPECIFIED HYPERLIPIDEMIA TYPE: ICD-10-CM

## 2019-02-25 DIAGNOSIS — I25.10 ASCVD (ARTERIOSCLEROTIC CARDIOVASCULAR DISEASE): ICD-10-CM

## 2019-02-25 DIAGNOSIS — I48.19 PERSISTENT ATRIAL FIBRILLATION (HCC): ICD-10-CM

## 2019-02-25 LAB — POC INR: 2.8 (ref 0.8–1.2)

## 2019-02-25 PROCEDURE — 36416 COLLJ CAPILLARY BLOOD SPEC: CPT

## 2019-02-25 PROCEDURE — 85610 PROTHROMBIN TIME: CPT

## 2019-02-25 PROCEDURE — 99211 OFF/OP EST MAY X REQ PHY/QHP: CPT

## 2019-02-25 PROCEDURE — 99214 OFFICE O/P EST MOD 30 MIN: CPT | Performed by: NURSE PRACTITIONER

## 2019-02-25 RX ORDER — NYSTATIN 10B UNIT
POWDER (EA) MISCELLANEOUS 2 TIMES DAILY PRN
COMMUNITY
End: 2019-04-25 | Stop reason: SDUPTHER

## 2019-02-25 ASSESSMENT — PATIENT HEALTH QUESTIONNAIRE - PHQ9
SUM OF ALL RESPONSES TO PHQ QUESTIONS 1-9: 0
2. FEELING DOWN, DEPRESSED OR HOPELESS: 0
1. LITTLE INTEREST OR PLEASURE IN DOING THINGS: 0
SUM OF ALL RESPONSES TO PHQ9 QUESTIONS 1 & 2: 0
SUM OF ALL RESPONSES TO PHQ QUESTIONS 1-9: 0

## 2019-02-26 ENCOUNTER — APPOINTMENT (OUTPATIENT)
Dept: PHARMACY | Age: 84
End: 2019-02-26
Payer: MEDICARE

## 2019-02-26 PROBLEM — I25.10 ASCVD (ARTERIOSCLEROTIC CARDIOVASCULAR DISEASE): Status: ACTIVE | Noted: 2019-02-26

## 2019-02-26 PROBLEM — M25.559 HIP PAIN: Status: RESOLVED | Noted: 2017-12-04 | Resolved: 2019-02-26

## 2019-04-01 ENCOUNTER — HOSPITAL ENCOUNTER (OUTPATIENT)
Dept: PHARMACY | Age: 84
Setting detail: THERAPIES SERIES
Discharge: HOME | End: 2019-04-01
Payer: MEDICARE

## 2019-04-01 DIAGNOSIS — Z86.718 HISTORY OF DVT (DEEP VEIN THROMBOSIS): ICD-10-CM

## 2019-04-01 DIAGNOSIS — Z86.73 HISTORY OF EMBOLIC STROKE: ICD-10-CM

## 2019-04-01 DIAGNOSIS — Z86.711 HISTORY OF PULMONARY EMBOLISM: ICD-10-CM

## 2019-04-01 DIAGNOSIS — I48.19 PERSISTENT ATRIAL FIBRILLATION (HCC): ICD-10-CM

## 2019-04-01 LAB — POC INR: 2.5 (ref 0.8–1.2)

## 2019-04-01 PROCEDURE — 99211 OFF/OP EST MAY X REQ PHY/QHP: CPT

## 2019-04-01 PROCEDURE — 36416 COLLJ CAPILLARY BLOOD SPEC: CPT

## 2019-04-01 PROCEDURE — 85610 PROTHROMBIN TIME: CPT

## 2019-04-01 NOTE — PROGRESS NOTES
Medication Management [facility]  Anticoagulation Clinic  [phone] (phone)  [phone] (fax)      Ms. Ankur Hughes is a 81 y.o.  female with history of persistent Afib, PE, DVT, CVA who presents today for anticoagulation monitoring and adjustment. Patient verifies current dosing regimen and tablet strength. No missed or extra doses. Patient denies s/s bleeding/bruising/swelling/SOB/chest pain. No blood in urine or stool. No dietary changes. No changes in medication/OTC agents/Herbals. No change in alcohol use or tobacco use. No change in activity level. Patient denies headaches/dizziness/falls. Gets a little lightheaded upon rising too quickly. No vomiting/diarrhea or acute illness. No procedures scheduled in the future at this time. Assessment:   Lab Results   Component Value Date    INR 2.50 (H) 04/01/2019    INR 2.80 (H) 02/25/2019    INR 2.80 (H) 01/21/2019     INR therapeutic   Recent Labs     04/01/19  1313   INR 2.50*     Plan: POCT INR ordered and result reviewed. Continue Coumadin 1.25 mg po MWF, 2.5 mg po TTHSS. Recheck INR in 5 weeks. Patient reminded to call the Anticoagulation Clinic with any signs or symptoms of bleeding or with any medication changes. Patient given instructions utilizing the teach back method. Discharged ambulatory in no apparent distress. After visit summary printed and reviewed with patient.       Medications reviewed and updated on home medication list Yes    Influenza vaccine:     [] given    [x] declined   [] received previously   [] plans to receive at a later time   [x] refused    [x] documented in EPIC

## 2019-04-08 DIAGNOSIS — I10 ESSENTIAL HYPERTENSION: ICD-10-CM

## 2019-04-10 RX ORDER — POTASSIUM CHLORIDE 750 MG/1
TABLET, EXTENDED RELEASE ORAL
Qty: 90 TABLET | Refills: 3 | Status: SHIPPED | OUTPATIENT
Start: 2019-04-10 | End: 2020-01-01

## 2019-04-10 RX ORDER — FUROSEMIDE 20 MG/1
TABLET ORAL
Qty: 90 TABLET | Refills: 3 | Status: SHIPPED | OUTPATIENT
Start: 2019-04-10 | End: 2020-01-01

## 2019-04-10 RX ORDER — ISOSORBIDE MONONITRATE 60 MG/1
TABLET, EXTENDED RELEASE ORAL
Qty: 90 TABLET | Refills: 3 | Status: SHIPPED | OUTPATIENT
Start: 2019-04-10 | End: 2020-01-01

## 2019-04-24 ENCOUNTER — CARE COORDINATION (OUTPATIENT)
Dept: CARE COORDINATION | Age: 84
End: 2019-04-24

## 2019-04-25 ENCOUNTER — CARE COORDINATION (OUTPATIENT)
Dept: CARE COORDINATION | Age: 84
End: 2019-04-25

## 2019-04-25 RX ORDER — NYSTATIN 10B UNIT
1 POWDER (EA) MISCELLANEOUS 2 TIMES DAILY PRN
Qty: 1 EACH | Refills: 2 | Status: SHIPPED | OUTPATIENT
Start: 2019-04-25 | End: 2019-10-22 | Stop reason: SDUPTHER

## 2019-04-25 NOTE — CARE COORDINATION
Ambulatory Care Coordination Note  4/25/2019  CM Risk Score: 7  Toney Mortality Risk Score: 46    ACC: Raad Quesada, RN    Summary Note: Sean Carter is being followed by Care coordination for education and assistance in managing her chronic conditions. Spoke with daughter [place] (HIP[employer] [place]) today. She reports that Sean Carter continues to do well. Was seen by CNP in office on 2/25. Denies Sean Carter having any issues since last visit. Continues to f/u with Dr. Xenia Stokes for neuropathy pain. DM; does not monitor BS's at home. Reports appetite continues to be good. Limited activity d/t unsteadiness. CHF: has mild edema to ble, but reports no worse than normal.  Denies increased SOB or congestion. Does not monitor wts at home d/t unsteady gait. Reports Sean Carter has a little excoriation under her abd folds which occurs at times. Getting low on nystatin powder. Requesting new script. Refill request sent to CNP. Using Tummy liners to help absorb moisture in addition to the powder. Plan of Care:  Continue to f/u with PCP and specialists routinely and as needed. Continue using powder and liners to help keep skin dry under abd folds. Monitor for s/s of swelling or increased SOB. Call with any new questions or concerns. Pt has met goals. Continues to do well. Danyell chacko continues to be pt's primary caregiver. Has not had any recent hospitalizations.   Will graduate from [employer] upon PCP approval.         Care Coordination Interventions    Program Enrollment:  Complex Care  Referral from Primary Care Provider:  Yes  Suggested Interventions and [address] [place] [address] Hwy:  Completed (Comment: completed )  Medi Set or Pill Pack:  Completed (Comment: daughter managing meds and setting them up weekly in pill box)  Physical Therapy:  Completed (Comment: no longer receiving PT)  Other Therapy Services:  Completed (Comment: receiving neurofeedback tx's at [address] and Guy Winters MD   bisacodyl (DULCOLAX) 5 MG EC tablet Take 1 tablet by mouth daily as needed for Constipation 11/17/17   Evette Murillo MD   acetaminophen (TYLENOL) 325 MG tablet Take 2 tablets by mouth every 4 hours as needed for Pain 4/27/17   Jacque Conner MD   aspirin 81 MG EC tablet Take 1 tablet by mouth daily 11/22/16   Eileen Oliveira MD   Glucose Blood (BLOOD GLUCOSE TEST STRIPS) STRP 1 strip by In Vitro route daily 11/22/16   Eileen Oliveira MD       Future Appointments   Date Time Provider Thang Fleming   5/6/2019  1:00 PM Huang Reynoso RN [facility] [place]   10/9/2019 11:15 AM Yaa Olmstead MD [facility] [place]   1/30/2020 11:30 AM SCHEDULE, SR[facility] PACER NURSE [facility] [place]   2/24/2020  1:20 PM LUZ Healy - CNP [facility] [place]

## 2019-04-25 NOTE — TELEPHONE ENCOUNTER
Spoke with pt's daughter Arden Dior today. She reports pt is doing well, but has reddened excoriated area under abd fold. They use nystatin powder PRN, but are getting low. Requesting new script be sent to [facility]. Please advise.

## 2019-04-30 ENCOUNTER — PROCEDURE VISIT (OUTPATIENT)
Dept: CARDIOLOGY CLINIC | Age: 84
End: 2019-04-30
Payer: MEDICARE

## 2019-04-30 DIAGNOSIS — Z95.0 S/P CARDIAC PACEMAKER PROCEDURE: Primary | ICD-10-CM

## 2019-04-30 PROCEDURE — 93294 REM INTERROG EVL PM/LDLS PM: CPT | Performed by: INTERNAL MEDICINE

## 2019-04-30 PROCEDURE — 93296 REM INTERROG EVL PM/IDS: CPT | Performed by: INTERNAL MEDICINE

## 2019-04-30 NOTE — PROGRESS NOTES
Medtronic single pacemaker  Battery 7.5 years 3.02V  Mode VVIR  r wave 5.0mV  Ventricle impedance 570 ohms  V paced 96.2%

## 2019-05-06 ENCOUNTER — HOSPITAL ENCOUNTER (OUTPATIENT)
Dept: PHARMACY | Age: 84
Setting detail: THERAPIES SERIES
Discharge: HOME | End: 2019-05-06
Payer: MEDICARE

## 2019-05-06 DIAGNOSIS — Z86.718 HISTORY OF DVT (DEEP VEIN THROMBOSIS): ICD-10-CM

## 2019-05-06 DIAGNOSIS — Z86.73 HISTORY OF EMBOLIC STROKE: ICD-10-CM

## 2019-05-06 DIAGNOSIS — I48.19 PERSISTENT ATRIAL FIBRILLATION (HCC): ICD-10-CM

## 2019-05-06 DIAGNOSIS — Z86.711 HISTORY OF PULMONARY EMBOLISM: ICD-10-CM

## 2019-05-06 LAB — POC INR: 2.8 (ref 0.8–1.2)

## 2019-05-06 PROCEDURE — 85610 PROTHROMBIN TIME: CPT

## 2019-05-06 PROCEDURE — 99211 OFF/OP EST MAY X REQ PHY/QHP: CPT

## 2019-05-06 PROCEDURE — 36416 COLLJ CAPILLARY BLOOD SPEC: CPT

## 2019-05-06 NOTE — PROGRESS NOTES
Medication Management [facility]  Anticoagulation Clinic  [phone] (phone)  [phone] (fax)      Ms. Kathy Villagomez is a 81 y.o.  female with history of persistent Afib, PE, DVT, CVA who presents today for anticoagulation monitoring and adjustment. Patient verifies current tablet strength and color and fills pill box according to coumadin dosing calendar. No missed or extra doses. Patient denies s/s bleeding/bruising/swelling/chest pain. Usual LI. No blood in urine or stool. No dietary changes. No changes in medication/OTC agents/Herbals. No change in alcohol use or tobacco use. No change in activity level. Patient denies dizziness/lightheadedness/falls. Has a headaches once in a while. No vomiting/diarrhea or acute illness. No procedures scheduled in the future at this time. Assessment:   Lab Results   Component Value Date    INR 2.80 (H) 05/06/2019    INR 2.50 (H) 04/01/2019    INR 2.80 (H) 02/25/2019     INR therapeutic   Recent Labs     05/06/19  1307   INR 2.80*     Plan: POCT INR ordered and result reviewed. Continue Coumadin 1.25 mg po MWF, 2.5 mg po TTHSS. Recheck INR in 6 weeks. Patient reminded to call the Anticoagulation Clinic with any signs or symptoms of bleeding or with any medication changes. Patient given instructions utilizing the teach back method. Discharged via wheelchair in no apparent distress with daughter. After visit summary printed and reviewed with patient.       Medications reviewed and updated on home medication list Yes    Influenza vaccine:     [] given    [x] declined   [] received previously   [] plans to receive at a later time   [x] refused    [x] documented in EPIC

## 2019-05-27 DIAGNOSIS — I10 ESSENTIAL HYPERTENSION: ICD-10-CM

## 2019-05-29 RX ORDER — AMLODIPINE BESYLATE 5 MG/1
TABLET ORAL
Qty: 90 TABLET | Refills: 3 | Status: ON HOLD | OUTPATIENT
Start: 2019-05-29 | End: 2020-01-01 | Stop reason: HOSPADM

## 2019-06-16 ENCOUNTER — APPOINTMENT (OUTPATIENT)
Dept: GENERAL RADIOLOGY | Age: 84
DRG: 871 | End: 2019-06-16
Payer: MEDICARE

## 2019-06-16 ENCOUNTER — HOSPITAL ENCOUNTER (INPATIENT)
Age: 84
LOS: 1 days | Discharge: HOME | DRG: 871 | End: 2019-06-18
Attending: HOSPITALIST | Admitting: HOSPITALIST
Payer: MEDICARE

## 2019-06-16 ENCOUNTER — APPOINTMENT (OUTPATIENT)
Dept: CT IMAGING | Age: 84
DRG: 871 | End: 2019-06-16
Payer: MEDICARE

## 2019-06-16 DIAGNOSIS — R10.9 INTRACTABLE ABDOMINAL PAIN: Primary | ICD-10-CM

## 2019-06-16 DIAGNOSIS — R91.8 RIGHT MIDDLE LOBE PULMONARY INFILTRATE: ICD-10-CM

## 2019-06-16 PROBLEM — R10.13 EPIGASTRIC PAIN: Status: ACTIVE | Noted: 2019-06-16

## 2019-06-16 LAB
ALBUMIN SERPL-MCNC: 3.2 G/DL (ref 3.5–5.1)
ALP BLD-CCNC: 84 U/L (ref 38–126)
ALT SERPL-CCNC: 8 U/L (ref 11–66)
ANION GAP SERPL CALCULATED.3IONS-SCNC: 13 MEQ/L (ref 8–16)
AST SERPL-CCNC: 16 U/L (ref 5–40)
BACTERIA: ABNORMAL
BASOPHILS # BLD: 0.3 %
BASOPHILS ABSOLUTE: 0 THOU/MM3 (ref 0–0.1)
BILIRUB SERPL-MCNC: 1.9 MG/DL (ref 0.3–1.2)
BILIRUBIN URINE: ABNORMAL
BLOOD, URINE: ABNORMAL
BUN BLDV-MCNC: 20 MG/DL (ref 7–22)
CALCIUM SERPL-MCNC: 9 MG/DL (ref 8.5–10.5)
CASTS: ABNORMAL /LPF
CASTS: ABNORMAL /LPF
CHARACTER, URINE: ABNORMAL
CHLORIDE BLD-SCNC: 98 MEQ/L (ref 98–111)
CO2: 27 MEQ/L (ref 23–33)
COLOR: ABNORMAL
CREAT SERPL-MCNC: 1.3 MG/DL (ref 0.4–1.2)
CRYSTALS: ABNORMAL
EKG ATRIAL RATE: 129 BPM
EKG Q-T INTERVAL: 402 MS
EKG QRS DURATION: 132 MS
EKG QTC CALCULATION (BAZETT): 483 MS
EKG R AXIS: 17 DEGREES
EKG T AXIS: 27 DEGREES
EKG VENTRICULAR RATE: 87 BPM
EOSINOPHIL # BLD: 1.7 %
EOSINOPHILS ABSOLUTE: 0.2 THOU/MM3 (ref 0–0.4)
EPITHELIAL CELLS, UA: ABNORMAL /HPF
ERYTHROCYTE [DISTWIDTH] IN BLOOD BY AUTOMATED COUNT: 14.9 % (ref 11.5–14.5)
ERYTHROCYTE [DISTWIDTH] IN BLOOD BY AUTOMATED COUNT: 50.7 FL (ref 35–45)
GFR SERPL CREATININE-BSD FRML MDRD: 39 ML/MIN/1.73M2
GLUCOSE BLD-MCNC: 151 MG/DL (ref 70–108)
GLUCOSE, URINE: NEGATIVE MG/DL
HCT VFR BLD CALC: 46.3 % (ref 37–47)
HEMOGLOBIN: 15.3 GM/DL (ref 12–16)
ICTOTEST: NEGATIVE
IMMATURE GRANS (ABS): 0.08 THOU/MM3 (ref 0–0.07)
IMMATURE GRANULOCYTES: 0.6 %
INR BLD: 3.76 (ref 0.85–1.13)
KETONES, URINE: NEGATIVE
LACTIC ACID: 1.6 MMOL/L (ref 0.5–2.2)
LEUKOCYTE ESTERASE, URINE: ABNORMAL
LIPASE: 7.8 U/L (ref 5.6–51.3)
LYMPHOCYTES # BLD: 11.8 %
LYMPHOCYTES ABSOLUTE: 1.5 THOU/MM3 (ref 1–4.8)
MCH RBC QN AUTO: 30.8 PG (ref 26–33)
MCHC RBC AUTO-ENTMCNC: 33 GM/DL (ref 32.2–35.5)
MCV RBC AUTO: 93.3 FL (ref 81–99)
MISCELLANEOUS LAB TEST RESULT: ABNORMAL
MONOCYTES # BLD: 11.2 %
MONOCYTES ABSOLUTE: 1.4 THOU/MM3 (ref 0.4–1.3)
NITRITE, URINE: POSITIVE
NUCLEATED RED BLOOD CELLS: 0 /100 WBC
OSMOLALITY CALCULATION: 281.2 MOSMOL/KG (ref 275–300)
PH UA: 5.5 (ref 5–9)
PLATELET # BLD: 200 THOU/MM3 (ref 130–400)
PMV BLD AUTO: 10.8 FL (ref 9.4–12.4)
POTASSIUM SERPL-SCNC: 4.3 MEQ/L (ref 3.5–5.2)
PROTEIN UA: 100 MG/DL
RBC # BLD: 4.96 MILL/MM3 (ref 4.2–5.4)
RBC URINE: ABNORMAL /HPF
RENAL EPITHELIAL, UA: ABNORMAL
SEG NEUTROPHILS: 74.4 %
SEGMENTED NEUTROPHILS ABSOLUTE COUNT: 9.6 THOU/MM3 (ref 1.8–7.7)
SODIUM BLD-SCNC: 138 MEQ/L (ref 135–145)
SPECIFIC GRAVITY UA: > 1.03 (ref 1–1.03)
TOTAL PROTEIN: 7.3 G/DL (ref 6.1–8)
TROPONIN T: < 0.01 NG/ML
UROBILINOGEN, URINE: 1 EU/DL (ref 0–1)
WBC # BLD: 12.9 THOU/MM3 (ref 4.8–10.8)
WBC UA: > 100 /HPF
YEAST: ABNORMAL

## 2019-06-16 PROCEDURE — 2580000003 HC RX 258: Performed by: PHYSICIAN ASSISTANT

## 2019-06-16 PROCEDURE — 87186 SC STD MICRODIL/AGAR DIL: CPT

## 2019-06-16 PROCEDURE — 71046 X-RAY EXAM CHEST 2 VIEWS: CPT

## 2019-06-16 PROCEDURE — 85610 PROTHROMBIN TIME: CPT

## 2019-06-16 PROCEDURE — 85025 COMPLETE CBC W/AUTO DIFF WBC: CPT

## 2019-06-16 PROCEDURE — 2580000003 HC RX 258: Performed by: HOSPITALIST

## 2019-06-16 PROCEDURE — 84484 ASSAY OF TROPONIN QUANT: CPT

## 2019-06-16 PROCEDURE — C9113 INJ PANTOPRAZOLE SODIUM, VIA: HCPCS | Performed by: NURSE PRACTITIONER

## 2019-06-16 PROCEDURE — 87086 URINE CULTURE/COLONY COUNT: CPT

## 2019-06-16 PROCEDURE — 36415 COLL VENOUS BLD VENIPUNCTURE: CPT

## 2019-06-16 PROCEDURE — 93005 ELECTROCARDIOGRAM TRACING: CPT | Performed by: HOSPITALIST

## 2019-06-16 PROCEDURE — 96365 THER/PROPH/DIAG IV INF INIT: CPT

## 2019-06-16 PROCEDURE — 2580000003 HC RX 258: Performed by: NURSE PRACTITIONER

## 2019-06-16 PROCEDURE — 2709999900 HC NON-CHARGEABLE SUPPLY

## 2019-06-16 PROCEDURE — 87184 SC STD DISK METHOD PER PLATE: CPT

## 2019-06-16 PROCEDURE — 83690 ASSAY OF LIPASE: CPT

## 2019-06-16 PROCEDURE — G0378 HOSPITAL OBSERVATION PER HR: HCPCS

## 2019-06-16 PROCEDURE — 6370000000 HC RX 637 (ALT 250 FOR IP): Performed by: NURSE PRACTITIONER

## 2019-06-16 PROCEDURE — 87077 CULTURE AEROBIC IDENTIFY: CPT

## 2019-06-16 PROCEDURE — 2700000000 HC OXYGEN THERAPY PER DAY

## 2019-06-16 PROCEDURE — 74177 CT ABD & PELVIS W/CONTRAST: CPT

## 2019-06-16 PROCEDURE — 80053 COMPREHEN METABOLIC PANEL: CPT

## 2019-06-16 PROCEDURE — 96374 THER/PROPH/DIAG INJ IV PUSH: CPT

## 2019-06-16 PROCEDURE — 6360000002 HC RX W HCPCS: Performed by: NURSE PRACTITIONER

## 2019-06-16 PROCEDURE — 99219 PR INITIAL OBSERVATION CARE/DAY 50 MINUTES: CPT | Performed by: HOSPITALIST

## 2019-06-16 PROCEDURE — 6370000000 HC RX 637 (ALT 250 FOR IP): Performed by: HOSPITALIST

## 2019-06-16 PROCEDURE — 6360000002 HC RX W HCPCS: Performed by: PHYSICIAN ASSISTANT

## 2019-06-16 PROCEDURE — 81001 URINALYSIS AUTO W/SCOPE: CPT

## 2019-06-16 PROCEDURE — 6360000004 HC RX CONTRAST MEDICATION: Performed by: NURSE PRACTITIONER

## 2019-06-16 PROCEDURE — 83605 ASSAY OF LACTIC ACID: CPT

## 2019-06-16 PROCEDURE — 96375 TX/PRO/DX INJ NEW DRUG ADDON: CPT

## 2019-06-16 PROCEDURE — 99285 EMERGENCY DEPT VISIT HI MDM: CPT

## 2019-06-16 PROCEDURE — 93010 ELECTROCARDIOGRAM REPORT: CPT | Performed by: NUCLEAR MEDICINE

## 2019-06-16 PROCEDURE — 96372 THER/PROPH/DIAG INJ SC/IM: CPT

## 2019-06-16 RX ORDER — METOPROLOL SUCCINATE 50 MG/1
50 TABLET, EXTENDED RELEASE ORAL DAILY
Status: DISCONTINUED | OUTPATIENT
Start: 2019-06-16 | End: 2019-06-18 | Stop reason: HOSPADM

## 2019-06-16 RX ORDER — SODIUM CHLORIDE 0.9 % (FLUSH) 0.9 %
10 SYRINGE (ML) INJECTION PRN
Status: DISCONTINUED | OUTPATIENT
Start: 2019-06-16 | End: 2019-06-18 | Stop reason: HOSPADM

## 2019-06-16 RX ORDER — METOLAZONE 2.5 MG/1
2.5 TABLET ORAL DAILY PRN
Status: DISCONTINUED | OUTPATIENT
Start: 2019-06-16 | End: 2019-06-18 | Stop reason: HOSPADM

## 2019-06-16 RX ORDER — 0.9 % SODIUM CHLORIDE 0.9 %
1000 INTRAVENOUS SOLUTION INTRAVENOUS ONCE
Status: COMPLETED | OUTPATIENT
Start: 2019-06-16 | End: 2019-06-16

## 2019-06-16 RX ORDER — POLYETHYLENE GLYCOL 3350 17 G/17G
17 POWDER, FOR SOLUTION ORAL NIGHTLY
Status: DISCONTINUED | OUTPATIENT
Start: 2019-06-16 | End: 2019-06-18 | Stop reason: HOSPADM

## 2019-06-16 RX ORDER — ONDANSETRON 2 MG/ML
4 INJECTION INTRAMUSCULAR; INTRAVENOUS EVERY 6 HOURS PRN
Status: DISCONTINUED | OUTPATIENT
Start: 2019-06-16 | End: 2019-06-18 | Stop reason: HOSPADM

## 2019-06-16 RX ORDER — ASPIRIN 81 MG/1
81 TABLET ORAL DAILY
Status: DISCONTINUED | OUTPATIENT
Start: 2019-06-16 | End: 2019-06-18 | Stop reason: HOSPADM

## 2019-06-16 RX ORDER — SENNA PLUS 8.6 MG/1
1 TABLET ORAL DAILY PRN
Status: DISCONTINUED | OUTPATIENT
Start: 2019-06-16 | End: 2019-06-18 | Stop reason: HOSPADM

## 2019-06-16 RX ORDER — PANTOPRAZOLE SODIUM 40 MG/10ML
40 INJECTION, POWDER, LYOPHILIZED, FOR SOLUTION INTRAVENOUS ONCE
Status: COMPLETED | OUTPATIENT
Start: 2019-06-16 | End: 2019-06-16

## 2019-06-16 RX ORDER — ISOSORBIDE MONONITRATE 60 MG/1
60 TABLET, EXTENDED RELEASE ORAL DAILY
Status: DISCONTINUED | OUTPATIENT
Start: 2019-06-16 | End: 2019-06-18 | Stop reason: HOSPADM

## 2019-06-16 RX ORDER — ACETAMINOPHEN 325 MG/1
650 TABLET ORAL EVERY 4 HOURS PRN
Status: DISCONTINUED | OUTPATIENT
Start: 2019-06-16 | End: 2019-06-18 | Stop reason: HOSPADM

## 2019-06-16 RX ORDER — ONDANSETRON 2 MG/ML
4 INJECTION INTRAMUSCULAR; INTRAVENOUS ONCE
Status: COMPLETED | OUTPATIENT
Start: 2019-06-16 | End: 2019-06-16

## 2019-06-16 RX ORDER — SUCRALFATE 1 G/1
1 TABLET ORAL EVERY 6 HOURS
Status: DISCONTINUED | OUTPATIENT
Start: 2019-06-16 | End: 2019-06-17

## 2019-06-16 RX ORDER — SODIUM CHLORIDE 0.9 % (FLUSH) 0.9 %
10 SYRINGE (ML) INJECTION EVERY 12 HOURS SCHEDULED
Status: DISCONTINUED | OUTPATIENT
Start: 2019-06-16 | End: 2019-06-18 | Stop reason: HOSPADM

## 2019-06-16 RX ORDER — PANTOPRAZOLE SODIUM 40 MG/1
40 TABLET, DELAYED RELEASE ORAL
Status: DISCONTINUED | OUTPATIENT
Start: 2019-06-17 | End: 2019-06-18

## 2019-06-16 RX ORDER — SUCRALFATE 1 G/1
1 TABLET ORAL ONCE
Status: COMPLETED | OUTPATIENT
Start: 2019-06-16 | End: 2019-06-16

## 2019-06-16 RX ORDER — FUROSEMIDE 20 MG/1
20 TABLET ORAL DAILY
Status: DISCONTINUED | OUTPATIENT
Start: 2019-06-16 | End: 2019-06-18 | Stop reason: HOSPADM

## 2019-06-16 RX ORDER — AMLODIPINE BESYLATE 5 MG/1
5 TABLET ORAL DAILY
Status: DISCONTINUED | OUTPATIENT
Start: 2019-06-16 | End: 2019-06-18 | Stop reason: HOSPADM

## 2019-06-16 RX ORDER — ERGOCALCIFEROL 1.25 MG/1
50000 CAPSULE ORAL WEEKLY
Status: DISCONTINUED | OUTPATIENT
Start: 2019-06-16 | End: 2019-06-18 | Stop reason: HOSPADM

## 2019-06-16 RX ADMIN — ONDANSETRON 4 MG: 2 INJECTION INTRAMUSCULAR; INTRAVENOUS at 08:50

## 2019-06-16 RX ADMIN — SUCRALFATE 1 G: 1 TABLET ORAL at 16:31

## 2019-06-16 RX ADMIN — SODIUM CHLORIDE 1000 ML: 9 INJECTION, SOLUTION INTRAVENOUS at 08:48

## 2019-06-16 RX ADMIN — HYDROMORPHONE HYDROCHLORIDE 1 MG: 1 INJECTION, SOLUTION INTRAMUSCULAR; INTRAVENOUS; SUBCUTANEOUS at 08:48

## 2019-06-16 RX ADMIN — AMLODIPINE BESYLATE 5 MG: 5 TABLET ORAL at 16:26

## 2019-06-16 RX ADMIN — Medication 10 ML: at 21:00

## 2019-06-16 RX ADMIN — FUROSEMIDE 20 MG: 20 TABLET ORAL at 16:27

## 2019-06-16 RX ADMIN — METOPROLOL SUCCINATE 50 MG: 50 TABLET, EXTENDED RELEASE ORAL at 16:28

## 2019-06-16 RX ADMIN — ACETAMINOPHEN 650 MG: 325 TABLET ORAL at 20:20

## 2019-06-16 RX ADMIN — SUCRALFATE 1 G: 1 TABLET ORAL at 22:00

## 2019-06-16 RX ADMIN — ASPIRIN 81 MG: 81 TABLET ORAL at 16:25

## 2019-06-16 RX ADMIN — ISOSORBIDE MONONITRATE 60 MG: 60 TABLET ORAL at 16:31

## 2019-06-16 RX ADMIN — PANTOPRAZOLE SODIUM 40 MG: 40 INJECTION, POWDER, FOR SOLUTION INTRAVENOUS at 08:50

## 2019-06-16 RX ADMIN — CEFTRIAXONE SODIUM 1 G: 1 INJECTION, POWDER, FOR SOLUTION INTRAMUSCULAR; INTRAVENOUS at 23:12

## 2019-06-16 RX ADMIN — IOPAMIDOL 80 ML: 755 INJECTION, SOLUTION INTRAVENOUS at 09:36

## 2019-06-16 RX ADMIN — SUCRALFATE 1 G: 1 TABLET ORAL at 08:48

## 2019-06-16 RX ADMIN — LIDOCAINE HYDROCHLORIDE: 20 SOLUTION ORAL; TOPICAL at 08:48

## 2019-06-16 ASSESSMENT — PAIN DESCRIPTION - FREQUENCY
FREQUENCY: CONTINUOUS

## 2019-06-16 ASSESSMENT — PAIN DESCRIPTION - ORIENTATION
ORIENTATION: RIGHT
ORIENTATION: MID
ORIENTATION: RIGHT

## 2019-06-16 ASSESSMENT — ENCOUNTER SYMPTOMS
SORE THROAT: 0
VOICE CHANGE: 0
BLOOD IN STOOL: 0
PHOTOPHOBIA: 0
VOMITING: 0
COLOR CHANGE: 0
SHORTNESS OF BREATH: 0
BACK PAIN: 0
ABDOMINAL DISTENTION: 0
SINUS PRESSURE: 0
NAUSEA: 0
DIARRHEA: 0
CONSTIPATION: 0
COUGH: 0
EYE REDNESS: 0
WHEEZING: 0
ABDOMINAL PAIN: 1
RHINORRHEA: 0
CHEST TIGHTNESS: 0

## 2019-06-16 ASSESSMENT — PAIN SCALES - WONG BAKER: WONGBAKER_NUMERICALRESPONSE: 6

## 2019-06-16 ASSESSMENT — PAIN DESCRIPTION - PROGRESSION
CLINICAL_PROGRESSION: GRADUALLY IMPROVING
CLINICAL_PROGRESSION: GRADUALLY IMPROVING
CLINICAL_PROGRESSION: NOT CHANGED
CLINICAL_PROGRESSION: GRADUALLY IMPROVING

## 2019-06-16 ASSESSMENT — PAIN DESCRIPTION - PAIN TYPE
TYPE: ACUTE PAIN

## 2019-06-16 ASSESSMENT — PAIN SCALES - GENERAL
PAINLEVEL_OUTOF10: 4
PAINLEVEL_OUTOF10: 7
PAINLEVEL_OUTOF10: 6
PAINLEVEL_OUTOF10: 4
PAINLEVEL_OUTOF10: 6

## 2019-06-16 ASSESSMENT — PAIN DESCRIPTION - DESCRIPTORS
DESCRIPTORS: CONSTANT;PRESSURE
DESCRIPTORS: PRESSURE

## 2019-06-16 ASSESSMENT — PAIN DESCRIPTION - LOCATION
LOCATION: CHEST
LOCATION: ABDOMEN

## 2019-06-16 ASSESSMENT — PAIN DESCRIPTION - ONSET
ONSET: SUDDEN
ONSET: SUDDEN

## 2019-06-16 NOTE — ED NOTES
Patient resting in bed. Medicated as ordered. Updated on plan of care. Denies any needs. Call light in reach. Daughter at bedside.       Dian Booker RN  06/16/19 0900

## 2019-06-16 NOTE — PROGRESS NOTES
Clinical Pharmacy Note    Trista Chandler is a 81 y.o. female for whom pharmacy has been asked to manage warfarin therapy. Reason for Admission: Chest pain    Consulting Physician: Dr. Moni Prado  Warfarin dose prior to admission: 1.25 mg MWF and 2.5 mg all other days  Warfarin indication: Afib, Pe, DVT, CVA  Target INR range: 2-3   Out[facility] war[facility] provider[facility]    Past Medical History:   Diagnosis Date    Arthritis     Blood circulation, collateral     Cerebral artery occlusion with cerebral infarction [facility])     Chronic anticoagulation     Chronic combined systolic and diastolic CHF (congestive heart failure) (HCC)     CKD (chronic kidney disease) stage 3, GFR 30-59 ml/min ([facility])     Degenerative joint disease     Fracture of femoral head ([facility] 10/09/2016    Left with intrarticular extension    GERD (gastroesophageal reflux disease)     History of DVT (deep vein thrombosis)     Hyperlipidemia     Hypertension     Ischemic heart disease     Macular degeneration, wet ([facility]     NHL (non-Hodgkin's lymphoma) ([facility] 06/2012    Dr. Jaclyn Mccauley    Obesity (BMI 30.0-34. 9)     Permanent atrial fibrillation (HCC)     Presence of IVC filter     Pulmonary embolism, bilateral (HCC)     S/P drug eluting coronary stent placement     Subdural hematoma (HCC)     s/p evacuation    Triple vessel coronary artery disease     Type II or unspecified type diabetes mellitus without mention of complication, not stated as uncontrolled           Recent Labs     06/16/19  0844   INR 3.76*     Recent Labs     06/16/19  0800   HGB 15.3   HCT 46.3          Current warfarin drug-drug interactions: aspirin,       Date INR Warfarin Dose   6/16/2019 3.76 No warfarin                                   Daily PT/INR until stable within therapeutic range. Thank you for the consult.

## 2019-06-16 NOTE — ED NOTES
Patient placed on 2L O2 via nasal cannula, oxygen saturation now 95%.       Jose Bentley RN  06/16/19 7254

## 2019-06-16 NOTE — H&P
vessel coronary artery disease     Type II or unspecified type diabetes mellitus without mention of complication, not stated as uncontrolled        Past Surgical History:          Procedure Laterality Date    BRAIN SURGERY      Drained brain bleed    CARDIOVASCULAR STRESS TEST      with Dr. Hortensia Leahy- no acute findings    CENTRAL VENOUS CATHETER      Medi port placement    DIAGNOSTIC CARDIAC CATH LAB PROCEDURE      EYE SURGERY      FRACTURE SURGERY      Bipolar femur    HIP FRACTURE SURGERY Left 10/10/2016    Bipolar Hip    JOINT REPLACEMENT      right knee    OTHER SURGICAL HISTORY Left 10/24/2016    I and D left hip, component exchange, Dr. Adrien Mack pacer   [address] Right     VENA CAVA FILTER PLACEMENT      VENTRICULAR CARDIAC PACEMAKER INSERTION  10/04/2016    Dr. Lluu Fraire       Medications Prior to Admission:      Prior to Admission medications    Medication Sig Start Date End Date Taking?  Authorizing Provider   amLODIPine (NORVASC) 5 MG tablet TAKE 1 TABLET DAILY 5/29/19  Yes Karole Moment, APRN - CNP   nystatin (MYCOSTATIN) POWD powder Apply 1 each topically 2 times daily as needed (abdominal folds- irritation) Indications: Skin Abnormalities 4/25/19  Yes Karole Moment, APRN - CNP   furosemide (LASIX) 20 MG tablet TAKE 1 TABLET DAILY 4/10/19  Yes Mine Kuzn MD   potassium chloride (KLOR-CON M) 10 MEQ extended release tablet TAKE 1 TABLET DAILY 4/10/19  Yes Mine Kunz MD   isosorbide mononitrate (IMDUR) 60 MG extended release tablet TAKE 1 TABLET DAILY 4/10/19  Yes Mine Kunz MD   B Complex-C (B COMPLEX-B12-C IJ) Inject as directed every 30 days    Yes Historical Provider, MD   D-Mannose 500 MG CAPS Take 1 capsule by mouth nightly    Yes Historical Provider, MD   Cholecalciferol (VITAMIN D3) 88637 units CAPS Take 1 capsule by mouth once a week    Yes Historical Provider, MD   metolazone (ZAROXOLYN) 2.5 MG tablet Take 1 tablet by mouth daily as needed (SOB) 10/28/18  Yes Douglas Garcia MD   polyethylene glycol [facility]) powder Take 17 g by mouth nightly    Yes Historical Provider, MD   warfarin (COUMADIN) 2.5 MG tablet TAKE AS DIRECTED BY THE COUMADIN CLINIC 10/15/18  Yes Douglas Garcia MD   methadone (DOLOPHINE) 5 MG tablet Take 5 mg by mouth 2 times daily as needed for Pain. .   Yes Historical Provider, MD   metoprolol succinate (TOPROL XL) 50 MG extended release tablet TAKE 1 TABLET DAILY 8/27/18  Yes Douglas Garcia MD   aspirin 81 MG EC tablet Take 1 tablet by mouth daily 11/22/16  Yes Archie Ziegler MD   NONFORMULARY Take 800 mcg by mouth 2 times daily Indications: Deficiency of Folic Acid L-5 methyltetrahydrofolate    Historical Provider, MD   cyanocobalamin 1000 MCG/ML injection Inject 1,000 mcg into the muscle every 30 days     Historical Provider, MD   NONFORMULARY Apply 1 applicator topically nightly     Historical Provider, MD   bisacodyl (DULCOLAX) 5 MG EC tablet Take 1 tablet by mouth daily as needed for Constipation 11/17/17   Douglas Garcia MD   acetaminophen (TYLENOL) 325 MG tablet Take 2 tablets by mouth every 4 hours as needed for Pain 4/27/17   Memory MD Ruy   Glucose Blood (BLOOD GLUCOSE TEST STRIPS) STRP 1 strip by In Vitro route daily 11/22/16   Archie Ziegler MD       Allergies:  Brilinta [ticagrelor]; Amoxicillin-pot clavulanate; Ezetimibe-simvastatin; Gabapentin; Levofloxacin; Lipitor; Losartan; Lyrica [pregabalin]; Morphine; Pcn [penicillins]; Pravastatin sodium; and Welchol [colesevelam hcl]    Social History:      The patient currently lives at home    TOBACCO:   reports that she has never smoked. She has never used smokeless tobacco.  ETOH:   reports that she does not drink alcohol.       Family History:      Reviewed in detail and positive as follows:        Problem Relation Age of Onset    Stroke Father     Cancer Sister     Heart Disease Brother        Diet:  No diet orders on file    REVIEW OF SYSTEMS:   Pertinent positives as noted in the HPI. All other systems reviewed and negative. PHYSICAL EXAM:    BP (!) 164/81   Pulse 77   Temp 98.1 °F (36.7 °C) (Oral)   Resp 16   Ht 5' 8\" (1.727 m)   Wt 212 lb (96.2 kg)   SpO2 94%   BMI 32.23 kg/m²     General appearance:  No apparent distress, appears stated age and cooperative. HEENT:  Normal cephalic, atraumatic without obvious deformity. Pupils equal, round, and reactive to light. Extra ocular muscles intact. Conjunctivae/corneas clear. Neck: Supple, with full range of motion. No jugular venous distention. Trachea midline. Respiratory:  Normal respiratory effort. Clear to auscultation, bilaterally without Rales/Wheezes/Rhonchi. Cardiovascular:  Regular rate and rhythm with normal S1/S2 without murmurs, rubs or gallops. Abdomen: Soft, non-tender, non-distended with normal bowel sounds. Musculoskeletal:  No clubbing, cyanosis or edema bilaterally. Full range of motion without deformity. Skin: Skin color, texture, turgor normal.  No rashes or lesions. Neurologic:  Neurovascularly intact without any focal sensory/motor deficits. Cranial nerves: II-XII intact, grossly non-focal.  Psychiatric:  Alert and oriented, thought content appropriate, normal insight  Capillary Refill: Brisk,< 3 seconds   Peripheral Pulses: +2 palpable, equal bilaterally       Labs:     Recent Labs     06/16/19  0800   WBC 12.9*   HGB 15.3   HCT 46.3        Recent Labs     06/16/19  0800      K 4.3   CL 98   CO2 27   BUN 20   CREATININE 1.3*   CALCIUM 9.0     Recent Labs     06/16/19  0800   AST 16   ALT 8*   BILITOT 1.9*   ALKPHOS 84     Recent Labs     06/16/19  0844   INR 3.76*     No results for input(s): CKTOTAL, TROPONINI in the last 72 hours.     Urinalysis:      Lab Results   Component Value Date    NITRU NEGATIVE 01/11/2019    WBCUA 50-75 01/11/2019    BACTERIA MANY 01/11/2019    RBCUA 15-25 01/11/2019    BLOODU MODERATE 01/11/2019 SPECGRAV 1.016 12/04/2017    GLUCOSEU NEGATIVE 01/11/2019       Intake & Output:  No intake/output data recorded. No intake/output data recorded. Radiology:     EKG:  I have reviewed the EKG with the following interpretation: Afib with PVC's and some paced rhythm    CT ABDOMEN PELVIS W IV CONTRAST Additional Contrast? None   Final Result   1. There is a large gallstone present within the gallbladder lumen. However no definite pericholecystic fluid is seen. 2. Nonobstructive nephrolithiasis within the right kidney. 3. Large exophytic lesion arising off of the lower pole the left kidney is demonstrated. This measures fluid attenuation and likely represents a large renal cyst. Recommend continued follow-up. 4. Sigmoid diverticulosis without evidence of diverticulitis. **This report has been created using voice recognition software. It may contain minor errors which are inherent in voice recognition technology. **      Final report electronically signed by Dr. Annette Cristobal on 6/16/2019 9:49 AM      XR CHEST STANDARD (2 VW)   Final Result   1. Patchy consolidative airspace disease overlying the right midlung may represent atelectasis or pneumonia. Recommend follow-up to document resolution. **This report has been created using voice recognition software. It may contain minor errors which are inherent in voice recognition technology. **      Final report electronically signed by Dr. Annette Cristobal on 6/16/2019 8:37 AM           DVT prophylaxis: {dvt prophylaxis:32550    Code Status: Prior      [facility] Problems    Diagnosis Date Noted    Epigastric pain [R10.13] 06/16/2019     Priority: High    Spinal stenosis of lumbar region [M48.061] 04/22/2017     Priority: High     Class: Acute    Chronic idiopathic constipation [K59.04]     Calculus of gallbladder without cholecystitis without obstruction [K80.20]     Sick sinus syndrome (HCC) [I49.5]     S/P cardiac pacemaker procedure [Z95.0] 10/06/2016    RLS (restless legs syndrome) [G25.81]     Triple vessel coronary artery disease [I25.10]     S/P drug eluting coronary stent placement [Z95.5]     [facility] [I50.22]     History of embolic stroke [H17.56]     Persistent atrial fibrillation (HCC) [I48.1]     Obesity (BMI 30-39. 9) [E66.9]     Polycythemia [D75.1] 08/30/2016    Anticoagulated on Coumadin [Z79.01] 98/18/4819    Diastolic dysfunction,  EF 55-60% [I51.9] 10/17/2013    History of pulmonary embolism [Z86.711] 07/27/2012    CKD stage 3 due to type 2 diabetes mellitus ([facility] [E11.22, N18.3] 06/27/2012    Essential hypertension [I10]     Hyperlipidemia [E78.5]     History of DVT (deep vein thrombosis) [Z86.718]        PLAN:    1. Abdominal pain - unclear etiology. Narcotics withdrawal vs underlying gastric pathology. Called GI consult. DC tomorrow if no significant event. Pain is controlled at this time. Follow troponin. 2. Other medical issues are chronic and stable. Continue to monitor and resume home medications. Thank you Tomás Mayfield MD for the opportunity to be involved in this patient's care.     Electronically signed by Jonathon Gee DO on 6/16/2019 at 10:47 AM

## 2019-06-16 NOTE — ED TRIAGE NOTES
Patient presents to ED by daughter with c/o chest pain that started this morning. States that they were getting ready to bring her son to the hospital when her chest pain started. Patient states that her pain is easing up now. Denies any shortness of breath. Call light in reach. Daughter at bedside.

## 2019-06-16 NOTE — CONSULTS
fracture surgery (Left, 10/10/2016); fracture surgery; other surgical history (Left, 10/24/2016); Total knee arthroplasty (Right); eye surgery; Diagnostic Cardiac Cath Lab Procedure; pacemaker placement; joint replacement; and Ventricular cardiac pacer insertion (10/04/2016). LABS:    CBC:   Recent Labs     06/16/19  0800   WBC 12.9*   HGB 15.3        BMP:    Recent Labs     06/16/19  0800      K 4.3   CL 98   CO2 27   BUN 20   CREATININE 1.3*   GLUCOSE 151*     Hepatic:   Recent Labs     06/16/19  0800   ALKPHOS 84   ALT 8*   AST 16   PROT 7.3   BILITOT 1.9*   LABALBU 3.2*     Amylase and Lipase:  Recent Labs     06/16/19  0844   LIPASE 7.8     Lactic Acid:   Recent Labs     06/16/19  0844   LACTA 1.6     Calcium:  Recent Labs     06/16/19  0800   CALCIUM 9.0     Ionized Calcium:No results for input(s): IONCA in the last 72 hours. Magnesium:No results for input(s): MG in the last 72 hours. Phosphorus:No results for input(s): PHOS in the last 72 hours. Troponin: No results for input(s): CKTOTAL, CKMB, TROPONINI in the last 72 hours. PT/INR:     Recent Labs     06/16/19  0844   INR 3.76*         REVIEW OF SYSTEMS:    GENERAL:  Unknown   EYES:  Unknown   CARDIOVASCULAR: ++  chest pain    RESPIRATORY:  No dyspnea or cough. Per ER note  GI: chest pain , epigastric pain   MUSCULOSKELETAL:  Unknown     :   Unknown    SKIN:  No rashes or jaundice. NEUROLOGIC:   No  seizures  PSYCH:  Unknown . ENDOCRINE:   No polyuria or polydipsia. Daughter thought urine was darker and foul smelling   BLOOD:  No anemia, bleeding disorder        PHYSICAL EXAMINATION:      Vitals:    06/16/19 1213   BP: 136/77   Pulse: 69   Resp: 20   Temp: 98.2 °F (36.8 °C)   SpO2: 93%     GEN: Well nourished, well developed. Looks older than stated age   LUNGS:  Clear to auscultation bilaterally. Chest rises equally on inspiration. CARDIOVASCULAR:  Regular rate and rhythm without murmurs, rubs or gallops.   ABDOMEN: Soft, round,moaned a little with palpation epigastric area, nondistended with normal bowel sounds. EYES: MILLIE. Extraoccular motions intact bilaterally. ENT:  Ears symmetric, Neck supple, trachea midline, moist mucous membranes     EXTREMITIES:  No cyanosis, clubbing, or edema. DERM:  No rash or jaundice. NEURO:  Lethargic and did not  Answer any questions or even keep eyes open   PSYCHIATRIC: calm but lethargic -did get agitated when I put NC O2 back in nose and swatted at my hand    HOME MEDICATIONS      Prior to Admission medications    Medication Sig Start Date End Date Taking?  Authorizing Provider   amLODIPine (NORVASC) 5 MG tablet TAKE 1 TABLET DAILY 5/29/19  Yes Deeann Gitelman, APRN - CNP   nystatin (MYCOSTATIN) POWD powder Apply 1 each topically 2 times daily as needed (abdominal folds- irritation) Indications: Skin Abnormalities 4/25/19  Yes Deeann Gitelman, APRN - CNP   furosemide (LASIX) 20 MG tablet TAKE 1 TABLET DAILY 4/10/19  Yes Tomás Mayfield MD   potassium chloride (KLOR-CON M) 10 MEQ extended release tablet TAKE 1 TABLET DAILY 4/10/19  Yes Tomás Mayfield MD   isosorbide mononitrate (IMDUR) 60 MG extended release tablet TAKE 1 TABLET DAILY 4/10/19  Yes Tomás Mayfield MD   B Complex-C (B COMPLEX-B12-C IJ) Inject as directed every 30 days    Yes Historical Provider, MD   D-Mannose 500 MG CAPS Take 1 capsule by mouth nightly    Yes Historical Provider, MD   Cholecalciferol (VITAMIN D3) 02337 units CAPS Take 1 capsule by mouth once a week    Yes Historical Provider, MD   cyanocobalamin 1000 MCG/ML injection Inject 1,000 mcg into the muscle every 30 days    Yes Historical Provider, MD   metolazone (ZAROXOLYN) 2.5 MG tablet Take 1 tablet by mouth daily as needed (SOB) 10/28/18  Yes Tomás Mayfield MD   polyethylene glycol (GLYCOLAX) powder Take 17 g by mouth nightly    Yes Historical Provider, MD   methadone (DOLOPHINE) 5 MG tablet Take 5 mg by mouth 2 times daily as needed for Pain. .   Yes Historical Provider, MD   metoprolol succinate (TOPROL XL) 50 MG extended release tablet TAKE 1 TABLET DAILY 8/27/18  Yes Jeffery Meza MD   aspirin 81 MG EC tablet Take 1 tablet by mouth daily 11/22/16  Yes Xin Hernandez MD   NONFORMULARY Take 800 mcg by mouth 2 times daily Indications: Deficiency of Folic Acid L-5 methyltetrahydrofolate    Historical Provider, MD   warfarin (COUMADIN) 2.5 MG tablet TAKE AS DIRECTED BY THE COUMADIN CLINIC 10/15/18   Jeffery Meza MD   NONFORMULARY Apply 1 applicator topically nightly     Historical Provider, MD   bisacodyl (DULCOLAX) 5 MG EC tablet Take 1 tablet by mouth daily as needed for Constipation 11/17/17   Jeffery Meza MD   acetaminophen (TYLENOL) 325 MG tablet Take 2 tablets by mouth every 4 hours as needed for Pain 4/27/17   Odilia Condon MD   Glucose Blood (BLOOD GLUCOSE TEST STRIPS) STRP 1 strip by In Vitro route daily 11/22/16   Xin Hernandez MD       MEDICATIONS    Scheduled Meds:   sodium chloride  1,000 mL Intravenous Once    amLODIPine  5 mg Oral Daily    aspirin  81 mg Oral Daily    vitamin D  50,000 Units Oral Weekly    furosemide  20 mg Oral Daily    isosorbide mononitrate  60 mg Oral Daily    metoprolol succinate  50 mg Oral Daily    polyethylene glycol  17 g Oral Nightly    sodium chloride flush  10 mL Intravenous 2 times per day    insulin lispro  0-6 Units Subcutaneous TID WC    insulin lispro  0-3 Units Subcutaneous Nightly    warfarin (COUMADIN) daily dosing (placeholder)   Other RX Placeholder     Continuous Infusions:  PRN Meds:.acetaminophen, bisacodyl, metolazone, sodium chloride flush, ondansetron, senna    ALLERGIES   is allergic to brilinta [ticagrelor]; amoxicillin-pot clavulanate; ezetimibe-simvastatin; gabapentin; levofloxacin; lipitor; losartan; lyrica [pregabalin]; morphine; pcn [penicillins]; pravastatin sodium; and welchol [colesevelam hcl].     SOCIAL HISTORY    Social History  Social History     Socioeconomic History    Marital status:      Spouse name: Tex Norris    Number of children: 3    Years of education: 8    Highest education level: None   Occupational History    None   Social Needs    Financial resource strain: None    Food insecurity:     Worry: None     Inability: None    Transportation needs:     Medical: None     Non-medical: None   Tobacco Use    Smoking status: Never Smoker    Smokeless tobacco: Never Used   Substance and Sexual Activity    Alcohol use: No     Alcohol/week: 0.0 oz    Drug use: No    Sexual activity: Never   Lifestyle    Physical activity:     Days per week: None     Minutes per session: None    Stress: None   Relationships    Social connections:     Talks on phone: None     Gets together: None     Attends [employer]ism service: None     Active member of club or organization: None     Attends meetings of clubs or organizations: None     Relationship status: None    Intimate partner violence:     Fear of current or ex partner: None     Emotionally abused: None     Physically abused: None     Forced sexual activity: None   Other Topics Concern    None   Social History Narrative    None       FAMILY HISTORY    family history includes Cancer in her sister; Heart Disease in her brother; Stroke in her father. REVIEW OF DIAGNOSTIC TESTING AND LABS:        Hospitalist/Attending provider notes, consulting physician notes, laboratory results and procedure notes reviewed prior to seeing the patient.    Note done in collaboration with DR Devonte Guajardo MD.   Electronically signed by LUZ Carney CNP on 6/16/19 at 2:11 PM

## 2019-06-16 NOTE — ED NOTES
Pt transported to [facility] on cart in stable condition. Floor contacted before transport. Spoke with [place].      [employer]  06/16/19 1111

## 2019-06-16 NOTE — ED PROVIDER NOTES
dysuria, flank pain, frequency and vaginal pain. Musculoskeletal: Negative for arthralgias, back pain, gait problem, joint swelling, neck pain and neck stiffness. Skin: Negative for color change and rash. Allergic/Immunologic: Negative for immunocompromised state. Neurological: Negative for dizziness, tremors, weakness, light-headedness, numbness and headaches. Hematological: Does not bruise/bleed easily. Psychiatric/Behavioral: Negative for behavioral problems, confusion, decreased concentration, hallucinations, self-injury and suicidal ideas. The patient is not nervous/anxious. PAST MEDICAL HISTORY    has a past medical history of Arthritis, Blood circulation, collateral, Cerebral artery occlusion with cerebral infarction [facility]), Chronic anticoagulation, Chronic combined systolic and diastolic CHF (congestive heart failure) ([facility] [address] CKD (chronic kidney disease) stage 3, GFR 30-59 ml/min ([facility]), Degenerative joint disease, Fracture of femoral head ([facility]), GERD (gastroesophageal reflux disease), History of DVT (deep vein thrombosis), Hyperlipidemia, Hypertension, Ischemic heart disease, Macular degeneration, wet ([facility] [address] NHL (non-Hodgkin's lymphoma) ([facility] [address] Obesity (BMI 30.0-34.9), Permanent atrial fibrillation ([facility] [address] Presence of IVC filter, Pulmonary embolism, bilateral ([facility]), S/P drug eluting coronary stent placement, Subdural hematoma ([facility] [address] Triple vessel coronary artery disease, and Type II or unspecified type diabetes mellitus without mention of complication, not stated as uncontrolled. SURGICAL HISTORY      has a past surgical history that includes Vena Cava Filter Placement; brain surgery; central venous catheter; cardiovascular stress test; Hip fracture surgery (Left, 10/10/2016); fracture surgery; other surgical history (Left, 10/24/2016);  Total knee arthroplasty (Right); eye surgery; Diagnostic Cardiac Cath Lab Procedure; pacemaker placement; joint replacement; and Ventricular cardiac pacer insertion (10/04/2016). CURRENT MEDICATIONS       Current Discharge Medication List      CONTINUE these medications which have NOT CHANGED    Details   amLODIPine (NORVASC) 5 MG tablet TAKE 1 TABLET DAILY  Qty: 90 tablet, Refills: 3    Associated Diagnoses: Essential hypertension      nystatin (MYCOSTATIN) POWD powder Apply 1 each topically 2 times daily as needed (abdominal folds- irritation) Indications: Skin Abnormalities  Qty: 1 each, Refills: 2      furosemide (LASIX) 20 MG tablet TAKE 1 TABLET DAILY  Qty: 90 tablet, Refills: 3    Associated Diagnoses: Essential hypertension      potassium chloride (KLOR-CON M) 10 MEQ extended release tablet TAKE 1 TABLET DAILY  Qty: 90 tablet, Refills: 3    Associated Diagnoses: Essential hypertension      isosorbide mononitrate (IMDUR) 60 MG extended release tablet TAKE 1 TABLET DAILY  Qty: 90 tablet, Refills: 3    Associated Diagnoses: Essential hypertension      B Complex-C (B COMPLEX-B12-C IJ) Inject as directed every 30 days       D-Mannose 500 MG CAPS Take 1 capsule by mouth nightly       Cholecalciferol (VITAMIN D3) 46829 units CAPS Take 1 capsule by mouth once a week       cyanocobalamin 1000 MCG/ML injection Inject 1,000 mcg into the muscle every 30 days       metolazone (ZAROXOLYN) 2.5 MG tablet Take 1 tablet by mouth daily as needed (SOB)  Qty: 30 tablet, Refills: 0    Associated Diagnoses: Diastolic CHF, acute on chronic (HCC)      polyethylene glycol (GLYCOLAX) powder Take 17 g by mouth nightly       methadone (DOLOPHINE) 5 MG tablet Take 5 mg by mouth 2 times daily as needed for Pain.  .      metoprolol succinate (TOPROL XL) 50 MG extended release tablet TAKE 1 TABLET DAILY  Qty: 90 tablet, Refills: 3    Associated Diagnoses: Chronic systolic heart failure (HCC)      aspirin 81 MG EC tablet Take 1 tablet by mouth daily  Qty: 30 tablet, Refills: 3      !! NONFORMULARY Take 800 mcg by mouth 2 times daily Indications: Deficiency of Folic Acid L-5 methyltetrahydrofolate      warfarin (COUMADIN) 2.5 MG tablet TAKE AS DIRECTED BY THE COUMADIN CLINIC  Qty: 90 tablet, Refills: 2    Associated Diagnoses: History of DVT (deep vein thrombosis); History of pulmonary embolism      !! NONFORMULARY Apply 1 applicator topically nightly       bisacodyl (DULCOLAX) 5 MG EC tablet Take 1 tablet by mouth daily as needed for Constipation  Qty: 30 tablet, Refills: 2      acetaminophen (TYLENOL) 325 MG tablet Take 2 tablets by mouth every 4 hours as needed for Pain  Qty: 120 tablet, Refills: 3      Glucose Blood (BLOOD GLUCOSE TEST STRIPS) STRP 1 strip by In Vitro route daily  Qty: 100 strip, Refills: 3    Comments: Contour test strips       ! ! - Potential duplicate medications found. Please discuss with provider. ALLERGIES     is allergic to brilinta [ticagrelor]; amoxicillin-pot clavulanate; ezetimibe-simvastatin; gabapentin; levofloxacin; lipitor; losartan; lyrica [pregabalin]; morphine; pcn [penicillins]; pravastatin sodium; and welchol [colesevelam hcl]. FAMILY HISTORY     indicated that her mother is . She indicated that her father is . She indicated that her sister is alive. She indicated that her brother is alive. family history includes Cancer in her sister; Heart Disease in her brother; Stroke in her father. SOCIAL HISTORY      reports that she has never smoked. She has never used smokeless tobacco. She reports that she does not drink alcohol or use drugs. PHYSICAL EXAM     INITIAL VITALS:  height is 5' 8\" (1.727 m) and weight is 229 lb 11.2 oz (104.2 kg). Her oral temperature is 98.2 °F (36.8 °C). Her blood pressure is 136/77 and her pulse is 69. Her respiration is 20 and oxygen saturation is 93%. Physical Exam   Constitutional: She is oriented to person, place, and time. She appears well-developed and well-nourished. HENT:   Head: Normocephalic. Mouth/Throat: Uvula is midline.    Eyes: Conjunctivae and EOM are normal.   Neck: Normal range of motion. Neck supple. Cardiovascular: Normal rate, regular rhythm, S1 normal, S2 normal, normal heart sounds and intact distal pulses. Pulmonary/Chest: Effort normal and breath sounds normal. No respiratory distress. She exhibits no tenderness. Abdominal: Soft. Normal appearance and bowel sounds are normal. She exhibits no distension. There is tenderness in the right upper quadrant and epigastric area. Musculoskeletal: Normal range of motion. Lymphadenopathy:     She has no cervical adenopathy. Neurological: She is alert and oriented to person, place, and time. Skin: Skin is warm, dry and intact. Psychiatric: She has a normal mood and affect. Her speech is normal and behavior is normal. Thought content normal.   Nursing note and vitals reviewed. DIFFERENTIAL DIAGNOSIS:   Peptic ulcer, ulcer rupture, peritonitis, cholecystitis, cholelithiasis, pancreatitis, esophagitis, low suspicion of ACS based on exam    DIAGNOSTIC RESULTS     EKG: All EKG's are interpreted by the Emergency Department Physician who either signs or Co-signs this chart in the absence of a cardiologist.    [place]. Rate: 87 bpm  WV interval: * ms  QRS duration: 132 ms  QTc: 483 ms  P-R-T axes: *, 17, 27  Atrial fibrillation with occasional ventricular-paced complexes and with premature ventricular or aberrantly conducted complexes. No STEMI  Compared to 12/08/17    RADIOLOGY: non-plain film images(s) such as CT, Ultrasound and MRI are read by theradiologist.  Plain radiographic images are visualized and preliminarily interpreted by the emergency physician unless otherwise stated below. CT ABDOMEN PELVIS W IV CONTRAST Additional Contrast? None   Final Result   1. There is a large gallstone present within the gallbladder lumen. However no definite pericholecystic fluid is seen. 2. Nonobstructive nephrolithiasis within the right kidney.    3. Large exophytic lesion arising off of the lower pole the left kidney is demonstrated. This measures fluid attenuation and likely represents a large renal cyst. Recommend continued follow-up. 4. Sigmoid diverticulosis without evidence of diverticulitis. **This report has been created using voice recognition software. It may contain minor errors which are inherent in voice recognition technology. **      Final report electronically signed by Dr. Ebonie Roach on 6/16/2019 9:49 AM      XR CHEST STANDARD (2 VW)   Final Result   1. Patchy consolidative airspace disease overlying the right midlung may represent atelectasis or pneumonia. Recommend follow-up to document resolution. **This report has been created using voice recognition software. It may contain minor errors which are inherent in voice recognition technology. **      Final report electronically signed by Dr. Ebonie Roach on 6/16/2019 8:37 AM            LABS:   Labs Reviewed   CBC WITH AUTO DIFFERENTIAL - Abnormal; Notable for the following components:       Result Value    WBC 12.9 (*)     RDW-CV 14.9 (*)     RDW-SD 50.7 (*)     Segs Absolute 9.6 (*)     Monocytes # 1.4 (*)     Immature Grans (Abs) 0.08 (*)     All other components within normal limits   COMPREHENSIVE METABOLIC PANEL - Abnormal; Notable for the following components:    Glucose 151 (*)     CREATININE 1.3 (*)     Alb 3.2 (*)     Total Bilirubin 1.9 (*)     ALT 8 (*)     All other components within normal limits   PROTIME-INR - Abnormal; Notable for the following components:    INR 3.76 (*)     All other components within normal limits   GLOMERULAR FILTRATION RATE, ESTIMATED - Abnormal; Notable for the following components:    Est, Glom Filt Rate 39 (*)     All other components within normal limits   TROPONIN   LIPASE   LACTIC ACID, PLASMA   ANION GAP   OSMOLALITY   URINALYSIS WITH MICROSCOPIC       EMERGENCY DEPARTMENT COURSE:   Vitals:    Vitals:    06/16/19 0859 06/16/19 1000 06/16/19 1107 06/16/19 1213   BP: (!) 156/73 (!) 164/81 124/73 136/77   Pulse: 74 77 78 69   Resp: 17 16 17 20   Temp:    98.2 °F (36.8 °C)   TempSrc:    Oral   SpO2: 95% 94% 95% 93%   Weight:    229 lb 11.2 oz (104.2 kg)   Height:    5' 8\" (1.727 m)       MDM    Patient was seen and evaluated in the emergency department, patient appeared to be no acute distress, vital signs were reviewed, no significant findings were noted. Physical exam was completed, significant tenderness to the right upper quadrant and epigastric area were noted. Negative Barrientos Giacomo sign was noted. No significant chest wall tenderness was noted. Patient denies any pain in her chest when I evaluated her. Lungs are clear to auscultation. Labs and imaging were performed, chest x-ray reveals a possible right middle lobe infiltrate, CT of the abdomen shows large gallstone present within the gallbladder lumen, with no signs of pericholecystic fluid, some nonobstructive nephrolithiasis is noted, large exophytic lesion arising off the kidney appears to be a renal cyst.  Patient was treated with medications below, I noted minimal improvement in her symptoms, discussed my findings with the patient and her daughter, they are amenable with admission for further evaluation and treatment. Concern for possible large ulcer causing pain in the stomach although no CT findings of rupture or significant inflammation, no anemia noted, patient is anticoagulated so likely a nonbleeding ulcer if there is one. Discussed case with Dr. Orquidea Willoughby, who graciously accepts the patient for admission, while here in the emergency department to maintain a stable course appropriate for admission.   Medications   0.9 % sodium chloride bolus (1,000 mLs Intravenous New Bag 6/16/19 0848)   acetaminophen (TYLENOL) tablet 650 mg (has no administration in time range)   amLODIPine (NORVASC) tablet 5 mg (has no administration in time range)   aspirin EC tablet 81 mg (has no administration in time range)   bisacodyl (DULCOLAX) EC tablet 5 mg (has no administration in time range)   vitamin D (ERGOCALCIFEROL) capsule 50,000 Units (has no administration in time range)   furosemide (LASIX) tablet 20 mg (has no administration in time range)   isosorbide mononitrate (IMDUR) extended release tablet 60 mg (has no administration in time range)   metolazone (ZAROXOLYN) tablet 2.5 mg (has no administration in time range)   metoprolol succinate (TOPROL XL) extended release tablet 50 mg (has no administration in time range)   polyethylene glycol (GLYCOLAX) powder 17 g (has no administration in time range)   sodium chloride flush 0.9 % injection 10 mL (has no administration in time range)   sodium chloride flush 0.9 % injection 10 mL (has no administration in time range)   ondansetron (ZOFRAN) injection 4 mg (has no administration in time range)   senna (SENOKOT) tablet 8.6 mg (has no administration in time range)   insulin lispro (HUMALOG) injection vial 0-6 Units (has no administration in time range)   insulin lispro (HUMALOG) injection vial 0-3 Units (has no administration in time range)   aluminum & magnesium hydroxide-simethicone (MAALOX) 30 mL, lidocaine viscous hcl (XYLOCAINE) 5 mL (GI COCKTAIL) ( Oral Given 6/16/19 0848)   sucralfate (CARAFATE) tablet 1 g (1 g Oral Given 6/16/19 0848)   pantoprazole (PROTONIX) injection 40 mg (40 mg Intravenous Given 6/16/19 0850)   ondansetron (ZOFRAN) injection 4 mg (4 mg Intravenous Given 6/16/19 0850)   HYDROmorphone (DILAUDID) injection 1 mg (1 mg Intramuscular Given 6/16/19 0848)   iopamidol (ISOVUE-370) 76 % injection 80 mL (80 mLs Intravenous Given 6/16/19 0936)       Patient was seen independently by myself. The patient's final impression and disposition and plan was determined by myself. CRITICAL CARE:   None    CONSULTS:  Dr. Satish Francisco:  None    FINAL IMPRESSION      1. Intractable abdominal pain    2.  Right middle lobe pulmonary infiltrate          DISPOSITION/PLAN   Patient admitted to the hospital service    PATIENT REFERRED TO:  Donnie Franco, [address]  [address]  [facility] [address]  [phone]            DISCHARGE MEDICATIONS:  Current Discharge Medication List          (Please note that portions of this note werecompleted with a voice recognition program.  Efforts were made to edit the dictations but occasionally words are mis-transcribed.)    The patient was given an opportunity to see theEmergency Attending. The patient voiced understanding that I was a Mid-Level Provider and was in agreement with being seen independently by myself. Scribe: Tana Han   6/16/19 8:03 AM Scribing forand in the presence of Amish Almaraz CNP. Signed by:Franko Shell, 06/16/19 12:47 PM    Provider:  I personally performed theservices described in the documentation, reviewed and edited the documentation which was dictated to the scribe in my presence, and it accurately records my words and actions.     Amish Almaraz CNP 6/16/19 12:47 PM              LUZ Plascencia - ROYA  06/16/19 2401

## 2019-06-17 ENCOUNTER — APPOINTMENT (OUTPATIENT)
Dept: PHARMACY | Age: 84
End: 2019-06-17
Payer: MEDICARE

## 2019-06-17 PROBLEM — A41.9 SEPSIS (HCC): Status: ACTIVE | Noted: 2019-06-17

## 2019-06-17 LAB
ALBUMIN SERPL-MCNC: 2.6 G/DL (ref 3.5–5.1)
ALP BLD-CCNC: 82 U/L (ref 38–126)
ALT SERPL-CCNC: 8 U/L (ref 11–66)
ANION GAP SERPL CALCULATED.3IONS-SCNC: 13 MEQ/L (ref 8–16)
AST SERPL-CCNC: 12 U/L (ref 5–40)
BILIRUB SERPL-MCNC: 1.5 MG/DL (ref 0.3–1.2)
BUN BLDV-MCNC: 20 MG/DL (ref 7–22)
CALCIUM SERPL-MCNC: 8.7 MG/DL (ref 8.5–10.5)
CHLORIDE BLD-SCNC: 102 MEQ/L (ref 98–111)
CO2: 25 MEQ/L (ref 23–33)
CREAT SERPL-MCNC: 1.2 MG/DL (ref 0.4–1.2)
ERYTHROCYTE [DISTWIDTH] IN BLOOD BY AUTOMATED COUNT: 14.8 % (ref 11.5–14.5)
ERYTHROCYTE [DISTWIDTH] IN BLOOD BY AUTOMATED COUNT: 51.6 FL (ref 35–45)
GFR SERPL CREATININE-BSD FRML MDRD: 43 ML/MIN/1.73M2
GLUCOSE BLD-MCNC: 121 MG/DL (ref 70–108)
GLUCOSE BLD-MCNC: 139 MG/DL (ref 70–108)
GLUCOSE BLD-MCNC: 146 MG/DL (ref 70–108)
HCT VFR BLD CALC: 43.1 % (ref 37–47)
HEMOGLOBIN: 13.9 GM/DL (ref 12–16)
INR BLD: 5.18 (ref 0.85–1.13)
LACTIC ACID: 1.3 MMOL/L (ref 0.5–2.2)
MCH RBC QN AUTO: 30.5 PG (ref 26–33)
MCHC RBC AUTO-ENTMCNC: 32.3 GM/DL (ref 32.2–35.5)
MCV RBC AUTO: 94.5 FL (ref 81–99)
PLATELET # BLD: 181 THOU/MM3 (ref 130–400)
PMV BLD AUTO: 10.7 FL (ref 9.4–12.4)
POTASSIUM REFLEX MAGNESIUM: 4.1 MEQ/L (ref 3.5–5.2)
PROCALCITONIN: 0.29 NG/ML (ref 0.01–0.09)
RBC # BLD: 4.56 MILL/MM3 (ref 4.2–5.4)
SODIUM BLD-SCNC: 140 MEQ/L (ref 135–145)
TOTAL PROTEIN: 6.3 G/DL (ref 6.1–8)
WBC # BLD: 13.9 THOU/MM3 (ref 4.8–10.8)

## 2019-06-17 PROCEDURE — 6370000000 HC RX 637 (ALT 250 FOR IP): Performed by: HOSPITALIST

## 2019-06-17 PROCEDURE — 85027 COMPLETE CBC AUTOMATED: CPT

## 2019-06-17 PROCEDURE — 36415 COLL VENOUS BLD VENIPUNCTURE: CPT

## 2019-06-17 PROCEDURE — 85610 PROTHROMBIN TIME: CPT

## 2019-06-17 PROCEDURE — 2580000003 HC RX 258: Performed by: HOSPITALIST

## 2019-06-17 PROCEDURE — 6370000000 HC RX 637 (ALT 250 FOR IP): Performed by: NURSE PRACTITIONER

## 2019-06-17 PROCEDURE — 87040 BLOOD CULTURE FOR BACTERIA: CPT

## 2019-06-17 PROCEDURE — 99232 SBSQ HOSP IP/OBS MODERATE 35: CPT | Performed by: INTERNAL MEDICINE

## 2019-06-17 PROCEDURE — 2709999900 HC NON-CHARGEABLE SUPPLY

## 2019-06-17 PROCEDURE — 80053 COMPREHEN METABOLIC PANEL: CPT

## 2019-06-17 PROCEDURE — 2700000000 HC OXYGEN THERAPY PER DAY

## 2019-06-17 PROCEDURE — 1200000003 HC TELEMETRY R&B

## 2019-06-17 PROCEDURE — 6360000002 HC RX W HCPCS: Performed by: PHYSICIAN ASSISTANT

## 2019-06-17 PROCEDURE — 2580000003 HC RX 258: Performed by: PHYSICIAN ASSISTANT

## 2019-06-17 PROCEDURE — 82948 REAGENT STRIP/BLOOD GLUCOSE: CPT

## 2019-06-17 PROCEDURE — 83605 ASSAY OF LACTIC ACID: CPT

## 2019-06-17 PROCEDURE — G0378 HOSPITAL OBSERVATION PER HR: HCPCS

## 2019-06-17 PROCEDURE — 84145 PROCALCITONIN (PCT): CPT

## 2019-06-17 RX ORDER — LACTOBACILLUS RHAMNOSUS GG 10B CELL
1 CAPSULE ORAL
Status: DISCONTINUED | OUTPATIENT
Start: 2019-06-17 | End: 2019-06-18 | Stop reason: HOSPADM

## 2019-06-17 RX ADMIN — ASPIRIN 81 MG: 81 TABLET ORAL at 09:54

## 2019-06-17 RX ADMIN — Medication 10 ML: at 09:54

## 2019-06-17 RX ADMIN — Medication 10 ML: at 21:17

## 2019-06-17 RX ADMIN — AMLODIPINE BESYLATE 5 MG: 5 TABLET ORAL at 09:54

## 2019-06-17 RX ADMIN — PANTOPRAZOLE SODIUM 40 MG: 40 TABLET, DELAYED RELEASE ORAL at 09:54

## 2019-06-17 RX ADMIN — FUROSEMIDE 20 MG: 20 TABLET ORAL at 09:54

## 2019-06-17 RX ADMIN — METOPROLOL SUCCINATE 50 MG: 50 TABLET, EXTENDED RELEASE ORAL at 09:54

## 2019-06-17 RX ADMIN — ACETAMINOPHEN 650 MG: 325 TABLET ORAL at 04:33

## 2019-06-17 RX ADMIN — CEFTRIAXONE SODIUM 1 G: 1 INJECTION, POWDER, FOR SOLUTION INTRAMUSCULAR; INTRAVENOUS at 21:16

## 2019-06-17 RX ADMIN — SUCRALFATE 1 G: 1 TABLET ORAL at 04:33

## 2019-06-17 RX ADMIN — POLYETHYLENE GLYCOL 3350 17 G: 17 POWDER, FOR SOLUTION ORAL at 20:21

## 2019-06-17 RX ADMIN — ISOSORBIDE MONONITRATE 60 MG: 60 TABLET ORAL at 09:54

## 2019-06-17 ASSESSMENT — PAIN SCALES - GENERAL
PAINLEVEL_OUTOF10: 4
PAINLEVEL_OUTOF10: 0

## 2019-06-17 NOTE — CODE DOCUMENTATION
6/17/19, 9:13 AM      Stacia Shanks       Admitted from: ER, patient presented with abdominal pain. 6/16/2019/ [address] day: 0   Location: -04/004-A Reason for admit: Epigastric pain [R10.13]  Sepsis ([address] [A41.9] Status: Obs. To Inpt. Admit order signed?: yes  PMH:  has a past medical history of Arthritis, Blood circulation, collateral, Cerebral artery occlusion with cerebral infarction [facility]), Chronic anticoagulation, Chronic combined systolic and diastolic CHF (congestive heart failure) ([address] CKD (chronic kidney disease) stage 3, GFR 30-59 ml/min ([facility]), Degenerative joint disease, Fracture of femoral head ([address] GERD (gastroesophageal reflux disease), History of DVT (deep vein thrombosis), Hyperlipidemia, Hypertension, Ischemic heart disease, Macular degeneration, wet ([address] NHL (non-Hodgkin's lymphoma) ([address] Obesity (BMI 30.0-34.9), Permanent atrial fibrillation ([address] Presence of IVC filter, Pulmonary embolism, bilateral ([address] S/P drug eluting coronary stent placement, Subdural hematoma ([address] Triple vessel coronary artery disease, and Type II or unspecified type diabetes mellitus without mention of complication, not stated as uncontrolled. Procedure: N/A  Pertinent abnormal Imaging:CT of abdomen and chest x-ray.    Medications:  Scheduled Meds:   vancomycin  1,000 mg Intravenous Once    amLODIPine  5 mg Oral Daily    aspirin  81 mg Oral Daily    vitamin D  50,000 Units Oral Weekly    furosemide  20 mg Oral Daily    isosorbide mononitrate  60 mg Oral Daily    metoprolol succinate  50 mg Oral Daily    polyethylene glycol  17 g Oral Nightly    sodium chloride flush  10 mL Intravenous 2 times per day    warfarin (COUMADIN) daily dosing (placeholder)   Other RX Placeholder    pantoprazole  40 mg Oral QAM AC    sucralfate  1 g Oral Q6H    cefTRIAXone (ROCEPHIN) IV  1 g Intravenous Q24H     Continuous Infusions:   Pertinent Info/Orders/Treatment Plan:  GI consult complete, they have signed off. IV Vancomycin x 1 dose, IV Rocephin, Coumadin per pharmacy dosing, BMP and CBC in a.m., daily INR's, oxygen, up with assistance. Diet: DIET GENERAL; Carb Control: 4 carb choices (60 gms)/meal; No Added Salt (3-4 GM)   Smoking status:  reports that she has never smoked. She has never used smokeless tobacco.   PCP: Kristin Calhoun MD  Readmission: No  Readmission Risk Score: 0%    Discharge Planning  Current Residence:     Living Arrangements:  Spouse/Significant Other, Children   Support Systems:  Spouse/Significant Other, Children  Current Services PTA:     Potential Assistance Needed:  N/A  Potential Assistance Purchasing Medications:     Does patient want to participate in local refill/ meds to beds program?     Type of Home Care Services:     Patient expects to be discharged to:  Home  Expected Discharge date:  06/18/19  Follow Up Appointment: Best Day/ Time:    Discharge Plan: Met with Tony Frankel and her daughter present at bedside. Kenzie's daughter is her and her husbands primary care-giver. She retired so she could care for her mother and father at home. She states they have a shower with a built in chair and grab bars, bedside commode and a walker. Tony Frankel is able to ambulate independently prior to admission. Kenzie's daughter denies a need for services or additional DME at this time.     Evaluation: no

## 2019-06-17 NOTE — PROGRESS NOTES
Nutrition Assessment    Type and Reason for Visit: Initial, Positive Nutrition Screen(Pressure Ulcer )    Nutrition Recommendations: Trial Ensure High Pro 1/d to provide additional protein to promote wound healing    Nutrition Assessment:   Pt. nutritionally compromised AEB increased nutrient needs to promote wound healing. At risk for further nutrition compromise r/t advanced age, catabolic response to infection, and underlying DM. Will honor food prefs as able, and trial ONS to promote wound healing. Malnutrition Assessment:  · Malnutrition Status: At risk for malnutrition    Nutrition Risk Level: Moderate    Nutrient Needs:  · Estimated Daily Total Kcal: 7641-6336 sheldon/d (15-17 sheldon/kg admit wt)(104 kg)  · Estimated Daily Protein (g):  gm pro/d (0.8-1.2 gm pro/kg admit wt)(104 kg)  · Estimated Daily Total Fluid (ml/day): Per Physician    Nutrition Diagnosis:   · Problem: Increased nutrient needs  · Etiology: related to Increased demand for energy/nutrients     Signs and symptoms:  as evidenced by Presence of wounds    Objective Information:  · Nutrition-Focused Physical Findings: Pt visited, good intake PTA reported. Noted weight gain over past months, reports less activity. Diet controlled DM (last A1c 12/2017 6.4%). · Wound Type: Pressure Ulcer(Stage 2 to Coccyx)  · Current Nutrition Therapies:  · Oral Diet Orders: General, Carb Control 4 Carbs/Meal   · Oral Diet intake: 26-50%  · Oral Nutrition Supplement (ONS) Orders: Low Calorie High Protein Supplement(1/d )  · Anthropometric Measures:  · Ht: 5' 8\" (172.7 cm)   · Current Body Wt: 229 lb 11.2 oz (104.2 kg)(6/16/19 2+ edema)  · Admission Body Wt: 230 lb (104.3 kg)((6/17/19) 2+ edema )  · Usual Body Wt: 212 lb (96.2 kg)(per family . (2/2019) 223 lbs.   (8/2018) 210 lbs.)  · % Weight Change:    18 lbs gain (7% increase)  · Ideal Body Wt: 140 lb (63.5 kg), % Ideal Body 164%  · BMI Classification: BMI 35.0 - 39.9 Obese Class II    Nutrition Interventions:   Continue current diet, Start ONS(Ensure High Pro 1/d)  Continued Inpatient Monitoring, Education declined    Nutrition Evaluation:   · Evaluation: Goals set   · Goals: Pt to consume 75% or more most meals during LOS    · Monitoring: Meal Intake, Supplement Intake      Electronically signed by Shivani MARINA [facility] on 6/17/19 at 2:53 PM  Contact Number: [phone]

## 2019-06-17 NOTE — PLAN OF CARE
Problem: Falls - Risk of:  Goal: Will remain free from falls  Description  Will remain free from falls  Outcome: Ongoing     Problem: Falls - Risk of:  Goal: Absence of physical injury  Description  Absence of physical injury  Outcome: Ongoing  Note:   Pt using call light appropriately to call for assistance with ambulation to the bathroom and to chair. Pt is also compliant with use of non-skid slippers. Pt reports understanding of fall prevention when discussed. Problem: Risk for Impaired Skin Integrity  Goal: Tissue integrity - skin and mucous membranes  Description  Structural intactness and normal physiological function of skin and  mucous membranes. Outcome: Ongoing  Note:   Pt understands the importance of frequent repositioning in order to prevent any skin breakdown. Problem: Discharge Planning:  Goal: Participates in care planning  Description  Participates in care planning  Outcome: Ongoing  Note:   Pt plans home at discharge. Care manager and social working helping with discharge needs. Problem: Discharge Planning:  Goal: Discharged to appropriate level of care  Description  Discharged to appropriate level of care  Outcome: Ongoing  Note:   Pt plans home at discharge. Care manager and social working helping with discharge needs. Problem: Activity Intolerance:  Goal: Ability to tolerate increased activity will improve  Description  Ability to tolerate increased activity will improve  Outcome: Ongoing  Note:   Up with 2 assist, walker, and gait belt. Requires assistance to mobilize out of bed, chair and to bathroom. Independent once set up for meals and bathing. Problem: Anxiety/Stress:  Goal: Level of anxiety will decrease  Description  Level of anxiety will decrease  Outcome: Ongoing  Note:   Pt resting well today.  No anxiety noted     Problem: Mobility - Impaired:  Goal: Mobility will improve to maximum level  Description  Mobility will improve to maximum level  Outcome: Ongoing  Note:   Up with 2 assist, walker, and gait belt. Requires assistance to mobilize out of bed, chair and to bathroom. Independent once set up for meals and bathing. Problem: Skin Integrity - Impaired:  Goal: Will show no infection signs and symptoms  Description  Will show no infection signs and symptoms  Outcome: Ongoing  Note:   No fevers, tachycardia, hypotension noted. Pt denies any complaints      Problem: Physical Regulation:  Goal: Will remain free from infection  Description  Will remain free from infection  Outcome: Ongoing  Note:   No fevers, tachycardia, hypotension noted. Pt denies any complaints      Problem: Pain:  Goal: Pain level will decrease  Description  Pain level will decrease  Outcome: Ongoing  Note:   Pt denies pain this shift. Problem: Pain:  Goal: Control of acute pain  Description  Control of acute pain  Outcome: Ongoing  Note:   Pt denies pain this shift. Problem: Pain:  Goal: Control of chronic pain  Description  Control of chronic pain  Outcome: Ongoing  Note:   Pt denies pain this shift. Problem: Nutrition  Goal: Optimal nutrition therapy  6/17/2019 1758 by Ric Fuentes RN  Outcome: Ongoing  Note:   Eating independently at meals and able to eat adequate amounts. Care plan reviewed with patient and daughter. Patient and daughter verbalize understanding of the plan of care and contribute to goal setting.

## 2019-06-17 NOTE — PLAN OF CARE
Problem: Falls - Risk of:  Goal: Will remain free from falls  Description  Will remain free from falls  Outcome: Ongoing     Problem: Falls - Risk of:  Goal: Absence of physical injury  Description  Absence of physical injury  Outcome: Ongoing     Problem: Risk for Impaired Skin Integrity  Goal: Tissue integrity - skin and mucous membranes  Description  Structural intactness and normal physiological function of skin and  mucous membranes. Outcome: Ongoing     Problem: Discharge Planning:  Goal: Participates in care planning  Description  Participates in care planning  Outcome: Ongoing     Problem: Discharge Planning:  Goal: Participates in care planning  Description  Participates in care planning  Outcome: Ongoing     Problem: Discharge Planning:  Goal: Discharged to appropriate level of care  Description  Discharged to appropriate level of care  Outcome: Ongoing   Daughter cares for her at [facility]  Problem:  Activity Intolerance:  Goal: Ability to tolerate increased activity will improve  Description  Ability to tolerate increased activity will improve  Outcome: Ongoing     Problem: Skin Integrity - Impaired:  Goal: Will show no infection signs and symptoms  Description  Will show no infection signs and symptoms  Outcome: Ongoing   Red and excoriated under abd folds interdry applied,   barrier  cream to coccyx  stage 2

## 2019-06-17 NOTE — PROGRESS NOTES
Hospitalist Progress Note    Patient:  Kathy Villagomez  YOB: 1935  MRN: [id number]   PCP: Katerine Nieto MD         Acct: [de-identified]  Unit/Bed: [facility]04/[facility]-    Date of Admission: 6/16/2019      ASSESSMENT   1. Sepsis 2/2 to acute cystitis with hematuria in the setting of a L exophytic cyst ( unclear if infected  1. Sepsis based on leukocytosis, tachycardia and fever to 101.3F   2. CT scan of abdomen and pelvis shows large gallstone w/i the gallbladder lumen with a large exophytic cyst  3. Would recommend antibiotics for at least 14 days as the cyst could be an infective source   4. Currently on Ceftriaxone with past cxs showing E.coli. 5. Urine cx shows gram negative bacilli. Had ordered vancomycin 2/2 to a previous hx of Enterococcus faecalis, but will cancel  2. Hypoxia with opacity at right mid lung that may be possible right mid lung bacterial penumonia  1. CXR showed patchy consolidation in right mid-lung which could be atelactasis or pneumonia  2. Check procalcitonin  3. Theory is that hypoxia is related to septic   4. On Ceftriaxone which should treat  3. Epigastric pain  1. Related to #1, resolved  2. Daughter requested that sucralfate be discontinued  4. Acute kidney injury  1. Improved. 5. Incidental cholelithiasis and nephrolithiasis  6. Isolated hyperbilirubinemia  7. Chronic co-morbidities:  1. DM Type 2- diet controlled- Check   2. CAD  3. Hx of DVT s/p IVC filter  4. Chronic atrial fibrillation on anticoagulation with Coumadin with supratherapeutic INR  1. Continue to hold Coumadin  2. Pharmacy managing  5. Essential HTN  6. Chronic combined systolic and diastolic CHF  7. Hx of CVA  8. Obesity    PLAN     1. Vancomycin was intended to treat possible Enterococcus faecalis as previous cxs have grown this organism. This was discontinued since urine cx show Gram negative organisms  2. Sucralfate discontinued at request of daughter  1.  Continue treatment with Ceftriaxone pending cxs  4. POC glucose changed to daily  5. Continue to wean oxygen  6. Continue to monitor    Anticipated Discharge in : 1 to 2 days    Code Status: Full Code    Electronically signed by Amisha Rai MD on 6/17/2019 at 9:50 AM      Chief Complaint     Epigastric abdominal pain     SUBJECTIVE     The patient is a 81 y.o. [place] female who was admitted for intractable epigastric pain with findings of a UTI. 6/17: The patient denied nausea, vomiting, CP, SOB, abdominal pain. Her daughter is at the bedside and does not want too may invasive tests performed and wants the patient to be discharged as soon as possible. OBJECTIVE     Medications:  Reviewed    Infusion Medications   Scheduled Medications    vancomycin  1,000 mg Intravenous Once    amLODIPine  5 mg Oral Daily    aspirin  81 mg Oral Daily    vitamin D  50,000 Units Oral Weekly    furosemide  20 mg Oral Daily    isosorbide mononitrate  60 mg Oral Daily    metoprolol succinate  50 mg Oral Daily    polyethylene glycol  17 g Oral Nightly    sodium chloride flush  10 mL Intravenous 2 times per day    warfarin (COUMADIN) daily dosing (placeholder)   Other RX Placeholder    pantoprazole  40 mg Oral QAM AC    cefTRIAXone (ROCEPHIN) IV  1 g Intravenous Q24H     PRN Meds: acetaminophen, bisacodyl, metolazone, sodium chloride flush, ondansetron, senna    Ins and outs:      Intake/Output Summary (Last 24 hours) at 6/17/2019 0950  Last data filed at 6/16/2019 1800  Gross per 24 hour   Intake 440 ml   Output 250 ml   Net 190 ml       Physical Examination     BP (!) 128/59   Pulse 70   Temp 97.6 °F (36.4 °C) (Oral)   Resp 16   Ht 5' 8\" (1.727 m)   Wt 229 lb 11.2 oz (104.2 kg)   SpO2 94%   BMI 34.93 kg/m²     General appearance: No apparent distress. Alert. [place] at [employer]: Extraocular motion intact.     Neck: Supple  Respiratory:  CTA bilaterally except for decreased breath sounds at the bilateral lung bases   Cardiovascular: RRR with normal S1/S2 without murmurs, rubs or gallops. Abdomen: Soft, non-tender, non-distended with normal bowel sounds. Musculoskeletal: Patient is moving extremities x 4 spontaneously. Strength at bilateral LE 4/5 and strength at bilateral upper extremities 5/5/  Neurologic: Grossly non focal. CN: II-XII intact  Psychiatric: Alert and oriented  Vascular: Dorsalis pedis palpable bilaterally. Radial pulses palpable bilaterally. No peripheral edema. Signs of previous venous stasis  Skin:  No visible rashes or lesions. Labs     Recent Labs     06/16/19  0800 06/17/19  0618   WBC 12.9* 13.9*   HGB 15.3 13.9   HCT 46.3 43.1    181     Recent Labs     06/16/19  0800 06/17/19 0618    140   K 4.3 4.1   CL 98 102   CO2 27 25   BUN 20 20   CREATININE 1.3* 1.2   CALCIUM 9.0 8.7     Recent Labs     06/16/19  0800 06/17/19  0618   AST 16 12   ALT 8* 8*   BILITOT 1.9* 1.5*   ALKPHOS 84 82     Recent Labs     06/16/19  0844 06/17/19 0618   INR 3.76* 5.18*     No results for input(s): Charoglynn Hart in the last 72 hours. Urinalysis:      Lab Results   Component Value Date    NITRU POSITIVE 06/16/2019    WBCUA > 100 06/16/2019    BACTERIA MODERATE 06/16/2019    RBCUA 3-5 06/16/2019    BLOODU LARGE 06/16/2019    SPECGRAV >1.030 06/16/2019    GLUCOSEU NEGATIVE 01/11/2019       Diagnostic imaging/procedures         Xr Chest Standard (2 Vw)    Result Date: 6/16/2019  PROCEDURE: XR CHEST (2 VW) CLINICAL INFORMATION: pain COMPARISON: 12/4/2017 TECHNIQUE:  AP and lateral chest x-ray  FINDINGS: The heart size is normal. Focal opacity is seen overlying the right midlung which may represent atelectasis or pneumonia. No pneumothorax is seen. No definite pleural effusion is seen. No acute osseous findings are demonstrated. 1. Patchy consolidative airspace disease overlying the right midlung may represent atelectasis or pneumonia. Recommend follow-up to document resolution.  **This report has been created using voice recognition software. It may contain minor errors which are inherent in voice recognition technology. ** Final report electronically signed by Dr. Addie Jean-Baptiste on 6/16/2019 8:37 AM    Ct Abdomen Pelvis W Iv Contrast Additional Contrast? None    Result Date: 6/16/2019  PROCEDURE: CT ABDOMEN PELVIS W IV CONTRAST CLINICAL INFORMATION: Epigastric abdominal pain COMPARISON: 4/21/2017 TECHNIQUE:  Axial CT images were obtained through the abdomen and pelvis following the intravenous administration of 80 mL Isovue 370 contrast. Coronal and sagittal reformatted images were rendered All CT scans at this facility use dose modulation, iterative reconstruction, and/or weight-based dosing when appropriate to reduce radiation dose to as low as reasonably achievable. FINDINGS: Limited evaluation of the lung bases demonstrates mild parenchymal bandlike scarring or atelectasis at the anterior right lung base. There is cardiomegaly noted. The liver and spleen appear normal. Cholelithiasis is demonstrated. There is a small duodenal diverticulum noted. There is mild nonspecific hypertrophy of the left adrenal gland which appears unchanged from prior examination from April 2017. This is nonspecific. There is an exophytic low-density lesion arising off of the whole the left kidney measuring fluid attenuation. There is at least one renal stone within the right kidney measuring 4 mm in diameter on axial image 40. No hydroureter or ureterolithiasis is seen. There is sigmoid diverticulosis without evidence of diverticulitis. The appendix appears normal. No lymphadenopathy is seen. No free air or free fluid is demonstrated. No acute osseous findings are demonstrated. Multilevel degenerative disc disease of the lumbar spine is demonstrated along with multilevel lower lumbar facet arthrosis and bilateral SI joint arthrosis. There is evidence of prior left hip arthroplasty. 1. There is a large gallstone present within the gallbladder lumen. However no definite pericholecystic fluid is seen. 2. Nonobstructive nephrolithiasis within the right kidney. 3. Large exophytic lesion arising off of the lower pole the left kidney is demonstrated. This measures fluid attenuation and likely represents a large renal cyst. Recommend continued follow-up. 4. Sigmoid diverticulosis without evidence of diverticulitis. **This report has been created using voice recognition software. It may contain minor errors which are inherent in voice recognition technology. ** Final report electronically signed by Dr. Magda Muller on 6/16/2019 9:49 AM      DVT prophylaxis: ? Lovenox                                 ? SCDs                                 ? SQ Heparin                                 ? Encourage ambulation           ? Already on Anticoagulation     Disposition:    ? Home       ? TCU       ? Rehab       ? Psych       ? SNF       ? [place]       ?  Other-

## 2019-06-17 NOTE — PROGRESS NOTES
Clinical Pharmacy Note    Warfarin consult follow-up    Recent Labs     06/17/19  0618   INR 5.18*     Recent Labs     06/16/19  0800 06/17/19  0618   HGB 15.3 13.9   HCT 46.3 43.1    181       Significant Drug-Drug Interactions:  New warfarin drug-drug interactions: none  Discontinued drug-drug interactions: none  Current warfarin drug-drug interactions: aspirin        Date INR Warfarin Dose   6/16/2019 3.76 No warfarin    6/17/2019  5.18   No warfarin                                               Notes:                     Daily PT/INR until stable within therapeutic range.      Justin Cristina, PharmD, BCPS  PGY2 Ambulatory Care Resident

## 2019-06-17 NOTE — FLOWSHEET NOTE
Pt admitted to  5 from ED and via cart/stretcher. Complaints: epigastric pain. IV normal saline infusing into the antecubital left, condition patent and no redness at a rate of 125 mls/ hour with about 200 mls in the bag still. IV site free of s/s of infection or infiltration. Vital signs obtained. Assessment and data collection initiated. Oriented to room. All questions answered with no further questions at this time. Fall prevention and safety brochure discussed with patient. The best day to schedule a follow up Dr appointment is:  Monday adona Ordaz RN 6/16/2019 9:50 PM

## 2019-06-17 NOTE — PLAN OF CARE
Care plan reviewed with patient and daughter. Patient and daughter verbalize understanding of the plan of care and contribute to goal setting.

## 2019-06-18 VITALS
TEMPERATURE: 98.3 F | DIASTOLIC BLOOD PRESSURE: 82 MMHG | SYSTOLIC BLOOD PRESSURE: 116 MMHG | WEIGHT: 229.7 LBS | BODY MASS INDEX: 34.81 KG/M2 | HEIGHT: 68 IN | HEART RATE: 60 BPM | RESPIRATION RATE: 16 BRPM | OXYGEN SATURATION: 92 %

## 2019-06-18 LAB
ANION GAP SERPL CALCULATED.3IONS-SCNC: 8 MEQ/L (ref 8–16)
BUN BLDV-MCNC: 25 MG/DL (ref 7–22)
CALCIUM SERPL-MCNC: 8.6 MG/DL (ref 8.5–10.5)
CHLORIDE BLD-SCNC: 100 MEQ/L (ref 98–111)
CO2: 27 MEQ/L (ref 23–33)
CREAT SERPL-MCNC: 1.4 MG/DL (ref 0.4–1.2)
ERYTHROCYTE [DISTWIDTH] IN BLOOD BY AUTOMATED COUNT: 14.6 % (ref 11.5–14.5)
ERYTHROCYTE [DISTWIDTH] IN BLOOD BY AUTOMATED COUNT: 48.7 FL (ref 35–45)
GFR SERPL CREATININE-BSD FRML MDRD: 36 ML/MIN/1.73M2
GLUCOSE BLD-MCNC: 191 MG/DL (ref 70–108)
HCT VFR BLD CALC: 40.7 % (ref 37–47)
HEMOGLOBIN: 13.8 GM/DL (ref 12–16)
INR BLD: 4.3 (ref 0.85–1.13)
MCH RBC QN AUTO: 30.5 PG (ref 26–33)
MCHC RBC AUTO-ENTMCNC: 33.9 GM/DL (ref 32.2–35.5)
MCV RBC AUTO: 90 FL (ref 81–99)
ORGANISM: ABNORMAL
PLATELET # BLD: 171 THOU/MM3 (ref 130–400)
PMV BLD AUTO: 11 FL (ref 9.4–12.4)
POTASSIUM SERPL-SCNC: 4.1 MEQ/L (ref 3.5–5.2)
RBC # BLD: 4.52 MILL/MM3 (ref 4.2–5.4)
SODIUM BLD-SCNC: 135 MEQ/L (ref 135–145)
URINE CULTURE, ROUTINE: ABNORMAL
WBC # BLD: 11.2 THOU/MM3 (ref 4.8–10.8)

## 2019-06-18 PROCEDURE — 6370000000 HC RX 637 (ALT 250 FOR IP): Performed by: NURSE PRACTITIONER

## 2019-06-18 PROCEDURE — 2700000000 HC OXYGEN THERAPY PER DAY

## 2019-06-18 PROCEDURE — 85610 PROTHROMBIN TIME: CPT

## 2019-06-18 PROCEDURE — 85027 COMPLETE CBC AUTOMATED: CPT

## 2019-06-18 PROCEDURE — 94761 N-INVAS EAR/PLS OXIMETRY MLT: CPT

## 2019-06-18 PROCEDURE — 36415 COLL VENOUS BLD VENIPUNCTURE: CPT

## 2019-06-18 PROCEDURE — 99239 HOSP IP/OBS DSCHRG MGMT >30: CPT | Performed by: INTERNAL MEDICINE

## 2019-06-18 PROCEDURE — 97163 PT EVAL HIGH COMPLEX 45 MIN: CPT

## 2019-06-18 PROCEDURE — 6370000000 HC RX 637 (ALT 250 FOR IP): Performed by: INTERNAL MEDICINE

## 2019-06-18 PROCEDURE — 2580000003 HC RX 258: Performed by: INTERNAL MEDICINE

## 2019-06-18 PROCEDURE — 97530 THERAPEUTIC ACTIVITIES: CPT

## 2019-06-18 PROCEDURE — 6370000000 HC RX 637 (ALT 250 FOR IP): Performed by: PHYSICIAN ASSISTANT

## 2019-06-18 PROCEDURE — 2709999900 HC NON-CHARGEABLE SUPPLY

## 2019-06-18 PROCEDURE — 6370000000 HC RX 637 (ALT 250 FOR IP): Performed by: HOSPITALIST

## 2019-06-18 PROCEDURE — 2580000003 HC RX 258: Performed by: HOSPITALIST

## 2019-06-18 PROCEDURE — 80048 BASIC METABOLIC PNL TOTAL CA: CPT

## 2019-06-18 RX ORDER — POLYETHYLENE GLYCOL 3350 17 G/17G
17 POWDER, FOR SOLUTION ORAL ONCE
Status: COMPLETED | OUTPATIENT
Start: 2019-06-18 | End: 2019-06-18

## 2019-06-18 RX ORDER — CEFDINIR 300 MG/1
300 CAPSULE ORAL 2 TIMES DAILY
Qty: 14 CAPSULE | Refills: 0 | Status: SHIPPED | OUTPATIENT
Start: 2019-06-19 | End: 2019-06-26

## 2019-06-18 RX ORDER — METHADONE HYDROCHLORIDE 10 MG/1
5 TABLET ORAL 2 TIMES DAILY PRN
Status: DISCONTINUED | OUTPATIENT
Start: 2019-06-18 | End: 2019-06-18 | Stop reason: HOSPADM

## 2019-06-18 RX ORDER — SODIUM CHLORIDE 9 MG/ML
INJECTION, SOLUTION INTRAVENOUS CONTINUOUS
Status: DISCONTINUED | OUTPATIENT
Start: 2019-06-18 | End: 2019-06-18 | Stop reason: HOSPADM

## 2019-06-18 RX ORDER — BISACODYL 10 MG
10 SUPPOSITORY, RECTAL RECTAL DAILY PRN
Status: DISCONTINUED | OUTPATIENT
Start: 2019-06-18 | End: 2019-06-18 | Stop reason: HOSPADM

## 2019-06-18 RX ORDER — LACTOBACILLUS RHAMNOSUS GG 10B CELL
1 CAPSULE ORAL
Qty: 15 CAPSULE | Refills: 0 | Status: SHIPPED | OUTPATIENT
Start: 2019-06-19 | End: 2019-07-04

## 2019-06-18 RX ADMIN — METOPROLOL SUCCINATE 50 MG: 50 TABLET, EXTENDED RELEASE ORAL at 09:16

## 2019-06-18 RX ADMIN — ASPIRIN 81 MG: 81 TABLET ORAL at 10:00

## 2019-06-18 RX ADMIN — PANTOPRAZOLE SODIUM 40 MG: 40 TABLET, DELAYED RELEASE ORAL at 09:11

## 2019-06-18 RX ADMIN — FUROSEMIDE 20 MG: 20 TABLET ORAL at 09:16

## 2019-06-18 RX ADMIN — Medication 1 CAPSULE: at 09:11

## 2019-06-18 RX ADMIN — Medication 10 ML: at 10:05

## 2019-06-18 RX ADMIN — ISOSORBIDE MONONITRATE 60 MG: 60 TABLET ORAL at 09:16

## 2019-06-18 RX ADMIN — SODIUM CHLORIDE: 9 INJECTION, SOLUTION INTRAVENOUS at 10:05

## 2019-06-18 RX ADMIN — AMLODIPINE BESYLATE 5 MG: 5 TABLET ORAL at 09:16

## 2019-06-18 RX ADMIN — POLYETHYLENE GLYCOL 3350 17 G: 17 POWDER, FOR SOLUTION ORAL at 09:12

## 2019-06-18 RX ADMIN — METHADONE HYDROCHLORIDE 5 MG: 10 TABLET ORAL at 01:44

## 2019-06-18 ASSESSMENT — PAIN SCALES - GENERAL
PAINLEVEL_OUTOF10: 7
PAINLEVEL_OUTOF10: 7

## 2019-06-18 ASSESSMENT — PAIN - FUNCTIONAL ASSESSMENT: PAIN_FUNCTIONAL_ASSESSMENT: PREVENTS OR INTERFERES SOME ACTIVE ACTIVITIES AND ADLS

## 2019-06-18 ASSESSMENT — PAIN DESCRIPTION - PROGRESSION: CLINICAL_PROGRESSION: GRADUALLY WORSENING

## 2019-06-18 ASSESSMENT — PAIN DESCRIPTION - ORIENTATION: ORIENTATION: LOWER

## 2019-06-18 ASSESSMENT — PAIN DESCRIPTION - DESCRIPTORS: DESCRIPTORS: ACHING

## 2019-06-18 ASSESSMENT — PAIN DESCRIPTION - PAIN TYPE: TYPE: CHRONIC PAIN

## 2019-06-18 ASSESSMENT — PAIN DESCRIPTION - FREQUENCY: FREQUENCY: INTERMITTENT

## 2019-06-18 ASSESSMENT — PAIN DESCRIPTION - ONSET: ONSET: AWAKENED FROM SLEEP

## 2019-06-18 ASSESSMENT — PAIN DESCRIPTION - LOCATION: LOCATION: BACK

## 2019-06-18 NOTE — PROGRESS NOTES
Clinical Pharmacy Note                                               Warfarin Discharge Recommendations      Pt discharged from [facility] today after admission for Chest pain    INR today:  Recent Labs     06/18/19  0817   INR 4.30*     INR goal during admission: 2-3    Recommendations for discharge:   Date Warfarin Dose   6/18/2019 No Coumadin   6/19/2019 No Coumadin   6/20/2019 INR to be checked in the [facility] @ 10:40am       Provider dosing warfarin: [facility]    Recheck INR:  6/20/19 with results to [address], PharmD, BCPS  6/18/2019 11:59 AM

## 2019-06-18 NOTE — CARE COORDINATION
6/18/19, 10:22 AM    DISCHARGE BARRIERS    Patient is to be discharged today and order placed for home health. Spoke with patient and daughter-they have used [facility] in the past and prefer to use them again. Order is for nursing and therapy-SW offered an aide-they do not feel that this is necessary as daughter assists patient with her personal care. Patient also qualifies for home 02. Patient states that she prefers to use [facility]. Call to Fred West with [facility] with referral for home 02. Spoke with Jayne with [facility] with home health referral.      Full assessment deferred. 6/18/19, 11:05 AM    Discharge plan discussed by  and . Discharge plan reviewed with patient/ family. Patient/ family verbalize understanding of discharge plan and are in agreement with plan. Understanding was demonstrated using the teach back method.    Services After Discharge  Services At/After Discharge: PT, OT, Nursing Services   IMM Letter  IMM Letter given to Patient/Family/Significant other/Guardian/POA/by[de-identified]   IMM Letter date given[de-identified] 06/17/19  IMM Letter time given[de-identified] [address]

## 2019-06-18 NOTE — PROGRESS NOTES
 S/P drug eluting coronary stent placement     Subdural hematoma (HCC)     s/p evacuation    Triple vessel coronary artery disease     Type II or unspecified type diabetes mellitus without mention of complication, not stated as uncontrolled      Past Surgical History:   Procedure Laterality Date    BRAIN SURGERY      Drained brain bleed    CARDIOVASCULAR STRESS TEST      with Dr. Get Glass- no acute findings    CENTRAL VENOUS CATHETER      Medi port placement    DIAGNOSTIC CARDIAC CATH LAB PROCEDURE      EYE SURGERY      FRACTURE SURGERY      Bipolar femur    HIP FRACTURE SURGERY Left 10/10/2016    Bipolar Hip    JOINT REPLACEMENT      right knee    OTHER SURGICAL HISTORY Left 10/24/2016    I and D left hip, component exchange, Dr. Sadi Claros pacer   [address] Right     VENA CAVA FILTER PLACEMENT      VENTRICULAR CARDIAC PACEMAKER INSERTION  10/04/2016    Dr. Nicky Perez       Restrictions/Precautions:  General Precautions, Fall Risk         Subjective:  Chart Reviewed: Yes  Patient assessed for rehabilitation services?: Yes  Family / Caregiver Present: Yes  Subjective: RN approved eval. pt in bed on arrival with dtr present. agrees to therapy eval and to sit in chair    General:  Overall Orientation Status: Within Functional Limits  Follows Commands: Within Functional Limits    Vision: Impaired  Vision Exceptions: Wears glasses at all times    Hearing: Exceptions to [facility]  Hearing Exceptions: Hard of hearing/hearing concerns         Pain:  Denies.           Social/Functional History:    Lives With: Spouse(and dtr)  Type of Home: House  Home Layout: Two level, Able to Live on Main level with bedroom/bathroom  Home Access: Ramped entrance  Home Equipment: Rolling walker(transport chair)          Receives Help From: Family  ADL Assistance: [address] [place] [address] Avenue: Independent  Ambulation Assistance: Independent  Transfer Assistance: Independent Additional Comments: dtr and spouse home to assist as needed. pt I with mobility in the home with use of walker      Objective:       RLE AROM: WFL         LLE AROM : WFL          Strength RLE: Exception  Comment: gross 4-/5    Strength LLE: Exception  Comment: gross 4-/5            Sensation  Overall Sensation Status: WFL       Balance  Sitting - Static: Fair  Sitting - Dynamic: Fair  Standing - Static: Fair  Standing - Dynamic: Fair    Supine to Sit: Moderate assistance(at trunk and LEs, HOB elevated)  Scooting: Contact guard assistance(to EOB, increased time to complete)    Transfers  Sit to Stand: Contact guard assistance(cues for hand placement)  Stand to sit: Contact guard assistance(cues for hand placement)       Ambulation 1  Surface: level tile  Device: Rolling Walker  Other Apparatus: O2(2.5L)  Assistance: Contact guard assistance  Quality of Gait: decreased velocity and car, decreased but equal step length B, forward flexed trunk, increased time to complete  Distance: 6ft, declined further mobility          Exercises:  Comments: verbally reviewed therex and HEP for home, pt and dtr decline performance during session          Activity Tolerance:  Activity Tolerance: Patient limited by fatigue;Patient limited by endurance    Treatment Initiated: see above mobility    Assessment: Body structures, Functions, Activity limitations: Decreased functional mobility , Decreased ADL status, Decreased strength, Decreased endurance, Decreased balance  Assessment: Pt presents with poor tolerance to activity requiring supplemental O2 and increased time to complete all mobility d/t fatigue.  she requires skilled PT services to increase independence and mobility   Prognosis: Good, Fair    Clinical Presentation: High - Unstable with Unpredictable Characteristics: high fall risk, varying pain levels in back, supplemental O2 required:    Decision Making: High Complexitybased on patient assessment and decision making process of determining plan of care and establishing reasonable expectations for measurable functional outcomes    REQUIRES PT FOLLOW UP: Yes    Discharge Recommendations:  Discharge Recommendations: Continue to assess pending progress    Patient Education:  Patient Education: POC, bed mob, transfers, gait, therex/HEP    Equipment Recommendations:  Equipment Needed: No    Safety:  Type of devices: All fall risk precautions in place, Call light within reach, Chair alarm in place, Gait belt, Patient at risk for falls, Left in chair, Nurse notified    Plan:  Times per week: 3-5x GM  Current Treatment Recommendations: Strengthening, Transfer Training, Endurance Training, Balance Training, Gait Training, Stair training, Functional Mobility Training, ADL/Self-care Training, Home Exercise Program, Safety Education & Training, Patient/Caregiver Education & Training, Equipment Evaluation, Education, & procurement    Goals:  Patient goals : go home  Short term goals  Time Frame for Short term goals: at d/c  Short term goal 1: pt to perform supine to/from sit at S get in and out of bed  Short term goal 2: Pt to perform sit to/from stand at S to rise from bed or chair  Short term goal 3: pt to ambulate 50ft with RW at S for household mobility  Long term goals  Time Frame for Long term goals : defer d/t short LOS    Evaluation Complexity: Based on the findings of patient history, examination, clinical presentation, and decision making during this evaluation, the evaluation of Ankur Hughes  is of high complexity.             AM-PAC Inpatient Mobility without Stair Climbing Raw Score : 13  AM-PAC Inpatient without Stair Climbing T-Scale Score : 38.96  Mobility Inpatient CMS 0-100% Score: 58.44  Mobility Inpatient without Stair CMS G-Code Modifier : CK

## 2019-06-18 NOTE — CARE COORDINATION
[address] Transitions Interview     2019    Patient: Dorothea Roy Patient : 1935   MRN: [id number]  Reason for Admission: Epigastric pain [R10.13]  Sepsis [facility]) [A41.9]  RARS: Readmission Risk Score: 20    2019 09:52 - Met with: Patient's daughter for inpatient Care Transition interview. Identified self/role. Explained CTC process, verbalized understanding. Patient resting in room with eyes closed. Will attempt inpatient Care Transition interview at a later time. Business card given to daughter. Will attempt inpatient Care Transition interview at a later time. 2019 11:00 - Met with: Patient and daughter for initial Care Transition follow up. Identified self/role. Explained CTC process, verbalized understanding and agreeable to CTC calls.     Readmission Risk  Patient Active Problem List   Diagnosis    Hyperlipidemia    History of DVT (deep vein thrombosis)    Type 2 diabetes mellitus with complication, without long-term current use of insulin ([facility]    Essential hypertension    Acute kidney injury ([facility]    CKD stage 3 due to type 2 diabetes mellitus ([address]    History of pulmonary embolism    Coagulopathy (HCC)    NHL (non-Hodgkin's lymphoma)  Dr. Michael West  diagnosed 263    Diastolic dysfunction,  EF 55-60%    Anticoagulated on Coumadin    Polycythemia    Dyspnea    Obesity (BMI 35.0-39.9 without comorbidity)    Mild aortic stenosis    Ischemic cardiomyopathy    Lower back pain    Hypoxia    Persistent atrial fibrillation (HCC)    CHF NYHA class II ([address]    Ischemic heart disease    History of embolic stroke    Triple vessel coronary artery disease    S/P drug eluting coronary stent placement    Chronic combined systolic and diastolic CHF (congestive heart failure) (HCC)    RLS (restless legs syndrome)    S/P cardiac pacemaker procedure    Physical deconditioning    Swelling    Pre-ulcerative calluses    Status post total replacement of left hip    Coronary artery disease involving native coronary artery of native heart without angina pectoris    Sick sinus syndrome (HCC)    Calculus of gallbladder without cholecystitis without obstruction    Chronic idiopathic constipation    History of coronary artery stent placement    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  right mid axillary line    Spinal stenosis of lumbar region    Coronary artery disease involving native heart    Epigastric abdominal pain    Sepsis ([facility]    Acute cystitis with hematuria    Kidney cysts    Opacity of lung on imaging study    Nephrolithiasis    Hyperbilirubinemia    History of CVA (cerebrovascular accident)    Supratherapeutic INR    Long term (current) use of anticoagulants       Inpatient Assessment  Care Transitions Summary    Care Transitions Inpatient Review  Medication Review  Do you have all of your prescriptions and are they filled?:  Yes   Barriers to Medication Adherence:  None  Are you able to afford your medications?:  Yes  How often do you have difficulty taking your medications?:  I always take them as prescribed. Housing Review  Who do you live with?:  Partner/Spouse/SO, Child  Are you an active caregiver in your home?:  No  Social Support  Do you have a ?:  No  Do you have a [address] [place] [address]?:  No  Durable Medical Equipment  Patient DME:  Pati vargas Shower chair  Functional Review  Ability to seek help/take action for Emergent/Urgent situations i.e. fire, crime, inclement weather or health crisis. :  Independent  Ability handle personal hygiene needs (bathing/dressing/grooming):  Needs Assistance  Ability to manage medications:  Dependent  Ability to prepare food:  Needs Assistance  Ability to maintain home (clean home, laundry):  Needs Assistance  Ability to drive and/or has transportation:  Dependent  Ability to do shopping:  Dependent  Ability to manage finances:  Needs Assistance  Is patient able to live independently?:  No  Hearing and Vision  Visual Impairment:  Visual impairment (Glasses/contacts)  Care Transitions Interventions  No Identified Needs       Patient presented to the ED on 06/16/2019 with report of abdominal pain that started that morning. PMH includes atrial fibrillation, CHF, GERD, and DM. Patient admitted for Epigastric pain [R10.13] and Sepsis ([address] [A41.9]. Consult with Gastroenterology. Case management and PT/OT following. Patient resides at home with her  and daughter. Patient states she is independent with ADL's. Daughter transports patient to appointments and manages medications. Patient and daughter share housework responsibilities. Patient has a scale, blood pressure cuff, and glucometer for chronic disease management. Patient states she should be discharged today. Patient states the plan is to return home with new referral to [facility]. Patient denies additional needs or concerns at this time. Business card with contact information for CTC provided. Patient understands CTC will follow upon discharge.      Follow Up  Future Appointments   Date Time Provider Thang Fleming   6/20/2019 10:40 AM [facility] [place]   6/25/2019  1:45 PM Jimbo Lee MD [facility] - [place]   10/9/2019 11:15 AM [address], MD [facility] [place]   1/30/2020 11:30 AM SCHEDULE, SRPS PACER NURSE [facility] PACER [facility] [place]   2/24/2020  1:20 PM LUZ Shah - CNP [facility] Physic [facility] - [facility] Maintenance  Health Maintenance Due   Topic Date Due    DEXA (modify frequency per FRAX score)  03/05/2001       Clementina Villareal RN  Care Transition Coordinator  [phone]  21

## 2019-06-18 NOTE — DISCHARGE INSTR - PHARMACY
Clinical Pharmacy Note                                               Warfarin Discharge Recommendations      Pt discharged from [address] [place] [address] today after admission for Chest pain    INR today:  Recent Labs     06/18/19  0817   INR 4.30*     INR goal during admission: 2-3    Recommendations for discharge:   Date Warfarin Dose   6/18/2019 No Coumadin   6/19/2019 No Coumadin   6/20/2019 INR to be checked in the Coumadin Clinic @ 10:40am       Provider dosing warfarin: [facility]    Recheck INR:  6/20/19 with results to [facility]

## 2019-06-18 NOTE — DISCHARGE SUMMARY
Hospital Medicine Discharge Summary      Patient:  Eugene Peterson  YOB: 1935  MRN: [id number]   PCP: Donnie Franco MD    Acct: [de-identified]     [facility] Date: 6/16/2019   Discharge Date:  6/18/2019    Admitting Physician: Bradley Tapia DO  Discharge Physician: Nabila Kaye MD     Discharge Diagnoses     DISCHARGE DIAGNOSES:  1. Sepsis 2/2 to acute cystitis with hematuria in the setting of a L exophytic cyst ( unclear if infected)  2. Hypoxia with opacity at right mid lung that may be possible right mid lung bacterial pneumonia  3. Epigastric pain  4. Acute kidney injury  5. Incidental cholelithiasis and nephrolithiasis  6. Isolated hyperbilirubinemia  7. Constipation, resolved  1. Had a BM on the day of discharge  8. Chronic co-morbidities:  1. DM Type 2- diet controlled- Check   2. CAD  3. Hx of DVT s/p IVC filter  4. Chronic atrial fibrillation on anticoagulation with Coumadin with supratherapeutic INR  1. Continue to hold Coumadin  2. Pharmacy managing  5. Essential HTN  6. Chronic combined systolic and diastolic CHF  7. Hx of CVA  8. Obesity    Disposition:    [] Home       [] TCU       [] Rehab       [] Psych       [] SNF       [] [place]       [] Other-    Condition at Discharge: Stable     Patient was evaluated today for the diagnosis of Pneumonia. I entered a DME order for home oxygen because the diagnosis and testing requires the patient to have supplemental oxygen. Condition will improve or be benefited by oxygen use. The patient is  able to perform good mobility in a home setting and therefore does require the use of a portable oxygen system. The need for this equipment was discussed with the patient and she understands and is in agreement. The patient was seen and examined on day of discharge and this discharge summary is in conjunction with any daily progress note from day of discharge.     Hospital course     Eugene Peterson is a 81 y.o. female admitted to [address] [place] [address]. [address] [place] [address] on 6/16/2019  who was admitted for intractable epigastric pain with findings of a UTI. DISCHARGE DIAGNOSES:  1. Sepsis 2/2 to acute cystitis with hematuria in the setting of a L exophytic cyst (cyst not believed to be infected at this time)  · Sepsis based on leukocytosis, tachycardia and fever to 101.3F   · CT scan of abdomen and pelvis shows large gallstone w/i the gallbladder lumen with a large exophytic cyst.  The cyst does not seem to be infected per CT scan of abdomen/ pelvis and patient to be discharged on at least 3 days of cefdinir. Urine cx showed E. Coli sensitive to Ceftriaxone. · Sepsis resolved at the time of discharge and patient now afebrile without leukocytosis. 2. Hypoxia with opacity at right mid lung that may be possible right mid lung bacterial pneumonia  · CXR showed patchy consolidation in right mid-lung which could be atelactasis or pneumonia  · Procalcitonin was slightly elevated at 0.29  · Theory is that hypoxia is related to sepsis as well as pneumonia  · Patient treated with Ceftriaxone and will be discharged on a total of 10 days of antibiotics. She was requiring oxygen and is to be discharged on 2L of oxygen  · On Ceftriaxone which should treat  3. Epigastric pain  · Related to #1, resolved  · Daughter requested that sucralfate and Protonix be discontinued  4. Acute kidney injury  · Slight trend upwards and patient hydrated with fluids  · Repeat BMP recommended on 6/20  · Encourage fluid intake  5. Incidental cholelithiasis and nephrolithiasis  6. Isolated hyperbilirubinemia  7. Constipation, resolved  · Had a BM on the day of discharge  8. Chronic co-morbidities:  · DM Type 2- diet controlled- Check   · CAD  · Hx of DVT s/p IVC filter  · Chronic atrial fibrillation on anticoagulation with Coumadin with supratherapeutic INR. INR 4.30 at the time of discharge.  INR recheck recommended on 6/20    · Continue to hold Coumadin  · Pharmacy managing  · Essential POWD powder  Apply 1 each topically 2 times daily as needed (abdominal folds- irritation) Indications: Skin Abnormalities             polyethylene glycol (GLYCOLAX) powder  Take 17 g by mouth nightly              potassium chloride (KLOR-CON M) 10 MEQ extended release tablet  TAKE 1 TABLET DAILY             warfarin (COUMADIN) 2.5 MG tablet  TAKE AS DIRECTED BY THE COUMADIN CLINIC                       Physical Examination     Vitals:  Vitals:    06/17/19 2045 06/18/19 0415 06/18/19 0839 06/18/19 0900   BP: 128/62 135/69  116/82   Pulse: 80 64  60   Resp: 18 18  16   Temp: 97.6 °F (36.4 °C) 99.2 °F (37.3 °C)  98.3 °F (36.8 °C)   TempSrc: Oral Oral  Oral   SpO2: 93% 91% 92% 92%   Weight:       Height:           Weight: Weight: 229 lb 11.2 oz (104.2 kg)     24 hour intake/output:    Intake/Output Summary (Last 24 hours) at 6/18/2019 1119  Last data filed at 6/17/2019 2112  Gross per 24 hour   Intake 60.82 ml   Output 400 ml   Net -339.18 ml         General appearance: No apparent distress. Alert. Daughter at bediside  HEENT: Extraocular motion intact. Neck: Supple  Respiratory:  CTA bilaterally except for decreased breath sounds at the bilateral lung bases . Wheezing improved  Cardiovascular: RRR with normal S1/S2 without murmurs, rubs or gallops. Abdomen: Soft, non-tender, non-distended with normal bowel sounds. Musculoskeletal: Patient is moving extremities x 4 spontaneously. Strength at bilateral LE 4/5 and strength at bilateral upper extremities 5/5  Neurologic: Grossly non focal. CN: II-XII intact  Psychiatric: Alert and oriented  Vascular: Dorsalis pedis palpable bilaterally. Radial pulses palpable bilaterally. No peripheral edema. Signs of previous venous stasis  Skin:  No visible rashes or lesions. Labs     Labs:  For convenience and continuity at follow-up the following most recent labs are provided:      CBC:    Lab Results   Component Value Date    WBC 11.2 06/18/2019    HGB 13.8 06/18/2019    HCT 40.7 06/18/2019    HCT 28.6 05/25/2012     06/18/2019       Renal:    Lab Results   Component Value Date     06/18/2019    K 4.1 06/18/2019    K 4.1 06/17/2019     06/18/2019    CO2 27 06/18/2019    BUN 25 06/18/2019    CREATININE 1.4 06/18/2019    CALCIUM 8.6 06/18/2019    PHOS 2.7 04/07/2017           Radiology       Xr Chest Standard (2 Vw)    Result Date: 6/16/2019  PROCEDURE: XR CHEST (2 VW) CLINICAL INFORMATION: pain COMPARISON: 12/4/2017 TECHNIQUE:  AP and lateral chest x-ray  FINDINGS: The heart size is normal. Focal opacity is seen overlying the right midlung which may represent atelectasis or pneumonia. No pneumothorax is seen. No definite pleural effusion is seen. No acute osseous findings are demonstrated. 1. Patchy consolidative airspace disease overlying the right midlung may represent atelectasis or pneumonia. Recommend follow-up to document resolution. **This report has been created using voice recognition software. It may contain minor errors which are inherent in voice recognition technology. ** Final report electronically signed by Dr. Odilia Lenz on 6/16/2019 8:37 AM    Ct Abdomen Pelvis W Iv Contrast Additional Contrast? None    Result Date: 6/16/2019  PROCEDURE: CT ABDOMEN PELVIS W IV CONTRAST CLINICAL INFORMATION: Epigastric abdominal pain COMPARISON: 4/21/2017 TECHNIQUE:  Axial CT images were obtained through the abdomen and pelvis following the intravenous administration of 80 mL Isovue 370 contrast. Coronal and sagittal reformatted images were rendered All CT scans at this facility use dose modulation, iterative reconstruction, and/or weight-based dosing when appropriate to reduce radiation dose to as low as reasonably achievable. FINDINGS: Limited evaluation of the lung bases demonstrates mild parenchymal bandlike scarring or atelectasis at the anterior right lung base. There is cardiomegaly noted. The liver and spleen appear normal. Cholelithiasis is demonstrated. Follow-up visits     Jimbo Lee MD  [address] [facility]IV[facility] [place] [address] [phone]               Code status at time of discharge     Full Code      Time Spent on discharge is more than 47 minutes in the examination, evaluation, counseling and review of medications and discharge plan. Signed: Thank you Jimbo Lee MD for the opportunity to be involved in this patient's care.     Electronically signed by David Major MD on 6/18/2019 at 11:19 AM

## 2019-06-18 NOTE — PROGRESS NOTES
Clinical Pharmacy Note    Warfarin consult follow-up    Recent Labs     06/18/19  0817   INR 4.30*     Recent Labs     06/16/19  0800 06/17/19  0618 06/18/19  0527   HGB 15.3 13.9 13.8   HCT 46.3 43.1 40.7    181 171       Significant Drug-Drug Interactions:  New warfarin drug-drug interactions: None  Discontinued drug-drug interactions: None  Current warfarin drug-drug interactions: aspirin        Date INR Warfarin Dose   6/16/2019 3.76 No warfarin    6/17/2019  5.18   No warfarin     6/18/2019 4.3  No warfarin                                       Notes:                     Daily PT/INR until stable within therapeutic range.      George Vázquez PharmD, BCPS  6/18/2019 9:30 AM

## 2019-06-18 NOTE — PROGRESS NOTES
A home oxygen evaluation has been completed. [x]Patient is an inpatient. It is expected that the patient will be discharged within the next 48 hours. Qualified provider to write order for home prescription if patient qualifies. Social service/care managers will arrange for home oxygen. If patient is active, arrange for Home Medical supplier to assess for Oxygen Conserving Device per pulse oximetry. []Patient is an outpatient. Results will be faxed to the ordering provider. Qualified provider to write order for home prescription if patient qualifies and arranges for home oxygen. Patient was placed on room air for 3 minutes. SpO2 was 87 % on room air at rest. Patients SpO2 was below 89% and qualified for home oxygen. Oxygen was applied at 2 lpm via nasal cannula to maintain a SpO2 between 90-92% while at rest. Actual SpO2 was 92 %. Patient cannot ambulate for exercise flow rate.

## 2019-06-19 ENCOUNTER — CARE COORDINATION (OUTPATIENT)
Dept: CASE MANAGEMENT | Age: 84
End: 2019-06-19

## 2019-06-19 NOTE — CARE COORDINATION
[facility] Transitions Follow Up Call    2019    Patient: Luann Giordano  Patient : 1935   MRN: [id number]  Reason for Admission: Epigastric pain [R10.13]  Sepsis [facility]) [A41.9]  Discharge Date: 19 RARS: Readmission Risk Score: 20         Spoke with: Son Alex Olea stated patient is doing well. Patient sleep well, eating and drinking good. Denies swelling, chest pressure, fluttering cough, fever, chills, dizziness, and pain. Home O2 has been delivered to home. BS have been WNL. Son stated his sister helps with medications and she was not in the home. Was not able to review medications. Home health nurse will review appts with family and patient. Reviewed follow up appts. Denies concerns or issues. Contacted [facility] in regards to patient being discharged yesterday    Care Transitions Subsequent and Final Call    Subsequent and Final Calls  Do you have any ongoing symptoms?:  No  Have your medications changed?:  No  Do you have any questions related to your medications?:  No  Do you currently have any active services?:  Yes  Are you currently active with any services?:  Home Health  Do you have any needs or concerns that I can assist you with?:  No  Identified Barriers:  None  Care Transitions Interventions  No Identified Needs  Other Interventions:             Follow Up  Future Appointments   Date Time Provider Thang Fleming   2019  1:45 PM MD JAKUB Rubio Physic [facility] - [place]   10/9/2019 11:15 AM MD JAKUB Stallworth Heart [facility] [place]   2020 11:30 AM SCHEDULE, GUILLERMOS PACER NURSE [facility]X PACER [facility] [place]   2020  1:20 PM LUZ King - CNP [facility] Physic [facility] Chucky Ac RN

## 2019-06-20 ENCOUNTER — HOSPITAL ENCOUNTER (OUTPATIENT)
Dept: PHARMACY | Age: 84
Setting detail: THERAPIES SERIES
Discharge: HOME | End: 2019-06-20
Payer: MEDICARE

## 2019-06-20 ENCOUNTER — APPOINTMENT (OUTPATIENT)
Dept: PHARMACY | Age: 84
End: 2019-06-20
Payer: MEDICARE

## 2019-06-20 DIAGNOSIS — Z86.711 HISTORY OF PULMONARY EMBOLISM: ICD-10-CM

## 2019-06-20 DIAGNOSIS — Z86.73 HISTORY OF EMBOLIC STROKE: ICD-10-CM

## 2019-06-20 DIAGNOSIS — I48.19 PERSISTENT ATRIAL FIBRILLATION (HCC): ICD-10-CM

## 2019-06-20 DIAGNOSIS — Z86.718 HISTORY OF DVT (DEEP VEIN THROMBOSIS): ICD-10-CM

## 2019-06-20 LAB — POC INR: 1.9 (ref 0.8–1.2)

## 2019-06-20 PROCEDURE — 99211 OFF/OP EST MAY X REQ PHY/QHP: CPT | Performed by: PHARMACIST

## 2019-06-20 PROCEDURE — 36416 COLLJ CAPILLARY BLOOD SPEC: CPT | Performed by: PHARMACIST

## 2019-06-20 PROCEDURE — 85610 PROTHROMBIN TIME: CPT | Performed by: PHARMACIST

## 2019-06-20 NOTE — PROGRESS NOTES
CLINICAL PHARMACY NOTE: MEDS TO [address] Select Patient?: Yes  Total # of Prescriptions Filled: 2   The following medications were delivered to the patient:    Total # of Interventions Completed: 2  Time Spent (min): 30    Additional Documentation:

## 2019-06-20 NOTE — PROGRESS NOTES
Medication Management [facility]  Anticoagulation Clinic  [phone] (phone)  [phone] (fax)      Ms. Burgess Mccullough is a 81 y.o.  female with history of Afib, stroke, PE, DVT who presents today for anticoagulation monitoring and adjustment. Patient verifies current dosing regimen and tablet strength. No missed or extra doses. Patient denies s/s bleeding/bruising/swelling/chest pain Short of breath - has had pneumonia  No blood in urine or stool. No dietary changes. No changes in OTC agents/Herbals. Pt no longer uses dulcolax. Cefdinir 300mg BID X7 days, started the 19th (yesterday). No change in alcohol use or tobacco use. No change in activity level. Patient denies headaches/dizziness/lightheadedness/falls. No vomiting/diarrhea Was hospitalized for pneumonia recently. No Procedures scheduled in the future at this time. Assessment:   Lab Results   Component Value Date    INR 1.90 (H) 06/20/2019    INR 4.30 (H) 06/18/2019    INR 5.18 (HH) 06/17/2019     INR subtherapeutic   Recent Labs     06/20/19  1056   INR 1.90*     Pt was supposed to be checked via [facility] today, but came to clinic instead. Called [facility], they will call pt to assume care. Plan:  Continue Coumadin 1.25mg MWF and 2.5mg TThSaS. Recheck INR in 6 days via [facility]. Patient reminded to call the Anticoagulation Clinic with any signs or symptoms of bleeding or with any medication changes. Patient given instructions utilizing the teach back method. Discharged in [facility] in no apparent distress, with daugther who agrees with plan. After visit summary printed and reviewed with patient.       Medications reviewed and updated on home medication list Yes    Influenza vaccine:     [] given    [x] declined   [x] received previously   [] plans to receive at a later time   [] refused    [x] documented in EPIC

## 2019-06-22 LAB
BLOOD CULTURE, ROUTINE: NORMAL
BLOOD CULTURE, ROUTINE: NORMAL

## 2019-06-24 ENCOUNTER — CARE COORDINATION (OUTPATIENT)
Dept: CASE MANAGEMENT | Age: 84
End: 2019-06-24

## 2019-06-24 NOTE — CARE COORDINATION
[facility] Transitions Follow Up Call    2019    Patient: Kathy Villagomez  Patient : 1935   MRN: [id number]  Reason for Admission: Epigastric pain [R10.13]  Sepsis [facility]) [A41.9]  Discharge Date: 19 RARS: Readmission Risk Score: 20    Spoke with: Lakeshia Mcghee and daughter for sub Care Transition follow up. Identified self/role. Care Transitions Subsequent and Final Call    Subsequent and Final Calls  Do you have any ongoing symptoms?:  Yes  Patient-reported symptoms:  Other, Fatigue  Interventions for patient-reported symptoms:  Other  Have your medications changed?:  No  Do you have any questions related to your medications?:  No  Do you currently have any active services?:  Yes  Are you currently active with any services?:  Home Health  Do you have any needs or concerns that I can assist you with?:  No  Identified Barriers:  None  Care Transitions Interventions  No Identified Needs  Other Interventions:          Daughter states patient is doing well. States they have company arriving, CTN call limited. Patient denies chest pain, SOB, palpitations, and urinary symptoms. Daughter states patient has been tired but is taking frequent rest breaks. Daughter reports minimal BLE swelling, no increase from baseline. Daughter states patient elevates BLE when sitting and daughter will wrap legs. Daughter states [facility] will be out tomorrow morning. Daughter denies bleeding or bloody stools due to anticoagulant therapy. Daughter reports decreased appetite but states patient is eating/drinking. Reviewed CHF zone tool, verbalized understanding. Reviewed upcoming appointments, verbalized understanding. Daughter denies additional needs or concerns at this time. Daughter instructed to notify Pre-Service, [facility], or CTN for any new or worsening symptoms, contact information provided. Daughter verbalized understanding. [facility]N will continue to follow. Daughter agreeable to [facility]N follow up call on 2019.      Follow Up  Future Appointments   Date Time Provider Thang Fleming   6/25/2019  1:45 PM Jeffery Meza MD SRPX Physic [facility] [place]   10/9/2019 11:15 AM Yeimy Becerril MD SRPX Heart [facility]   1/30/2020 11:30 AM SCHEDULE, SRPS PACER NURSE SRPX PACER [facility]   2/24/2020  1:20 PM LUZ Burgos - CNP SRPX Physic [facility] - [place] Levine, RN  Care Transition Nurse  [phone]  21

## 2019-06-25 ENCOUNTER — OFFICE VISIT (OUTPATIENT)
Dept: INTERNAL MEDICINE CLINIC | Age: 84
End: 2019-06-25
Payer: MEDICARE

## 2019-06-25 ENCOUNTER — ANTI-COAG VISIT (OUTPATIENT)
Dept: PHARMACY | Age: 84
End: 2019-06-25

## 2019-06-25 ENCOUNTER — HOSPITAL ENCOUNTER (OUTPATIENT)
Age: 84
Setting detail: SPECIMEN
Discharge: HOME | End: 2019-06-25
Payer: MEDICARE

## 2019-06-25 VITALS — HEART RATE: 62 BPM | SYSTOLIC BLOOD PRESSURE: 130 MMHG | DIASTOLIC BLOOD PRESSURE: 80 MMHG

## 2019-06-25 DIAGNOSIS — Z86.73 HISTORY OF EMBOLIC STROKE: ICD-10-CM

## 2019-06-25 DIAGNOSIS — Z86.718 HISTORY OF DVT (DEEP VEIN THROMBOSIS): ICD-10-CM

## 2019-06-25 DIAGNOSIS — J18.9 PNEUMONIA DUE TO INFECTIOUS ORGANISM, UNSPECIFIED LATERALITY, UNSPECIFIED PART OF LUNG: Primary | ICD-10-CM

## 2019-06-25 DIAGNOSIS — N30.90 CYSTITIS: ICD-10-CM

## 2019-06-25 DIAGNOSIS — Z09 HOSPITAL DISCHARGE FOLLOW-UP: ICD-10-CM

## 2019-06-25 DIAGNOSIS — M25.552 LEFT HIP PAIN: ICD-10-CM

## 2019-06-25 DIAGNOSIS — Z86.711 HISTORY OF PULMONARY EMBOLISM: ICD-10-CM

## 2019-06-25 DIAGNOSIS — I48.19 PERSISTENT ATRIAL FIBRILLATION (HCC): ICD-10-CM

## 2019-06-25 LAB
ANION GAP SERPL CALCULATED.3IONS-SCNC: 8 MEQ/L (ref 8–16)
BUN BLDV-MCNC: 19 MG/DL (ref 7–22)
CALCIUM SERPL-MCNC: 9.4 MG/DL (ref 8.5–10.5)
CHLORIDE BLD-SCNC: 102 MEQ/L (ref 98–111)
CO2: 32 MEQ/L (ref 23–33)
CREAT SERPL-MCNC: 1.1 MG/DL (ref 0.4–1.2)
GFR SERPL CREATININE-BSD FRML MDRD: 47 ML/MIN/1.73M2
GLUCOSE BLD-MCNC: 131 MG/DL (ref 70–108)
INR BLD: 1.48 (ref 0.85–1.13)
POTASSIUM SERPL-SCNC: 3.7 MEQ/L (ref 3.5–5.2)
SODIUM BLD-SCNC: 142 MEQ/L (ref 135–145)

## 2019-06-25 PROCEDURE — 1123F ACP DISCUSS/DSCN MKR DOCD: CPT | Performed by: NURSE PRACTITIONER

## 2019-06-25 PROCEDURE — 99214 OFFICE O/P EST MOD 30 MIN: CPT | Performed by: NURSE PRACTITIONER

## 2019-06-25 PROCEDURE — G8417 CALC BMI ABV UP PARAM F/U: HCPCS | Performed by: NURSE PRACTITIONER

## 2019-06-25 PROCEDURE — G8427 DOCREV CUR MEDS BY ELIG CLIN: HCPCS | Performed by: NURSE PRACTITIONER

## 2019-06-25 PROCEDURE — 80048 BASIC METABOLIC PNL TOTAL CA: CPT

## 2019-06-25 PROCEDURE — 4040F PNEUMOC VAC/ADMIN/RCVD: CPT | Performed by: NURSE PRACTITIONER

## 2019-06-25 PROCEDURE — 1111F DSCHRG MED/CURRENT MED MERGE: CPT | Performed by: NURSE PRACTITIONER

## 2019-06-25 PROCEDURE — 85610 PROTHROMBIN TIME: CPT

## 2019-06-25 PROCEDURE — 1090F PRES/ABSN URINE INCON ASSESS: CPT | Performed by: NURSE PRACTITIONER

## 2019-06-25 PROCEDURE — G8598 ASA/ANTIPLAT THER USED: HCPCS | Performed by: NURSE PRACTITIONER

## 2019-06-25 PROCEDURE — 1036F TOBACCO NON-USER: CPT | Performed by: NURSE PRACTITIONER

## 2019-06-25 PROCEDURE — 94010 BREATHING CAPACITY TEST: CPT | Performed by: NURSE PRACTITIONER

## 2019-06-25 PROCEDURE — G8400 PT W/DXA NO RESULTS DOC: HCPCS | Performed by: NURSE PRACTITIONER

## 2019-06-25 NOTE — PROGRESS NOTES
1 TABLET DAILY 90 tablet 3    potassium chloride (KLOR-CON M) 10 MEQ extended release tablet TAKE 1 TABLET DAILY 90 tablet 3    isosorbide mononitrate (IMDUR) 60 MG extended release tablet TAKE 1 TABLET DAILY 90 tablet 3    NONFORMULARY Take 800 mcg by mouth 2 times daily Indications: Deficiency of Folic Acid L-5 methyltetrahydrofolate      B Complex-C (B COMPLEX-B12-C IJ) Inject as directed every 30 days       D-Mannose 500 MG CAPS Take 1 capsule by mouth nightly       Cholecalciferol (VITAMIN D3) 00142 units CAPS Take 1 capsule by mouth once a week       cyanocobalamin 1000 MCG/ML injection Inject 1,000 mcg into the muscle every 30 days       metolazone (ZAROXOLYN) 2.5 MG tablet Take 1 tablet by mouth daily as needed (SOB) 30 tablet 0    polyethylene glycol (GLYCOLAX) powder Take 17 g by mouth nightly       warfarin (COUMADIN) 2.5 MG tablet TAKE AS DIRECTED BY THE COUMADIN CLINIC 90 tablet 2    methadone (DOLOPHINE) 5 MG tablet Take 5 mg by mouth 2 times daily as needed for Pain. Mica Perez NONFORMULARY Apply 1 applicator topically nightly       metoprolol succinate (TOPROL XL) 50 MG extended release tablet TAKE 1 TABLET DAILY 90 tablet 3    acetaminophen (TYLENOL) 325 MG tablet Take 2 tablets by mouth every 4 hours as needed for Pain 120 tablet 3    aspirin 81 MG EC tablet Take 1 tablet by mouth daily 30 tablet 3        Medications patient taking as of now reconciled against medications ordered at time of hospital discharge: Yes    Chief Complaint   Patient presents with    Follow-Up from [address] [place] [address]     d/ from [facility] on 6/18/19 for resp failure    Pain     left hip pain       History of Present illness - Follow up of Hospital diagnosis(es): sepsis secondary to acute cystitis with hematuria in the setting of a left exophytic cyst, hypoxia related to pneumonia, epigastric pain, AVELINO, incidental cholelithiasis and nephrolithiasis, isolated hyperbilirubinemia, and constipation.      Inpatient course: Discharge summary reviewed- see chart. Interval history/Current status:     Licha Judge was admitted from 6/16-6/18 for above diagnosis. She presents today accompanied by her daughter who is also her caregiver. She reports last dose of antibiotics was this am. She has not had any fevers, is not tachycardic, denies dysuria or hematuria. She has not had the bloodwork completed that was ordered for 6/20/19. She will go over and have it completed today. Her breathing has improved significantly. She has not been wearing her home oxygen, oxygen saturation 94% here on room air. She denies shortness of breath, wheezing, and cough. She does not want the home oxygen any longer; ok to discontinue. Daughter is requesting an incentive spirometer, will order. She complains of left hip pain that began this am. She also has a moderate 2+ pitting edema in bilateral lower extremities, but states she is unable to elevate them due to hip pain. She denies any fall or trauma. A comprehensive review of systems was negative except for what was noted in the HPI. Vitals:    06/25/19 1406 06/25/19 1421   BP: (!) 150/70 130/80   Site: Left Upper Arm Left Lower Arm   Pulse: 62      There is no height or weight on file to calculate BMI. Wt Readings from Last 3 Encounters:   06/16/19 229 lb 11.2 oz (104.2 kg)   02/25/19 223 lb (101.2 kg)   10/05/18 215 lb (97.5 kg)     BP Readings from Last 3 Encounters:   06/25/19 130/80   06/18/19 116/82   02/25/19 (!) 150/78        Physical Exam:    General Appearance: alert and oriented to person, place and time, well developed and well- nourished, in no acute distress. Is in a wheelchair today.    Skin: warm and dry, no rash or erythema  Head: normocephalic and atraumatic  Eyes: pupils equal, round, and reactive to light, extraocular eye movements intact, conjunctivae normal  ENT: tympanic membrane, external ear and ear canal normal bilaterally, nose without deformity, nasal mucosa and turbinates normal

## 2019-06-26 ENCOUNTER — TELEPHONE (OUTPATIENT)
Dept: INTERNAL MEDICINE CLINIC | Age: 84
End: 2019-06-26

## 2019-06-27 ENCOUNTER — CARE COORDINATION (OUTPATIENT)
Dept: CASE MANAGEMENT | Age: 84
End: 2019-06-27

## 2019-06-28 ENCOUNTER — HOSPITAL ENCOUNTER (OUTPATIENT)
Age: 84
Discharge: HOME | End: 2019-06-28
Payer: MEDICARE

## 2019-06-28 ENCOUNTER — CARE COORDINATION (OUTPATIENT)
Dept: CASE MANAGEMENT | Age: 84
End: 2019-06-28

## 2019-06-28 ENCOUNTER — HOSPITAL ENCOUNTER (OUTPATIENT)
Dept: GENERAL RADIOLOGY | Age: 84
Discharge: HOME | End: 2019-06-28
Payer: MEDICARE

## 2019-06-28 ENCOUNTER — TELEPHONE (OUTPATIENT)
Dept: INTERNAL MEDICINE CLINIC | Age: 84
End: 2019-06-28

## 2019-06-28 DIAGNOSIS — M25.552 LEFT HIP PAIN: ICD-10-CM

## 2019-06-28 PROCEDURE — 73502 X-RAY EXAM HIP UNI 2-3 VIEWS: CPT

## 2019-07-01 ENCOUNTER — TELEPHONE (OUTPATIENT)
Dept: INTERNAL MEDICINE CLINIC | Age: 84
End: 2019-07-01

## 2019-07-01 NOTE — TELEPHONE ENCOUNTER
----- Message from Lurline Schirmer, APRN - CNP sent at 7/1/2019  7:30 AM EDT -----  Hip xray is normal. How is she feeling? Interested in PT if it is still bothering her?

## 2019-07-01 NOTE — TELEPHONE ENCOUNTER
Notified pts dtr, hip x-ray is OK. She is still having pain. Dtr feels like they need to hold off on physical therapy for now. Do you have any further recommendations. ? Also need order to d/c home oxygen. They will not take a verbal.   Dtr is asking daily about getting oxygen taken out of home.

## 2019-07-02 ENCOUNTER — ANTI-COAG VISIT (OUTPATIENT)
Dept: PHARMACY | Age: 84
End: 2019-07-02

## 2019-07-02 ENCOUNTER — CARE COORDINATION (OUTPATIENT)
Dept: CASE MANAGEMENT | Age: 84
End: 2019-07-02

## 2019-07-02 ENCOUNTER — HOSPITAL ENCOUNTER (OUTPATIENT)
Age: 84
Setting detail: SPECIMEN
Discharge: HOME | End: 2019-07-02
Payer: MEDICARE

## 2019-07-02 DIAGNOSIS — Z86.718 HISTORY OF DVT (DEEP VEIN THROMBOSIS): ICD-10-CM

## 2019-07-02 DIAGNOSIS — Z86.73 HISTORY OF EMBOLIC STROKE: ICD-10-CM

## 2019-07-02 DIAGNOSIS — Z86.711 HISTORY OF PULMONARY EMBOLISM: ICD-10-CM

## 2019-07-02 DIAGNOSIS — I48.19 PERSISTENT ATRIAL FIBRILLATION (HCC): ICD-10-CM

## 2019-07-02 LAB — INR BLD: 1.59 (ref 0.85–1.13)

## 2019-07-02 PROCEDURE — 85610 PROTHROMBIN TIME: CPT

## 2019-07-02 NOTE — TELEPHONE ENCOUNTER
Spoke to dtr and advised her of Adela's recommendations. She states the [facility] health nurse has been checking pulse ox every visit. I left a message at home health to call back with pulse ox numbers on room air.

## 2019-07-09 ENCOUNTER — ANTI-COAG VISIT (OUTPATIENT)
Dept: PHARMACY | Age: 84
End: 2019-07-09

## 2019-07-09 ENCOUNTER — HOSPITAL ENCOUNTER (OUTPATIENT)
Age: 84
Setting detail: SPECIMEN
Discharge: HOME | End: 2019-07-09
Payer: MEDICARE

## 2019-07-09 DIAGNOSIS — I48.19 PERSISTENT ATRIAL FIBRILLATION (HCC): ICD-10-CM

## 2019-07-09 DIAGNOSIS — Z86.73 HISTORY OF EMBOLIC STROKE: ICD-10-CM

## 2019-07-09 DIAGNOSIS — Z86.711 HISTORY OF PULMONARY EMBOLISM: ICD-10-CM

## 2019-07-09 DIAGNOSIS — Z86.718 HISTORY OF DVT (DEEP VEIN THROMBOSIS): ICD-10-CM

## 2019-07-09 LAB — INR BLD: 2.11 (ref 0.85–1.13)

## 2019-07-09 PROCEDURE — 85610 PROTHROMBIN TIME: CPT

## 2019-07-18 ENCOUNTER — HOSPITAL ENCOUNTER (OUTPATIENT)
Dept: PHARMACY | Age: 84
Setting detail: THERAPIES SERIES
Discharge: HOME | End: 2019-07-18
Payer: MEDICARE

## 2019-07-18 DIAGNOSIS — Z86.711 HISTORY OF PULMONARY EMBOLISM: ICD-10-CM

## 2019-07-18 DIAGNOSIS — Z86.718 HISTORY OF DVT (DEEP VEIN THROMBOSIS): ICD-10-CM

## 2019-07-18 DIAGNOSIS — Z86.73 HISTORY OF EMBOLIC STROKE: ICD-10-CM

## 2019-07-18 DIAGNOSIS — I48.19 PERSISTENT ATRIAL FIBRILLATION (HCC): ICD-10-CM

## 2019-07-18 LAB — POC INR: 3.1 (ref 0.8–1.2)

## 2019-07-18 PROCEDURE — 99211 OFF/OP EST MAY X REQ PHY/QHP: CPT

## 2019-07-18 PROCEDURE — 85610 PROTHROMBIN TIME: CPT

## 2019-07-18 PROCEDURE — 36416 COLLJ CAPILLARY BLOOD SPEC: CPT

## 2019-07-29 ENCOUNTER — HOSPITAL ENCOUNTER (OUTPATIENT)
Dept: PHARMACY | Age: 84
Setting detail: THERAPIES SERIES
Discharge: HOME | End: 2019-07-29
Payer: MEDICARE

## 2019-07-29 DIAGNOSIS — Z86.711 HISTORY OF PULMONARY EMBOLISM: ICD-10-CM

## 2019-07-29 DIAGNOSIS — I48.19 PERSISTENT ATRIAL FIBRILLATION (HCC): ICD-10-CM

## 2019-07-29 DIAGNOSIS — Z86.73 HISTORY OF EMBOLIC STROKE: ICD-10-CM

## 2019-07-29 DIAGNOSIS — Z86.718 HISTORY OF DVT (DEEP VEIN THROMBOSIS): ICD-10-CM

## 2019-07-29 LAB — POC INR: 3 (ref 0.8–1.2)

## 2019-07-29 PROCEDURE — 85610 PROTHROMBIN TIME: CPT

## 2019-07-29 PROCEDURE — 36416 COLLJ CAPILLARY BLOOD SPEC: CPT

## 2019-07-29 PROCEDURE — 99211 OFF/OP EST MAY X REQ PHY/QHP: CPT

## 2019-08-06 ENCOUNTER — PROCEDURE VISIT (OUTPATIENT)
Dept: CARDIOLOGY CLINIC | Age: 84
End: 2019-08-06
Payer: MEDICARE

## 2019-08-06 DIAGNOSIS — Z95.0 S/P CARDIAC PACEMAKER PROCEDURE: Primary | ICD-10-CM

## 2019-08-06 PROCEDURE — 93296 REM INTERROG EVL PM/IDS: CPT | Performed by: INTERNAL MEDICINE

## 2019-08-06 PROCEDURE — 93294 REM INTERROG EVL PM/LDLS PM: CPT | Performed by: INTERNAL MEDICINE

## 2019-08-08 ENCOUNTER — HOSPITAL ENCOUNTER (OUTPATIENT)
Dept: PHARMACY | Age: 84
Setting detail: THERAPIES SERIES
Discharge: HOME | End: 2019-08-08
Payer: MEDICARE

## 2019-08-08 DIAGNOSIS — I48.19 PERSISTENT ATRIAL FIBRILLATION (HCC): ICD-10-CM

## 2019-08-08 DIAGNOSIS — Z86.73 HISTORY OF EMBOLIC STROKE: ICD-10-CM

## 2019-08-08 DIAGNOSIS — Z86.718 HISTORY OF DVT (DEEP VEIN THROMBOSIS): ICD-10-CM

## 2019-08-08 DIAGNOSIS — Z86.711 HISTORY OF PULMONARY EMBOLISM: ICD-10-CM

## 2019-08-08 LAB — POC INR: 3.2 (ref 0.8–1.2)

## 2019-08-08 PROCEDURE — 36416 COLLJ CAPILLARY BLOOD SPEC: CPT

## 2019-08-08 PROCEDURE — 99211 OFF/OP EST MAY X REQ PHY/QHP: CPT

## 2019-08-08 PROCEDURE — 85610 PROTHROMBIN TIME: CPT

## 2019-08-21 ENCOUNTER — HOSPITAL ENCOUNTER (OUTPATIENT)
Age: 84
Discharge: HOME | End: 2019-08-21
Payer: MEDICARE

## 2019-08-21 ENCOUNTER — HOSPITAL ENCOUNTER (OUTPATIENT)
Dept: PHARMACY | Age: 84
Setting detail: THERAPIES SERIES
Discharge: HOME | End: 2019-08-21
Payer: MEDICARE

## 2019-08-21 DIAGNOSIS — I48.19 PERSISTENT ATRIAL FIBRILLATION (HCC): ICD-10-CM

## 2019-08-21 DIAGNOSIS — Z86.711 HISTORY OF PULMONARY EMBOLISM: ICD-10-CM

## 2019-08-21 DIAGNOSIS — Z86.718 HISTORY OF DVT (DEEP VEIN THROMBOSIS): ICD-10-CM

## 2019-08-21 DIAGNOSIS — Z86.73 HISTORY OF EMBOLIC STROKE: ICD-10-CM

## 2019-08-21 LAB
ERYTHROCYTE [DISTWIDTH] IN BLOOD BY AUTOMATED COUNT: 14.6 % (ref 11.5–14.5)
ERYTHROCYTE [DISTWIDTH] IN BLOOD BY AUTOMATED COUNT: 50.4 FL (ref 35–45)
FOLATE: > 20 NG/ML (ref 4.8–24.2)
HCT VFR BLD CALC: 50.7 % (ref 37–47)
HEMOGLOBIN: 16.1 GM/DL (ref 12–16)
MCH RBC QN AUTO: 29.9 PG (ref 26–33)
MCHC RBC AUTO-ENTMCNC: 31.8 GM/DL (ref 32.2–35.5)
MCV RBC AUTO: 94.2 FL (ref 81–99)
PLATELET # BLD: 184 THOU/MM3 (ref 130–400)
PMV BLD AUTO: 10.1 FL (ref 9.4–12.4)
POC INR: 3.3 (ref 0.8–1.2)
RBC # BLD: 5.38 MILL/MM3 (ref 4.2–5.4)
VITAMIN B-12: > 2000 PG/ML (ref 211–911)
VITAMIN D 25-HYDROXY: 48 NG/ML (ref 30–100)
WBC # BLD: 6.2 THOU/MM3 (ref 4.8–10.8)

## 2019-08-21 PROCEDURE — 82607 VITAMIN B-12: CPT

## 2019-08-21 PROCEDURE — 85027 COMPLETE CBC AUTOMATED: CPT

## 2019-08-21 PROCEDURE — 85610 PROTHROMBIN TIME: CPT

## 2019-08-21 PROCEDURE — 82746 ASSAY OF FOLIC ACID SERUM: CPT

## 2019-08-21 PROCEDURE — 36416 COLLJ CAPILLARY BLOOD SPEC: CPT

## 2019-08-21 PROCEDURE — 82306 VITAMIN D 25 HYDROXY: CPT

## 2019-08-21 PROCEDURE — 99211 OFF/OP EST MAY X REQ PHY/QHP: CPT

## 2019-08-21 PROCEDURE — 36415 COLL VENOUS BLD VENIPUNCTURE: CPT

## 2019-09-17 ENCOUNTER — HOSPITAL ENCOUNTER (OUTPATIENT)
Dept: PHARMACY | Age: 84
Setting detail: THERAPIES SERIES
Discharge: HOME | End: 2019-09-17
Payer: MEDICARE

## 2019-09-17 DIAGNOSIS — Z86.718 HISTORY OF DVT (DEEP VEIN THROMBOSIS): ICD-10-CM

## 2019-09-17 DIAGNOSIS — Z86.73 HISTORY OF EMBOLIC STROKE: ICD-10-CM

## 2019-09-17 DIAGNOSIS — Z86.711 HISTORY OF PULMONARY EMBOLISM: ICD-10-CM

## 2019-09-17 DIAGNOSIS — I48.19 PERSISTENT ATRIAL FIBRILLATION (HCC): ICD-10-CM

## 2019-09-17 LAB — POC INR: 3.2 (ref 0.8–1.2)

## 2019-09-17 PROCEDURE — 85610 PROTHROMBIN TIME: CPT

## 2019-09-17 PROCEDURE — 36416 COLLJ CAPILLARY BLOOD SPEC: CPT

## 2019-09-17 PROCEDURE — 99211 OFF/OP EST MAY X REQ PHY/QHP: CPT

## 2019-09-17 RX ORDER — PENTOXIFYLLINE 400 MG/1
400 TABLET, EXTENDED RELEASE ORAL
COMMUNITY
Start: 2019-09-03 | End: 2019-12-04

## 2019-09-17 RX ORDER — NYSTATIN 100000 [USP'U]/G
POWDER TOPICAL
COMMUNITY
Start: 2019-09-04 | End: 2019-09-17

## 2019-10-09 ENCOUNTER — HOSPITAL ENCOUNTER (OUTPATIENT)
Dept: PHARMACY | Age: 84
Setting detail: THERAPIES SERIES
Discharge: HOME | End: 2019-10-09
Payer: MEDICARE

## 2019-10-09 ENCOUNTER — OFFICE VISIT (OUTPATIENT)
Dept: CARDIOLOGY CLINIC | Age: 84
End: 2019-10-09
Payer: MEDICARE

## 2019-10-09 VITALS — DIASTOLIC BLOOD PRESSURE: 92 MMHG | HEART RATE: 68 BPM | SYSTOLIC BLOOD PRESSURE: 164 MMHG

## 2019-10-09 DIAGNOSIS — Z86.73 HISTORY OF EMBOLIC STROKE: ICD-10-CM

## 2019-10-09 DIAGNOSIS — Z86.711 HISTORY OF PULMONARY EMBOLISM: ICD-10-CM

## 2019-10-09 DIAGNOSIS — I25.10 CORONARY ARTERY DISEASE INVOLVING NATIVE CORONARY ARTERY OF NATIVE HEART WITHOUT ANGINA PECTORIS: ICD-10-CM

## 2019-10-09 DIAGNOSIS — I48.19 PERSISTENT ATRIAL FIBRILLATION (HCC): ICD-10-CM

## 2019-10-09 DIAGNOSIS — I10 ESSENTIAL HYPERTENSION: ICD-10-CM

## 2019-10-09 DIAGNOSIS — Z86.718 HISTORY OF DVT (DEEP VEIN THROMBOSIS): ICD-10-CM

## 2019-10-09 DIAGNOSIS — Z95.0 PACEMAKER: Primary | ICD-10-CM

## 2019-10-09 LAB — POC INR: 2.4 (ref 0.8–1.2)

## 2019-10-09 PROCEDURE — 99211 OFF/OP EST MAY X REQ PHY/QHP: CPT

## 2019-10-09 PROCEDURE — G8484 FLU IMMUNIZE NO ADMIN: HCPCS | Performed by: NUCLEAR MEDICINE

## 2019-10-09 PROCEDURE — G8428 CUR MEDS NOT DOCUMENT: HCPCS | Performed by: NUCLEAR MEDICINE

## 2019-10-09 PROCEDURE — 36416 COLLJ CAPILLARY BLOOD SPEC: CPT

## 2019-10-09 PROCEDURE — 1123F ACP DISCUSS/DSCN MKR DOCD: CPT | Performed by: NUCLEAR MEDICINE

## 2019-10-09 PROCEDURE — 1036F TOBACCO NON-USER: CPT | Performed by: NUCLEAR MEDICINE

## 2019-10-09 PROCEDURE — 99213 OFFICE O/P EST LOW 20 MIN: CPT | Performed by: NUCLEAR MEDICINE

## 2019-10-09 PROCEDURE — 1090F PRES/ABSN URINE INCON ASSESS: CPT | Performed by: NUCLEAR MEDICINE

## 2019-10-09 PROCEDURE — G8598 ASA/ANTIPLAT THER USED: HCPCS | Performed by: NUCLEAR MEDICINE

## 2019-10-09 PROCEDURE — 4040F PNEUMOC VAC/ADMIN/RCVD: CPT | Performed by: NUCLEAR MEDICINE

## 2019-10-09 PROCEDURE — G8417 CALC BMI ABV UP PARAM F/U: HCPCS | Performed by: NUCLEAR MEDICINE

## 2019-10-09 PROCEDURE — G8400 PT W/DXA NO RESULTS DOC: HCPCS | Performed by: NUCLEAR MEDICINE

## 2019-10-09 PROCEDURE — 85610 PROTHROMBIN TIME: CPT

## 2019-10-16 ENCOUNTER — CARE COORDINATION (OUTPATIENT)
Dept: CARE COORDINATION | Age: 84
End: 2019-10-16

## 2019-10-16 ENCOUNTER — NURSE ONLY (OUTPATIENT)
Dept: LAB | Age: 84
End: 2019-10-16

## 2019-10-16 DIAGNOSIS — R53.83 FATIGUE, UNSPECIFIED TYPE: ICD-10-CM

## 2019-10-16 DIAGNOSIS — R53.83 FATIGUE, UNSPECIFIED TYPE: Primary | ICD-10-CM

## 2019-10-16 LAB
REASON FOR REJECTION: NORMAL
REJECTED TEST: NORMAL

## 2019-10-17 ENCOUNTER — TELEPHONE (OUTPATIENT)
Dept: INTERNAL MEDICINE CLINIC | Age: 84
End: 2019-10-17

## 2019-10-17 ENCOUNTER — CARE COORDINATION (OUTPATIENT)
Dept: CARE COORDINATION | Age: 84
End: 2019-10-17

## 2019-10-17 ENCOUNTER — TELEPHONE (OUTPATIENT)
Dept: PHARMACY | Age: 84
End: 2019-10-17

## 2019-10-17 LAB
BACTERIA: ABNORMAL
BILIRUBIN URINE: NEGATIVE
BLOOD, URINE: ABNORMAL
CASTS: ABNORMAL /LPF
CASTS: ABNORMAL /LPF
CHARACTER, URINE: ABNORMAL
COLOR: ABNORMAL
CRYSTALS: ABNORMAL
EPITHELIAL CELLS, UA: ABNORMAL /HPF
GLUCOSE, URINE: NEGATIVE MG/DL
KETONES, URINE: NEGATIVE
LEUKOCYTE ESTERASE, URINE: ABNORMAL
MISCELLANEOUS LAB TEST RESULT: ABNORMAL
NITRITE, URINE: NEGATIVE
PH UA: 5.5 (ref 5–9)
PROTEIN UA: ABNORMAL MG/DL
RBC URINE: ABNORMAL /HPF
RENAL EPITHELIAL, UA: ABNORMAL
SPECIFIC GRAVITY UA: 1.02 (ref 1–1.03)
UROBILINOGEN, URINE: 0.2 EU/DL (ref 0–1)
WBC UA: ABNORMAL /HPF
YEAST: ABNORMAL

## 2019-10-17 RX ORDER — SULFAMETHOXAZOLE AND TRIMETHOPRIM 800; 160 MG/1; MG/1
1 TABLET ORAL 2 TIMES DAILY
Qty: 6 TABLET | Refills: 0 | Status: SHIPPED | OUTPATIENT
Start: 2019-10-17 | End: 2019-10-20

## 2019-10-21 ENCOUNTER — CARE COORDINATION (OUTPATIENT)
Dept: CARE COORDINATION | Age: 84
End: 2019-10-21

## 2019-10-21 DIAGNOSIS — N30.01 ACUTE CYSTITIS WITH HEMATURIA: ICD-10-CM

## 2019-10-21 DIAGNOSIS — R53.83 FATIGUE, UNSPECIFIED TYPE: Primary | ICD-10-CM

## 2019-10-22 ENCOUNTER — NURSE ONLY (OUTPATIENT)
Dept: LAB | Age: 84
End: 2019-10-22

## 2019-10-22 ENCOUNTER — OFFICE VISIT (OUTPATIENT)
Dept: INTERNAL MEDICINE CLINIC | Age: 84
End: 2019-10-22
Payer: MEDICARE

## 2019-10-22 ENCOUNTER — TELEPHONE (OUTPATIENT)
Dept: INTERNAL MEDICINE CLINIC | Age: 84
End: 2019-10-22

## 2019-10-22 ENCOUNTER — HOSPITAL ENCOUNTER (OUTPATIENT)
Age: 84
Discharge: HOME | End: 2019-10-22
Payer: MEDICARE

## 2019-10-22 VITALS
OXYGEN SATURATION: 93 % | RESPIRATION RATE: 12 BRPM | WEIGHT: 229.72 LBS | DIASTOLIC BLOOD PRESSURE: 72 MMHG | HEART RATE: 100 BPM | HEIGHT: 68 IN | SYSTOLIC BLOOD PRESSURE: 130 MMHG | BODY MASS INDEX: 34.82 KG/M2

## 2019-10-22 DIAGNOSIS — R44.3 HALLUCINATIONS: ICD-10-CM

## 2019-10-22 DIAGNOSIS — G47.00 INSOMNIA, UNSPECIFIED TYPE: ICD-10-CM

## 2019-10-22 DIAGNOSIS — R53.83 FATIGUE, UNSPECIFIED TYPE: ICD-10-CM

## 2019-10-22 DIAGNOSIS — N30.01 ACUTE CYSTITIS WITH HEMATURIA: ICD-10-CM

## 2019-10-22 DIAGNOSIS — R79.89 CREATININE ELEVATION: Primary | ICD-10-CM

## 2019-10-22 DIAGNOSIS — I50.22 CHRONIC SYSTOLIC HEART FAILURE (HCC): ICD-10-CM

## 2019-10-22 LAB
ANION GAP SERPL CALCULATED.3IONS-SCNC: 12 MEQ/L (ref 8–16)
BACTERIA: ABNORMAL /HPF
BILIRUBIN URINE: NEGATIVE
BLOOD, URINE: NEGATIVE
BUN BLDV-MCNC: 22 MG/DL (ref 7–22)
CALCIUM SERPL-MCNC: 9.5 MG/DL (ref 8.5–10.5)
CASTS 2: ABNORMAL /LPF
CASTS UA: ABNORMAL /LPF
CHARACTER, URINE: CLEAR
CHLORIDE BLD-SCNC: 100 MEQ/L (ref 98–111)
CO2: 26 MEQ/L (ref 23–33)
COLOR: YELLOW
CREAT SERPL-MCNC: 1.5 MG/DL (ref 0.4–1.2)
CRYSTALS, UA: ABNORMAL
EPITHELIAL CELLS, UA: ABNORMAL /HPF
ERYTHROCYTE [DISTWIDTH] IN BLOOD BY AUTOMATED COUNT: 15.2 % (ref 11.5–14.5)
ERYTHROCYTE [DISTWIDTH] IN BLOOD BY AUTOMATED COUNT: 51.3 FL (ref 35–45)
GFR SERPL CREATININE-BSD FRML MDRD: 33 ML/MIN/1.73M2
GLUCOSE BLD-MCNC: 123 MG/DL (ref 70–108)
GLUCOSE URINE: NEGATIVE MG/DL
HCT VFR BLD CALC: 46.2 % (ref 37–47)
HEMOGLOBIN: 14.8 GM/DL (ref 12–16)
KETONES, URINE: NEGATIVE
LEUKOCYTE ESTERASE, URINE: NEGATIVE
MCH RBC QN AUTO: 29.5 PG (ref 26–33)
MCHC RBC AUTO-ENTMCNC: 32 GM/DL (ref 32.2–35.5)
MCV RBC AUTO: 92 FL (ref 81–99)
MISCELLANEOUS 2: ABNORMAL
NITRITE, URINE: NEGATIVE
PH UA: 6.5 (ref 5–9)
PLATELET # BLD: 146 THOU/MM3 (ref 130–400)
PMV BLD AUTO: 10.5 FL (ref 9.4–12.4)
POTASSIUM SERPL-SCNC: 4.6 MEQ/L (ref 3.5–5.2)
PROTEIN UA: 30
RBC # BLD: 5.02 MILL/MM3 (ref 4.2–5.4)
RBC URINE: ABNORMAL /HPF
RENAL EPITHELIAL, UA: ABNORMAL
SODIUM BLD-SCNC: 138 MEQ/L (ref 135–145)
SPECIFIC GRAVITY, URINE: 1.02 (ref 1–1.03)
UROBILINOGEN, URINE: 1 EU/DL (ref 0–1)
WBC # BLD: 5.9 THOU/MM3 (ref 4.8–10.8)
WBC UA: ABNORMAL /HPF
YEAST: ABNORMAL

## 2019-10-22 PROCEDURE — G8598 ASA/ANTIPLAT THER USED: HCPCS | Performed by: NURSE PRACTITIONER

## 2019-10-22 PROCEDURE — 4040F PNEUMOC VAC/ADMIN/RCVD: CPT | Performed by: NURSE PRACTITIONER

## 2019-10-22 PROCEDURE — 1090F PRES/ABSN URINE INCON ASSESS: CPT | Performed by: NURSE PRACTITIONER

## 2019-10-22 PROCEDURE — 36415 COLL VENOUS BLD VENIPUNCTURE: CPT

## 2019-10-22 PROCEDURE — G8484 FLU IMMUNIZE NO ADMIN: HCPCS | Performed by: NURSE PRACTITIONER

## 2019-10-22 PROCEDURE — 1036F TOBACCO NON-USER: CPT | Performed by: NURSE PRACTITIONER

## 2019-10-22 PROCEDURE — G8427 DOCREV CUR MEDS BY ELIG CLIN: HCPCS | Performed by: NURSE PRACTITIONER

## 2019-10-22 PROCEDURE — G8417 CALC BMI ABV UP PARAM F/U: HCPCS | Performed by: NURSE PRACTITIONER

## 2019-10-22 PROCEDURE — 85027 COMPLETE CBC AUTOMATED: CPT

## 2019-10-22 PROCEDURE — G8400 PT W/DXA NO RESULTS DOC: HCPCS | Performed by: NURSE PRACTITIONER

## 2019-10-22 PROCEDURE — 1123F ACP DISCUSS/DSCN MKR DOCD: CPT | Performed by: NURSE PRACTITIONER

## 2019-10-22 PROCEDURE — 99214 OFFICE O/P EST MOD 30 MIN: CPT | Performed by: NURSE PRACTITIONER

## 2019-10-22 PROCEDURE — 80048 BASIC METABOLIC PNL TOTAL CA: CPT

## 2019-10-22 RX ORDER — NYSTATIN 10B UNIT
1 POWDER (EA) MISCELLANEOUS 2 TIMES DAILY PRN
Qty: 3 EACH | Refills: 2 | Status: SHIPPED | OUTPATIENT
Start: 2019-10-22 | End: 2021-01-01

## 2019-10-22 ASSESSMENT — ENCOUNTER SYMPTOMS
SHORTNESS OF BREATH: 0
CONSTIPATION: 0
BACK PAIN: 0
SINUS PRESSURE: 0
VOMITING: 0
BLOOD IN STOOL: 0
DIARRHEA: 0
WHEEZING: 0
COUGH: 0
PHOTOPHOBIA: 0
TROUBLE SWALLOWING: 0
SORE THROAT: 0
ABDOMINAL DISTENTION: 0
NAUSEA: 0
SINUS PAIN: 0
ABDOMINAL PAIN: 0

## 2019-10-25 RX ORDER — METOPROLOL SUCCINATE 50 MG/1
TABLET, EXTENDED RELEASE ORAL
Qty: 90 TABLET | Refills: 4 | Status: SHIPPED | OUTPATIENT
Start: 2019-10-25 | End: 2021-01-01

## 2019-10-28 ENCOUNTER — HOSPITAL ENCOUNTER (OUTPATIENT)
Dept: PHARMACY | Age: 84
Setting detail: THERAPIES SERIES
Discharge: HOME | End: 2019-10-28
Payer: MEDICARE

## 2019-10-28 DIAGNOSIS — Z86.73 HISTORY OF EMBOLIC STROKE: ICD-10-CM

## 2019-10-28 DIAGNOSIS — Z86.718 HISTORY OF DVT (DEEP VEIN THROMBOSIS): ICD-10-CM

## 2019-10-28 DIAGNOSIS — Z86.711 HISTORY OF PULMONARY EMBOLISM: ICD-10-CM

## 2019-10-28 DIAGNOSIS — I48.19 PERSISTENT ATRIAL FIBRILLATION (HCC): ICD-10-CM

## 2019-10-28 LAB — POC INR: 2.4 (ref 0.8–1.2)

## 2019-10-28 PROCEDURE — 99211 OFF/OP EST MAY X REQ PHY/QHP: CPT

## 2019-10-28 PROCEDURE — 36416 COLLJ CAPILLARY BLOOD SPEC: CPT

## 2019-10-28 PROCEDURE — 85610 PROTHROMBIN TIME: CPT

## 2019-11-13 ENCOUNTER — TELEPHONE (OUTPATIENT)
Dept: CARDIOLOGY CLINIC | Age: 84
End: 2019-11-13

## 2019-11-14 ENCOUNTER — PROCEDURE VISIT (OUTPATIENT)
Dept: CARDIOLOGY CLINIC | Age: 84
End: 2019-11-14
Payer: MEDICARE

## 2019-11-14 DIAGNOSIS — Z95.0 PACEMAKER: Primary | ICD-10-CM

## 2019-11-14 PROCEDURE — 93296 REM INTERROG EVL PM/IDS: CPT | Performed by: INTERNAL MEDICINE

## 2019-11-14 PROCEDURE — 93294 REM INTERROG EVL PM/LDLS PM: CPT | Performed by: INTERNAL MEDICINE

## 2019-11-25 ENCOUNTER — HOSPITAL ENCOUNTER (OUTPATIENT)
Dept: PHARMACY | Age: 84
Setting detail: THERAPIES SERIES
Discharge: HOME | End: 2019-11-25
Payer: MEDICARE

## 2019-11-25 DIAGNOSIS — Z86.711 HISTORY OF PULMONARY EMBOLISM: ICD-10-CM

## 2019-11-25 DIAGNOSIS — Z86.73 HISTORY OF EMBOLIC STROKE: ICD-10-CM

## 2019-11-25 DIAGNOSIS — Z86.718 HISTORY OF DVT (DEEP VEIN THROMBOSIS): ICD-10-CM

## 2019-11-25 DIAGNOSIS — I48.19 PERSISTENT ATRIAL FIBRILLATION (HCC): ICD-10-CM

## 2019-11-25 LAB — POC INR: 2.5 (ref 0.8–1.2)

## 2019-11-25 PROCEDURE — 85610 PROTHROMBIN TIME: CPT

## 2019-11-25 PROCEDURE — 36416 COLLJ CAPILLARY BLOOD SPEC: CPT

## 2019-11-25 PROCEDURE — 99211 OFF/OP EST MAY X REQ PHY/QHP: CPT

## 2019-12-04 ENCOUNTER — TELEPHONE (OUTPATIENT)
Dept: PHARMACY | Age: 84
End: 2019-12-04

## 2019-12-11 ENCOUNTER — HOSPITAL ENCOUNTER (EMERGENCY)
Age: 84
Discharge: HOME | End: 2019-12-11
Payer: MEDICARE

## 2019-12-11 ENCOUNTER — CARE COORDINATION (OUTPATIENT)
Dept: CARE COORDINATION | Age: 84
End: 2019-12-11

## 2019-12-11 VITALS
HEART RATE: 82 BPM | DIASTOLIC BLOOD PRESSURE: 82 MMHG | OXYGEN SATURATION: 95 % | TEMPERATURE: 96.5 F | SYSTOLIC BLOOD PRESSURE: 154 MMHG | WEIGHT: 225 LBS | HEIGHT: 68 IN | RESPIRATION RATE: 16 BRPM | BODY MASS INDEX: 34.1 KG/M2

## 2019-12-11 DIAGNOSIS — R30.0 DYSURIA: Primary | ICD-10-CM

## 2019-12-11 DIAGNOSIS — N30.01 ACUTE CYSTITIS WITH HEMATURIA: Primary | ICD-10-CM

## 2019-12-11 LAB
BILIRUBIN URINE: NEGATIVE
BLOOD, URINE: ABNORMAL
CHARACTER, URINE: ABNORMAL
COLOR: ABNORMAL
GLUCOSE, URINE: NEGATIVE MG/DL
KETONES, URINE: NEGATIVE
LEUKOCYTES, UA: ABNORMAL
NITRITE, URINE: NEGATIVE
PH UA: 6 (ref 5–9)
PROTEIN UA: 100 MG/DL
REFLEX TO URINE C & S: ABNORMAL
SPECIFIC GRAVITY UA: >= 1.03 (ref 1–1.03)
UROBILINOGEN, URINE: 1 EU/DL (ref 0–1)

## 2019-12-11 PROCEDURE — 87086 URINE CULTURE/COLONY COUNT: CPT

## 2019-12-11 PROCEDURE — 99213 OFFICE O/P EST LOW 20 MIN: CPT | Performed by: NURSE PRACTITIONER

## 2019-12-11 PROCEDURE — 87077 CULTURE AEROBIC IDENTIFY: CPT

## 2019-12-11 PROCEDURE — 87186 SC STD MICRODIL/AGAR DIL: CPT

## 2019-12-11 PROCEDURE — 81003 URINALYSIS AUTO W/O SCOPE: CPT

## 2019-12-11 PROCEDURE — 99214 OFFICE O/P EST MOD 30 MIN: CPT

## 2019-12-11 RX ORDER — HYDROCODONE BITARTRATE AND ACETAMINOPHEN 5; 325 MG/1; MG/1
TABLET ORAL
COMMUNITY
Start: 2019-12-03 | End: 2020-01-06

## 2019-12-11 RX ORDER — NITROFURANTOIN 25; 75 MG/1; MG/1
100 CAPSULE ORAL 2 TIMES DAILY
Qty: 10 CAPSULE | Refills: 0 | Status: SHIPPED | OUTPATIENT
Start: 2019-12-11 | End: 2019-12-16

## 2019-12-11 ASSESSMENT — PAIN DESCRIPTION - DESCRIPTORS: DESCRIPTORS: ACHING

## 2019-12-11 ASSESSMENT — ENCOUNTER SYMPTOMS
VOMITING: 0
NAUSEA: 1
BLOOD IN STOOL: 0
SHORTNESS OF BREATH: 0
DIARRHEA: 0
ABDOMINAL PAIN: 1
BACK PAIN: 0
COUGH: 0
CONSTIPATION: 0

## 2019-12-11 ASSESSMENT — PAIN DESCRIPTION - PROGRESSION: CLINICAL_PROGRESSION: NOT CHANGED

## 2019-12-11 ASSESSMENT — PAIN DESCRIPTION - ORIENTATION: ORIENTATION: RIGHT

## 2019-12-11 ASSESSMENT — PAIN DESCRIPTION - ONSET: ONSET: AWAKENED FROM SLEEP

## 2019-12-11 ASSESSMENT — PAIN DESCRIPTION - FREQUENCY: FREQUENCY: CONTINUOUS

## 2019-12-11 ASSESSMENT — PAIN DESCRIPTION - LOCATION: LOCATION: ABDOMEN

## 2019-12-11 ASSESSMENT — PAIN DESCRIPTION - PAIN TYPE: TYPE: ACUTE PAIN

## 2019-12-11 ASSESSMENT — PAIN SCALES - GENERAL: PAINLEVEL_OUTOF10: 7

## 2019-12-12 ENCOUNTER — TELEPHONE (OUTPATIENT)
Dept: PHARMACY | Age: 84
End: 2019-12-12

## 2019-12-14 LAB
ORGANISM: ABNORMAL
ORGANISM: ABNORMAL
URINE CULTURE REFLEX: ABNORMAL

## 2019-12-17 ENCOUNTER — CARE COORDINATION (OUTPATIENT)
Dept: CARE COORDINATION | Age: 84
End: 2019-12-17

## 2019-12-19 ENCOUNTER — TELEPHONE (OUTPATIENT)
Dept: PHARMACY | Age: 84
End: 2019-12-19

## 2019-12-19 DIAGNOSIS — Z86.711 HISTORY OF PULMONARY EMBOLISM: ICD-10-CM

## 2019-12-19 DIAGNOSIS — Z86.73 HISTORY OF CVA (CEREBROVASCULAR ACCIDENT): ICD-10-CM

## 2019-12-19 DIAGNOSIS — Z86.718 HISTORY OF DVT (DEEP VEIN THROMBOSIS): Primary | ICD-10-CM

## 2019-12-19 DIAGNOSIS — I48.19 PERSISTENT ATRIAL FIBRILLATION (HCC): ICD-10-CM

## 2019-12-21 ENCOUNTER — HOSPITAL ENCOUNTER (INPATIENT)
Age: 84
LOS: 2 days | Discharge: HOME | DRG: 291 | End: 2019-12-24
Attending: EMERGENCY MEDICINE | Admitting: INTERNAL MEDICINE
Payer: MEDICARE

## 2019-12-21 DIAGNOSIS — R09.89 PULMONARY VENOUS CONGESTION: ICD-10-CM

## 2019-12-21 DIAGNOSIS — R06.02 SHORTNESS OF BREATH: Primary | ICD-10-CM

## 2019-12-21 DIAGNOSIS — M79.605 BILATERAL PAIN OF LEG AND FOOT: ICD-10-CM

## 2019-12-21 DIAGNOSIS — M79.604 BILATERAL PAIN OF LEG AND FOOT: ICD-10-CM

## 2019-12-21 DIAGNOSIS — M79.671 BILATERAL PAIN OF LEG AND FOOT: ICD-10-CM

## 2019-12-21 DIAGNOSIS — M79.672 BILATERAL PAIN OF LEG AND FOOT: ICD-10-CM

## 2019-12-21 LAB
EKG ATRIAL RATE: 47 BPM
EKG Q-T INTERVAL: 462 MS
EKG QRS DURATION: 146 MS
EKG QTC CALCULATION (BAZETT): 549 MS
EKG R AXIS: 48 DEGREES
EKG T AXIS: -14 DEGREES
EKG VENTRICULAR RATE: 85 BPM

## 2019-12-21 PROCEDURE — 99285 EMERGENCY DEPT VISIT HI MDM: CPT

## 2019-12-21 PROCEDURE — 93005 ELECTROCARDIOGRAM TRACING: CPT | Performed by: EMERGENCY MEDICINE

## 2019-12-22 ENCOUNTER — APPOINTMENT (OUTPATIENT)
Dept: GENERAL RADIOLOGY | Age: 84
DRG: 291 | End: 2019-12-22
Payer: MEDICARE

## 2019-12-22 PROBLEM — I16.0 HYPERTENSIVE URGENCY: Status: ACTIVE | Noted: 2019-12-22

## 2019-12-22 LAB
ALBUMIN SERPL-MCNC: 3.6 G/DL (ref 3.5–5.1)
ALP BLD-CCNC: 79 U/L (ref 38–126)
ALT SERPL-CCNC: 7 U/L (ref 11–66)
ANION GAP SERPL CALCULATED.3IONS-SCNC: 14 MEQ/L (ref 8–16)
AST SERPL-CCNC: 20 U/L (ref 5–40)
BACTERIA: ABNORMAL /HPF
BASOPHILS # BLD: 1.1 %
BASOPHILS ABSOLUTE: 0.1 THOU/MM3 (ref 0–0.1)
BILIRUB SERPL-MCNC: 1.3 MG/DL (ref 0.3–1.2)
BILIRUBIN DIRECT: < 0.2 MG/DL (ref 0–0.3)
BILIRUBIN URINE: NEGATIVE
BLOOD, URINE: ABNORMAL
BUN BLDV-MCNC: 19 MG/DL (ref 7–22)
CALCIUM SERPL-MCNC: 9.4 MG/DL (ref 8.5–10.5)
CASTS 2: ABNORMAL /LPF
CASTS UA: ABNORMAL /LPF
CHARACTER, URINE: ABNORMAL
CHLORIDE BLD-SCNC: 99 MEQ/L (ref 98–111)
CO2: 26 MEQ/L (ref 23–33)
COLOR: YELLOW
CREAT SERPL-MCNC: 0.9 MG/DL (ref 0.4–1.2)
CRYSTALS, UA: ABNORMAL
EOSINOPHIL # BLD: 8.9 %
EOSINOPHILS ABSOLUTE: 0.6 THOU/MM3 (ref 0–0.4)
EPITHELIAL CELLS, UA: ABNORMAL /HPF
ERYTHROCYTE [DISTWIDTH] IN BLOOD BY AUTOMATED COUNT: 15.9 % (ref 11.5–14.5)
ERYTHROCYTE [DISTWIDTH] IN BLOOD BY AUTOMATED COUNT: 53.1 FL (ref 35–45)
GFR SERPL CREATININE-BSD FRML MDRD: 60 ML/MIN/1.73M2
GLUCOSE BLD-MCNC: 139 MG/DL (ref 70–108)
GLUCOSE URINE: NEGATIVE MG/DL
HCT VFR BLD CALC: 48.6 % (ref 37–47)
HEMOGLOBIN: 15.4 GM/DL (ref 12–16)
IMMATURE GRANS (ABS): 0.02 THOU/MM3 (ref 0–0.07)
IMMATURE GRANULOCYTES: 0.3 %
INR BLD: 1.95 (ref 0.85–1.13)
KETONES, URINE: NEGATIVE
LEUKOCYTE ESTERASE, URINE: ABNORMAL
LYMPHOCYTES # BLD: 19.1 %
LYMPHOCYTES ABSOLUTE: 1.3 THOU/MM3 (ref 1–4.8)
MCH RBC QN AUTO: 28.8 PG (ref 26–33)
MCHC RBC AUTO-ENTMCNC: 31.7 GM/DL (ref 32.2–35.5)
MCV RBC AUTO: 91 FL (ref 81–99)
MISCELLANEOUS 2: ABNORMAL
MONOCYTES # BLD: 10 %
MONOCYTES ABSOLUTE: 0.7 THOU/MM3 (ref 0.4–1.3)
NITRITE, URINE: NEGATIVE
NUCLEATED RED BLOOD CELLS: 0 /100 WBC
OSMOLALITY CALCULATION: 282 MOSMOL/KG (ref 275–300)
PH UA: 6.5 (ref 5–9)
PLATELET # BLD: 187 THOU/MM3 (ref 130–400)
PMV BLD AUTO: 11.1 FL (ref 9.4–12.4)
POTASSIUM REFLEX MAGNESIUM: 3.8 MEQ/L (ref 3.5–5.2)
PRO-BNP: 9544 PG/ML (ref 0–1800)
PROTEIN UA: 30
RBC # BLD: 5.34 MILL/MM3 (ref 4.2–5.4)
RBC URINE: ABNORMAL /HPF
RENAL EPITHELIAL, UA: ABNORMAL
SEG NEUTROPHILS: 60.6 %
SEGMENTED NEUTROPHILS ABSOLUTE COUNT: 4.2 THOU/MM3 (ref 1.8–7.7)
SODIUM BLD-SCNC: 139 MEQ/L (ref 135–145)
SPECIFIC GRAVITY, URINE: 1.01 (ref 1–1.03)
TOTAL PROTEIN: 7.7 G/DL (ref 6.1–8)
TROPONIN T: < 0.01 NG/ML
UROBILINOGEN, URINE: 0.2 EU/DL (ref 0–1)
WBC # BLD: 7 THOU/MM3 (ref 4.8–10.8)
WBC UA: > 200 /HPF
YEAST: ABNORMAL

## 2019-12-22 PROCEDURE — 2709999900 HC NON-CHARGEABLE SUPPLY

## 2019-12-22 PROCEDURE — 87086 URINE CULTURE/COLONY COUNT: CPT

## 2019-12-22 PROCEDURE — 93010 ELECTROCARDIOGRAM REPORT: CPT | Performed by: INTERNAL MEDICINE

## 2019-12-22 PROCEDURE — 6370000000 HC RX 637 (ALT 250 FOR IP): Performed by: INTERNAL MEDICINE

## 2019-12-22 PROCEDURE — 2700000000 HC OXYGEN THERAPY PER DAY

## 2019-12-22 PROCEDURE — 80076 HEPATIC FUNCTION PANEL: CPT

## 2019-12-22 PROCEDURE — 81001 URINALYSIS AUTO W/SCOPE: CPT

## 2019-12-22 PROCEDURE — 87077 CULTURE AEROBIC IDENTIFY: CPT

## 2019-12-22 PROCEDURE — 2580000003 HC RX 258: Performed by: PHYSICIAN ASSISTANT

## 2019-12-22 PROCEDURE — 84484 ASSAY OF TROPONIN QUANT: CPT

## 2019-12-22 PROCEDURE — 6370000000 HC RX 637 (ALT 250 FOR IP): Performed by: PHYSICIAN ASSISTANT

## 2019-12-22 PROCEDURE — 36415 COLL VENOUS BLD VENIPUNCTURE: CPT

## 2019-12-22 PROCEDURE — 2500000003 HC RX 250 WO HCPCS: Performed by: EMERGENCY MEDICINE

## 2019-12-22 PROCEDURE — 2140000000 HC CCU INTERMEDIATE R&B

## 2019-12-22 PROCEDURE — 94760 N-INVAS EAR/PLS OXIMETRY 1: CPT

## 2019-12-22 PROCEDURE — 6360000002 HC RX W HCPCS: Performed by: EMERGENCY MEDICINE

## 2019-12-22 PROCEDURE — 85610 PROTHROMBIN TIME: CPT

## 2019-12-22 PROCEDURE — 2500000003 HC RX 250 WO HCPCS: Performed by: INTERNAL MEDICINE

## 2019-12-22 PROCEDURE — 71045 X-RAY EXAM CHEST 1 VIEW: CPT

## 2019-12-22 PROCEDURE — 96375 TX/PRO/DX INJ NEW DRUG ADDON: CPT

## 2019-12-22 PROCEDURE — 87186 SC STD MICRODIL/AGAR DIL: CPT

## 2019-12-22 PROCEDURE — 85025 COMPLETE CBC W/AUTO DIFF WBC: CPT

## 2019-12-22 PROCEDURE — 2580000003 HC RX 258: Performed by: EMERGENCY MEDICINE

## 2019-12-22 PROCEDURE — 96374 THER/PROPH/DIAG INJ IV PUSH: CPT

## 2019-12-22 PROCEDURE — 99999 PR OFFICE/OUTPT VISIT,PROCEDURE ONLY: CPT | Performed by: PHYSICIAN ASSISTANT

## 2019-12-22 PROCEDURE — 80048 BASIC METABOLIC PNL TOTAL CA: CPT

## 2019-12-22 PROCEDURE — 83880 ASSAY OF NATRIURETIC PEPTIDE: CPT

## 2019-12-22 RX ORDER — ISOSORBIDE MONONITRATE 60 MG/1
60 TABLET, EXTENDED RELEASE ORAL DAILY
Status: DISCONTINUED | OUTPATIENT
Start: 2019-12-22 | End: 2019-12-24 | Stop reason: HOSPADM

## 2019-12-22 RX ORDER — POTASSIUM CHLORIDE 20 MEQ/1
40 TABLET, EXTENDED RELEASE ORAL PRN
Status: DISCONTINUED | OUTPATIENT
Start: 2019-12-22 | End: 2019-12-24 | Stop reason: HOSPADM

## 2019-12-22 RX ORDER — ASPIRIN 81 MG/1
81 TABLET ORAL DAILY
Status: DISCONTINUED | OUTPATIENT
Start: 2019-12-22 | End: 2019-12-24 | Stop reason: HOSPADM

## 2019-12-22 RX ORDER — ACETAMINOPHEN 325 MG/1
650 TABLET ORAL EVERY 4 HOURS PRN
Status: DISCONTINUED | OUTPATIENT
Start: 2019-12-22 | End: 2019-12-24 | Stop reason: HOSPADM

## 2019-12-22 RX ORDER — TIZANIDINE 4 MG/1
2 TABLET ORAL EVERY 6 HOURS PRN
Status: DISCONTINUED | OUTPATIENT
Start: 2019-12-22 | End: 2019-12-24 | Stop reason: HOSPADM

## 2019-12-22 RX ORDER — HYDROCODONE BITARTRATE AND ACETAMINOPHEN 5; 325 MG/1; MG/1
1 TABLET ORAL EVERY 6 HOURS PRN
Status: DISCONTINUED | OUTPATIENT
Start: 2019-12-22 | End: 2019-12-24 | Stop reason: HOSPADM

## 2019-12-22 RX ORDER — METOLAZONE 2.5 MG/1
2.5 TABLET ORAL DAILY PRN
Status: DISCONTINUED | OUTPATIENT
Start: 2019-12-22 | End: 2019-12-22

## 2019-12-22 RX ORDER — CIPROFLOXACIN 500 MG/1
500 TABLET, FILM COATED ORAL EVERY 12 HOURS
Status: DISCONTINUED | OUTPATIENT
Start: 2019-12-22 | End: 2019-12-22

## 2019-12-22 RX ORDER — ONDANSETRON 2 MG/ML
4 INJECTION INTRAMUSCULAR; INTRAVENOUS EVERY 30 MIN PRN
Status: DISCONTINUED | OUTPATIENT
Start: 2019-12-22 | End: 2019-12-22

## 2019-12-22 RX ORDER — CIPROFLOXACIN 500 MG/1
500 TABLET, FILM COATED ORAL EVERY 12 HOURS SCHEDULED
Status: DISCONTINUED | OUTPATIENT
Start: 2019-12-22 | End: 2019-12-22

## 2019-12-22 RX ORDER — SODIUM CHLORIDE 0.9 % (FLUSH) 0.9 %
10 SYRINGE (ML) INJECTION PRN
Status: DISCONTINUED | OUTPATIENT
Start: 2019-12-22 | End: 2019-12-24 | Stop reason: HOSPADM

## 2019-12-22 RX ORDER — SODIUM CHLORIDE 9 MG/ML
INJECTION, SOLUTION INTRAVENOUS CONTINUOUS
Status: DISCONTINUED | OUTPATIENT
Start: 2019-12-22 | End: 2019-12-22 | Stop reason: ALTCHOICE

## 2019-12-22 RX ORDER — SODIUM CHLORIDE 0.9 % (FLUSH) 0.9 %
10 SYRINGE (ML) INJECTION EVERY 12 HOURS SCHEDULED
Status: DISCONTINUED | OUTPATIENT
Start: 2019-12-22 | End: 2019-12-24 | Stop reason: HOSPADM

## 2019-12-22 RX ORDER — AMLODIPINE BESYLATE 5 MG/1
5 TABLET ORAL DAILY
Status: DISCONTINUED | OUTPATIENT
Start: 2019-12-22 | End: 2019-12-24 | Stop reason: HOSPADM

## 2019-12-22 RX ORDER — POLYETHYLENE GLYCOL 3350 17 G/17G
17 POWDER, FOR SOLUTION ORAL DAILY PRN
Status: DISCONTINUED | OUTPATIENT
Start: 2019-12-22 | End: 2019-12-24 | Stop reason: HOSPADM

## 2019-12-22 RX ORDER — METOPROLOL SUCCINATE 50 MG/1
50 TABLET, EXTENDED RELEASE ORAL DAILY
Status: DISCONTINUED | OUTPATIENT
Start: 2019-12-22 | End: 2019-12-24 | Stop reason: HOSPADM

## 2019-12-22 RX ORDER — SODIUM CHLORIDE 9 MG/ML
INJECTION, SOLUTION INTRAVENOUS CONTINUOUS
Status: DISCONTINUED | OUTPATIENT
Start: 2019-12-22 | End: 2019-12-22

## 2019-12-22 RX ORDER — 0.9 % SODIUM CHLORIDE 0.9 %
1000 INTRAVENOUS SOLUTION INTRAVENOUS ONCE
Status: COMPLETED | OUTPATIENT
Start: 2019-12-22 | End: 2019-12-22

## 2019-12-22 RX ORDER — HYDROCODONE BITARTRATE AND ACETAMINOPHEN 5; 325 MG/1; MG/1
0.5 TABLET ORAL EVERY 6 HOURS PRN
Status: DISCONTINUED | OUTPATIENT
Start: 2019-12-22 | End: 2019-12-24 | Stop reason: HOSPADM

## 2019-12-22 RX ORDER — FUROSEMIDE 20 MG/1
20 TABLET ORAL DAILY
Status: DISCONTINUED | OUTPATIENT
Start: 2019-12-22 | End: 2019-12-24 | Stop reason: HOSPADM

## 2019-12-22 RX ORDER — ONDANSETRON 2 MG/ML
4 INJECTION INTRAMUSCULAR; INTRAVENOUS EVERY 6 HOURS PRN
Status: DISCONTINUED | OUTPATIENT
Start: 2019-12-22 | End: 2019-12-24 | Stop reason: HOSPADM

## 2019-12-22 RX ORDER — CIPROFLOXACIN 500 MG/1
500 TABLET, FILM COATED ORAL 2 TIMES DAILY
Status: DISCONTINUED | OUTPATIENT
Start: 2019-12-22 | End: 2019-12-24 | Stop reason: HOSPADM

## 2019-12-22 RX ORDER — WARFARIN SODIUM 2 MG/1
2 TABLET ORAL ONCE
Status: COMPLETED | OUTPATIENT
Start: 2019-12-22 | End: 2019-12-22

## 2019-12-22 RX ORDER — POTASSIUM CHLORIDE 7.45 MG/ML
10 INJECTION INTRAVENOUS PRN
Status: DISCONTINUED | OUTPATIENT
Start: 2019-12-22 | End: 2019-12-24 | Stop reason: HOSPADM

## 2019-12-22 RX ORDER — BUMETANIDE 0.25 MG/ML
1 INJECTION, SOLUTION INTRAMUSCULAR; INTRAVENOUS ONCE
Status: COMPLETED | OUTPATIENT
Start: 2019-12-22 | End: 2019-12-22

## 2019-12-22 RX ORDER — FENTANYL CITRATE 50 UG/ML
25 INJECTION, SOLUTION INTRAMUSCULAR; INTRAVENOUS EVERY 30 MIN PRN
Status: DISCONTINUED | OUTPATIENT
Start: 2019-12-22 | End: 2019-12-22 | Stop reason: HOSPADM

## 2019-12-22 RX ORDER — HYDROCODONE BITARTRATE AND ACETAMINOPHEN 5; 325 MG/1; MG/1
1 TABLET ORAL EVERY 6 HOURS PRN
Status: DISCONTINUED | OUTPATIENT
Start: 2019-12-22 | End: 2019-12-22

## 2019-12-22 RX ADMIN — ONDANSETRON 4 MG: 2 INJECTION INTRAMUSCULAR; INTRAVENOUS at 00:52

## 2019-12-22 RX ADMIN — AMLODIPINE BESYLATE 5 MG: 5 TABLET ORAL at 11:28

## 2019-12-22 RX ADMIN — WARFARIN SODIUM 2 MG: 2 TABLET ORAL at 16:35

## 2019-12-22 RX ADMIN — METOPROLOL SUCCINATE 50 MG: 50 TABLET, FILM COATED, EXTENDED RELEASE ORAL at 11:28

## 2019-12-22 RX ADMIN — MICONAZOLE NITRATE: 20 POWDER TOPICAL at 19:59

## 2019-12-22 RX ADMIN — BUMETANIDE 1 MG: 0.25 INJECTION INTRAMUSCULAR; INTRAVENOUS at 02:25

## 2019-12-22 RX ADMIN — FENTANYL CITRATE 25 MCG: 50 INJECTION, SOLUTION INTRAMUSCULAR; INTRAVENOUS at 00:52

## 2019-12-22 RX ADMIN — CIPROFLOXACIN 500 MG: 500 TABLET, FILM COATED ORAL at 19:59

## 2019-12-22 RX ADMIN — ACETAMINOPHEN 650 MG: 325 TABLET ORAL at 11:34

## 2019-12-22 RX ADMIN — SODIUM CHLORIDE: 9 INJECTION, SOLUTION INTRAVENOUS at 06:50

## 2019-12-22 RX ADMIN — ISOSORBIDE MONONITRATE 60 MG: 60 TABLET ORAL at 11:28

## 2019-12-22 RX ADMIN — Medication 10 ML: at 20:00

## 2019-12-22 RX ADMIN — ASPIRIN 81 MG: 81 TABLET ORAL at 11:31

## 2019-12-22 RX ADMIN — Medication 10 ML: at 09:33

## 2019-12-22 RX ADMIN — SODIUM CHLORIDE: 9 INJECTION, SOLUTION INTRAVENOUS at 02:36

## 2019-12-22 RX ADMIN — SODIUM CHLORIDE 1000 ML: 9 INJECTION, SOLUTION INTRAVENOUS at 00:52

## 2019-12-22 RX ADMIN — FUROSEMIDE 20 MG: 20 TABLET ORAL at 11:28

## 2019-12-22 RX ADMIN — CIPROFLOXACIN 500 MG: 500 TABLET, FILM COATED ORAL at 14:05

## 2019-12-22 RX ADMIN — TIZANIDINE 2 MG: 4 TABLET ORAL at 16:29

## 2019-12-22 ASSESSMENT — PAIN SCALES - GENERAL
PAINLEVEL_OUTOF10: 0
PAINLEVEL_OUTOF10: 0
PAINLEVEL_OUTOF10: 5
PAINLEVEL_OUTOF10: 9
PAINLEVEL_OUTOF10: 7

## 2019-12-22 ASSESSMENT — PAIN DESCRIPTION - FREQUENCY: FREQUENCY: CONTINUOUS

## 2019-12-22 ASSESSMENT — PAIN DESCRIPTION - PROGRESSION: CLINICAL_PROGRESSION: NOT CHANGED

## 2019-12-22 ASSESSMENT — PAIN DESCRIPTION - LOCATION: LOCATION: GENERALIZED

## 2019-12-22 ASSESSMENT — PAIN DESCRIPTION - DESCRIPTORS: DESCRIPTORS: ACHING

## 2019-12-22 ASSESSMENT — PAIN DESCRIPTION - PAIN TYPE: TYPE: ACUTE PAIN

## 2019-12-22 ASSESSMENT — ENCOUNTER SYMPTOMS: SHORTNESS OF BREATH: 1

## 2019-12-22 ASSESSMENT — PAIN DESCRIPTION - ONSET: ONSET: ON-GOING

## 2019-12-23 ENCOUNTER — TELEPHONE (OUTPATIENT)
Dept: PHARMACY | Age: 84
End: 2019-12-23

## 2019-12-23 ENCOUNTER — APPOINTMENT (OUTPATIENT)
Dept: PHARMACY | Age: 84
End: 2019-12-23
Payer: MEDICARE

## 2019-12-23 DIAGNOSIS — Z86.711 HISTORY OF PULMONARY EMBOLISM: ICD-10-CM

## 2019-12-23 DIAGNOSIS — Z86.718 HISTORY OF DVT (DEEP VEIN THROMBOSIS): ICD-10-CM

## 2019-12-23 PROBLEM — E44.0 MODERATE MALNUTRITION (HCC): Status: ACTIVE | Noted: 2019-12-23

## 2019-12-23 LAB
ANION GAP SERPL CALCULATED.3IONS-SCNC: 9 MEQ/L (ref 8–16)
BASOPHILS # BLD: 1.3 %
BASOPHILS ABSOLUTE: 0.1 THOU/MM3 (ref 0–0.1)
BUN BLDV-MCNC: 22 MG/DL (ref 7–22)
CALCIUM SERPL-MCNC: 8.8 MG/DL (ref 8.5–10.5)
CHLORIDE BLD-SCNC: 100 MEQ/L (ref 98–111)
CO2: 28 MEQ/L (ref 23–33)
CREAT SERPL-MCNC: 1.3 MG/DL (ref 0.4–1.2)
EOSINOPHIL # BLD: 13 %
EOSINOPHILS ABSOLUTE: 0.7 THOU/MM3 (ref 0–0.4)
ERYTHROCYTE [DISTWIDTH] IN BLOOD BY AUTOMATED COUNT: 15.9 % (ref 11.5–14.5)
ERYTHROCYTE [DISTWIDTH] IN BLOOD BY AUTOMATED COUNT: 53.9 FL (ref 35–45)
GFR SERPL CREATININE-BSD FRML MDRD: 39 ML/MIN/1.73M2
GLUCOSE BLD-MCNC: 108 MG/DL (ref 70–108)
HCT VFR BLD CALC: 43.5 % (ref 37–47)
HEMOGLOBIN: 13.4 GM/DL (ref 12–16)
IMMATURE GRANS (ABS): 0.02 THOU/MM3 (ref 0–0.07)
IMMATURE GRANULOCYTES: 0.4 %
INR BLD: 3.2 (ref 0.85–1.13)
LYMPHOCYTES # BLD: 19.8 %
LYMPHOCYTES ABSOLUTE: 1.1 THOU/MM3 (ref 1–4.8)
MCH RBC QN AUTO: 28.6 PG (ref 26–33)
MCHC RBC AUTO-ENTMCNC: 30.8 GM/DL (ref 32.2–35.5)
MCV RBC AUTO: 92.9 FL (ref 81–99)
MONOCYTES # BLD: 10.5 %
MONOCYTES ABSOLUTE: 0.6 THOU/MM3 (ref 0.4–1.3)
NUCLEATED RED BLOOD CELLS: 0 /100 WBC
PLATELET # BLD: 157 THOU/MM3 (ref 130–400)
PMV BLD AUTO: 10.9 FL (ref 9.4–12.4)
POTASSIUM REFLEX MAGNESIUM: 3.9 MEQ/L (ref 3.5–5.2)
RBC # BLD: 4.68 MILL/MM3 (ref 4.2–5.4)
SEG NEUTROPHILS: 55 %
SEGMENTED NEUTROPHILS ABSOLUTE COUNT: 3.1 THOU/MM3 (ref 1.8–7.7)
SODIUM BLD-SCNC: 137 MEQ/L (ref 135–145)
WBC # BLD: 5.6 THOU/MM3 (ref 4.8–10.8)

## 2019-12-23 PROCEDURE — 2700000000 HC OXYGEN THERAPY PER DAY

## 2019-12-23 PROCEDURE — 99233 SBSQ HOSP IP/OBS HIGH 50: CPT | Performed by: INTERNAL MEDICINE

## 2019-12-23 PROCEDURE — 2140000000 HC CCU INTERMEDIATE R&B

## 2019-12-23 PROCEDURE — 80048 BASIC METABOLIC PNL TOTAL CA: CPT

## 2019-12-23 PROCEDURE — 36415 COLL VENOUS BLD VENIPUNCTURE: CPT

## 2019-12-23 PROCEDURE — 85025 COMPLETE CBC W/AUTO DIFF WBC: CPT

## 2019-12-23 PROCEDURE — 94760 N-INVAS EAR/PLS OXIMETRY 1: CPT

## 2019-12-23 PROCEDURE — 2580000003 HC RX 258: Performed by: PHYSICIAN ASSISTANT

## 2019-12-23 PROCEDURE — 6370000000 HC RX 637 (ALT 250 FOR IP): Performed by: INTERNAL MEDICINE

## 2019-12-23 PROCEDURE — 6360000002 HC RX W HCPCS: Performed by: INTERNAL MEDICINE

## 2019-12-23 PROCEDURE — 85610 PROTHROMBIN TIME: CPT

## 2019-12-23 PROCEDURE — 6370000000 HC RX 637 (ALT 250 FOR IP): Performed by: PHYSICIAN ASSISTANT

## 2019-12-23 RX ORDER — WARFARIN SODIUM 2.5 MG/1
TABLET ORAL
Qty: 75 TABLET | Refills: 3 | Status: SHIPPED | OUTPATIENT
Start: 2019-12-23 | End: 2020-01-01 | Stop reason: HOSPADM

## 2019-12-23 RX ORDER — FUROSEMIDE 10 MG/ML
20 INJECTION INTRAMUSCULAR; INTRAVENOUS ONCE
Status: COMPLETED | OUTPATIENT
Start: 2019-12-23 | End: 2019-12-23

## 2019-12-23 RX ADMIN — ASPIRIN 81 MG: 81 TABLET ORAL at 08:46

## 2019-12-23 RX ADMIN — MICONAZOLE NITRATE: 20 POWDER TOPICAL at 19:50

## 2019-12-23 RX ADMIN — DICLOFENAC 2 G: 10 GEL TOPICAL at 14:12

## 2019-12-23 RX ADMIN — AMLODIPINE BESYLATE 5 MG: 5 TABLET ORAL at 08:46

## 2019-12-23 RX ADMIN — DICLOFENAC 2 G: 10 GEL TOPICAL at 17:08

## 2019-12-23 RX ADMIN — ISOSORBIDE MONONITRATE 60 MG: 60 TABLET ORAL at 08:46

## 2019-12-23 RX ADMIN — FUROSEMIDE 20 MG: 10 INJECTION, SOLUTION INTRAVENOUS at 11:41

## 2019-12-23 RX ADMIN — FUROSEMIDE 20 MG: 20 TABLET ORAL at 08:46

## 2019-12-23 RX ADMIN — Medication 10 ML: at 19:50

## 2019-12-23 RX ADMIN — MICONAZOLE NITRATE: 20 POWDER TOPICAL at 08:46

## 2019-12-23 RX ADMIN — METOPROLOL SUCCINATE 50 MG: 50 TABLET, FILM COATED, EXTENDED RELEASE ORAL at 08:46

## 2019-12-23 RX ADMIN — TIZANIDINE 2 MG: 4 TABLET ORAL at 21:34

## 2019-12-23 RX ADMIN — CIPROFLOXACIN 500 MG: 500 TABLET, FILM COATED ORAL at 19:50

## 2019-12-23 RX ADMIN — CIPROFLOXACIN 500 MG: 500 TABLET, FILM COATED ORAL at 08:46

## 2019-12-23 RX ADMIN — Medication 10 ML: at 08:51

## 2019-12-23 RX ADMIN — TIZANIDINE 2 MG: 4 TABLET ORAL at 14:10

## 2019-12-23 ASSESSMENT — PAIN SCALES - GENERAL
PAINLEVEL_OUTOF10: 0

## 2019-12-24 VITALS
BODY MASS INDEX: 33.07 KG/M2 | WEIGHT: 218.19 LBS | DIASTOLIC BLOOD PRESSURE: 71 MMHG | SYSTOLIC BLOOD PRESSURE: 117 MMHG | HEART RATE: 72 BPM | OXYGEN SATURATION: 91 % | HEIGHT: 68 IN | TEMPERATURE: 97.6 F | RESPIRATION RATE: 18 BRPM

## 2019-12-24 LAB
ANION GAP SERPL CALCULATED.3IONS-SCNC: 12 MEQ/L (ref 8–16)
BASOPHILS # BLD: 1.2 %
BASOPHILS ABSOLUTE: 0.1 THOU/MM3 (ref 0–0.1)
BUN BLDV-MCNC: 27 MG/DL (ref 7–22)
CALCIUM SERPL-MCNC: 8.9 MG/DL (ref 8.5–10.5)
CHLORIDE BLD-SCNC: 99 MEQ/L (ref 98–111)
CO2: 24 MEQ/L (ref 23–33)
CREAT SERPL-MCNC: 1.3 MG/DL (ref 0.4–1.2)
EOSINOPHIL # BLD: 11.6 %
EOSINOPHILS ABSOLUTE: 0.7 THOU/MM3 (ref 0–0.4)
ERYTHROCYTE [DISTWIDTH] IN BLOOD BY AUTOMATED COUNT: 15.9 % (ref 11.5–14.5)
ERYTHROCYTE [DISTWIDTH] IN BLOOD BY AUTOMATED COUNT: 54.4 FL (ref 35–45)
GFR SERPL CREATININE-BSD FRML MDRD: 39 ML/MIN/1.73M2
GLUCOSE BLD-MCNC: 131 MG/DL (ref 70–108)
HCT VFR BLD CALC: 46.7 % (ref 37–47)
HEMOGLOBIN: 14.6 GM/DL (ref 12–16)
IMMATURE GRANS (ABS): 0.01 THOU/MM3 (ref 0–0.07)
IMMATURE GRANULOCYTES: 0.2 %
INR BLD: 2.39 (ref 0.85–1.13)
LYMPHOCYTES # BLD: 23.6 %
LYMPHOCYTES ABSOLUTE: 1.3 THOU/MM3 (ref 1–4.8)
MCH RBC QN AUTO: 29.4 PG (ref 26–33)
MCHC RBC AUTO-ENTMCNC: 31.3 GM/DL (ref 32.2–35.5)
MCV RBC AUTO: 94 FL (ref 81–99)
MONOCYTES # BLD: 10.9 %
MONOCYTES ABSOLUTE: 0.6 THOU/MM3 (ref 0.4–1.3)
NUCLEATED RED BLOOD CELLS: 0 /100 WBC
PLATELET # BLD: 159 THOU/MM3 (ref 130–400)
PMV BLD AUTO: 11 FL (ref 9.4–12.4)
POTASSIUM REFLEX MAGNESIUM: 4 MEQ/L (ref 3.5–5.2)
RBC # BLD: 4.97 MILL/MM3 (ref 4.2–5.4)
SEG NEUTROPHILS: 52.5 %
SEGMENTED NEUTROPHILS ABSOLUTE COUNT: 3 THOU/MM3 (ref 1.8–7.7)
SODIUM BLD-SCNC: 135 MEQ/L (ref 135–145)
WBC # BLD: 5.7 THOU/MM3 (ref 4.8–10.8)

## 2019-12-24 PROCEDURE — 36415 COLL VENOUS BLD VENIPUNCTURE: CPT

## 2019-12-24 PROCEDURE — 85025 COMPLETE CBC W/AUTO DIFF WBC: CPT

## 2019-12-24 PROCEDURE — 99239 HOSP IP/OBS DSCHRG MGMT >30: CPT | Performed by: INTERNAL MEDICINE

## 2019-12-24 PROCEDURE — 6370000000 HC RX 637 (ALT 250 FOR IP): Performed by: INTERNAL MEDICINE

## 2019-12-24 PROCEDURE — 85610 PROTHROMBIN TIME: CPT

## 2019-12-24 PROCEDURE — 6370000000 HC RX 637 (ALT 250 FOR IP): Performed by: PHYSICIAN ASSISTANT

## 2019-12-24 PROCEDURE — 2580000003 HC RX 258: Performed by: PHYSICIAN ASSISTANT

## 2019-12-24 PROCEDURE — 80048 BASIC METABOLIC PNL TOTAL CA: CPT

## 2019-12-24 RX ORDER — WARFARIN SODIUM 2.5 MG/1
1.25 TABLET ORAL ONCE
Status: COMPLETED | OUTPATIENT
Start: 2019-12-24 | End: 2019-12-24

## 2019-12-24 RX ORDER — TIZANIDINE 2 MG/1
2 TABLET ORAL EVERY 6 HOURS PRN
Qty: 60 TABLET | Refills: 0 | Status: SHIPPED | OUTPATIENT
Start: 2019-12-24 | End: 2020-01-06

## 2019-12-24 RX ORDER — CIPROFLOXACIN 500 MG/1
500 TABLET, FILM COATED ORAL 2 TIMES DAILY
Qty: 10 TABLET | Refills: 0 | Status: SHIPPED | OUTPATIENT
Start: 2019-12-24 | End: 2019-12-29

## 2019-12-24 RX ADMIN — Medication 10 ML: at 08:12

## 2019-12-24 RX ADMIN — METOPROLOL SUCCINATE 50 MG: 50 TABLET, FILM COATED, EXTENDED RELEASE ORAL at 08:09

## 2019-12-24 RX ADMIN — DICLOFENAC 2 G: 10 GEL TOPICAL at 08:12

## 2019-12-24 RX ADMIN — FUROSEMIDE 20 MG: 20 TABLET ORAL at 08:09

## 2019-12-24 RX ADMIN — WARFARIN SODIUM 1.25 MG: 2.5 TABLET ORAL at 13:14

## 2019-12-24 RX ADMIN — POLYETHYLENE GLYCOL 3350 17 G: 17 POWDER, FOR SOLUTION ORAL at 08:26

## 2019-12-24 RX ADMIN — TIZANIDINE 2 MG: 4 TABLET ORAL at 08:09

## 2019-12-24 RX ADMIN — ASPIRIN 81 MG: 81 TABLET ORAL at 08:09

## 2019-12-24 RX ADMIN — MICONAZOLE NITRATE: 20 POWDER TOPICAL at 08:12

## 2019-12-24 RX ADMIN — CIPROFLOXACIN 500 MG: 500 TABLET, FILM COATED ORAL at 08:09

## 2019-12-24 RX ADMIN — AMLODIPINE BESYLATE 5 MG: 5 TABLET ORAL at 08:09

## 2019-12-24 RX ADMIN — ISOSORBIDE MONONITRATE 60 MG: 60 TABLET ORAL at 08:09

## 2019-12-24 ASSESSMENT — PAIN SCALES - GENERAL
PAINLEVEL_OUTOF10: 0
PAINLEVEL_OUTOF10: 0

## 2019-12-25 LAB
ORGANISM: ABNORMAL
URINE CULTURE REFLEX: ABNORMAL

## 2019-12-26 ENCOUNTER — CARE COORDINATION (OUTPATIENT)
Dept: CASE MANAGEMENT | Age: 84
End: 2019-12-26

## 2019-12-26 ENCOUNTER — TELEPHONE (OUTPATIENT)
Dept: INTERNAL MEDICINE CLINIC | Age: 84
End: 2019-12-26

## 2019-12-26 NOTE — TELEPHONE ENCOUNTER
.Transition of Care visit scheduled. 1/2/2020 (Ahmet Triana)  Patient is being discharged to HOME  Date of discharge 12/24/19  Discharge from facility [facility]   Reason for admission PER DAUGHTER, UTI AND FLUID AROUND HEART    They requested an appointment on 12/31/19 but nothing available, offered 12/30/19 but she has another appointment that day and 2 appointments in 1 day is too much. She is scheduled on 1/28/20 but would like 12/31 if at all possible. Please advise daughter Jerson Armstrong. Please call Jerson Armstrong (daughter) at [phone].

## 2019-12-27 ENCOUNTER — CARE COORDINATION (OUTPATIENT)
Dept: CASE MANAGEMENT | Age: 84
End: 2019-12-27

## 2019-12-30 ENCOUNTER — TELEPHONE (OUTPATIENT)
Dept: PHARMACY | Age: 84
End: 2019-12-30

## 2019-12-30 ENCOUNTER — APPOINTMENT (OUTPATIENT)
Dept: PHARMACY | Age: 84
End: 2019-12-30
Payer: MEDICARE

## 2019-12-30 ENCOUNTER — CARE COORDINATION (OUTPATIENT)
Dept: CASE MANAGEMENT | Age: 84
End: 2019-12-30

## 2020-01-01 ENCOUNTER — HOSPITAL ENCOUNTER (OUTPATIENT)
Age: 85
Setting detail: SPECIMEN
Discharge: HOME | End: 2020-05-07
Payer: MEDICARE

## 2020-01-01 ENCOUNTER — TELEPHONE (OUTPATIENT)
Dept: INTERNAL MEDICINE CLINIC | Age: 85
End: 2020-01-01

## 2020-01-01 ENCOUNTER — HOSPITAL ENCOUNTER (OUTPATIENT)
Dept: PHARMACY | Age: 85
Setting detail: THERAPIES SERIES
Discharge: HOME | End: 2020-11-25
Payer: MEDICARE

## 2020-01-01 ENCOUNTER — HOSPITAL ENCOUNTER (OUTPATIENT)
Dept: PHARMACY | Age: 85
Setting detail: THERAPIES SERIES
Discharge: HOME | End: 2020-10-22
Payer: MEDICARE

## 2020-01-01 ENCOUNTER — HOSPITAL ENCOUNTER (OUTPATIENT)
Dept: PHARMACY | Age: 85
Setting detail: THERAPIES SERIES
Discharge: HOME | End: 2020-12-28
Payer: MEDICARE

## 2020-01-01 ENCOUNTER — HOSPITAL ENCOUNTER (OUTPATIENT)
Age: 85
Setting detail: SPECIMEN
Discharge: HOME | End: 2020-06-24
Payer: MEDICARE

## 2020-01-01 ENCOUNTER — TELEPHONE (OUTPATIENT)
Dept: PHARMACY | Age: 85
End: 2020-01-01

## 2020-01-01 ENCOUNTER — HOSPITAL ENCOUNTER (OUTPATIENT)
Age: 85
Setting detail: SPECIMEN
Discharge: HOME | End: 2020-07-07
Payer: MEDICARE

## 2020-01-01 ENCOUNTER — ANTI-COAG VISIT (OUTPATIENT)
Dept: INTERNAL MEDICINE CLINIC | Age: 85
End: 2020-01-01

## 2020-01-01 ENCOUNTER — HOSPITAL ENCOUNTER (OUTPATIENT)
Dept: PHARMACY | Age: 85
Setting detail: THERAPIES SERIES
Discharge: HOME | End: 2020-09-24
Payer: MEDICARE

## 2020-01-01 ENCOUNTER — HOSPITAL ENCOUNTER (OUTPATIENT)
Age: 85
Setting detail: SPECIMEN
Discharge: HOME | End: 2020-06-09
Payer: MEDICARE

## 2020-01-01 ENCOUNTER — HOSPITAL ENCOUNTER (OUTPATIENT)
Dept: PHARMACY | Age: 85
Setting detail: THERAPIES SERIES
Discharge: HOME | End: 2020-06-16
Payer: MEDICARE

## 2020-01-01 ENCOUNTER — HOSPITAL ENCOUNTER (OUTPATIENT)
Dept: PHARMACY | Age: 85
Setting detail: THERAPIES SERIES
Discharge: HOME | End: 2020-07-07
Payer: MEDICARE

## 2020-01-01 ENCOUNTER — VIRTUAL VISIT (OUTPATIENT)
Dept: INTERNAL MEDICINE CLINIC | Age: 85
End: 2020-01-01
Payer: MEDICARE

## 2020-01-01 ENCOUNTER — HOSPITAL ENCOUNTER (OUTPATIENT)
Age: 85
Setting detail: SPECIMEN
Discharge: HOME | End: 2020-06-16
Payer: MEDICARE

## 2020-01-01 ENCOUNTER — HOSPITAL ENCOUNTER (OUTPATIENT)
Dept: PHARMACY | Age: 85
Setting detail: THERAPIES SERIES
Discharge: HOME | End: 2020-05-07
Payer: MEDICARE

## 2020-01-01 ENCOUNTER — APPOINTMENT (OUTPATIENT)
Dept: PHARMACY | Age: 85
End: 2020-01-01
Payer: MEDICARE

## 2020-01-01 ENCOUNTER — HOSPITAL ENCOUNTER (OUTPATIENT)
Age: 85
Setting detail: SPECIMEN
Discharge: HOME | End: 2020-05-21
Payer: MEDICARE

## 2020-01-01 ENCOUNTER — ANTI-COAG VISIT (OUTPATIENT)
Dept: PHARMACY | Age: 85
End: 2020-01-01

## 2020-01-01 ENCOUNTER — HOSPITAL ENCOUNTER (OUTPATIENT)
Age: 85
Setting detail: SPECIMEN
Discharge: HOME | End: 2020-06-01
Payer: MEDICARE

## 2020-01-01 ENCOUNTER — APPOINTMENT (OUTPATIENT)
Dept: GENERAL RADIOLOGY | Age: 85
DRG: 682 | End: 2020-01-01
Payer: MEDICARE

## 2020-01-01 ENCOUNTER — HOSPITAL ENCOUNTER (OUTPATIENT)
Age: 85
Setting detail: SPECIMEN
Discharge: HOME | End: 2020-03-24
Payer: MEDICARE

## 2020-01-01 ENCOUNTER — HOSPITAL ENCOUNTER (OUTPATIENT)
Dept: PHARMACY | Age: 85
Setting detail: THERAPIES SERIES
Discharge: HOME | End: 2020-04-16
Payer: MEDICARE

## 2020-01-01 ENCOUNTER — HOSPITAL ENCOUNTER (OUTPATIENT)
Dept: PHARMACY | Age: 85
Setting detail: THERAPIES SERIES
Discharge: HOME | End: 2020-06-09
Payer: MEDICARE

## 2020-01-01 ENCOUNTER — HOSPITAL ENCOUNTER (OUTPATIENT)
Dept: PHARMACY | Age: 85
Setting detail: THERAPIES SERIES
Discharge: HOME | End: 2020-09-09
Payer: MEDICARE

## 2020-01-01 ENCOUNTER — HOSPITAL ENCOUNTER (OUTPATIENT)
Age: 85
Setting detail: SPECIMEN
Discharge: HOME | End: 2020-04-16
Payer: MEDICARE

## 2020-01-01 ENCOUNTER — HOSPITAL ENCOUNTER (OUTPATIENT)
Dept: PHARMACY | Age: 85
Setting detail: THERAPIES SERIES
Discharge: HOME | End: 2020-12-04
Payer: MEDICARE

## 2020-01-01 ENCOUNTER — HOSPITAL ENCOUNTER (OUTPATIENT)
Dept: PHARMACY | Age: 85
Setting detail: THERAPIES SERIES
Discharge: HOME | End: 2020-12-09
Payer: MEDICARE

## 2020-01-01 ENCOUNTER — HOSPITAL ENCOUNTER (OUTPATIENT)
Dept: PHARMACY | Age: 85
Discharge: HOME | End: 2020-06-01

## 2020-01-01 ENCOUNTER — CARE COORDINATION (OUTPATIENT)
Dept: CARE COORDINATION | Age: 85
End: 2020-01-01

## 2020-01-01 ENCOUNTER — HOSPITAL ENCOUNTER (OUTPATIENT)
Age: 85
Setting detail: SPECIMEN
Discharge: HOME | End: 2020-03-10
Payer: MEDICARE

## 2020-01-01 ENCOUNTER — APPOINTMENT (OUTPATIENT)
Dept: CT IMAGING | Age: 85
DRG: 682 | End: 2020-01-01
Payer: MEDICARE

## 2020-01-01 ENCOUNTER — HOSPITAL ENCOUNTER (INPATIENT)
Age: 85
LOS: 4 days | Discharge: HOME HEALTH | DRG: 070 | End: 2020-03-04
Attending: FAMILY MEDICINE | Admitting: INTERNAL MEDICINE
Payer: MEDICARE

## 2020-01-01 ENCOUNTER — HOSPITAL ENCOUNTER (OUTPATIENT)
Dept: PHARMACY | Age: 85
Setting detail: THERAPIES SERIES
Discharge: HOME | End: 2020-05-21
Payer: MEDICARE

## 2020-01-01 ENCOUNTER — HOSPITAL ENCOUNTER (INPATIENT)
Age: 85
LOS: 5 days | Discharge: HOME HEALTH | DRG: 682 | End: 2020-12-22
Attending: EMERGENCY MEDICINE | Admitting: INTERNAL MEDICINE
Payer: MEDICARE

## 2020-01-01 ENCOUNTER — HOSPITAL ENCOUNTER (OUTPATIENT)
Dept: PHARMACY | Age: 85
Setting detail: THERAPIES SERIES
Discharge: HOME | End: 2020-02-25
Payer: MEDICARE

## 2020-01-01 ENCOUNTER — PROCEDURE VISIT (OUTPATIENT)
Dept: CARDIOLOGY CLINIC | Age: 85
End: 2020-01-01
Payer: MEDICARE

## 2020-01-01 ENCOUNTER — APPOINTMENT (OUTPATIENT)
Dept: GENERAL RADIOLOGY | Age: 85
DRG: 070 | End: 2020-01-01
Payer: MEDICARE

## 2020-01-01 ENCOUNTER — HOSPITAL ENCOUNTER (OUTPATIENT)
Dept: PHARMACY | Age: 85
Setting detail: THERAPIES SERIES
Discharge: HOME | End: 2020-08-06
Payer: MEDICARE

## 2020-01-01 ENCOUNTER — HOSPITAL ENCOUNTER (OUTPATIENT)
Dept: PHARMACY | Age: 85
Setting detail: THERAPIES SERIES
Discharge: HOME | End: 2020-11-19
Payer: MEDICARE

## 2020-01-01 ENCOUNTER — APPOINTMENT (OUTPATIENT)
Dept: MRI IMAGING | Age: 85
DRG: 682 | End: 2020-01-01
Payer: MEDICARE

## 2020-01-01 ENCOUNTER — HOSPITAL ENCOUNTER (OUTPATIENT)
Dept: PHARMACY | Age: 85
Setting detail: THERAPIES SERIES
Discharge: HOME | End: 2020-11-13
Payer: MEDICARE

## 2020-01-01 ENCOUNTER — HOSPITAL ENCOUNTER (OUTPATIENT)
Dept: PHARMACY | Age: 85
Setting detail: THERAPIES SERIES
Discharge: HOME | End: 2020-12-31
Payer: MEDICARE

## 2020-01-01 ENCOUNTER — HOSPITAL ENCOUNTER (OUTPATIENT)
Age: 85
Setting detail: SPECIMEN
Discharge: HOME | End: 2020-03-27
Payer: MEDICARE

## 2020-01-01 VITALS — TEMPERATURE: 98.7 F

## 2020-01-01 VITALS
SYSTOLIC BLOOD PRESSURE: 128 MMHG | HEIGHT: 69 IN | HEART RATE: 70 BPM | BODY MASS INDEX: 29.59 KG/M2 | WEIGHT: 199.8 LBS | TEMPERATURE: 97.7 F | RESPIRATION RATE: 16 BRPM | DIASTOLIC BLOOD PRESSURE: 59 MMHG | OXYGEN SATURATION: 93 %

## 2020-01-01 VITALS — TEMPERATURE: 97 F

## 2020-01-01 VITALS
RESPIRATION RATE: 18 BRPM | HEIGHT: 68 IN | TEMPERATURE: 98.2 F | BODY MASS INDEX: 34.25 KG/M2 | SYSTOLIC BLOOD PRESSURE: 139 MMHG | OXYGEN SATURATION: 95 % | WEIGHT: 226 LBS | HEART RATE: 61 BPM | DIASTOLIC BLOOD PRESSURE: 64 MMHG

## 2020-01-01 VITALS — TEMPERATURE: 96.7 F | TEMPERATURE: 97.3 F

## 2020-01-01 VITALS — TEMPERATURE: 98 F

## 2020-01-01 VITALS — TEMPERATURE: 97.5 F

## 2020-01-01 VITALS — TEMPERATURE: 97.6 F

## 2020-01-01 VITALS — TEMPERATURE: 96.7 F

## 2020-01-01 DIAGNOSIS — D68.9 COAGULOPATHY (HCC): ICD-10-CM

## 2020-01-01 DIAGNOSIS — I48.19 PERSISTENT ATRIAL FIBRILLATION (HCC): ICD-10-CM

## 2020-01-01 LAB
ALBUMIN SERPL-MCNC: 2.4 G/DL (ref 3.5–5.1)
ALBUMIN SERPL-MCNC: 2.5 G/DL (ref 3.5–5.1)
ALBUMIN SERPL-MCNC: 2.5 G/DL (ref 3.5–5.1)
ALBUMIN SERPL-MCNC: 2.7 G/DL (ref 3.5–5.1)
ALBUMIN SERPL-MCNC: 2.8 G/DL (ref 3.5–5.1)
ALBUMIN SERPL-MCNC: 2.9 G/DL (ref 3.5–5.1)
ALBUMIN SERPL-MCNC: 2.9 G/DL (ref 3.5–5.1)
ALBUMIN SERPL-MCNC: 3.4 G/DL (ref 3.5–5.1)
ALBUMIN SERPL-MCNC: 3.6 G/DL (ref 3.5–5.1)
ALP BLD-CCNC: 62 U/L (ref 38–126)
ALP BLD-CCNC: 62 U/L (ref 38–126)
ALP BLD-CCNC: 63 U/L (ref 38–126)
ALP BLD-CCNC: 64 U/L (ref 38–126)
ALP BLD-CCNC: 64 U/L (ref 38–126)
ALP BLD-CCNC: 65 U/L (ref 38–126)
ALP BLD-CCNC: 68 U/L (ref 38–126)
ALP BLD-CCNC: 71 U/L (ref 38–126)
ALP BLD-CCNC: 75 U/L (ref 38–126)
ALT SERPL-CCNC: 7 U/L (ref 11–66)
ALT SERPL-CCNC: 8 U/L (ref 11–66)
AMMONIA: 21 UMOL/L (ref 11–60)
AMPHETAMINE+METHAMPHETAMINE URINE SCREEN: NEGATIVE
ANION GAP SERPL CALCULATED.3IONS-SCNC: 10 MEQ/L (ref 8–16)
ANION GAP SERPL CALCULATED.3IONS-SCNC: 11 MEQ/L (ref 8–16)
ANION GAP SERPL CALCULATED.3IONS-SCNC: 11 MEQ/L (ref 8–16)
ANION GAP SERPL CALCULATED.3IONS-SCNC: 12 MEQ/L (ref 8–16)
ANION GAP SERPL CALCULATED.3IONS-SCNC: 13 MEQ/L (ref 8–16)
ANION GAP SERPL CALCULATED.3IONS-SCNC: 13 MEQ/L (ref 8–16)
ANION GAP SERPL CALCULATED.3IONS-SCNC: 15 MEQ/L (ref 8–16)
ANION GAP SERPL CALCULATED.3IONS-SCNC: 18 MEQ/L (ref 8–16)
APTT: 39.9 SECONDS (ref 22–38)
APTT: 46.1 SECONDS (ref 22–38)
AST SERPL-CCNC: 13 U/L (ref 5–40)
AST SERPL-CCNC: 14 U/L (ref 5–40)
AST SERPL-CCNC: 15 U/L (ref 5–40)
AST SERPL-CCNC: 15 U/L (ref 5–40)
AST SERPL-CCNC: 16 U/L (ref 5–40)
AST SERPL-CCNC: 16 U/L (ref 5–40)
AST SERPL-CCNC: 17 U/L (ref 5–40)
AST SERPL-CCNC: 19 U/L (ref 5–40)
AST SERPL-CCNC: 19 U/L (ref 5–40)
BACTERIA: ABNORMAL
BACTERIA: ABNORMAL /HPF
BARBITURATE QUANTITATIVE URINE: NEGATIVE
BASOPHILS # BLD: 0.7 %
BASOPHILS # BLD: 0.7 %
BASOPHILS # BLD: 0.8 %
BASOPHILS ABSOLUTE: 0 THOU/MM3 (ref 0–0.1)
BASOPHILS ABSOLUTE: 0 THOU/MM3 (ref 0–0.1)
BASOPHILS ABSOLUTE: 0.1 THOU/MM3 (ref 0–0.1)
BENZODIAZEPINE QUANTITATIVE URINE: NEGATIVE
BILIRUB SERPL-MCNC: 0.2 MG/DL (ref 0.3–1.2)
BILIRUB SERPL-MCNC: 0.3 MG/DL (ref 0.3–1.2)
BILIRUB SERPL-MCNC: 0.9 MG/DL (ref 0.3–1.2)
BILIRUB SERPL-MCNC: 1.2 MG/DL (ref 0.3–1.2)
BILIRUBIN DIRECT: 0.3 MG/DL (ref 0–0.3)
BILIRUBIN DIRECT: < 0.2 MG/DL (ref 0–0.3)
BILIRUBIN DIRECT: < 0.2 MG/DL (ref 0–0.3)
BILIRUBIN URINE: ABNORMAL
BILIRUBIN URINE: NEGATIVE
BLOOD CULTURE, ROUTINE: NORMAL
BLOOD CULTURE, ROUTINE: NORMAL
BLOOD, URINE: ABNORMAL
BLOOD, URINE: ABNORMAL
BLOOD, URINE: NEGATIVE
BUN BLDV-MCNC: 23 MG/DL (ref 7–22)
BUN BLDV-MCNC: 24 MG/DL (ref 7–22)
BUN BLDV-MCNC: 24 MG/DL (ref 7–22)
BUN BLDV-MCNC: 25 MG/DL (ref 7–22)
BUN BLDV-MCNC: 25 MG/DL (ref 7–22)
BUN BLDV-MCNC: 27 MG/DL (ref 7–22)
BUN BLDV-MCNC: 28 MG/DL (ref 7–22)
BUN BLDV-MCNC: 33 MG/DL (ref 7–22)
BUN BLDV-MCNC: 40 MG/DL (ref 7–22)
BUN BLDV-MCNC: 40 MG/DL (ref 7–22)
BUN BLDV-MCNC: 43 MG/DL (ref 7–22)
BUN BLDV-MCNC: 46 MG/DL (ref 7–22)
BUN BLDV-MCNC: 56 MG/DL (ref 7–22)
BUN BLDV-MCNC: 67 MG/DL (ref 7–22)
BUN BLDV-MCNC: 73 MG/DL (ref 7–22)
CALCIUM SERPL-MCNC: 8.4 MG/DL (ref 8.5–10.5)
CALCIUM SERPL-MCNC: 8.5 MG/DL (ref 8.5–10.5)
CALCIUM SERPL-MCNC: 8.7 MG/DL (ref 8.5–10.5)
CALCIUM SERPL-MCNC: 8.8 MG/DL (ref 8.5–10.5)
CALCIUM SERPL-MCNC: 8.9 MG/DL (ref 8.5–10.5)
CALCIUM SERPL-MCNC: 9.1 MG/DL (ref 8.5–10.5)
CALCIUM SERPL-MCNC: 9.1 MG/DL (ref 8.5–10.5)
CALCIUM SERPL-MCNC: 9.2 MG/DL (ref 8.5–10.5)
CALCIUM SERPL-MCNC: 9.2 MG/DL (ref 8.5–10.5)
CALCIUM SERPL-MCNC: 9.3 MG/DL (ref 8.5–10.5)
CALCIUM SERPL-MCNC: 9.5 MG/DL (ref 8.5–10.5)
CANNABINOID QUANTITATIVE URINE: POSITIVE
CASTS 2: ABNORMAL /LPF
CASTS UA: ABNORMAL /LPF
CASTS: ABNORMAL /LPF
CHARACTER, URINE: CLEAR
CHLORIDE BLD-SCNC: 102 MEQ/L (ref 98–111)
CHLORIDE BLD-SCNC: 103 MEQ/L (ref 98–111)
CHLORIDE BLD-SCNC: 104 MEQ/L (ref 98–111)
CHLORIDE BLD-SCNC: 105 MEQ/L (ref 98–111)
CHLORIDE BLD-SCNC: 105 MEQ/L (ref 98–111)
CHLORIDE BLD-SCNC: 106 MEQ/L (ref 98–111)
CHLORIDE BLD-SCNC: 107 MEQ/L (ref 98–111)
CHLORIDE BLD-SCNC: 108 MEQ/L (ref 98–111)
CHLORIDE BLD-SCNC: 109 MEQ/L (ref 98–111)
CHLORIDE BLD-SCNC: 110 MEQ/L (ref 98–111)
CHLORIDE BLD-SCNC: 111 MEQ/L (ref 98–111)
CHLORIDE BLD-SCNC: 113 MEQ/L (ref 98–111)
CHLORIDE BLD-SCNC: 113 MEQ/L (ref 98–111)
CHLORIDE BLD-SCNC: 114 MEQ/L (ref 98–111)
CHLORIDE BLD-SCNC: 116 MEQ/L (ref 98–111)
CO2: 13 MEQ/L (ref 23–33)
CO2: 17 MEQ/L (ref 23–33)
CO2: 17 MEQ/L (ref 23–33)
CO2: 19 MEQ/L (ref 23–33)
CO2: 20 MEQ/L (ref 23–33)
CO2: 21 MEQ/L (ref 23–33)
CO2: 21 MEQ/L (ref 23–33)
CO2: 22 MEQ/L (ref 23–33)
CO2: 22 MEQ/L (ref 23–33)
CO2: 23 MEQ/L (ref 23–33)
CO2: 23 MEQ/L (ref 23–33)
CO2: 24 MEQ/L (ref 23–33)
CO2: 29 MEQ/L (ref 23–33)
COCAINE METABOLITE QUANTITATIVE URINE: NEGATIVE
COLOR: ABNORMAL
COLOR: ABNORMAL
COLOR: YELLOW
CREAT SERPL-MCNC: 1.1 MG/DL (ref 0.4–1.2)
CREAT SERPL-MCNC: 1.2 MG/DL (ref 0.4–1.2)
CREAT SERPL-MCNC: 1.2 MG/DL (ref 0.4–1.2)
CREAT SERPL-MCNC: 1.4 MG/DL (ref 0.4–1.2)
CREAT SERPL-MCNC: 1.5 MG/DL (ref 0.4–1.2)
CREAT SERPL-MCNC: 1.5 MG/DL (ref 0.4–1.2)
CREAT SERPL-MCNC: 1.6 MG/DL (ref 0.4–1.2)
CREAT SERPL-MCNC: 1.8 MG/DL (ref 0.4–1.2)
CREAT SERPL-MCNC: 2 MG/DL (ref 0.4–1.2)
CREAT SERPL-MCNC: 2.1 MG/DL (ref 0.4–1.2)
CREAT SERPL-MCNC: 2.2 MG/DL (ref 0.4–1.2)
CREAT SERPL-MCNC: 2.3 MG/DL (ref 0.4–1.2)
CREAT SERPL-MCNC: 2.6 MG/DL (ref 0.4–1.2)
CREAT SERPL-MCNC: 3.2 MG/DL (ref 0.4–1.2)
CREAT SERPL-MCNC: 3.5 MG/DL (ref 0.4–1.2)
CRYSTALS, UA: ABNORMAL
CRYSTALS: ABNORMAL
EKG ATRIAL RATE: 29 BPM
EKG ATRIAL RATE: 44 BPM
EKG Q-T INTERVAL: 458 MS
EKG Q-T INTERVAL: 514 MS
EKG QRS DURATION: 162 MS
EKG QRS DURATION: 64 MS
EKG QTC CALCULATION (BAZETT): 400 MS
EKG QTC CALCULATION (BAZETT): 550 MS
EKG R AXIS: -88 DEGREES
EKG R AXIS: 15 DEGREES
EKG T AXIS: -70 DEGREES
EKG T AXIS: 99 DEGREES
EKG VENTRICULAR RATE: 46 BPM
EKG VENTRICULAR RATE: 69 BPM
EOSINOPHIL # BLD: 3.2 %
EOSINOPHIL # BLD: 6 %
EOSINOPHIL # BLD: 9.9 %
EOSINOPHILS ABSOLUTE: 0.3 THOU/MM3 (ref 0–0.4)
EOSINOPHILS ABSOLUTE: 0.4 THOU/MM3 (ref 0–0.4)
EOSINOPHILS ABSOLUTE: 0.6 THOU/MM3 (ref 0–0.4)
EPITHELIAL CELLS, UA: ABNORMAL /HPF
ERYTHROCYTE [DISTWIDTH] IN BLOOD BY AUTOMATED COUNT: 15.1 % (ref 11.5–14.5)
ERYTHROCYTE [DISTWIDTH] IN BLOOD BY AUTOMATED COUNT: 15.3 % (ref 11.5–14.5)
ERYTHROCYTE [DISTWIDTH] IN BLOOD BY AUTOMATED COUNT: 16.2 % (ref 11.5–14.5)
ERYTHROCYTE [DISTWIDTH] IN BLOOD BY AUTOMATED COUNT: 18.3 % (ref 11.5–14.5)
ERYTHROCYTE [DISTWIDTH] IN BLOOD BY AUTOMATED COUNT: 18.8 % (ref 11.5–14.5)
ERYTHROCYTE [DISTWIDTH] IN BLOOD BY AUTOMATED COUNT: 55.6 FL (ref 35–45)
ERYTHROCYTE [DISTWIDTH] IN BLOOD BY AUTOMATED COUNT: 55.6 FL (ref 35–45)
ERYTHROCYTE [DISTWIDTH] IN BLOOD BY AUTOMATED COUNT: 56.2 FL (ref 35–45)
ERYTHROCYTE [DISTWIDTH] IN BLOOD BY AUTOMATED COUNT: 62.6 FL (ref 35–45)
ERYTHROCYTE [DISTWIDTH] IN BLOOD BY AUTOMATED COUNT: 64 FL (ref 35–45)
ETHYL ALCOHOL, SERUM: < 0.01 %
FLU A ANTIGEN: NEGATIVE
FLU A ANTIGEN: NEGATIVE
FLU B ANTIGEN: NEGATIVE
FLU B ANTIGEN: NEGATIVE
FOLATE: 12.7 NG/ML (ref 4.8–24.2)
GFR SERPL CREATININE-BSD FRML MDRD: 12 ML/MIN/1.73M2
GFR SERPL CREATININE-BSD FRML MDRD: 14 ML/MIN/1.73M2
GFR SERPL CREATININE-BSD FRML MDRD: 18 ML/MIN/1.73M2
GFR SERPL CREATININE-BSD FRML MDRD: 20 ML/MIN/1.73M2
GFR SERPL CREATININE-BSD FRML MDRD: 21 ML/MIN/1.73M2
GFR SERPL CREATININE-BSD FRML MDRD: 22 ML/MIN/1.73M2
GFR SERPL CREATININE-BSD FRML MDRD: 24 ML/MIN/1.73M2
GFR SERPL CREATININE-BSD FRML MDRD: 27 ML/MIN/1.73M2
GFR SERPL CREATININE-BSD FRML MDRD: 31 ML/MIN/1.73M2
GFR SERPL CREATININE-BSD FRML MDRD: 33 ML/MIN/1.73M2
GFR SERPL CREATININE-BSD FRML MDRD: 33 ML/MIN/1.73M2
GFR SERPL CREATININE-BSD FRML MDRD: 36 ML/MIN/1.73M2
GFR SERPL CREATININE-BSD FRML MDRD: 43 ML/MIN/1.73M2
GFR SERPL CREATININE-BSD FRML MDRD: 43 ML/MIN/1.73M2
GFR SERPL CREATININE-BSD FRML MDRD: 47 ML/MIN/1.73M2
GLUCOSE BLD-MCNC: 103 MG/DL (ref 70–108)
GLUCOSE BLD-MCNC: 104 MG/DL (ref 70–108)
GLUCOSE BLD-MCNC: 119 MG/DL (ref 70–108)
GLUCOSE BLD-MCNC: 136 MG/DL (ref 70–108)
GLUCOSE BLD-MCNC: 139 MG/DL (ref 70–108)
GLUCOSE BLD-MCNC: 145 MG/DL (ref 70–108)
GLUCOSE BLD-MCNC: 148 MG/DL (ref 70–108)
GLUCOSE BLD-MCNC: 75 MG/DL (ref 70–108)
GLUCOSE BLD-MCNC: 76 MG/DL (ref 70–108)
GLUCOSE BLD-MCNC: 77 MG/DL (ref 70–108)
GLUCOSE BLD-MCNC: 81 MG/DL (ref 70–108)
GLUCOSE BLD-MCNC: 84 MG/DL (ref 70–108)
GLUCOSE BLD-MCNC: 85 MG/DL (ref 70–108)
GLUCOSE BLD-MCNC: 87 MG/DL (ref 70–108)
GLUCOSE BLD-MCNC: 89 MG/DL (ref 70–108)
GLUCOSE BLD-MCNC: 99 MG/DL (ref 70–108)
GLUCOSE URINE: NEGATIVE MG/DL
GLUCOSE, URINE: NEGATIVE MG/DL
HCT VFR BLD CALC: 42.6 % (ref 37–47)
HCT VFR BLD CALC: 43.8 % (ref 37–47)
HCT VFR BLD CALC: 45.1 % (ref 37–47)
HCT VFR BLD CALC: 45.8 % (ref 37–47)
HCT VFR BLD CALC: 45.9 % (ref 37–47)
HEMOGLOBIN: 13.4 GM/DL (ref 12–16)
HEMOGLOBIN: 13.8 GM/DL (ref 12–16)
HEMOGLOBIN: 13.9 GM/DL (ref 12–16)
HEMOGLOBIN: 14 GM/DL (ref 12–16)
HEMOGLOBIN: 14 GM/DL (ref 12–16)
IMMATURE GRANS (ABS): 0.01 THOU/MM3 (ref 0–0.07)
IMMATURE GRANS (ABS): 0.02 THOU/MM3 (ref 0–0.07)
IMMATURE GRANS (ABS): 0.03 THOU/MM3 (ref 0–0.07)
IMMATURE GRANULOCYTES: 0.2 %
IMMATURE GRANULOCYTES: 0.2 %
IMMATURE GRANULOCYTES: 0.4 %
INR BLD: 1.05 (ref 0.85–1.13)
INR BLD: 1.12 (ref 0.85–1.13)
INR BLD: 1.35 (ref 0.85–1.13)
INR BLD: 1.91 (ref 0.85–1.13)
INR BLD: 1.93 (ref 0.85–1.13)
INR BLD: 2.08 (ref 0.85–1.13)
INR BLD: 2.12 (ref 0.85–1.13)
INR BLD: 2.14 (ref 0.85–1.13)
INR BLD: 2.2 (ref 0.85–1.13)
INR BLD: 2.22 (ref 0.85–1.13)
INR BLD: 2.28 (ref 0.85–1.13)
INR BLD: 2.32 (ref 0.85–1.13)
INR BLD: 2.44 (ref 0.85–1.13)
INR BLD: 2.45 (ref 0.85–1.13)
INR BLD: 2.53 (ref 0.85–1.13)
INR BLD: 2.73 (ref 0.85–1.13)
INR BLD: 2.98 (ref 0.85–1.13)
INR BLD: 3.1
INR BLD: 3.18 (ref 0.85–1.13)
INR BLD: 4.18 (ref 0.85–1.13)
INR BLD: 7.17 (ref 0.85–1.13)
KETONES, URINE: NEGATIVE
LACTIC ACID, SEPSIS: 1.1 MMOL/L (ref 0.5–1.9)
LACTIC ACID, SEPSIS: 1.2 MMOL/L (ref 0.5–1.9)
LACTIC ACID, SEPSIS: 1.6 MMOL/L (ref 0.5–1.9)
LEUKOCYTE EST, POC: ABNORMAL
LEUKOCYTE EST, POC: ABNORMAL
LEUKOCYTE ESTERASE, URINE: NEGATIVE
LEUKOCYTE ESTERASE, URINE: NEGATIVE
LEUKOCYTES, UA: ABNORMAL
LIPASE: 12.4 U/L (ref 5.6–51.3)
LIPASE: 20.7 U/L (ref 5.6–51.3)
LV EF: 28 %
LVEF MODALITY: NORMAL
LYMPHOCYTES # BLD: 15.2 %
LYMPHOCYTES # BLD: 22.1 %
LYMPHOCYTES # BLD: 28.8 %
LYMPHOCYTES ABSOLUTE: 1.2 THOU/MM3 (ref 1–4.8)
LYMPHOCYTES ABSOLUTE: 1.5 THOU/MM3 (ref 1–4.8)
LYMPHOCYTES ABSOLUTE: 1.8 THOU/MM3 (ref 1–4.8)
MAGNESIUM: 2.3 MG/DL (ref 1.6–2.4)
MAGNESIUM: 3.1 MG/DL (ref 1.6–2.4)
MCH RBC QN AUTO: 28.2 PG (ref 26–33)
MCH RBC QN AUTO: 28.3 PG (ref 26–33)
MCH RBC QN AUTO: 29.5 PG (ref 26–33)
MCH RBC QN AUTO: 31.2 PG (ref 26–33)
MCH RBC QN AUTO: 31.5 PG (ref 26–33)
MCHC RBC AUTO-ENTMCNC: 30.1 GM/DL (ref 32.2–35.5)
MCHC RBC AUTO-ENTMCNC: 30.6 GM/DL (ref 32.2–35.5)
MCHC RBC AUTO-ENTMCNC: 31 GM/DL (ref 32.2–35.5)
MCHC RBC AUTO-ENTMCNC: 31.5 GM/DL (ref 32.2–35.5)
MCHC RBC AUTO-ENTMCNC: 31.7 GM/DL (ref 32.2–35.5)
MCV RBC AUTO: 100 FL (ref 81–99)
MCV RBC AUTO: 92.7 FL (ref 81–99)
MCV RBC AUTO: 93.9 FL (ref 81–99)
MCV RBC AUTO: 95.1 FL (ref 81–99)
MCV RBC AUTO: 98.4 FL (ref 81–99)
MISCELLANEOUS 2: ABNORMAL
MISCELLANEOUS LAB TEST RESULT: ABNORMAL
MONOCYTES # BLD: 10 %
MONOCYTES # BLD: 10.2 %
MONOCYTES # BLD: 13.5 %
MONOCYTES ABSOLUTE: 0.7 THOU/MM3 (ref 0.4–1.3)
MONOCYTES ABSOLUTE: 0.8 THOU/MM3 (ref 0.4–1.3)
MONOCYTES ABSOLUTE: 0.8 THOU/MM3 (ref 0.4–1.3)
NITRITE, URINE: NEGATIVE
NUCLEATED RED BLOOD CELLS: 0 /100 WBC
OPIATES, URINE: POSITIVE
OSMOLALITY CALCULATION: 286.1 MOSMOL/KG (ref 275–300)
OSMOLALITY CALCULATION: 290.3 MOSMOL/KG (ref 275–300)
OXYCODONE: NEGATIVE
PH UA: 5 (ref 5–9)
PH UA: 5.5 (ref 5–9)
PH UA: 7 (ref 5–9)
PHENCYCLIDINE QUANTITATIVE URINE: NEGATIVE
PLATELET # BLD: 126 THOU/MM3 (ref 130–400)
PLATELET # BLD: 141 THOU/MM3 (ref 130–400)
PLATELET # BLD: 174 THOU/MM3 (ref 130–400)
PLATELET # BLD: 232 THOU/MM3 (ref 130–400)
PLATELET # BLD: 240 THOU/MM3 (ref 130–400)
PMV BLD AUTO: 11.1 FL (ref 9.4–12.4)
PMV BLD AUTO: 12 FL (ref 9.4–12.4)
PMV BLD AUTO: 12.1 FL (ref 9.4–12.4)
PMV BLD AUTO: 9.7 FL (ref 9.4–12.4)
PMV BLD AUTO: 9.9 FL (ref 9.4–12.4)
POC INR: 1.8 (ref 0.8–1.2)
POC INR: 2.2 (ref 0.8–1.2)
POC INR: 2.3 (ref 0.8–1.2)
POC INR: 2.3 (ref 0.8–1.2)
POC INR: 2.6 (ref 0.8–1.2)
POC INR: 3 (ref 0.8–1.2)
POC INR: 3.2 (ref 0.8–1.2)
POC INR: 3.3 (ref 0.8–1.2)
POC INR: 3.4 (ref 0.8–1.2)
POC INR: 4.8 (ref 0.8–1.2)
POTASSIUM REFLEX MAGNESIUM: 4.3 MEQ/L (ref 3.5–5.2)
POTASSIUM REFLEX MAGNESIUM: 4.4 MEQ/L (ref 3.5–5.2)
POTASSIUM REFLEX MAGNESIUM: 4.6 MEQ/L (ref 3.5–5.2)
POTASSIUM REFLEX MAGNESIUM: 4.6 MEQ/L (ref 3.5–5.2)
POTASSIUM REFLEX MAGNESIUM: 4.7 MEQ/L (ref 3.5–5.2)
POTASSIUM REFLEX MAGNESIUM: 4.8 MEQ/L (ref 3.5–5.2)
POTASSIUM REFLEX MAGNESIUM: 5.1 MEQ/L (ref 3.5–5.2)
POTASSIUM REFLEX MAGNESIUM: 5.2 MEQ/L (ref 3.5–5.2)
POTASSIUM SERPL-SCNC: 3.9 MEQ/L (ref 3.5–5.2)
POTASSIUM SERPL-SCNC: 4.2 MEQ/L (ref 3.5–5.2)
POTASSIUM SERPL-SCNC: 4.4 MEQ/L (ref 3.5–5.2)
POTASSIUM SERPL-SCNC: 4.4 MEQ/L (ref 3.5–5.2)
POTASSIUM SERPL-SCNC: 4.6 MEQ/L (ref 3.5–5.2)
POTASSIUM SERPL-SCNC: 4.6 MEQ/L (ref 3.5–5.2)
POTASSIUM SERPL-SCNC: 6 MEQ/L (ref 3.5–5.2)
PRO-BNP: 4869 PG/ML (ref 0–1800)
PRO-BNP: 5790 PG/ML (ref 0–1800)
PROCALCITONIN: 0.06 NG/ML (ref 0.01–0.09)
PROCALCITONIN: 0.1 NG/ML (ref 0.01–0.09)
PROTEIN UA: 100 MG/DL
PROTEIN UA: 100 MG/DL
PROTEIN UA: 30 MG/DL
PROTEIN UA: ABNORMAL
PROTEIN UA: ABNORMAL MG/DL
RBC # BLD: 4.26 MILL/MM3 (ref 4.2–5.4)
RBC # BLD: 4.45 MILL/MM3 (ref 4.2–5.4)
RBC # BLD: 4.74 MILL/MM3 (ref 4.2–5.4)
RBC # BLD: 4.89 MILL/MM3 (ref 4.2–5.4)
RBC # BLD: 4.94 MILL/MM3 (ref 4.2–5.4)
RBC URINE: ABNORMAL /HPF
REASON FOR REJECTION: NORMAL
REFLEX TO URINE C & S: ABNORMAL
REJECTED TEST: NORMAL
RENAL EPITHELIAL, UA: ABNORMAL
SEG NEUTROPHILS: 46.8 %
SEG NEUTROPHILS: 60.8 %
SEG NEUTROPHILS: 70.5 %
SEGMENTED NEUTROPHILS ABSOLUTE COUNT: 2.9 THOU/MM3 (ref 1.8–7.7)
SEGMENTED NEUTROPHILS ABSOLUTE COUNT: 4.3 THOU/MM3 (ref 1.8–7.7)
SEGMENTED NEUTROPHILS ABSOLUTE COUNT: 5.6 THOU/MM3 (ref 1.8–7.7)
SODIUM BLD-SCNC: 135 MEQ/L (ref 135–145)
SODIUM BLD-SCNC: 138 MEQ/L (ref 135–145)
SODIUM BLD-SCNC: 139 MEQ/L (ref 135–145)
SODIUM BLD-SCNC: 139 MEQ/L (ref 135–145)
SODIUM BLD-SCNC: 140 MEQ/L (ref 135–145)
SODIUM BLD-SCNC: 140 MEQ/L (ref 135–145)
SODIUM BLD-SCNC: 141 MEQ/L (ref 135–145)
SODIUM BLD-SCNC: 142 MEQ/L (ref 135–145)
SODIUM BLD-SCNC: 143 MEQ/L (ref 135–145)
SODIUM BLD-SCNC: 144 MEQ/L (ref 135–145)
SODIUM BLD-SCNC: 147 MEQ/L (ref 135–145)
SPECIFIC GRAVITY UA: 1.01 (ref 1–1.03)
SPECIFIC GRAVITY UA: 1.02 (ref 1–1.03)
SPECIFIC GRAVITY UA: 1.02 (ref 1–1.03)
SPECIFIC GRAVITY UA: >= 1.03 (ref 1–1.03)
SPECIFIC GRAVITY, URINE: 1.02 (ref 1–1.03)
T4 FREE: 1.07 NG/DL (ref 0.93–1.76)
T4 FREE: 1.29 NG/DL (ref 0.93–1.76)
TOTAL PROTEIN: 5.4 G/DL (ref 6.1–8)
TOTAL PROTEIN: 5.9 G/DL (ref 6.1–8)
TOTAL PROTEIN: 5.9 G/DL (ref 6.1–8)
TOTAL PROTEIN: 6 G/DL (ref 6.1–8)
TOTAL PROTEIN: 6.2 G/DL (ref 6.1–8)
TOTAL PROTEIN: 6.2 G/DL (ref 6.1–8)
TOTAL PROTEIN: 6.7 G/DL (ref 6.1–8)
TOTAL PROTEIN: 6.8 G/DL (ref 6.1–8)
TOTAL PROTEIN: 6.8 G/DL (ref 6.1–8)
TROPONIN T: < 0.01 NG/ML
TROPONIN T: < 0.01 NG/ML
TSH SERPL DL<=0.05 MIU/L-ACNC: 6.12 UIU/ML (ref 0.4–4.2)
TSH SERPL DL<=0.05 MIU/L-ACNC: 7.57 UIU/ML (ref 0.4–4.2)
UROBILINOGEN, URINE: 0.2 EU/DL (ref 0–1)
UROBILINOGEN, URINE: 1 EU/DL (ref 0–1)
VITAMIN B-12: 1965 PG/ML (ref 211–911)
WBC # BLD: 4.1 THOU/MM3 (ref 4.8–10.8)
WBC # BLD: 5.6 THOU/MM3 (ref 4.8–10.8)
WBC # BLD: 6.1 THOU/MM3 (ref 4.8–10.8)
WBC # BLD: 7 THOU/MM3 (ref 4.8–10.8)
WBC # BLD: 8 THOU/MM3 (ref 4.8–10.8)
WBC UA: ABNORMAL /HPF
YEAST: ABNORMAL

## 2020-01-01 PROCEDURE — 6370000000 HC RX 637 (ALT 250 FOR IP): Performed by: PHYSICIAN ASSISTANT

## 2020-01-01 PROCEDURE — 83690 ASSAY OF LIPASE: CPT

## 2020-01-01 PROCEDURE — 97530 THERAPEUTIC ACTIVITIES: CPT

## 2020-01-01 PROCEDURE — 99239 HOSP IP/OBS DSCHRG MGMT >30: CPT | Performed by: FAMILY MEDICINE

## 2020-01-01 PROCEDURE — 96375 TX/PRO/DX INJ NEW DRUG ADDON: CPT

## 2020-01-01 PROCEDURE — 83605 ASSAY OF LACTIC ACID: CPT

## 2020-01-01 PROCEDURE — 96365 THER/PROPH/DIAG IV INF INIT: CPT

## 2020-01-01 PROCEDURE — 6370000000 HC RX 637 (ALT 250 FOR IP): Performed by: PHARMACIST

## 2020-01-01 PROCEDURE — 36415 COLL VENOUS BLD VENIPUNCTURE: CPT

## 2020-01-01 PROCEDURE — 99232 SBSQ HOSP IP/OBS MODERATE 35: CPT | Performed by: INTERNAL MEDICINE

## 2020-01-01 PROCEDURE — 1200000003 HC TELEMETRY R&B

## 2020-01-01 PROCEDURE — 93296 REM INTERROG EVL PM/IDS: CPT | Performed by: NUCLEAR MEDICINE

## 2020-01-01 PROCEDURE — 85610 PROTHROMBIN TIME: CPT | Performed by: PHARMACIST

## 2020-01-01 PROCEDURE — 80053 COMPREHEN METABOLIC PANEL: CPT

## 2020-01-01 PROCEDURE — 80048 BASIC METABOLIC PNL TOTAL CA: CPT

## 2020-01-01 PROCEDURE — 36416 COLLJ CAPILLARY BLOOD SPEC: CPT

## 2020-01-01 PROCEDURE — 94761 N-INVAS EAR/PLS OXIMETRY MLT: CPT

## 2020-01-01 PROCEDURE — 85610 PROTHROMBIN TIME: CPT

## 2020-01-01 PROCEDURE — 94760 N-INVAS EAR/PLS OXIMETRY 1: CPT

## 2020-01-01 PROCEDURE — 6370000000 HC RX 637 (ALT 250 FOR IP): Performed by: STUDENT IN AN ORGANIZED HEALTH CARE EDUCATION/TRAINING PROGRAM

## 2020-01-01 PROCEDURE — 93294 REM INTERROG EVL PM/LDLS PM: CPT | Performed by: INTERNAL MEDICINE

## 2020-01-01 PROCEDURE — 6370000000 HC RX 637 (ALT 250 FOR IP): Performed by: FAMILY MEDICINE

## 2020-01-01 PROCEDURE — 6370000000 HC RX 637 (ALT 250 FOR IP): Performed by: INTERNAL MEDICINE

## 2020-01-01 PROCEDURE — 6360000002 HC RX W HCPCS: Performed by: NURSE PRACTITIONER

## 2020-01-01 PROCEDURE — 36416 COLLJ CAPILLARY BLOOD SPEC: CPT | Performed by: PHARMACIST

## 2020-01-01 PROCEDURE — 1090F PRES/ABSN URINE INCON ASSESS: CPT | Performed by: NURSE PRACTITIONER

## 2020-01-01 PROCEDURE — 99215 OFFICE O/P EST HI 40 MIN: CPT

## 2020-01-01 PROCEDURE — 2580000003 HC RX 258: Performed by: NURSE PRACTITIONER

## 2020-01-01 PROCEDURE — 2580000003 HC RX 258: Performed by: EMERGENCY MEDICINE

## 2020-01-01 PROCEDURE — 85025 COMPLETE CBC W/AUTO DIFF WBC: CPT

## 2020-01-01 PROCEDURE — 99233 SBSQ HOSP IP/OBS HIGH 50: CPT | Performed by: FAMILY MEDICINE

## 2020-01-01 PROCEDURE — 84439 ASSAY OF FREE THYROXINE: CPT

## 2020-01-01 PROCEDURE — 85730 THROMBOPLASTIN TIME PARTIAL: CPT

## 2020-01-01 PROCEDURE — 99285 EMERGENCY DEPT VISIT HI MDM: CPT

## 2020-01-01 PROCEDURE — 99232 SBSQ HOSP IP/OBS MODERATE 35: CPT | Performed by: NURSE PRACTITIONER

## 2020-01-01 PROCEDURE — 99211 OFF/OP EST MAY X REQ PHY/QHP: CPT

## 2020-01-01 PROCEDURE — 6360000002 HC RX W HCPCS: Performed by: FAMILY MEDICINE

## 2020-01-01 PROCEDURE — 93010 ELECTROCARDIOGRAM REPORT: CPT | Performed by: INTERNAL MEDICINE

## 2020-01-01 PROCEDURE — 93294 REM INTERROG EVL PM/LDLS PM: CPT | Performed by: NUCLEAR MEDICINE

## 2020-01-01 PROCEDURE — 2709999900 HC NON-CHARGEABLE SUPPLY

## 2020-01-01 PROCEDURE — 82248 BILIRUBIN DIRECT: CPT

## 2020-01-01 PROCEDURE — 2700000000 HC OXYGEN THERAPY PER DAY

## 2020-01-01 PROCEDURE — 6370000000 HC RX 637 (ALT 250 FOR IP): Performed by: NURSE PRACTITIONER

## 2020-01-01 PROCEDURE — 82746 ASSAY OF FOLIC ACID SERUM: CPT

## 2020-01-01 PROCEDURE — 99223 1ST HOSP IP/OBS HIGH 75: CPT | Performed by: INTERNAL MEDICINE

## 2020-01-01 PROCEDURE — 97166 OT EVAL MOD COMPLEX 45 MIN: CPT

## 2020-01-01 PROCEDURE — 6360000002 HC RX W HCPCS: Performed by: PHYSICIAN ASSISTANT

## 2020-01-01 PROCEDURE — 82948 REAGENT STRIP/BLOOD GLUCOSE: CPT

## 2020-01-01 PROCEDURE — 2580000003 HC RX 258: Performed by: INTERNAL MEDICINE

## 2020-01-01 PROCEDURE — 99212 OFFICE O/P EST SF 10 MIN: CPT

## 2020-01-01 PROCEDURE — 73630 X-RAY EXAM OF FOOT: CPT

## 2020-01-01 PROCEDURE — 6360000002 HC RX W HCPCS: Performed by: INTERNAL MEDICINE

## 2020-01-01 PROCEDURE — 81003 URINALYSIS AUTO W/O SCOPE: CPT

## 2020-01-01 PROCEDURE — 99214 OFFICE O/P EST MOD 30 MIN: CPT | Performed by: NURSE PRACTITIONER

## 2020-01-01 PROCEDURE — 2500000003 HC RX 250 WO HCPCS: Performed by: INTERNAL MEDICINE

## 2020-01-01 PROCEDURE — 2500000003 HC RX 250 WO HCPCS: Performed by: EMERGENCY MEDICINE

## 2020-01-01 PROCEDURE — 80307 DRUG TEST PRSMV CHEM ANLYZR: CPT

## 2020-01-01 PROCEDURE — 99222 1ST HOSP IP/OBS MODERATE 55: CPT | Performed by: INTERNAL MEDICINE

## 2020-01-01 PROCEDURE — 51798 US URINE CAPACITY MEASURE: CPT

## 2020-01-01 PROCEDURE — 2580000003 HC RX 258: Performed by: FAMILY MEDICINE

## 2020-01-01 PROCEDURE — 93005 ELECTROCARDIOGRAM TRACING: CPT | Performed by: EMERGENCY MEDICINE

## 2020-01-01 PROCEDURE — 4040F PNEUMOC VAC/ADMIN/RCVD: CPT | Performed by: NURSE PRACTITIONER

## 2020-01-01 PROCEDURE — 2580000003 HC RX 258: Performed by: PHYSICIAN ASSISTANT

## 2020-01-01 PROCEDURE — 99211 OFF/OP EST MAY X REQ PHY/QHP: CPT | Performed by: PHARMACIST

## 2020-01-01 PROCEDURE — 84484 ASSAY OF TROPONIN QUANT: CPT

## 2020-01-01 PROCEDURE — 71045 X-RAY EXAM CHEST 1 VIEW: CPT

## 2020-01-01 PROCEDURE — 81001 URINALYSIS AUTO W/SCOPE: CPT

## 2020-01-01 PROCEDURE — 83735 ASSAY OF MAGNESIUM: CPT

## 2020-01-01 PROCEDURE — 84443 ASSAY THYROID STIM HORMONE: CPT

## 2020-01-01 PROCEDURE — 99232 SBSQ HOSP IP/OBS MODERATE 35: CPT | Performed by: FAMILY MEDICINE

## 2020-01-01 PROCEDURE — 6370000000 HC RX 637 (ALT 250 FOR IP): Performed by: EMERGENCY MEDICINE

## 2020-01-01 PROCEDURE — 99222 1ST HOSP IP/OBS MODERATE 55: CPT | Performed by: PHYSICIAN ASSISTANT

## 2020-01-01 PROCEDURE — 99223 1ST HOSP IP/OBS HIGH 75: CPT | Performed by: NUCLEAR MEDICINE

## 2020-01-01 PROCEDURE — 82140 ASSAY OF AMMONIA: CPT

## 2020-01-01 PROCEDURE — 93005 ELECTROCARDIOGRAM TRACING: CPT | Performed by: FAMILY MEDICINE

## 2020-01-01 PROCEDURE — G8400 PT W/DXA NO RESULTS DOC: HCPCS | Performed by: NURSE PRACTITIONER

## 2020-01-01 PROCEDURE — 82607 VITAMIN B-12: CPT

## 2020-01-01 PROCEDURE — 6360000002 HC RX W HCPCS: Performed by: EMERGENCY MEDICINE

## 2020-01-01 PROCEDURE — 85027 COMPLETE CBC AUTOMATED: CPT

## 2020-01-01 PROCEDURE — 84145 PROCALCITONIN (PCT): CPT

## 2020-01-01 PROCEDURE — 97110 THERAPEUTIC EXERCISES: CPT

## 2020-01-01 PROCEDURE — 92526 ORAL FUNCTION THERAPY: CPT

## 2020-01-01 PROCEDURE — 93306 TTE W/DOPPLER COMPLETE: CPT

## 2020-01-01 PROCEDURE — 96367 TX/PROPH/DG ADDL SEQ IV INF: CPT

## 2020-01-01 PROCEDURE — 2500000003 HC RX 250 WO HCPCS: Performed by: NURSE PRACTITIONER

## 2020-01-01 PROCEDURE — G8427 DOCREV CUR MEDS BY ELIG CLIN: HCPCS | Performed by: NURSE PRACTITIONER

## 2020-01-01 PROCEDURE — G0480 DRUG TEST DEF 1-7 CLASSES: HCPCS

## 2020-01-01 PROCEDURE — 87040 BLOOD CULTURE FOR BACTERIA: CPT

## 2020-01-01 PROCEDURE — 83880 ASSAY OF NATRIURETIC PEPTIDE: CPT

## 2020-01-01 PROCEDURE — 1123F ACP DISCUSS/DSCN MKR DOCD: CPT | Performed by: NURSE PRACTITIONER

## 2020-01-01 PROCEDURE — 99239 HOSP IP/OBS DSCHRG MGMT >30: CPT | Performed by: NURSE PRACTITIONER

## 2020-01-01 PROCEDURE — 97163 PT EVAL HIGH COMPLEX 45 MIN: CPT

## 2020-01-01 PROCEDURE — 71046 X-RAY EXAM CHEST 2 VIEWS: CPT

## 2020-01-01 PROCEDURE — 92610 EVALUATE SWALLOWING FUNCTION: CPT

## 2020-01-01 PROCEDURE — 99212 OFFICE O/P EST SF 10 MIN: CPT | Performed by: PHARMACIST

## 2020-01-01 PROCEDURE — 87804 INFLUENZA ASSAY W/OPTIC: CPT

## 2020-01-01 PROCEDURE — 70450 CT HEAD/BRAIN W/O DYE: CPT

## 2020-01-01 PROCEDURE — 93296 REM INTERROG EVL PM/IDS: CPT | Performed by: INTERNAL MEDICINE

## 2020-01-01 RX ORDER — AMLODIPINE BESYLATE 5 MG/1
5 TABLET ORAL DAILY
Status: DISCONTINUED | OUTPATIENT
Start: 2020-01-01 | End: 2020-01-01

## 2020-01-01 RX ORDER — 0.9 % SODIUM CHLORIDE 0.9 %
1000 INTRAVENOUS SOLUTION INTRAVENOUS ONCE
Status: COMPLETED | OUTPATIENT
Start: 2020-01-01 | End: 2020-01-01

## 2020-01-01 RX ORDER — AZITHROMYCIN 250 MG/1
500 TABLET, FILM COATED ORAL DAILY
Status: COMPLETED | OUTPATIENT
Start: 2020-01-01 | End: 2020-01-01

## 2020-01-01 RX ORDER — POTASSIUM CHLORIDE 20 MEQ/1
40 TABLET, EXTENDED RELEASE ORAL PRN
Status: DISCONTINUED | OUTPATIENT
Start: 2020-01-01 | End: 2020-01-01 | Stop reason: HOSPADM

## 2020-01-01 RX ORDER — LISINOPRIL 5 MG/1
5 TABLET ORAL DAILY
Qty: 30 TABLET | Refills: 1 | Status: SHIPPED | OUTPATIENT
Start: 2020-01-01 | End: 2020-01-01 | Stop reason: SDUPTHER

## 2020-01-01 RX ORDER — SODIUM CHLORIDE 9 MG/ML
INJECTION, SOLUTION INTRAVENOUS CONTINUOUS
Status: DISCONTINUED | OUTPATIENT
Start: 2020-01-01 | End: 2020-01-01

## 2020-01-01 RX ORDER — POLYETHYLENE GLYCOL 3350 17 G/17G
17 POWDER, FOR SOLUTION ORAL DAILY PRN
Status: DISCONTINUED | OUTPATIENT
Start: 2020-01-01 | End: 2020-01-01 | Stop reason: HOSPADM

## 2020-01-01 RX ORDER — CYANOCOBALAMIN 1000 UG/ML
1000 INJECTION INTRAMUSCULAR; SUBCUTANEOUS
COMMUNITY
End: 2020-01-01

## 2020-01-01 RX ORDER — LISINOPRIL 5 MG/1
5 TABLET ORAL DAILY
Status: DISCONTINUED | OUTPATIENT
Start: 2020-01-01 | End: 2020-01-01 | Stop reason: HOSPADM

## 2020-01-01 RX ORDER — NITROFURANTOIN 25; 75 MG/1; MG/1
100 CAPSULE ORAL EVERY 12 HOURS SCHEDULED
Status: DISCONTINUED | OUTPATIENT
Start: 2020-01-01 | End: 2020-01-01 | Stop reason: HOSPADM

## 2020-01-01 RX ORDER — FLUCONAZOLE 100 MG/1
100 TABLET ORAL DAILY
COMMUNITY
End: 2020-01-01

## 2020-01-01 RX ORDER — DOXYCYCLINE HYCLATE 100 MG
100 TABLET ORAL 2 TIMES DAILY
Qty: 14 TABLET | Refills: 0 | Status: SHIPPED | OUTPATIENT
Start: 2020-01-01 | End: 2020-01-01

## 2020-01-01 RX ORDER — SODIUM CHLORIDE 0.9 % (FLUSH) 0.9 %
10 SYRINGE (ML) INJECTION EVERY 12 HOURS SCHEDULED
Status: DISCONTINUED | OUTPATIENT
Start: 2020-01-01 | End: 2020-01-01 | Stop reason: HOSPADM

## 2020-01-01 RX ORDER — HYDROCODONE BITARTRATE AND ACETAMINOPHEN 5; 325 MG/1; MG/1
1 TABLET ORAL EVERY 6 HOURS PRN
Status: DISCONTINUED | OUTPATIENT
Start: 2020-01-01 | End: 2020-01-01 | Stop reason: HOSPADM

## 2020-01-01 RX ORDER — 0.9 % SODIUM CHLORIDE 0.9 %
30 INTRAVENOUS SOLUTION INTRAVENOUS ONCE
Status: DISCONTINUED | OUTPATIENT
Start: 2020-01-01 | End: 2020-01-01

## 2020-01-01 RX ORDER — METOPROLOL SUCCINATE 50 MG/1
50 TABLET, EXTENDED RELEASE ORAL DAILY
Status: DISCONTINUED | OUTPATIENT
Start: 2020-01-01 | End: 2020-01-01 | Stop reason: HOSPADM

## 2020-01-01 RX ORDER — ONDANSETRON 2 MG/ML
4 INJECTION INTRAMUSCULAR; INTRAVENOUS EVERY 6 HOURS PRN
Status: DISCONTINUED | OUTPATIENT
Start: 2020-01-01 | End: 2020-01-01 | Stop reason: HOSPADM

## 2020-01-01 RX ORDER — WARFARIN SODIUM 1 MG/1
TABLET ORAL
Qty: 30 TABLET | Refills: 3 | Status: SHIPPED | OUTPATIENT
Start: 2020-01-01 | End: 2021-01-01 | Stop reason: HOSPADM

## 2020-01-01 RX ORDER — ERGOCALCIFEROL 1.25 MG/1
50000 CAPSULE ORAL WEEKLY
Status: DISCONTINUED | OUTPATIENT
Start: 2020-01-01 | End: 2020-01-01 | Stop reason: HOSPADM

## 2020-01-01 RX ORDER — ASPIRIN 81 MG/1
81 TABLET ORAL DAILY
COMMUNITY
End: 2020-01-01

## 2020-01-01 RX ORDER — DEXTROSE MONOHYDRATE 25 G/50ML
25 INJECTION, SOLUTION INTRAVENOUS ONCE
Status: COMPLETED | OUTPATIENT
Start: 2020-01-01 | End: 2020-01-01

## 2020-01-01 RX ORDER — SODIUM CHLORIDE 0.9 % (FLUSH) 0.9 %
10 SYRINGE (ML) INJECTION PRN
Status: DISCONTINUED | OUTPATIENT
Start: 2020-01-01 | End: 2020-01-01 | Stop reason: HOSPADM

## 2020-01-01 RX ORDER — ACETAMINOPHEN 650 MG/1
650 SUPPOSITORY RECTAL EVERY 6 HOURS PRN
Status: DISCONTINUED | OUTPATIENT
Start: 2020-01-01 | End: 2020-01-01 | Stop reason: HOSPADM

## 2020-01-01 RX ORDER — HYDROCODONE BITARTRATE AND ACETAMINOPHEN 10; 325 MG/1; MG/1
1-2 TABLET ORAL
Status: ON HOLD | COMMUNITY
End: 2020-01-01 | Stop reason: HOSPADM

## 2020-01-01 RX ORDER — DOCUSATE SODIUM 100 MG/1
100 CAPSULE, LIQUID FILLED ORAL DAILY
Status: DISCONTINUED | OUTPATIENT
Start: 2020-01-01 | End: 2020-01-01 | Stop reason: HOSPADM

## 2020-01-01 RX ORDER — LISINOPRIL 5 MG/1
5 TABLET ORAL DAILY
COMMUNITY
End: 2020-01-01

## 2020-01-01 RX ORDER — LISINOPRIL 5 MG/1
5 TABLET ORAL DAILY
Status: DISCONTINUED | OUTPATIENT
Start: 2020-01-01 | End: 2020-01-01

## 2020-01-01 RX ORDER — AZITHROMYCIN 250 MG/1
250 TABLET, FILM COATED ORAL DAILY
Status: DISCONTINUED | OUTPATIENT
Start: 2020-01-01 | End: 2020-01-01 | Stop reason: HOSPADM

## 2020-01-01 RX ORDER — ISOSORBIDE MONONITRATE 60 MG/1
TABLET, EXTENDED RELEASE ORAL
Qty: 90 TABLET | Refills: 3 | Status: SHIPPED | OUTPATIENT
Start: 2020-01-01 | End: 2021-01-01 | Stop reason: SDUPTHER

## 2020-01-01 RX ORDER — SULFAMETHOXAZOLE AND TRIMETHOPRIM 800; 160 MG/1; MG/1
1 TABLET ORAL 2 TIMES DAILY
Qty: 14 TABLET | Refills: 0 | Status: SHIPPED | OUTPATIENT
Start: 2020-01-01 | End: 2020-01-01

## 2020-01-01 RX ORDER — SODIUM CHLORIDE 0.9 % (FLUSH) 0.9 %
10 SYRINGE (ML) INJECTION PRN
Status: DISCONTINUED | OUTPATIENT
Start: 2020-01-01 | End: 2020-01-01

## 2020-01-01 RX ORDER — SODIUM CHLORIDE 0.9 % (FLUSH) 0.9 %
10 SYRINGE (ML) INJECTION EVERY 12 HOURS SCHEDULED
Status: DISCONTINUED | OUTPATIENT
Start: 2020-01-01 | End: 2020-01-01 | Stop reason: SDUPTHER

## 2020-01-01 RX ORDER — SODIUM POLYSTYRENE SULFONATE 15 G/60ML
15 SUSPENSION ORAL; RECTAL ONCE
Status: COMPLETED | OUTPATIENT
Start: 2020-01-01 | End: 2020-01-01

## 2020-01-01 RX ORDER — POTASSIUM CHLORIDE 7.45 MG/ML
10 INJECTION INTRAVENOUS PRN
Status: DISCONTINUED | OUTPATIENT
Start: 2020-01-01 | End: 2020-01-01 | Stop reason: HOSPADM

## 2020-01-01 RX ORDER — LISINOPRIL 5 MG/1
5 TABLET ORAL DAILY
Qty: 90 TABLET | Refills: 1 | Status: SHIPPED | OUTPATIENT
Start: 2020-01-01 | End: 2020-01-01

## 2020-01-01 RX ORDER — CHOLECALCIFEROL (VITAMIN D3) 1250 MCG
1 CAPSULE ORAL WEEKLY
COMMUNITY
End: 2021-01-01 | Stop reason: HOSPADM

## 2020-01-01 RX ORDER — WARFARIN SODIUM 2.5 MG/1
TABLET ORAL
COMMUNITY
End: 2021-01-01 | Stop reason: HOSPADM

## 2020-01-01 RX ORDER — PHENOL 1.4 %
2 AEROSOL, SPRAY (ML) MUCOUS MEMBRANE NIGHTLY
COMMUNITY
End: 2020-01-01

## 2020-01-01 RX ORDER — METOPROLOL SUCCINATE 50 MG/1
50 TABLET, EXTENDED RELEASE ORAL DAILY
COMMUNITY
End: 2020-01-01

## 2020-01-01 RX ORDER — WARFARIN SODIUM 2.5 MG/1
1.25 TABLET ORAL
Status: COMPLETED | OUTPATIENT
Start: 2020-01-01 | End: 2020-01-01

## 2020-01-01 RX ORDER — LANOLIN ALCOHOL/MO/W.PET/CERES
9 CREAM (GRAM) TOPICAL NIGHTLY
Status: DISCONTINUED | OUTPATIENT
Start: 2020-01-01 | End: 2020-01-01 | Stop reason: HOSPADM

## 2020-01-01 RX ORDER — HYDROCODONE BITARTRATE AND ACETAMINOPHEN 10; 325 MG/1; MG/1
1 TABLET ORAL EVERY 4 HOURS PRN
Status: DISCONTINUED | OUTPATIENT
Start: 2020-01-01 | End: 2020-01-01 | Stop reason: HOSPADM

## 2020-01-01 RX ORDER — WARFARIN SODIUM 1 MG/1
1 TABLET ORAL
Status: COMPLETED | OUTPATIENT
Start: 2020-01-01 | End: 2020-01-01

## 2020-01-01 RX ORDER — PROMETHAZINE HYDROCHLORIDE 25 MG/1
12.5 TABLET ORAL EVERY 6 HOURS PRN
Status: DISCONTINUED | OUTPATIENT
Start: 2020-01-01 | End: 2020-01-01 | Stop reason: HOSPADM

## 2020-01-01 RX ORDER — POLYETHYLENE GLYCOL 3350 17 G/17G
17 POWDER, FOR SOLUTION ORAL NIGHTLY
Status: DISCONTINUED | OUTPATIENT
Start: 2020-01-01 | End: 2020-01-01 | Stop reason: HOSPADM

## 2020-01-01 RX ORDER — ISOSORBIDE MONONITRATE 60 MG/1
60 TABLET, EXTENDED RELEASE ORAL DAILY
Status: DISCONTINUED | OUTPATIENT
Start: 2020-01-01 | End: 2020-01-01 | Stop reason: HOSPADM

## 2020-01-01 RX ORDER — ISOSORBIDE MONONITRATE 60 MG/1
60 TABLET, EXTENDED RELEASE ORAL DAILY
Status: DISCONTINUED | OUTPATIENT
Start: 2020-01-01 | End: 2020-01-01

## 2020-01-01 RX ORDER — ACETAMINOPHEN 325 MG/1
650 TABLET ORAL EVERY 6 HOURS PRN
Status: DISCONTINUED | OUTPATIENT
Start: 2020-01-01 | End: 2020-01-01 | Stop reason: HOSPADM

## 2020-01-01 RX ORDER — FUROSEMIDE 20 MG/1
20 TABLET ORAL DAILY
Status: DISCONTINUED | OUTPATIENT
Start: 2020-01-01 | End: 2020-01-01 | Stop reason: HOSPADM

## 2020-01-01 RX ORDER — FUROSEMIDE 20 MG/1
20 TABLET ORAL DAILY
COMMUNITY
End: 2020-01-01

## 2020-01-01 RX ORDER — MAGNESIUM SULFATE IN WATER 40 MG/ML
2 INJECTION, SOLUTION INTRAVENOUS PRN
Status: DISCONTINUED | OUTPATIENT
Start: 2020-01-01 | End: 2020-01-01 | Stop reason: HOSPADM

## 2020-01-01 RX ORDER — POTASSIUM CHLORIDE 750 MG/1
TABLET, EXTENDED RELEASE ORAL
Qty: 90 TABLET | Refills: 3 | Status: SHIPPED | OUTPATIENT
Start: 2020-01-01 | End: 2021-01-01 | Stop reason: HOSPADM

## 2020-01-01 RX ORDER — HEPARIN SODIUM 5000 [USP'U]/ML
5000 INJECTION, SOLUTION INTRAVENOUS; SUBCUTANEOUS EVERY 8 HOURS SCHEDULED
Status: DISCONTINUED | OUTPATIENT
Start: 2020-01-01 | End: 2020-01-01

## 2020-01-01 RX ORDER — ZINC OXIDE
OINTMENT (GRAM) TOPICAL PRN
COMMUNITY
End: 2021-01-01

## 2020-01-01 RX ORDER — ZINC GLUCONATE 50 MG
50 TABLET ORAL DAILY
COMMUNITY
End: 2021-01-01 | Stop reason: HOSPADM

## 2020-01-01 RX ORDER — FENTANYL CITRATE 50 UG/ML
50 INJECTION, SOLUTION INTRAMUSCULAR; INTRAVENOUS ONCE
Status: DISCONTINUED | OUTPATIENT
Start: 2020-01-01 | End: 2020-01-01

## 2020-01-01 RX ORDER — MELATONIN 5 MG
2 TABLET,CHEWABLE ORAL NIGHTLY PRN
COMMUNITY
End: 2021-01-01 | Stop reason: HOSPADM

## 2020-01-01 RX ORDER — SODIUM CHLORIDE 450 MG/100ML
INJECTION, SOLUTION INTRAVENOUS CONTINUOUS
Status: DISCONTINUED | OUTPATIENT
Start: 2020-01-01 | End: 2020-01-01

## 2020-01-01 RX ORDER — PHYTONADIONE 5 MG/1
5 TABLET ORAL ONCE
Status: DISCONTINUED | OUTPATIENT
Start: 2020-01-01 | End: 2020-01-01

## 2020-01-01 RX ORDER — LANOLIN ALCOHOL/MO/W.PET/CERES
20 CREAM (GRAM) TOPICAL NIGHTLY
Status: DISCONTINUED | OUTPATIENT
Start: 2020-01-01 | End: 2020-01-01 | Stop reason: HOSPADM

## 2020-01-01 RX ORDER — LISINOPRIL 5 MG/1
TABLET ORAL
Qty: 90 TABLET | Refills: 3 | Status: SHIPPED | OUTPATIENT
Start: 2020-01-01 | End: 2021-01-01 | Stop reason: HOSPADM

## 2020-01-01 RX ORDER — WARFARIN SODIUM 1 MG/1
1 TABLET ORAL
Status: DISCONTINUED | OUTPATIENT
Start: 2020-01-01 | End: 2020-01-01 | Stop reason: HOSPADM

## 2020-01-01 RX ORDER — SODIUM BICARBONATE 650 MG/1
1300 TABLET ORAL 2 TIMES DAILY
Status: DISCONTINUED | OUTPATIENT
Start: 2020-01-01 | End: 2020-01-01 | Stop reason: HOSPADM

## 2020-01-01 RX ORDER — CYANOCOBALAMIN 1000 UG/ML
1000 INJECTION INTRAMUSCULAR; SUBCUTANEOUS
Status: DISCONTINUED | OUTPATIENT
Start: 2020-01-01 | End: 2020-01-01 | Stop reason: HOSPADM

## 2020-01-01 RX ORDER — PENTOXIFYLLINE 400 MG/1
400 TABLET, EXTENDED RELEASE ORAL
COMMUNITY
End: 2020-01-01

## 2020-01-01 RX ORDER — POLYETHYLENE GLYCOL 3350 17 G/17G
17 POWDER, FOR SOLUTION ORAL NIGHTLY
COMMUNITY
End: 2020-01-01

## 2020-01-01 RX ORDER — POTASSIUM CHLORIDE 750 MG/1
10 CAPSULE, EXTENDED RELEASE ORAL DAILY
COMMUNITY
End: 2020-01-01

## 2020-01-01 RX ORDER — WARFARIN SODIUM 1 MG/1
TABLET ORAL
COMMUNITY
End: 2020-01-01

## 2020-01-01 RX ORDER — METOPROLOL SUCCINATE 50 MG/1
50 TABLET, EXTENDED RELEASE ORAL DAILY
Status: DISCONTINUED | OUTPATIENT
Start: 2020-01-01 | End: 2020-01-01

## 2020-01-01 RX ORDER — SULFAMETHOXAZOLE AND TRIMETHOPRIM 800; 160 MG/1; MG/1
1 TABLET ORAL 2 TIMES DAILY
COMMUNITY
End: 2020-01-01

## 2020-01-01 RX ORDER — FUROSEMIDE 20 MG/1
TABLET ORAL
Qty: 90 TABLET | Refills: 3 | Status: SHIPPED | OUTPATIENT
Start: 2020-01-01 | End: 2021-01-01 | Stop reason: HOSPADM

## 2020-01-01 RX ORDER — ASPIRIN 81 MG/1
81 TABLET ORAL DAILY
Status: DISCONTINUED | OUTPATIENT
Start: 2020-01-01 | End: 2020-01-01 | Stop reason: HOSPADM

## 2020-01-01 RX ORDER — ISOSORBIDE MONONITRATE 60 MG/1
60 TABLET, EXTENDED RELEASE ORAL DAILY
COMMUNITY
End: 2020-01-01

## 2020-01-01 RX ORDER — WARFARIN SODIUM 2.5 MG/1
2.5 TABLET ORAL DAILY
COMMUNITY
End: 2021-01-01 | Stop reason: HOSPADM

## 2020-01-01 RX ORDER — PHENOL 1.4 %
2 AEROSOL, SPRAY (ML) MUCOUS MEMBRANE NIGHTLY PRN
COMMUNITY
End: 2020-01-01

## 2020-01-01 RX ORDER — WARFARIN SODIUM 1 MG/1
1 TABLET ORAL ONCE
Status: COMPLETED | OUTPATIENT
Start: 2020-01-01 | End: 2020-01-01

## 2020-01-01 RX ADMIN — SODIUM CHLORIDE: 4.5 INJECTION, SOLUTION INTRAVENOUS at 02:21

## 2020-01-01 RX ADMIN — METOPROLOL SUCCINATE 50 MG: 50 TABLET, EXTENDED RELEASE ORAL at 08:57

## 2020-01-01 RX ADMIN — NITROFURANTOIN MONOHYDRATE/MACROCRYSTALLINE 100 MG: 25; 75 CAPSULE ORAL at 11:55

## 2020-01-01 RX ADMIN — DIAPER RASH SKIN PROTECTENT: at 20:24

## 2020-01-01 RX ADMIN — HYDROCODONE BITARTRATE AND ACETAMINOPHEN 1 TABLET: 10; 325 TABLET ORAL at 16:37

## 2020-01-01 RX ADMIN — Medication 9 MG: at 20:25

## 2020-01-01 RX ADMIN — AZITHROMYCIN 500 MG: 500 INJECTION, POWDER, LYOPHILIZED, FOR SOLUTION INTRAVENOUS at 18:33

## 2020-01-01 RX ADMIN — SODIUM BICARBONATE 1300 MG: 650 TABLET ORAL at 15:27

## 2020-01-01 RX ADMIN — FUROSEMIDE 20 MG: 20 TABLET ORAL at 08:11

## 2020-01-01 RX ADMIN — ASPIRIN 81 MG: 81 TABLET ORAL at 09:44

## 2020-01-01 RX ADMIN — PHYTONADIONE 5 MG: 10 INJECTION, EMULSION INTRAMUSCULAR; INTRAVENOUS; SUBCUTANEOUS at 11:17

## 2020-01-01 RX ADMIN — Medication 10 ML: at 23:54

## 2020-01-01 RX ADMIN — SODIUM CHLORIDE: 9 INJECTION, SOLUTION INTRAVENOUS at 06:11

## 2020-01-01 RX ADMIN — WARFARIN SODIUM 1 MG: 1 TABLET ORAL at 14:50

## 2020-01-01 RX ADMIN — METOPROLOL SUCCINATE 50 MG: 50 TABLET, EXTENDED RELEASE ORAL at 09:40

## 2020-01-01 RX ADMIN — HYDROCODONE BITARTRATE AND ACETAMINOPHEN 1 TABLET: 5; 325 TABLET ORAL at 06:07

## 2020-01-01 RX ADMIN — ISOSORBIDE MONONITRATE 60 MG: 60 TABLET ORAL at 08:57

## 2020-01-01 RX ADMIN — HYDROCODONE BITARTRATE AND ACETAMINOPHEN 1 TABLET: 5; 325 TABLET ORAL at 11:57

## 2020-01-01 RX ADMIN — HYDROCODONE BITARTRATE AND ACETAMINOPHEN 1 TABLET: 5; 325 TABLET ORAL at 16:23

## 2020-01-01 RX ADMIN — HYDROCODONE BITARTRATE AND ACETAMINOPHEN 1 TABLET: 5; 325 TABLET ORAL at 15:46

## 2020-01-01 RX ADMIN — SODIUM CHLORIDE: 4.5 INJECTION, SOLUTION INTRAVENOUS at 23:39

## 2020-01-01 RX ADMIN — AMLODIPINE BESYLATE 5 MG: 5 TABLET ORAL at 08:11

## 2020-01-01 RX ADMIN — DIAPER RASH SKIN PROTECTENT: at 09:35

## 2020-01-01 RX ADMIN — METOPROLOL SUCCINATE 50 MG: 50 TABLET, FILM COATED, EXTENDED RELEASE ORAL at 08:05

## 2020-01-01 RX ADMIN — DIAPER RASH SKIN PROTECTENT: at 09:48

## 2020-01-01 RX ADMIN — MICONAZOLE NITRATE: 20 POWDER TOPICAL at 20:25

## 2020-01-01 RX ADMIN — SODIUM CHLORIDE: 4.5 INJECTION, SOLUTION INTRAVENOUS at 15:26

## 2020-01-01 RX ADMIN — CEFTRIAXONE SODIUM 1 G: 1 INJECTION, POWDER, FOR SOLUTION INTRAMUSCULAR; INTRAVENOUS at 18:30

## 2020-01-01 RX ADMIN — METOPROLOL SUCCINATE 50 MG: 50 TABLET, FILM COATED, EXTENDED RELEASE ORAL at 08:11

## 2020-01-01 RX ADMIN — SODIUM BICARBONATE 1300 MG: 650 TABLET ORAL at 21:52

## 2020-01-01 RX ADMIN — Medication 0.5 MG: at 23:53

## 2020-01-01 RX ADMIN — DOXYCYCLINE 100 MG: 100 INJECTION, POWDER, LYOPHILIZED, FOR SOLUTION INTRAVENOUS at 13:35

## 2020-01-01 RX ADMIN — DOCUSATE SODIUM 100 MG: 100 CAPSULE, LIQUID FILLED ORAL at 08:05

## 2020-01-01 RX ADMIN — SODIUM CHLORIDE: 9 INJECTION, SOLUTION INTRAVENOUS at 06:39

## 2020-01-01 RX ADMIN — SODIUM BICARBONATE 1300 MG: 650 TABLET ORAL at 08:56

## 2020-01-01 RX ADMIN — ASPIRIN 81 MG: 81 TABLET ORAL at 08:05

## 2020-01-01 RX ADMIN — DIAPER RASH SKIN PROTECTENT: at 20:25

## 2020-01-01 RX ADMIN — ISOSORBIDE MONONITRATE 60 MG: 60 TABLET ORAL at 09:35

## 2020-01-01 RX ADMIN — WARFARIN SODIUM 1 MG: 1 TABLET ORAL at 18:37

## 2020-01-01 RX ADMIN — MICONAZOLE NITRATE: 20 POWDER TOPICAL at 09:49

## 2020-01-01 RX ADMIN — ONDANSETRON 4 MG: 2 INJECTION INTRAMUSCULAR; INTRAVENOUS at 20:34

## 2020-01-01 RX ADMIN — DIAPER RASH SKIN PROTECTENT: at 20:19

## 2020-01-01 RX ADMIN — SODIUM CHLORIDE: 4.5 INJECTION, SOLUTION INTRAVENOUS at 17:16

## 2020-01-01 RX ADMIN — METOPROLOL SUCCINATE 50 MG: 50 TABLET, FILM COATED, EXTENDED RELEASE ORAL at 09:47

## 2020-01-01 RX ADMIN — METOPROLOL SUCCINATE 50 MG: 50 TABLET, EXTENDED RELEASE ORAL at 09:48

## 2020-01-01 RX ADMIN — MICONAZOLE NITRATE: 20 POWDER TOPICAL at 08:06

## 2020-01-01 RX ADMIN — HYDROCODONE BITARTRATE AND ACETAMINOPHEN 1 TABLET: 10; 325 TABLET ORAL at 20:24

## 2020-01-01 RX ADMIN — FUROSEMIDE 20 MG: 20 TABLET ORAL at 09:48

## 2020-01-01 RX ADMIN — ASPIRIN 81 MG: 81 TABLET ORAL at 08:58

## 2020-01-01 RX ADMIN — MICONAZOLE NITRATE: 20 POWDER TOPICAL at 22:16

## 2020-01-01 RX ADMIN — SODIUM CHLORIDE: 9 INJECTION, SOLUTION INTRAVENOUS at 02:58

## 2020-01-01 RX ADMIN — HYDROCODONE BITARTRATE AND ACETAMINOPHEN 1 TABLET: 10; 325 TABLET ORAL at 02:07

## 2020-01-01 RX ADMIN — ASPIRIN 81 MG: 81 TABLET ORAL at 09:48

## 2020-01-01 RX ADMIN — SODIUM CHLORIDE 1000 ML: 9 INJECTION, SOLUTION INTRAVENOUS at 02:03

## 2020-01-01 RX ADMIN — POLYETHYLENE GLYCOL 3350 17 G: 17 POWDER, FOR SOLUTION ORAL at 16:37

## 2020-01-01 RX ADMIN — MICONAZOLE NITRATE: 20 POWDER TOPICAL at 20:19

## 2020-01-01 RX ADMIN — HYDROCODONE BITARTRATE AND ACETAMINOPHEN 1 TABLET: 10; 325 TABLET ORAL at 01:10

## 2020-01-01 RX ADMIN — MICONAZOLE NITRATE: 20 POWDER TOPICAL at 09:38

## 2020-01-01 RX ADMIN — ACETAMINOPHEN 650 MG: 325 TABLET ORAL at 21:43

## 2020-01-01 RX ADMIN — HYDROCODONE BITARTRATE AND ACETAMINOPHEN 1 TABLET: 10; 325 TABLET ORAL at 09:34

## 2020-01-01 RX ADMIN — HYDROCODONE BITARTRATE AND ACETAMINOPHEN 1 TABLET: 10; 325 TABLET ORAL at 21:06

## 2020-01-01 RX ADMIN — ISOSORBIDE MONONITRATE 60 MG: 60 TABLET ORAL at 08:05

## 2020-01-01 RX ADMIN — ISOSORBIDE MONONITRATE 60 MG: 60 TABLET ORAL at 09:47

## 2020-01-01 RX ADMIN — Medication 9 MG: at 21:28

## 2020-01-01 RX ADMIN — MICONAZOLE NITRATE: 20 POWDER TOPICAL at 09:48

## 2020-01-01 RX ADMIN — ISOSORBIDE MONONITRATE 60 MG: 60 TABLET ORAL at 09:13

## 2020-01-01 RX ADMIN — WARFARIN SODIUM 1.25 MG: 2.5 TABLET ORAL at 21:28

## 2020-01-01 RX ADMIN — FUROSEMIDE 20 MG: 20 TABLET ORAL at 08:05

## 2020-01-01 RX ADMIN — ISOSORBIDE MONONITRATE 60 MG: 60 TABLET ORAL at 08:11

## 2020-01-01 RX ADMIN — LISINOPRIL 5 MG: 5 TABLET ORAL at 10:34

## 2020-01-01 RX ADMIN — CEFTRIAXONE SODIUM 1 G: 1 INJECTION, POWDER, FOR SOLUTION INTRAMUSCULAR; INTRAVENOUS at 18:31

## 2020-01-01 RX ADMIN — AMLODIPINE BESYLATE 5 MG: 5 TABLET ORAL at 09:48

## 2020-01-01 RX ADMIN — METOPROLOL SUCCINATE 50 MG: 50 TABLET, FILM COATED, EXTENDED RELEASE ORAL at 09:35

## 2020-01-01 RX ADMIN — AZITHROMYCIN 500 MG: 250 TABLET, FILM COATED ORAL at 11:54

## 2020-01-01 RX ADMIN — DEXTROSE MONOHYDRATE 25 G: 25 INJECTION, SOLUTION INTRAVENOUS at 04:15

## 2020-01-01 RX ADMIN — HYDROMORPHONE HYDROCHLORIDE 0.5 MG: 1 INJECTION, SOLUTION INTRAMUSCULAR; INTRAVENOUS; SUBCUTANEOUS at 11:40

## 2020-01-01 RX ADMIN — SODIUM BICARBONATE 1300 MG: 650 TABLET ORAL at 09:48

## 2020-01-01 RX ADMIN — FUROSEMIDE 20 MG: 20 TABLET ORAL at 09:35

## 2020-01-01 RX ADMIN — Medication 9 MG: at 20:24

## 2020-01-01 RX ADMIN — NITROFURANTOIN MONOHYDRATE/MACROCRYSTALLINE 100 MG: 25; 75 CAPSULE ORAL at 08:05

## 2020-01-01 RX ADMIN — MICONAZOLE NITRATE: 20 POWDER TOPICAL at 20:24

## 2020-01-01 RX ADMIN — ASPIRIN 81 MG: 81 TABLET ORAL at 11:22

## 2020-01-01 RX ADMIN — ISOSORBIDE MONONITRATE 60 MG: 60 TABLET ORAL at 11:15

## 2020-01-01 RX ADMIN — SODIUM BICARBONATE 1300 MG: 650 TABLET ORAL at 20:27

## 2020-01-01 RX ADMIN — DOCUSATE SODIUM 100 MG: 100 CAPSULE, LIQUID FILLED ORAL at 20:25

## 2020-01-01 RX ADMIN — WARFARIN SODIUM 1 MG: 1 TABLET ORAL at 17:35

## 2020-01-01 RX ADMIN — SODIUM CHLORIDE: 4.5 INJECTION, SOLUTION INTRAVENOUS at 09:46

## 2020-01-01 RX ADMIN — NITROFURANTOIN MONOHYDRATE/MACROCRYSTALLINE 100 MG: 25; 75 CAPSULE ORAL at 20:24

## 2020-01-01 RX ADMIN — METOPROLOL SUCCINATE 50 MG: 50 TABLET, EXTENDED RELEASE ORAL at 11:16

## 2020-01-01 RX ADMIN — AZITHROMYCIN 500 MG: 500 INJECTION, POWDER, LYOPHILIZED, FOR SOLUTION INTRAVENOUS at 20:19

## 2020-01-01 RX ADMIN — Medication 0.5 MG: at 17:16

## 2020-01-01 RX ADMIN — HYDROCODONE BITARTRATE AND ACETAMINOPHEN 1 TABLET: 10; 325 TABLET ORAL at 15:52

## 2020-01-01 RX ADMIN — DOCUSATE SODIUM 100 MG: 100 CAPSULE, LIQUID FILLED ORAL at 09:35

## 2020-01-01 RX ADMIN — SODIUM POLYSTYRENE SULFONATE 15 G: 15 SUSPENSION ORAL; RECTAL at 04:15

## 2020-01-01 RX ADMIN — ASPIRIN 81 MG: 81 TABLET ORAL at 23:53

## 2020-01-01 RX ADMIN — HYDROCODONE BITARTRATE AND ACETAMINOPHEN 1 TABLET: 5; 325 TABLET ORAL at 23:50

## 2020-01-01 RX ADMIN — HYDROCODONE BITARTRATE AND ACETAMINOPHEN 1 TABLET: 5; 325 TABLET ORAL at 08:57

## 2020-01-01 RX ADMIN — SODIUM CHLORIDE: 9 INJECTION, SOLUTION INTRAVENOUS at 10:51

## 2020-01-01 RX ADMIN — HYDROCODONE BITARTRATE AND ACETAMINOPHEN 1 TABLET: 5; 325 TABLET ORAL at 18:08

## 2020-01-01 RX ADMIN — POLYETHYLENE GLYCOL 3350 17 G: 17 POWDER, FOR SOLUTION ORAL at 09:48

## 2020-01-01 RX ADMIN — HYDROCODONE BITARTRATE AND ACETAMINOPHEN 1 TABLET: 5; 325 TABLET ORAL at 11:56

## 2020-01-01 RX ADMIN — ISOSORBIDE MONONITRATE 60 MG: 60 TABLET ORAL at 09:40

## 2020-01-01 RX ADMIN — AZITHROMYCIN 500 MG: 500 INJECTION, POWDER, LYOPHILIZED, FOR SOLUTION INTRAVENOUS at 20:25

## 2020-01-01 RX ADMIN — AMLODIPINE BESYLATE 5 MG: 5 TABLET ORAL at 09:35

## 2020-01-01 RX ADMIN — CEFTRIAXONE SODIUM 1 G: 1 INJECTION, POWDER, FOR SOLUTION INTRAMUSCULAR; INTRAVENOUS at 12:56

## 2020-01-01 RX ADMIN — HYDROCODONE BITARTRATE AND ACETAMINOPHEN 1 TABLET: 5; 325 TABLET ORAL at 11:46

## 2020-01-01 RX ADMIN — MICONAZOLE NITRATE: 20 POWDER TOPICAL at 08:11

## 2020-01-01 RX ADMIN — Medication 0.5 MG: at 17:47

## 2020-01-01 RX ADMIN — SODIUM CHLORIDE: 9 INJECTION, SOLUTION INTRAVENOUS at 16:23

## 2020-01-01 RX ADMIN — HYDROCODONE BITARTRATE AND ACETAMINOPHEN 1 TABLET: 5; 325 TABLET ORAL at 04:18

## 2020-01-01 RX ADMIN — ASPIRIN 81 MG: 81 TABLET ORAL at 08:11

## 2020-01-01 RX ADMIN — SODIUM BICARBONATE 50 MEQ: 84 INJECTION, SOLUTION INTRAVENOUS at 04:15

## 2020-01-01 RX ADMIN — ASPIRIN 81 MG: 81 TABLET ORAL at 09:35

## 2020-01-01 RX ADMIN — SODIUM CHLORIDE: 9 INJECTION, SOLUTION INTRAVENOUS at 18:07

## 2020-01-01 RX ADMIN — SODIUM CHLORIDE: 4.5 INJECTION, SOLUTION INTRAVENOUS at 11:06

## 2020-01-01 RX ADMIN — HYDROCODONE BITARTRATE AND ACETAMINOPHEN 1 TABLET: 5; 325 TABLET ORAL at 02:17

## 2020-01-01 RX ADMIN — HYDROCODONE BITARTRATE AND ACETAMINOPHEN 1 TABLET: 5; 325 TABLET ORAL at 19:37

## 2020-01-01 RX ADMIN — DOCUSATE SODIUM 100 MG: 100 CAPSULE, LIQUID FILLED ORAL at 09:48

## 2020-01-01 RX ADMIN — HYDROCODONE BITARTRATE AND ACETAMINOPHEN 1 TABLET: 5; 325 TABLET ORAL at 01:14

## 2020-01-01 RX ADMIN — Medication 9 MG: at 20:19

## 2020-01-01 RX ADMIN — HYDROCODONE BITARTRATE AND ACETAMINOPHEN 1 TABLET: 10; 325 TABLET ORAL at 12:42

## 2020-01-01 RX ADMIN — ENOXAPARIN SODIUM 90 MG: 100 INJECTION SUBCUTANEOUS at 15:21

## 2020-01-01 RX ADMIN — HYDROCODONE BITARTRATE AND ACETAMINOPHEN 1 TABLET: 10; 325 TABLET ORAL at 20:19

## 2020-01-01 RX ADMIN — HYDROCODONE BITARTRATE AND ACETAMINOPHEN 1 TABLET: 10; 325 TABLET ORAL at 06:50

## 2020-01-01 RX ADMIN — CALCIUM GLUCONATE 1 G: 98 INJECTION, SOLUTION INTRAVENOUS at 03:00

## 2020-01-01 RX ADMIN — AZITHROMYCIN 250 MG: 250 TABLET, FILM COATED ORAL at 08:05

## 2020-01-01 RX ADMIN — Medication: at 20:35

## 2020-01-01 RX ADMIN — DIAPER RASH SKIN PROTECTENT: at 08:06

## 2020-01-01 RX ADMIN — POLYETHYLENE GLYCOL 3350 17 G: 17 POWDER, FOR SOLUTION ORAL at 08:58

## 2020-01-01 RX ADMIN — POLYETHYLENE GLYCOL 3350 17 G: 17 POWDER, FOR SOLUTION ORAL at 20:24

## 2020-01-01 RX ADMIN — SODIUM CHLORIDE 1000 ML: 9 INJECTION, SOLUTION INTRAVENOUS at 11:24

## 2020-01-01 RX ADMIN — ISOSORBIDE MONONITRATE 60 MG: 60 TABLET ORAL at 09:48

## 2020-01-01 ASSESSMENT — PAIN SCALES - GENERAL
PAINLEVEL_OUTOF10: 8
PAINLEVEL_OUTOF10: 10
PAINLEVEL_OUTOF10: 8
PAINLEVEL_OUTOF10: 5
PAINLEVEL_OUTOF10: 2
PAINLEVEL_OUTOF10: 8
PAINLEVEL_OUTOF10: 0
PAINLEVEL_OUTOF10: 6
PAINLEVEL_OUTOF10: 6
PAINLEVEL_OUTOF10: 3
PAINLEVEL_OUTOF10: 8
PAINLEVEL_OUTOF10: 5
PAINLEVEL_OUTOF10: 5
PAINLEVEL_OUTOF10: 7
PAINLEVEL_OUTOF10: 8
PAINLEVEL_OUTOF10: 0
PAINLEVEL_OUTOF10: 6
PAINLEVEL_OUTOF10: 7
PAINLEVEL_OUTOF10: 5
PAINLEVEL_OUTOF10: 6
PAINLEVEL_OUTOF10: 0
PAINLEVEL_OUTOF10: 10
PAINLEVEL_OUTOF10: 0
PAINLEVEL_OUTOF10: 4
PAINLEVEL_OUTOF10: 8
PAINLEVEL_OUTOF10: 10
PAINLEVEL_OUTOF10: 0
PAINLEVEL_OUTOF10: 5
PAINLEVEL_OUTOF10: 3
PAINLEVEL_OUTOF10: 10
PAINLEVEL_OUTOF10: 10
PAINLEVEL_OUTOF10: 8
PAINLEVEL_OUTOF10: 0
PAINLEVEL_OUTOF10: 7
PAINLEVEL_OUTOF10: 5
PAINLEVEL_OUTOF10: 7
PAINLEVEL_OUTOF10: 6
PAINLEVEL_OUTOF10: 5
PAINLEVEL_OUTOF10: 0
PAINLEVEL_OUTOF10: 6
PAINLEVEL_OUTOF10: 7
PAINLEVEL_OUTOF10: 9

## 2020-01-01 ASSESSMENT — PAIN - FUNCTIONAL ASSESSMENT
PAIN_FUNCTIONAL_ASSESSMENT: PREVENTS OR INTERFERES SOME ACTIVE ACTIVITIES AND ADLS

## 2020-01-01 ASSESSMENT — PAIN DESCRIPTION - LOCATION
LOCATION: FOOT
LOCATION: LEG
LOCATION: OTHER (COMMENT)

## 2020-01-01 ASSESSMENT — PAIN SCALES - WONG BAKER
WONGBAKER_NUMERICALRESPONSE: 0
WONGBAKER_NUMERICALRESPONSE: 8
WONGBAKER_NUMERICALRESPONSE: 6
WONGBAKER_NUMERICALRESPONSE: 2
WONGBAKER_NUMERICALRESPONSE: 4

## 2020-01-01 ASSESSMENT — PAIN DESCRIPTION - ORIENTATION
ORIENTATION: RIGHT;LEFT
ORIENTATION: RIGHT;LEFT
ORIENTATION: LEFT;RIGHT
ORIENTATION: LEFT

## 2020-01-01 ASSESSMENT — ENCOUNTER SYMPTOMS
SINUS PAIN: 0
TROUBLE SWALLOWING: 0
EYE REDNESS: 0
NAUSEA: 0
ABDOMINAL DISTENTION: 0
CHEST TIGHTNESS: 0
EYE REDNESS: 0
SINUS PRESSURE: 0
CHOKING: 0
RHINORRHEA: 0
DIARRHEA: 0
BLOOD IN STOOL: 0
ABDOMINAL PAIN: 0
EYES NEGATIVE: 1
SORE THROAT: 0
EYE DISCHARGE: 0
NAUSEA: 0
SORE THROAT: 0
SHORTNESS OF BREATH: 0
ABDOMINAL PAIN: 0
GASTROINTESTINAL NEGATIVE: 1
SHORTNESS OF BREATH: 0
VOMITING: 0
BLOOD IN STOOL: 0
SHORTNESS OF BREATH: 1
VOMITING: 0
WHEEZING: 0
SORE THROAT: 0
BLOOD IN STOOL: 0
PHOTOPHOBIA: 0
WHEEZING: 0
COLOR CHANGE: 0
SINUS PRESSURE: 0
CONSTIPATION: 0
VOICE CHANGE: 0
COUGH: 0
BACK PAIN: 0
DIARRHEA: 0
PHOTOPHOBIA: 0
CONSTIPATION: 0
VOMITING: 0
ABDOMINAL PAIN: 0
TROUBLE SWALLOWING: 0
RHINORRHEA: 0
VOICE CHANGE: 0
ABDOMINAL DISTENTION: 0
SHORTNESS OF BREATH: 0
ABDOMINAL DISTENTION: 0
EYE ITCHING: 0
EYE PAIN: 0
ALLERGIC/IMMUNOLOGIC NEGATIVE: 1
DIARRHEA: 0
NAUSEA: 0
COUGH: 0
BACK PAIN: 0
COUGH: 0
SINUS PRESSURE: 0
CHEST TIGHTNESS: 0
WHEEZING: 0
CONSTIPATION: 0
BACK PAIN: 0
PHOTOPHOBIA: 0

## 2020-01-01 ASSESSMENT — PAIN DESCRIPTION - PROGRESSION
CLINICAL_PROGRESSION: GRADUALLY IMPROVING
CLINICAL_PROGRESSION: GRADUALLY WORSENING
CLINICAL_PROGRESSION: GRADUALLY WORSENING

## 2020-01-01 ASSESSMENT — PAIN DESCRIPTION - ONSET
ONSET: GRADUAL
ONSET: GRADUAL
ONSET: ON-GOING

## 2020-01-01 ASSESSMENT — PAIN DESCRIPTION - DESCRIPTORS
DESCRIPTORS: ACHING

## 2020-01-01 ASSESSMENT — PAIN DESCRIPTION - PAIN TYPE
TYPE: CHRONIC PAIN
TYPE: CHRONIC PAIN
TYPE: ACUTE PAIN
TYPE: CHRONIC PAIN
TYPE: CHRONIC PAIN
TYPE: ACUTE PAIN
TYPE: ACUTE PAIN

## 2020-01-01 ASSESSMENT — PAIN DESCRIPTION - FREQUENCY
FREQUENCY: INTERMITTENT
FREQUENCY: CONTINUOUS
FREQUENCY: CONTINUOUS

## 2020-01-06 ENCOUNTER — TELEPHONE (OUTPATIENT)
Dept: PHARMACY | Age: 85
End: 2020-01-06

## 2020-01-06 ENCOUNTER — HOSPITAL ENCOUNTER (OUTPATIENT)
Dept: PHARMACY | Age: 85
Setting detail: THERAPIES SERIES
Discharge: HOME | End: 2020-01-06
Payer: MEDICARE

## 2020-01-06 LAB — POC INR: 3.1 (ref 0.8–1.2)

## 2020-01-06 PROCEDURE — 99211 OFF/OP EST MAY X REQ PHY/QHP: CPT

## 2020-01-06 PROCEDURE — 85610 PROTHROMBIN TIME: CPT

## 2020-01-06 PROCEDURE — 36416 COLLJ CAPILLARY BLOOD SPEC: CPT

## 2020-01-06 RX ORDER — HYDROCODONE BITARTRATE AND ACETAMINOPHEN 10; 325 MG/1; MG/1
1-2 TABLET ORAL
COMMUNITY
End: 2021-01-01 | Stop reason: HOSPADM

## 2020-01-06 NOTE — TELEPHONE ENCOUNTER
Jacek Gillespie called stating she forgot to mention at today's visit that patient no longer takes Tizanidine and to remove it from patient's list.    Renaldo Suarez, PharmD, BCPS  1/6/2020  2:29 PM

## 2020-01-06 NOTE — PROGRESS NOTES
Medication Management [facility]  Anticoagulation Clinic  [phone] (phone)  [phone] (fax)      Ms. Lynette Spaulding is a 81 y.o.  female with history of Afib/DVT/PE who presents today for anticoagulation monitoring and adjustment. Patient verifies current dosing regimen and tablet strength. No missed or extra doses. Patient denies s/s bleeding/bruising/SOB/chest pain - some increased swelling in legs  No blood in urine or stool. No dietary changes. No changes in OTC agents/Herbals. - increased norco to 10/325. Started phenytoin 10% cream BID - applies to toes. No change in alcohol use or tobacco use. No change in activity level. Patient denies headaches/dizziness/lightheadedness/falls. No vomiting/diarrhea or acute illness. No Procedures scheduled in the future at this time. Assessment:   Lab Results   Component Value Date    INR 3.10 (H) 01/06/2020    INR 2.39 (H) 12/24/2019    INR 3.20 (H) 12/23/2019     INR supratherapeutic   Recent Labs     01/06/20  1026   INR 3.10*       Plan:  Take 1.25 mg Coumadin today, then continue Coumadin 2.5 mg MF, 1.25 mg SuTuWThSa. Recheck INR in 2.5 weeks. Patient reminded to call the Anticoagulation Clinic with any signs or symptoms of bleeding or with any medication changes. Patient given instructions utilizing the teach back method. Discharged via wheelchair with daughter in no apparent distress. After visit summary printed and reviewed with patient and daughter.       Medications reviewed and updated on home medication list Yes    Influenza vaccine:     [] given    [x] declined   [] received previously   [] plans to receive at a later time   [x] refused    [] documented in EPIC

## 2020-01-13 ENCOUNTER — OFFICE VISIT (OUTPATIENT)
Dept: INTERNAL MEDICINE CLINIC | Age: 85
End: 2020-01-13
Payer: MEDICARE

## 2020-01-13 VITALS
DIASTOLIC BLOOD PRESSURE: 72 MMHG | OXYGEN SATURATION: 91 % | RESPIRATION RATE: 12 BRPM | WEIGHT: 218.26 LBS | HEIGHT: 68 IN | BODY MASS INDEX: 33.08 KG/M2 | SYSTOLIC BLOOD PRESSURE: 155 MMHG | HEART RATE: 67 BPM

## 2020-01-13 PROCEDURE — 1123F ACP DISCUSS/DSCN MKR DOCD: CPT | Performed by: NURSE PRACTITIONER

## 2020-01-13 PROCEDURE — G8417 CALC BMI ABV UP PARAM F/U: HCPCS | Performed by: NURSE PRACTITIONER

## 2020-01-13 PROCEDURE — G8427 DOCREV CUR MEDS BY ELIG CLIN: HCPCS | Performed by: NURSE PRACTITIONER

## 2020-01-13 PROCEDURE — 1090F PRES/ABSN URINE INCON ASSESS: CPT | Performed by: NURSE PRACTITIONER

## 2020-01-13 PROCEDURE — 99214 OFFICE O/P EST MOD 30 MIN: CPT | Performed by: NURSE PRACTITIONER

## 2020-01-13 PROCEDURE — 1036F TOBACCO NON-USER: CPT | Performed by: NURSE PRACTITIONER

## 2020-01-13 PROCEDURE — 1111F DSCHRG MED/CURRENT MED MERGE: CPT | Performed by: NURSE PRACTITIONER

## 2020-01-13 PROCEDURE — G8510 SCR DEP NEG, NO PLAN REQD: HCPCS | Performed by: NURSE PRACTITIONER

## 2020-01-13 PROCEDURE — G8400 PT W/DXA NO RESULTS DOC: HCPCS | Performed by: NURSE PRACTITIONER

## 2020-01-13 PROCEDURE — 4040F PNEUMOC VAC/ADMIN/RCVD: CPT | Performed by: NURSE PRACTITIONER

## 2020-01-13 PROCEDURE — G8484 FLU IMMUNIZE NO ADMIN: HCPCS | Performed by: NURSE PRACTITIONER

## 2020-01-13 ASSESSMENT — PATIENT HEALTH QUESTIONNAIRE - PHQ9
SUM OF ALL RESPONSES TO PHQ QUESTIONS 1-9: 0
1. LITTLE INTEREST OR PLEASURE IN DOING THINGS: 0
2. FEELING DOWN, DEPRESSED OR HOPELESS: 0
SUM OF ALL RESPONSES TO PHQ QUESTIONS 1-9: 0
SUM OF ALL RESPONSES TO PHQ9 QUESTIONS 1 & 2: 0

## 2020-01-13 NOTE — PROGRESS NOTES
into the muscle every 30 days              D-Mannose 500 MG CAPS  Take 1 capsule by mouth nightly              furosemide (LASIX) 20 MG tablet  TAKE 1 TABLET DAILY             HYDROcodone-acetaminophen (NORCO)  MG per tablet  Take 1 tablet by mouth every 4-6 hours as needed for Pain.             isosorbide mononitrate (IMDUR) 60 MG extended release tablet  TAKE 1 TABLET DAILY             metoprolol succinate (TOPROL XL) 50 MG extended release tablet  TAKE 1 TABLET DAILY             NONFORMULARY  Take 800 mcg by mouth every other day Indications: Deficiency of Folic Acid L-5 methyltetrahydrofolate              NONFORMULARY  Take 1 tablet by mouth daily as needed Indications: [address] 50. Contains DGLE( deglycyrrhizinaed licorice root extract), Clove flower extract, [place] ([facility]) Elecampane root extract, mark arco bark extract, capsicum fruit. NONFORMULARY  Apply topically 2 times daily Phenytoin 10% topical cream - apply to affected area(s) BID             nystatin (MYCOSTATIN) POWD powder  Apply 1 each topically 2 times daily as needed (abdominal folds- irritation) Indications: Skin Abnormalities             polyethylene glycol (GLYCOLAX) powder  Take 17 g by mouth nightly              potassium chloride (KLOR-CON M) 10 MEQ extended release tablet  TAKE 1 TABLET DAILY             warfarin (COUMADIN) 2.5 MG tablet  Take as directed by [facility] tabs = 90 day supply                   Medications marked \"taking\" at this time  Outpatient Medications Marked as Taking for the 1/13/20 encounter (Office Visit) with LUZ Mckeon CNP   Medication Sig Dispense Refill    HYDROcodone-acetaminophen (NORCO)  MG per tablet Take 1 tablet by mouth every 4-6 hours as needed for Pain.       NONFORMULARY Apply topically 2 times daily Phenytoin 10% topical cream - apply to affected area(s) BID      warfarin (COUMADIN) 2.5 MG tablet Take as directed by [facility] tabs = 90 day supply 75 tablet 3    metoprolol succinate (TOPROL XL) 50 MG extended release tablet TAKE 1 TABLET DAILY 90 tablet 4    nystatin (MYCOSTATIN) POWD powder Apply 1 each topically 2 times daily as needed (abdominal folds- irritation) Indications: Skin Abnormalities 3 each 2    NONFORMULARY Take 1 tablet by mouth daily as needed Indications: Puruntie 50. Contains DGLE( deglycyrrhizinaed licorice root extract), Clove flower extract, [place] ([facility]) Elecampane root extract, mark arco bark extract, capsicum fruit.        amLODIPine (NORVASC) 5 MG tablet TAKE 1 TABLET DAILY 90 tablet 3    furosemide (LASIX) 20 MG tablet TAKE 1 TABLET DAILY 90 tablet 3    potassium chloride (KLOR-CON M) 10 MEQ extended release tablet TAKE 1 TABLET DAILY 90 tablet 3    isosorbide mononitrate (IMDUR) 60 MG extended release tablet TAKE 1 TABLET DAILY 90 tablet 3    NONFORMULARY Take 800 mcg by mouth every other day Indications: Deficiency of Folic Acid L-5 methyltetrahydrofolate       B Complex-C (B COMPLEX-B12-C IJ) Inject as directed every 30 days       D-Mannose 500 MG CAPS Take 1 capsule by mouth nightly       Cholecalciferol (VITAMIN D3) 76552 units CAPS Take 1 capsule by mouth once a week Indications: Treatment with Vitamin D Takes on Tuesdays      cyanocobalamin 1000 MCG/ML injection Inject 1,000 mcg into the muscle every 30 days       polyethylene glycol (GLYCOLAX) powder Take 17 g by mouth nightly       aspirin 81 MG EC tablet Take 1 tablet by mouth daily 30 tablet 3        Medications patient taking as of now reconciled against medications ordered at time of hospital discharge: Yes    Chief Complaint   Patient presents with    Follow-Up from Hospital     fluid around heart/ UTI       History of Present illness - Follow up of Hospital diagnosis(es):     Marya Weaver was admitted to the hospital from 15/27-00/95 for metabolic encephalopathy due to UTI, acute on chronic CHF, and chronic leg nodules, no cervical lymphadenopathy  Pulmonary/Chest: clear to auscultation bilaterally- no wheezes, rales or rhonchi, normal air movement, no respiratory distress  Cardiovascular: normal rate, regular rhythm, normal S1 and S2, no murmurs, rubs, clicks, or gallops, distal pulses intact, no carotid bruits  Abdomen: soft, non-tender, non-distended, normal bowel sounds, no masses or organomegaly  Extremities: no cyanosis or clubbing. 1-2+ pitting edema BLE. Musculoskeletal: limited range of motion due to pain. Unable to get out of wheelchair today. Neurologic: falls asleep easy. reflexes normal and symmetric, no cranial nerve deficit, gait, and speech normal    Assessment/Plan:    1. Acute cystitis without hematuria    - Urinalysis With Microscopic; Standing  - Comprehensive Metabolic Panel; Standing  - Repeat if any alterations in mental status. Consider referral to urology if recurrent infection. 2. Hospital discharge follow-up    3. Acute on chronic congestive heart failure, unspecified heart failure type (HCC)    - Continue furosemide 20 mg daily    4. AVELINO (acute kidney injury) ([facility] [address]    - Repeat CMP soon    5.  Chronic pain syndrome    - Follow with pain management as previously scheduled      Keep previously scheduled appointment    Medical Decision Making: moderate complexity

## 2020-01-23 ENCOUNTER — HOSPITAL ENCOUNTER (OUTPATIENT)
Age: 85
Discharge: HOME | End: 2020-01-23
Payer: MEDICARE

## 2020-01-23 ENCOUNTER — HOSPITAL ENCOUNTER (OUTPATIENT)
Dept: PHARMACY | Age: 85
Setting detail: THERAPIES SERIES
Discharge: HOME | End: 2020-01-23
Payer: MEDICARE

## 2020-01-23 LAB
ALBUMIN SERPL-MCNC: 3.6 G/DL (ref 3.5–5.1)
ALP BLD-CCNC: 76 U/L (ref 38–126)
ALT SERPL-CCNC: 8 U/L (ref 11–66)
ANION GAP SERPL CALCULATED.3IONS-SCNC: 11 MEQ/L (ref 8–16)
AST SERPL-CCNC: 16 U/L (ref 5–40)
BILIRUB SERPL-MCNC: 0.7 MG/DL (ref 0.3–1.2)
BUN BLDV-MCNC: 24 MG/DL (ref 7–22)
CALCIUM SERPL-MCNC: 9.5 MG/DL (ref 8.5–10.5)
CHLORIDE BLD-SCNC: 104 MEQ/L (ref 98–111)
CO2: 30 MEQ/L (ref 23–33)
CREAT SERPL-MCNC: 1.2 MG/DL (ref 0.4–1.2)
GFR SERPL CREATININE-BSD FRML MDRD: 43 ML/MIN/1.73M2
GLUCOSE BLD-MCNC: 150 MG/DL (ref 70–108)
POC INR: 2.1 (ref 0.8–1.2)
POTASSIUM SERPL-SCNC: 4.1 MEQ/L (ref 3.5–5.2)
SODIUM BLD-SCNC: 145 MEQ/L (ref 135–145)
TOTAL PROTEIN: 7.3 G/DL (ref 6.1–8)

## 2020-01-23 PROCEDURE — 80053 COMPREHEN METABOLIC PANEL: CPT

## 2020-01-23 PROCEDURE — 85610 PROTHROMBIN TIME: CPT | Performed by: PHARMACIST

## 2020-01-23 PROCEDURE — 99211 OFF/OP EST MAY X REQ PHY/QHP: CPT | Performed by: PHARMACIST

## 2020-01-23 PROCEDURE — 36415 COLL VENOUS BLD VENIPUNCTURE: CPT

## 2020-01-23 PROCEDURE — 36416 COLLJ CAPILLARY BLOOD SPEC: CPT | Performed by: PHARMACIST

## 2020-01-24 ENCOUNTER — TELEPHONE (OUTPATIENT)
Dept: INTERNAL MEDICINE CLINIC | Age: 85
End: 2020-01-24

## 2020-01-24 NOTE — TELEPHONE ENCOUNTER
Left a detailed message on pt daughter voicemail with recommendation. If pt has any questions or concerns to call the office.

## 2020-01-30 ENCOUNTER — NURSE ONLY (OUTPATIENT)
Dept: CARDIOLOGY CLINIC | Age: 85
End: 2020-01-30
Payer: MEDICARE

## 2020-01-30 PROCEDURE — 93279 PRGRMG DEV EVAL PM/LDLS PM: CPT | Performed by: INTERNAL MEDICINE

## 2020-02-25 NOTE — PROGRESS NOTES
Medication Management [address]  [phone] (phone)  [phone] (fax)      Ms. Sherren Ronde is a 81 y.o.  female with history of DVT, PE, Afib, CVA who presents today for anticoagulation monitoring and adjustment. Patient verifies current dosing regimen and tablet strength. No missed or extra doses. Patient denies s/s bleeding/bruising/swelling/SOB/chest pain. No blood in urine or stool. No dietary changes. No changes in medication/OTC agents. One week ago started taking CHF #14 mineral supplement containing beet root and several other plant roots for leg cramps. Reviewed ingredient list with HAMILTON Duarte Pharm. D. No change in alcohol use or tobacco use. No change in activity level. Patient denies headaches/dizziness/lightheadedness/falls. No vomiting/diarrhea or acute illness. No procedures scheduled in the future at this time. Assessment:   Lab Results   Component Value Date    INR 2.30 (H) 02/25/2020    INR 2.10 (H) 01/23/2020    INR 3.10 (H) 01/06/2020     INR therapeutic   Recent Labs     02/25/20  1041   INR 2.30*     Plan: POCT INR ordered and result reviewed. Reviewed mineral supplement with HAMILTON Duarte Pharm. D. Order received and verified to continue Coumadin 2.5 mg po MF, 1.25 mg po TWTHSS and ok to take mineral supplement, but recheck INR in 2 weeks. Patient reminded to call the Anticoagulation Clinic with any signs or symptoms of bleeding or with any medication changes. Patient given instructions utilizing the teach back method. Discharged via wheelchair in no apparent distress with daughter. After visit summary printed and reviewed with patient.       Medications reviewed and updated on home medication list Yes    Influenza vaccine:     [] given    [x] declined   [] received previously   [] plans to receive at a later time   [x] refused    [x] documented in EPIC

## 2020-02-29 PROBLEM — N39.0 UTI (URINARY TRACT INFECTION): Status: ACTIVE | Noted: 2020-01-01

## 2020-02-29 PROBLEM — J18.9 PNEUMONIA: Status: ACTIVE | Noted: 2020-01-01

## 2020-02-29 NOTE — ED PROVIDER NOTES
complexes with heart rate of 69; interval *; ;QTc 550; axis *, -88, 99. No STEMI     RADIOLOGY: non-plain film images(s) such as CT, Ultrasound and MRI are read by the radiologist.  Plain radiographic imagesare visualized and preliminarily interpreted by the emergency physician unless otherwise stated below. XR CHEST STANDARD (2 VW)   Final Result      1. Bibasilar opacities which may represent infiltrates or atelectasis. 2. Cardiomegaly. **This report has been created using voice recognition software. It may contain minor errors which are inherent in voice recognition technology. **      Final report electronically signed by Dr Mayte Bob on 2/29/2020 12:08 PM      XR FOOT LEFT (MIN 3 VIEWS)   Final Result   Severe osteopenia and extensive soft tissue swelling. No displaced fractures. **This report has been created using voice recognition software. It may contain minor errors which are inherent in voice recognition technology. **      Final report electronically signed by Dr Mayte Bob on 2/29/2020 12:11 PM          LABS:   Labs Reviewed   UA W/O MICROSCOPIC, REFLEX C & S - Abnormal; Notable for the following components:       Result Value    Bilirubin Urine Small (*)     Blood, Urine Small (*)     Protein,  (*)     LEUKOCYTES, UA Trace (*)     All other components within normal limits   PROTIME-INR - Abnormal; Notable for the following components:    INR 2.22 (*)     All other components within normal limits   APTT - Abnormal; Notable for the following components:    aPTT 39.9 (*)     All other components within normal limits   CBC WITH AUTO DIFFERENTIAL - Abnormal; Notable for the following components:    MCHC 30.6 (*)     RDW-CV 18.8 (*)     RDW-SD 62.6 (*)     All other components within normal limits   BRAIN NATRIURETIC PEPTIDE - Abnormal; Notable for the following components:    Pro-BNP 4869.0 (*)     All other components within normal limits   BASIC METABOLIC PANEL - Abnormal; Notable for the following components:    Glucose 139 (*)     BUN 25 (*)     All other components within normal limits   HEPATIC FUNCTION PANEL - Abnormal; Notable for the following components:    Alb 3.4 (*)     ALT 7 (*)     All other components within normal limits   GLOMERULAR FILTRATION RATE, ESTIMATED - Abnormal; Notable for the following components:    Est, Glom Filt Rate 43 (*)     All other components within normal limits   RAPID INFLUENZA A/B ANTIGENS   CULTURE, BLOOD 1   CULTURE, BLOOD 2   LACTATE, SEPSIS   LACTATE, SEPSIS   SPECIMEN REJECTION   LIPASE   TROPONIN   ANION GAP   OSMOLALITY   URINE RT REFLEX TO CULTURE   BASIC METABOLIC PANEL W/ REFLEX TO MG FOR LOW K   CBC       EMERGENCY DEPARTMENT COURSE:   Vitals:    Vitals:    02/29/20 1258 02/29/20 1435 02/29/20 1548 02/29/20 1655   BP: 118/68 112/65 138/86 (!) 161/90   Pulse: 61 67 69 76   Resp: 16 16 16 18   Temp:    97.6 °F (36.4 °C)   TempSrc:    Axillary   SpO2: 94% 93% 95% 92%   Weight:       Height:           MDM  Patient was seen and evaluated in the emergency department, patient appeared to be in acute distress associated with pain confusion. Vital signs were reviewed, no significant findings were noted. Physical exam was completed, patient was easily arousable, she was oriented, she had dressings applied to her left lower leg, daughter at bedside shows pictures of wounds from the previous day. Her lungs were clear, heart rate was regular. Labs were obtained, no significant abnormalities were noted. X-ray shows bibasilar opacities which may represent infiltrates or atelectasis. Patient was reassessed, now requiring oxygen, was satting 88% on room air. Initiated patient on Rocephin and doxycycline due to prolonged QTC. Patient was given IV fluids. proBNP is elevated but less than previously elevation in the past.  No elevation lactic acidosis, no significant leukocytosis. No suspicion of PE as the patient on INR.   Due

## 2020-02-29 NOTE — H&P
disability-PT OT-resides at a nursing home will consult ,       History Of Present Illness: This is 50-year-old lady who resides at home and with past medical history as described below, was brought into the ER by her daughter concerning that the patient was more confused and was not interacting as usual.  Per daughter patient usually has UTI when she behaves this way and hence wanted to get it checked out she initially took her to the [facility] ER and they found that she probably has pneumonia as well as mild UTI and hence was referred to our hospital ER. Chest x-ray does confirm some pneumonia/atelectasis patient is requiring 3 L of oxygen to keep saturations above 90% urine analysis shows UTI-patient is very weak and not answering much questions and as per the family this is not normal.      Past Medical History:        Diagnosis Date    Arthritis     Blood circulation, collateral     Cerebral artery occlusion with cerebral infarction (HCC)     Chronic anticoagulation     Chronic combined systolic and diastolic CHF (congestive heart failure) ([facility])     CKD (chronic kidney disease) stage 3, GFR 30-59 ml/min ([facility])     Degenerative joint disease     Fracture of femoral head ([address] 10/09/2016    Left with intrarticular extension    GERD (gastroesophageal reflux disease)     History of DVT (deep vein thrombosis)     Hyperlipidemia     Hypertension     Ischemic heart disease     Macular degeneration, wet ([address]     Neuromuscular disorder ([address]     NHL (non-Hodgkin's lymphoma) ([address] 06/2012    Dr. Bowen Torre Obesity (BMI 30.0-34. 9)     Permanent atrial fibrillation     Pneumonia     Presence of IVC filter     Pulmonary embolism, bilateral (HCC)     S/P drug eluting coronary stent placement     Subdural hematoma (HCC)     s/p evacuation    Triple vessel coronary artery disease     Type II or unspecified type diabetes mellitus without mention of complication, not stated as

## 2020-02-29 NOTE — PROGRESS NOTES
Spoke with daughter and pt about going directly to ER. Pt voiced understanding. Pt stated she is cold and daughter walked pt out via wheelchair.

## 2020-02-29 NOTE — ED NOTES
Pt daughter refusing strait cath- states it is too traumatizing for pt. Yolanda updated. Pt appears calm and comfortable at this time. Daughter would rather take pt home if she is able, Yolanda to update on POC.      Alberto Campos RN  02/29/20 [id number]

## 2020-02-29 NOTE — ED NOTES
Pt continues restful on cart in position of comfort. VSS. Daughter at bedside.  Will monitor     Elisabet Oliveira RN  02/29/20 [id number]

## 2020-02-29 NOTE — ED PROVIDER NOTES
[address]  Urgent Care Encounter       CHIEF COMPLAINT       Chief Complaint   Patient presents with    Urinary Retention     decreased urination with cloudy foul smelling urine       Nurses Notes reviewed and I agree except as noted in the HPI. HISTORY OF PRESENT ILLNESS   Dayday Christina is a 81 y.o. female who presents with her daughter with complaints of decreased urine and foul-smelling urine. Patient is also been mildly confused and lethargic over the last couple days but symptoms became worse last night. Daughter is concerned about a possible UTI and brought a urine sample with her. She saw her podiatrist yesterday due to some foot ulcers on the left foot. No testing was completed in the wounds were were just wrapped. No reports of fever, nausea, vomiting or diarrhea. The history is provided by a relative and the patient. REVIEW OF SYSTEMS     Review of Systems   Constitutional: Positive for fatigue. Negative for chills and fever. HENT: Negative for congestion. Respiratory: Negative for cough and shortness of breath. Cardiovascular: Negative for chest pain. Gastrointestinal: Negative for nausea and vomiting. Genitourinary: Positive for decreased urine volume and dysuria. Foul smelling, dark urine   Skin: Positive for wound. Negative for rash. Neurological: Positive for weakness. Negative for headaches. Chronic neuropathy to bilateral lower extremities   Psychiatric/Behavioral: Positive for confusion.        PAST MEDICAL HISTORY         Diagnosis Date    Arthritis     Blood circulation, collateral     Cerebral artery occlusion with cerebral infarction (HCC)     Chronic anticoagulation     Chronic combined systolic and diastolic CHF (congestive heart failure) [facility]     CKD (chronic kidney disease) stage 3, GFR 30-59 ml/min ([facility]     Degenerative joint disease     Fracture of [facility] head [facility] 10/09/2016    Left with intrarticular extension  GERD (gastroesophageal reflux disease)     History of DVT (deep vein thrombosis)     Hyperlipidemia     Hypertension     Ischemic heart disease     [facility], wet [facility]     Neuromuscular disorder [facility]     NHL (non-Hodgkin's lymphoma) ([facility] 06/2012    Dr. Saintclair Old Obesity (BMI 30.0-34. 9)     Permanent atrial fibrillation     Pneumonia     Presence of IVC filter     Pulmonary embolism, bilateral (HCC)     S/P drug eluting coronary stent placement     Subdural hematoma (HCC)     s/p evacuation    Triple vessel coronary artery disease     Type II or unspecified type diabetes mellitus without mention of complication, not stated as uncontrolled        SURGICALHISTORY     Patient  has a past surgical history that includes Vena Cava Filter Placement; brain surgery; central venous catheter; cardiovascular stress test; Hip fracture surgery (Left, 10/10/2016); fracture surgery; other surgical history (Left, 10/24/2016); Total knee arthroplasty (Right); eye surgery; Diagnostic Cardiac Cath Lab Procedure; pacemaker placement; joint replacement; Ventricular cardiac pacer insertion (10/04/2016); and Endoscopy, colon, diagnostic. CURRENT MEDICATIONS       Current Discharge Medication List      CONTINUE these medications which have NOT CHANGED    Details   Melatonin 10 MG TABS Take 2 tablets by mouth nightly Indications: Sleep Disorder      !! NONFORMULARY CHF #14 multi mineral supplement. Takes one capsule in am and 3 in pm for leg cramps. HYDROcodone-acetaminophen (NORCO)  MG per tablet Take 1 tablet by mouth every 4-6 hours as needed for Pain.      !! NONFORMULARY Apply topically 2 times daily Phenytoin 10% topical cream - apply to affected area(s) BID      warfarin (COUMADIN) 2.5 MG tablet Take as directed by [facility] tabs = 90 day supply  Qty: 75 tablet, Refills: 3    Associated Diagnoses: History of DVT (deep vein thrombosis);  History of pulmonary embolism

## 2020-03-01 NOTE — PROGRESS NOTES
Clinical Pharmacy Note    Jony Melchor is a 81 y.o. female for whom pharmacy has been asked to manage warfarin therapy. Reason for Admission: UTI    Consulting Physician: Nancy Spears  Warfarin dose prior to admission: Coumadin 2.5 mg po MF, 1.25 mg po TWTHSS   Warfarin indication: Afib, h/o DVT  Target INR range: 2-3   Outpatient warfarin provider: [facility]    Past Medical History:   Diagnosis Date    Arthritis     Blood circulation, collateral     Cerebral artery occlusion with cerebral infarction [facility])     Chronic anticoagulation     Chronic combined systolic and diastolic CHF (congestive heart failure) ([facility])     CKD (chronic kidney disease) stage 3, GFR 30-59 ml/min ([facility])     Degenerative joint disease     Fracture of femoral head ([facility] 10/09/2016    Left with intrarticular extension    GERD (gastroesophageal reflux disease)     History of DVT (deep vein thrombosis)     Hyperlipidemia     Hypertension     Ischemic heart disease     Macular degeneration, wet ([facility]     Neuromuscular disorder ([facility]     NHL (non-Hodgkin's lymphoma) ([facility] 06/2012    Dr. Millan Pyo    Obesity (BMI 30.0-34. 9)     Permanent atrial fibrillation     Pneumonia     Presence of IVC filter     Pulmonary embolism, bilateral (HCC)     S/P drug eluting coronary stent placement     Subdural hematoma (HCC)     s/p evacuation    Triple vessel coronary artery disease     Type II or unspecified type diabetes mellitus without mention of complication, not stated as uncontrolled                 Recent Labs     02/29/20  1238   INR 2.22*     Recent Labs     02/29/20  1120   HGB 14.0   HCT 45.8          Current warfarin drug-drug interactions: azithromycin      Date INR Warfarin Dose   2/29/2020 2.22 1.25 mg                                   Daily PT/INR until stable within therapeutic range. Thank you for the consult.

## 2020-03-01 NOTE — PROGRESS NOTES
Result      1. Bibasilar opacities which may represent infiltrates or atelectasis. 2. Cardiomegaly. **This report has been created using voice recognition software. It may contain minor errors which are inherent in voice recognition technology. **      Final report electronically signed by Dr Lucero Loaiza on 2/29/2020 12:08 PM      XR FOOT LEFT (MIN 3 VIEWS)   Final Result   Severe osteopenia and extensive soft tissue swelling. No displaced fractures. **This report has been created using voice recognition software. It may contain minor errors which are inherent in voice recognition technology. **      Final report electronically signed by Dr Lucero Loaiza on 2/29/2020 12:11 PM            Assessment/Plan:         Acute metabolic encephalopathy likely due to UTI and possible CAP    -pt's daughter states that pt does not have dementia and per daughter, pt's baseline is that pt is sharp and alert and oriented at home. But during my examination, pt is only oriented to herself ( she only knows her first name, not last name and does not know her bday and where she is)  -this is possibly from UTI and possible CAP   -pt received 1 dose of rocephin in ER  -cont rocephin and azithromycin   -ST eval to r/o cognitive dysfunction   -avoid benzos and narcotics as much as possible      Sepsis due to UTI and possible CAP, resolved    -On arrival, RR 24, heart rate 101. Patient is afebrile, WBC and lactic acid normal.  Suspected source UTI and possible pneumonia. Currently, VS nl. CBC and lactic still nl.   -Blood cxs no growth so far  -IVF   -cont rocephin and azithromycin     UTI    -UA showed small blood, negative nitrite, trace leukocyte esterase. No urine culture on file.   -Check urine culture  -Continue Rocephin IV    Suspect CAP    -CXR showed \"bibasilar opacities which may represent infiltrates or atelectasis\"  -pt denies cough, though feels sob and normal on 3 L of oxygen at baseline, pt is not

## 2020-03-02 NOTE — PROGRESS NOTES
Present to patient room for consult regarding venous ulcers in feet. Patient in bed upon arrival, daughter at bedside. Per daughter, patient currently under care of Dr Steph Mcnair. Patient has appointment this Friday with physician. Daughter unwilling to allow staff to remove dressing to left lower extremity for evaluation of wounds. If patient remains in facility on Friday, contact Dr Bairon Batres office for evaluation by podiatry/resident. Will sign off at this time, call with concerns. Thank you for allowing us to participate in the care of your patient. TIME   Wound/ostomy individual minutes  Time In: 1055  Time Out: 1100  Minutes: 5  Time does not include documentation.

## 2020-03-02 NOTE — PROGRESS NOTES
Patient reports that she is still having some shortness of breath, but it is better than previous days. She continues to complain of a cough that is nonproductive. She denies chest pain, nausea, vomiting, fevers. Patient daughter reports that should patient had 2 small bowel movements yesterday. Medications:  Reviewed    Infusion Medications   Scheduled Medications    warfarin  1 mg Oral Once    cefTRIAXone (ROCEPHIN) IV  1 g Intravenous Q24H    docusate sodium  100 mg Oral Daily    zinc oxide   Topical BID    sodium chloride flush  10 mL Intravenous 2 times per day    amLODIPine  5 mg Oral Daily    aspirin  81 mg Oral Daily    furosemide  20 mg Oral Daily    melatonin  9 mg Oral Nightly    isosorbide mononitrate  60 mg Oral Daily    metoprolol succinate  50 mg Oral Daily    polyethylene glycol  17 g Oral Nightly    [START ON 3/7/2020] vitamin D  50,000 Units Oral Weekly    [START ON 3/29/2020] cyanocobalamin  1,000 mcg Intramuscular Q30 Days    miconazole   Topical BID    azithromycin  500 mg Intravenous Q24H    warfarin (COUMADIN) daily dosing (placeholder)   Other RX Placeholder     PRN Meds: HYDROcodone-acetaminophen, sodium chloride flush, acetaminophen **OR** acetaminophen, polyethylene glycol, promethazine **OR** ondansetron, potassium chloride **OR** potassium alternative oral replacement **OR** potassium chloride, magnesium sulfate      Intake/Output Summary (Last 24 hours) at 3/2/2020 0948  Last data filed at 3/2/2020 0150  Gross per 24 hour   Intake 1348.9 ml   Output 300 ml   Net 1048.9 ml       Diet:  DIET CARDIAC; Exam:  /79   Pulse 52   Temp 98.2 °F (36.8 °C) (Oral)   Resp 16   Ht 5' 8\" (1.727 m)   Wt 215 lb (97.5 kg)   SpO2 93%   BMI 32.69 kg/m²     Physical Exam  Vitals signs and nursing note reviewed. Constitutional:       General: She is not in acute distress. Appearance: She is not ill-appearing. HENT:      Head: Normocephalic and atraumatic. 03/01/20  0653 03/02/20  0635   INR 2.22* 2.32* 2.53*     No results for input(s): CKTOTAL, TROPONINI in the last 72 hours. Urinalysis:      Lab Results   Component Value Date    NITRU Negative 02/29/2020    WBCUA > 200 12/22/2019    BACTERIA FEW 12/22/2019    RBCUA 15-25 12/22/2019    BLOODU Small 02/29/2020    SPECGRAV >=1.030 02/29/2020    GLUCOSEU NEGATIVE 12/22/2019       Radiology:  XR CHEST STANDARD (2 VW)   Final Result      1. Bibasilar opacities which may represent infiltrates or atelectasis. 2. Cardiomegaly. **This report has been created using voice recognition software. It may contain minor errors which are inherent in voice recognition technology. **      Final report electronically signed by Dr Radha Cleary on 2/29/2020 12:08 PM      XR FOOT LEFT (MIN 3 VIEWS)   Final Result   Severe osteopenia and extensive soft tissue swelling. No displaced fractures. **This report has been created using voice recognition software. It may contain minor errors which are inherent in voice recognition technology. **      Final report electronically signed by Dr Radha Cleary on 2/29/2020 12:11 PM          Diet: DIET CARDIAC;    DVT prophylaxis: [] Lovenox                                 [] SCDs                                 [] SQ Heparin                                 [] Encourage ambulation           [x] Already on Anticoagulation     Disposition:    [x] Home       [] TCU       [] Rehab       [] Psych       [] SNF       [] [place]       [] Other-    Code Status: Full Code    PT/OT Eval Status: following      Electronically signed by Clifford Barriga MD on 3/2/2020 at 9:48 AM

## 2020-03-02 NOTE — CONSULTS
Podiatric Surgery Consult    Reason for Consult:  Diabetic foot ulcer, right foot  Requesting Physician:  Dr. Leon Shape:    Chief Complaint   Patient presents with    Urinary Retention     decreased urination with cloudy foul smelling urine         HISTORY OF PRESENT ILLNESS:                The patient is a 81 y.o. female with significant past medical history of DM type II, CAD, h/o PE with IVC, CKD stage III, CHF, HTN who being seen at bedside on behalf of Dr. Opal Bravo. Patient sleeping during visit, history provided by daughter who is primary care giver. Patient was seen by Dr. Opal Bravo in office on Friday 02.28.2020 for wounds to her right foot. Wounds were debrided and bandaging applied. Patient given surgical shoe to wear when ambulating. Patient was admitted over the weekend for urinary retention. Past Medical History:        Diagnosis Date    Arthritis     Blood circulation, collateral     Cerebral artery occlusion with cerebral infarction (HCC)     Chronic anticoagulation     Chronic combined systolic and diastolic CHF (congestive heart failure) [facility]     CKD (chronic kidney disease) stage 3, GFR 30-59 ml/min ([facility]     Degenerative joint disease     Fracture of femoral head ([facility] 10/09/2016    Left with intrarticular extension    GERD (gastroesophageal reflux disease)     History of DVT (deep vein thrombosis)     Hyperlipidemia     Hypertension     Ischemic heart disease     Macular degeneration, wet ([address]     Neuromuscular disorder ([facility]     NHL (non-Hodgkin's lymphoma) ([facility] 06/2012    Dr. Tamanna Wilson Obesity (BMI 30.0-34. 9)     Permanent atrial fibrillation     Pneumonia     Presence of IVC filter     Pulmonary embolism, bilateral (HCC)     S/P drug eluting coronary stent placement     Subdural hematoma (HCC)     s/p evacuation    Triple vessel coronary artery disease     Type II or unspecified type diabetes mellitus without mention of complication, not stated as uncontrolled      Past Surgical History:        Procedure Laterality Date    BRAIN SURGERY      Drained brain bleed    CARDIOVASCULAR STRESS TEST      with Dr. Torsten Benites- no acute findings    CENTRAL VENOUS CATHETER      Medi port placement    DIAGNOSTIC CARDIAC CATH LAB PROCEDURE      ENDOSCOPY, COLON, DIAGNOSTIC      EYE SURGERY      FRACTURE SURGERY      Bipolar femur    HIP FRACTURE SURGERY Left 10/10/2016    Bipolar Hip    JOINT REPLACEMENT      right knee    OTHER SURGICAL HISTORY Left 10/24/2016    I and D left hip, component exchange, Dr. Krishna Cook pacer    TOTAL KNEE ARTHROPLASTY Right     VENA CAVA FILTER PLACEMENT      VENTRICULAR CARDIAC PACEMAKER INSERTION  10/04/2016    Dr. Khurram Rubin     Current Medications:    Current Facility-Administered Medications: warfarin (COUMADIN) tablet 1 mg, 1 mg, Oral, Once  HYDROcodone-acetaminophen (NORCO)  MG per tablet 1 tablet, 1 tablet, Oral, Q4H PRN  cefTRIAXone (ROCEPHIN) 1 g IVPB in 50 mL D5W minibag, 1 g, Intravenous, Q24H  docusate sodium (COLACE) capsule 100 mg, 100 mg, Oral, Daily  zinc oxide 40 % paste, , Topical, BID  sodium chloride flush 0.9 % injection 10 mL, 10 mL, Intravenous, 2 times per day  sodium chloride flush 0.9 % injection 10 mL, 10 mL, Intravenous, PRN  amLODIPine (NORVASC) tablet 5 mg, 5 mg, Oral, Daily  aspirin EC tablet 81 mg, 81 mg, Oral, Daily  furosemide (LASIX) tablet 20 mg, 20 mg, Oral, Daily  melatonin tablet 9 mg, 9 mg, Oral, Nightly  isosorbide mononitrate (IMDUR) extended release tablet 60 mg, 60 mg, Oral, Daily  metoprolol succinate (TOPROL XL) extended release tablet 50 mg, 50 mg, Oral, Daily  polyethylene glycol (GLYCOLAX) packet 17 g, 17 g, Oral, Nightly  [START ON 3/7/2020] vitamin D (ERGOCALCIFEROL) capsule 50,000 Units, 50,000 Units, Oral, Weekly  [START ON 3/29/2020] cyanocobalamin injection 1,000 mcg, 1,000 mcg, Intramuscular, Q30 Days  miconazole (MICOTIN) 2 % powder, , Recent Labs     02/29/20  1120 03/01/20  0653   WBC 8.0 5.6   HGB 14.0 14.0   HCT 45.8 45.1    141        Recent Labs     03/02/20  0635      K 4.4      CO2 23   BUN 25*   CREATININE 1.5*        Recent Labs     02/29/20  1238 02/29/20  1347 03/01/20  0653 03/02/20  0635   PROT  --  6.8  --   --    INR 2.22*  --  2.32* 2.53*   APTT 39.9*  --   --   --       No results for input(s): CKTOTAL, CKMB, CKMBINDEX, TROPONINI in the last 72 hours.           Assessment  Principle  Diabetic foot ulcer, left foot     Chronic  Patient Active Problem List   Diagnosis    Hyperlipidemia    History of DVT (deep vein thrombosis)    Type 2 diabetes mellitus with complication, without long-term current use of insulin ([facility]    Essential hypertension    Acute kidney injury ([facility]    CKD stage 3 due to type 2 diabetes mellitus ([facility]    History of pulmonary embolism    Coagulopathy (HCC)    NHL (non-Hodgkin's lymphoma)  Dr. Field  diagnosed June 2111    Diastolic dysfunction,  EF 55-60%    Anticoagulated on Coumadin    Polycythemia    Dyspnea    Obesity (BMI 35.0-39.9 without comorbidity)    Mild aortic stenosis    Ischemic cardiomyopathy    Lower back pain    Hypoxia    Persistent atrial fibrillation    CHF NYHA class II ([facility]    Ischemic heart disease    History of embolic stroke    Triple vessel coronary artery disease    S/P drug eluting coronary stent placement    Chronic combined systolic and diastolic CHF (congestive heart failure) (HCC)    RLS (restless legs syndrome)    S/P cardiac pacemaker procedure    Physical deconditioning    Swelling    Pre-ulcerative calluses    Status post total replacement of left hip    Coronary artery disease involving native coronary artery of native heart without angina pectoris    Sick sinus syndrome (HCC)    Calculus of gallbladder without cholecystitis without obstruction    Chronic idiopathic constipation    History of coronary artery stent

## 2020-03-02 NOTE — CARE COORDINATION
3/2/20, 1:28 PM  DISCHARGE PLANNING EVALUATION:    Elisa Singh       Admitted from: ER, patient presented due to decreased urine output and dysuria. 2/29/2020/ [facility],[address] day: 2   Location: [facility]12/012-A Reason for admit: Pneumonia [J18.9]  UTI (urinary tract infection) [N39.0]  UTI (urinary tract infection) [N39.0] Status: Obs. to Inpt. Admit order signed?: yes  PMH:  has a past medical history of Arthritis, Blood circulation, collateral, Cerebral artery occlusion with cerebral infarction [facility]), Chronic anticoagulation, Chronic combined systolic and diastolic CHF (congestive heart failure) ([address] CKD (chronic kidney disease) stage 3, GFR 30-59 ml/min ([facility]), Degenerative joint disease, Fracture of femoral head ([address] GERD (gastroesophageal reflux disease), History of DVT (deep vein thrombosis), Hyperlipidemia, Hypertension, Ischemic heart disease, Macular degeneration, wet ([address] Neuromuscular disorder ([address] NHL (non-Hodgkin's lymphoma) ([address] Obesity (BMI 30.0-34.9), Permanent atrial fibrillation, Pneumonia, Presence of IVC filter, Pulmonary embolism, bilateral ([address] S/P drug eluting coronary stent placement, Subdural hematoma ([address] Triple vessel coronary artery disease, and Type II or unspecified type diabetes mellitus without mention of complication, not stated as uncontrolled. Procedure: N/A  Pertinent abnormal Imaging:Chest x-ray.    Medications:  Scheduled Meds:   warfarin  1 mg Oral Once    cefTRIAXone (ROCEPHIN) IV  1 g Intravenous Q24H    docusate sodium  100 mg Oral Daily    zinc oxide   Topical BID    sodium chloride flush  10 mL Intravenous 2 times per day    amLODIPine  5 mg Oral Daily    aspirin  81 mg Oral Daily    furosemide  20 mg Oral Daily    melatonin  9 mg Oral Nightly    isosorbide mononitrate  60 mg Oral Daily    metoprolol succinate  50 mg Oral Daily    polyethylene glycol  17 g Oral Nightly    [START ON 3/7/2020] vitamin D  50,000 Units Oral Weekly    [START ON 3/29/2020] cyanocobalamin  1,000 mcg Intramuscular Q30 Days    miconazole   Topical BID    azithromycin  500 mg Intravenous Q24H    warfarin (COUMADIN) daily dosing (placeholder)   Other RX Placeholder     Continuous Infusions:   Pertinent Info/Orders/Treatment Plan:  IV Zithromax, Rocephin, Coumadin per pharmacy dosing, prn Tylenol, Norco, Glycolax, Phenergan and Zofran, Potassium replacement protocol, daily INR's, Dietician, SS, Palliative Care, Wound/Ostomy RN, PT/OT/ST echocardiogram, daily weights, telemetry, up as tolerated. Diet: DIET CARDIAC;   Smoking status:  reports that she has never smoked. She has never used smokeless tobacco.   PCP: LUZ Velasquez CNP  Readmission 30 days or less: No  Readmission Risk Score: 24%    Discharge Planning Evaluation  Current Residence:  Private Residence  Living Arrangements:  Spouse/Significant Other, Children   Support Systems:  Spouse/Significant Other, Children  Current Services PTA:     Potential Assistance Needed:  Home Care  Potential Assistance Purchasing Medications:  No  Does patient want to participate in local refill/ meds to beds program?     Type of Home Care Services:  None  Patient expects to be discharged to:  home with [place]  Expected Discharge date:  03/03/20  Follow Up Appointment: Best Day/ Time:      Patient Goals/Plan/Treatment Preferences:  Met with Marika Shipley and her daughter present at bedside. Marika Shipley is from home with her  and daughter. She has a PCP, insurance, can afford her medications, will have transportation to home at discharge, and her ADL's and nutritional needs are met. Marika Shipley agrees to [place] at discharge. SS has already met with patient and her daughter. Transportation/Food Security/Housekeeping Addressed:  No issues identified.     Evaluation: yes

## 2020-03-02 NOTE — PROGRESS NOTES
monitor throughout meal consumption. Patient's daughter verbalized understanding and continuously denies hx of any swallowing concerns. Recommend continuation of regular diet with thin liquids with high reliance on compensatory swallowing strategies (small bites/sips, upright position, slow rate) and close pulmonary monitoring. Patient would benefit from skilled ST services to monitor diet tolerance and complete close pulmonary monitoring in order to further target dysphagia needs. ST also consulted to complete cognitive evaluation d/t confusion upon admission. Patient's daughter reporting, \"Oh her thinking is fine now. She is back to herself. I provide 24/hour supervision for her and my Dad. I provide all of their care. \"  ST to monitor cognitive baseline and complete evaluation as indicated. RECOMMENDATIONS/ASSESSMENT:   Modified Barium Swallow:  MBS is not indicated at this time. Will recommend as appropriate  Diet Recommendations:  Regular diet with thin liquids   Strategies:  Full Upright Position, Small Bite/Sip, Pulmonary Monitoring, Medication in Applesauce, Direct 1:1 Supervision and Monitor for Fatigue   Rehabilitation Potential: fair    EDUCATION:  Learner: Patient and Family  Education:  Reviewed results and recommendations of this evaluation, Reviewed diet and strategies, Reviewed signs, symptoms and risks of aspiration, Reviewed ST goals and Plan of Care and Reviewed recommendations for follow-up  Evaluation of Education: Verbalizes understanding, Demonstrates with assistance and Needs further instruction    PLAN:  Speech Therapy evaluation to assess speech, language, cognition and/or voice and Skilled SLP intervention on acute care 3-5 x per week or until goals met and/or pt plateaus in function. Specific interventions for next session may include: dysphagia tx. PATIENT GOAL:    Did not state. Will further assess during treatment.     SHORT TERM GOALS:  Short-term Goals  Timeframe for Short-term Goals: 2 weeks  Goal 1: Patient will consume regular diet with thin liquids without s/s of aspiration in order to safely maintain adequate hydration and nutrition  Goal 2: Patient and daughter will demonstrate understanding of compensatory swallowing strateiges (upright position, small bites/sips, slow rate, s/s of apsiration, risks of apsiration) in order to reduce risk of aspiration and improve overall safety of oral intake   Goal 3: Complete pulmonary monitoring, if decline presents recommend instrumental evaluation   Goal 4: Monitor cognitive baseline, complete evaluation and determine goals as clinically appropriate       LONG TERM GOALS:  No LTGs due to [address]

## 2020-03-02 NOTE — PROGRESS NOTES
[facility]'s Palliative Care           Progress Note    Patient Name:  Amado Kearney  Medical Record Number:  [id number]  YOB: 1935    Date:  3/2/2020  Time:  10:49 AM  Completed By:  Pete Ren RN, BSN, [facility]    Reason for Palliative Care Evaluation:  Code Status    Current Issues:  []  Pain  [x]  Fatigue  []  Nausea  []  Anxiety  []  Depression  []  Shortness of Breath  []  Constipation  []  Appetite  []  Other:   Advance Directives  [] [facility] DNR Form  [] Living Will  [x] Medical POA    Current Code Status  [x] Full Resuscitation  [] DNR-Comfort Care-Arrest  [] DNR-Comfort Care  [] Limited   [] No CPR   [] No shock   [] No ET intubation/reintubation   [] No resuscitative medications   [] Other limitation:    Performance Status:  30%  Family/Patient Discussion:  Patient awake in bed, caregiver Diogo Cerrato at bedside helping feed her. Patient communicates and answered questions appropriately but does not feel well. She had c/o being tired, and has had staff members in room since 6:30 am.  I did introduce the palliative care program. We discussed her symptoms and she did state to Diogo Cerrato this am that she \"felt the best this am since her admit. \"  We discussed DPOA. Paperwork has been completed with her  Joaquin Carmichael as first then Diogo Cerrato as second for medical decisions. She does not wish to change this. I addressed code status as full code and asked if that remains her wishes at this time, and elaborated about what a full code would entail. At that moment, patient needed to use the bathroom and family asked if it could be discussed at a later time when her mom \"didn't feel so badly and can think more clear. \"  She added that she did not believer her mom would change it, but stated to her mom that it is \"her decision, her body. \"  Daughter very supportive-lives with both parents and cares for them. Plan/Follow-Up:  Diogo Cerrato and patient ok with palliative nurse following up tomorrow.     Pete Ren RN, BSN,

## 2020-03-02 NOTE — PROGRESS NOTES
[address]  OCCUPATIONAL THERAPY MISSED TREATMENT NOTE  Jeanne Dobson [facility] 5K  5K-12/012-A      Date: 3/2/2020  Patient Name: Kalyn Xie        CSN: [id number]   : 1935  (81 y.o.)  Gender: female                REASON FOR MISSED TREATMENT: Attempt x 1, family request not to wake Pt. Attempt x 2, Pt declined. Daughter requested that therapist try back tomorrow.

## 2020-03-03 NOTE — PROGRESS NOTES
NC.  - Now on 2 L/min NC  - Wean O2 as tolerated  - Home O2 eval prior to discharge     Elevated pro-BNP, chronic, possibly from CKD, r/o CHF  - Clinically, does not look like has fluid overload, though cautious in giving too much fluid. Will continue to hold IVF for now, re-assess tomorrow  - Last echo on file was in 10/2016, EF 50%, new echo ordered  - Daily weights, I/Os, fluid and salt restrictions      Azotemia, possibly due to dehydration, improving  - received IVF, will continue to hold for now, given elevated BNP  - encouraged oral intake  - BMP in am      CKD stage 3, stable   - avoid nephrotoxins  - BMP in am     Hypoalbuminemia, possibly due to malnutrition  - dietician consult      Left foot soft tissue swelling  - noted on left foot xray  - Pt's daughter report that pt has \"left foot pressure ulcer\" and follows Podiatry Dr. Vahid Castillo. - Podiatry consulted, recommended outpatient follow up on Friday for dressing change     Mild AGMA, resolved  - monitor for now, BMP in AM     CHF, unknown if acute or chronic  - Echo 3/2/2020 EF 25-30%  - EF 50% per echo in 10/2016  - Consult Cardiology  - Cont lasix      Hx atrial fibrillation, w pacemaker,  - Cont Toprol XL and coumadin  - Pharmacy on-board to dose coumadin. Appreciate pharmacy input     Hx CAD, s/p ALVARO placement   - Cont Toprol XL, aspirin, imdur     Constipation  - cont miralax and colace     Hx DVT, w IVC filter  - cont coumadin      Sacral pressure ulcer  - cont micotin powder and pinxav     Intertrigo ( in between groin)  - cont micotin powder and pinxav     Suspect PVD    - has PVD changes on legs       Chief Complaint: Confusion    Hospital Course:   Please see H/P for details.  In summary, this is a 81 y.o. female, with past medical history of CKD stage III, CHF, EF 50% in October 2016, history of DVT, with IVC filter, hyperlipidemia, hx of Non-Hodgkin's lymphoma, not on treatment anymore, history of atrial fibrillation, with history of Normocephalic and atraumatic. Right Ear: External ear normal.      Left Ear: External ear normal.      Nose: Nose normal. No congestion or rhinorrhea. Mouth/Throat:      Mouth: Mucous membranes are moist.      Pharynx: Oropharynx is clear. Eyes:      General:         Right eye: No discharge. Left eye: No discharge. Conjunctiva/sclera: Conjunctivae normal.      Pupils: Pupils are equal, round, and reactive to light. Neck:      Musculoskeletal: Neck supple. Cardiovascular:      Rate and Rhythm: Normal rate. Rhythm irregular. Pulses: Normal pulses. Heart sounds: Normal heart sounds. No murmur. Pulmonary:      Effort: Pulmonary effort is normal. No respiratory distress. Breath sounds: Examination of the right-lower field reveals decreased breath sounds. Decreased breath sounds present. No wheezing, rhonchi or rales. Abdominal:      General: Abdomen is flat. Bowel sounds are normal.      Tenderness: There is no abdominal tenderness. Musculoskeletal:         General: Signs of injury (L foot wrapped for pressure ulcer) present. Right lower leg: No edema. Skin:     General: Skin is warm and dry. Neurological:      General: No focal deficit present. Mental Status: She is alert and oriented to person, place, and time. Mental status is at baseline. Psychiatric:         Attention and Perception: Attention normal.         Speech: Speech is delayed. Behavior: Behavior is slowed. Behavior is cooperative. Cognition and Memory: Cognition normal. Memory is not impaired.              Labs:   Recent Labs     02/29/20  1120 03/01/20  0653   WBC 8.0 5.6   HGB 14.0 14.0   HCT 45.8 45.1    141     Recent Labs     03/01/20  0653 03/02/20  0635 03/03/20  0626    138 139   K 4.3 4.4 4.6    105 105   CO2 22* 23 24   BUN 24* 25* 24*   CREATININE 1.2 1.5* 1.4*   CALCIUM 9.2 9.1 9.2     Recent Labs     02/29/20  1347   AST 19   ALT 7*   BILIDIR 0.3

## 2020-03-03 NOTE — PROGRESS NOTES
apsiration, risks of apsiration) in order to reduce risk of aspiration and improve overall safety of oral intake   INTERVENTIONS:  Provided comprehensive review of swallow strategies and s/s of aspiration with patient and daughter; both voiced understanding and compliance. Daughter indicated good success with meals in regards to swallow function; patient no concerns with swallow. SHORT TERM GOAL #3:  Goal 3: Complete pulmonary monitoring, if decline presents recommend instrumental evaluation   INTERVENTIONS: No significant changes in pulmonary status indicated by nurse or chart review. SHORT TERM GOAL #4:  Goal 4: Monitor cognitive baseline, complete evaluation and determine goals as clinically appropriate   INTERVENTIONS: Discussed cognition with patient and daughter; reported no concern. Patient is under constant supervision and is near baseline cognitive functioning. Long-Term Goals:  LTGs not established d/t short ELOS         EDUCATION:  Learner: Patient and Family  Education:  Reviewed diet and strategies, Reviewed signs, symptoms and risks of aspiration and Reviewed ST goals and Plan of Care  Evaluation of Education: Verbalizes understanding, Demonstrates with assistance and Needs further instruction    ASSESSMENT/PLAN:  Activity Tolerance:  Patient tolerance of  Treatment: Good  Assessment/Plan: Patient progressing toward established goals. Continues to require skilled care of licensed speech pathologist to progress toward achievement of established goals and plan of care. .     Plan for Next Session: Dietary analysis      DARIEL Jimenez, Speech-Intern  Max Chauhan M.S. [place] [id number]

## 2020-03-04 NOTE — DISCHARGE SUMMARY
Hospital Medicine Discharge Summary      Patient Identification:   Nathanael Barnett   : 1935  MRN: [id number]   Account: [de-identified]      Patient's PCP: LUZ El CNP    Admit Date: 2020     Discharge Date:   3/4/2020    Admitting Physician: Di Simental MD     Discharge Physician: Olaf Rodríguez MD     Discharge Diagnoses:    Acute metabolic encephalopathy  Acute Hypoxic respiratory failure  Acute on Chronic Systolic HF  Cardiomyopathy EF 25-30%  Left foot cellulitis  Sepsis, ruled out  Pneumonia and UTI, ruled out  Azotemia due to dehydration  CKD stage 3, stable   Hypoalbuminemia, possibly due to malnutrition  Mild AGMA, resolved  Hx atrial fibrillation, on warfarin and pacemaker  Hx CAD, s/p ALVARO placement  Constipation  Hx DVT, w IVC filter  Sacral pressure ulcer  Intertrigo   Suspect PVD        The patient was seen and examined on day of discharge and this discharge summary is in conjunction with any daily progress note from day of discharge. Hospital Course:   Nathanael Barnett is a 81 y.o. female admitted to [facility] on 2020 for confusion.   Patient has a past medical history of CKD stage III, CHF, EF 50% in 2016, history of DVT, with IVC filter, hyperlipidemia, hx of Non-Hodgkin's lymphoma, not on treatment anymore, history of atrial fibrillation, with history of pacemaker, on Coumadin, history of CAD with ALVARO placement, DM type II, diet controlled, who was brought to [facility] by her daughter on 2020 due to confusion. Aimee Vickers lives at home with her daughter. Pam Even reports that patient started to have smelly urine this past Friday night, followed by not feeling well, then daughter noticed that patient started to get confused.  And per daughter, patient usually has UTI and she acting this way, hence daughter brought the patient to [facility] and they apparently found that she probably has pneumonia and UTI,

## 2020-03-04 NOTE — PLAN OF CARE
Problem: Falls - Risk of:  Goal: Will remain free from falls  Description  Will remain free from falls  Outcome: Ongoing  Note:   Patient remains free from falls this shift. Call light and bedside table within reach. Bed in lowest position with alarm on. Rounding hourly. Problem: Pain:  Goal: Patient's pain/discomfort is manageable  Description  Patient's pain/discomfort is manageable  Outcome: Ongoing  Note:   Pain Assessment: 0-10  Pain Level: 6   Patient's Stated Pain Goal: 2   Is pain goal met at this time? No  Non-Pharmaceutical Pain Intervention(s): Rest, reposition        Problem: Discharge Planning:  Goal: Patients continuum of care needs are met  Description  Patients continuum of care needs are met  Outcome: Ongoing  Note:   Patient is from home with family, plans to return upon discharge   Care plan reviewed with patient and family. Patient and family verbalize understanding of the plan of care and contribute to goal setting.

## 2020-03-04 NOTE — FLOWSHEET NOTE
03/04/20 1050   Encounter Summary   Services provided to: Patient and family together   Referral/Consult From: Veronica   Continue Visiting Yes  (3/4/2020 )   Complexity of Encounter Moderate   Length of Encounter 15 minutes   Routine   Type Initial   Assessment Approachable   Intervention Nurtured hope   Outcome Coping   S- During my contact with the 81 yr old patient and the family, I wanted to assess what their       spiritual and emotional needs were. The pt was coping with pneumonia. The pt was tired. O-  The pt was in bed and the family was supportively present. A- The pt was receptive when I offered emotional support. P-  Continued support would be helpful in order to meet the future Spiritual needs of the         patient.

## 2020-03-04 NOTE — DISCHARGE INSTR - MEDS
Warfarin Discharge Instructions:        Recent Labs     03/04/20  0643   INR 2.73*   Coumadin 2.5 mg tabs  Interacting medications at discharge: aspirin, doxycycline BID x 7 days  INR goal during admission: 2.0-3.0  Date Warfarin Dose   3/4/2020  (Wednesday) 1 mg   (given in Hospital)   3/5/2020  (Thursday) 1.25 mg   3/6/2020  (Friday) 1.25 mg   3/7/2020   (Saturday) 1.25 mg   3/8/2020  (Sunday) 1.25 mg      3/9/2020  (Monday) 1.25 mg   3/10/2020  (Tuesday) [address] to draw INR - [facility] should contact you with further dosing instructions   Provider dosing warfarin: [facility]  Recheck INR:  Tuesday, 3/10/2020 by [address]. [facility] should contact you Tuesday afternoon with further dosing instructions.

## 2020-03-04 NOTE — CARE COORDINATION
[facility] ([facility]) DME notified for home oxygen per patient and families request. Dr. Kathy Rucker notified for order.  Electronically signed by Carolina Hernandez RN on 3/4/20 at 1:19 PM

## 2020-03-04 NOTE — PROGRESS NOTES
A home oxygen evaluation has been completed. [x]Patient is an inpatient. It is expected that the patient will be discharged within the next 48 hours. Qualified provider to write order for home prescription if patient qualifies. Social service/care managers will arrange for home oxygen. If patient is active, arrange for Home Medical supplier to assess for Oxygen Conserving Device per pulse oximetry. []Patient is an outpatient. Results will be faxed to the ordering provider. Qualified provider to write order for home prescription if patient qualifies and arranges for home oxygen. Patient was placed on room air for 11 minutes. SpO2 was 88 % on room air at rest. Patients SpO2 was below 89% and qualified for home oxygen. Oxygen was applied at 1 lpm via nasal cannula for SpO2 of 89% it was increased to 2lpm to maintain a SpO2 between 90-92% while at rest. Actual SpO2 was 91 %. Patient unable to ambulate for exercise flow rate. Patients was not ambulated. Note: For any SpO2 at 32% see policy and procedure for possible qualifications.

## 2020-03-10 NOTE — PROGRESS NOTES
Medication Management [address]  [phone] (phone)  [phone] (fax)    INR drawn by [facility]. Nursing assessment reviewed and appreciated. Nursing assessment within the normal limits with the exception of the following:  Started on lisinopril 5mg daily. Last dose of doxycycline Thursday am.    Assessment:  Lab Results   Component Value Date    INR 2.45 (H) 03/10/2020    INR 2.73 (H) 03/04/2020    INR 3.18 (H) 03/03/2020     INR therapeutic   Recent Labs     03/10/20  1200   INR 2.45*         Plan:  Continue Coumadin 2.5mg MF, 1.25mg TWThSS. Recheck INR in 2 weeks. Patient reminded to call the Anticoagulation Clinic with any signs or symptoms of bleeding or with any medication changes. Patient given instructions utilizing the teach back method. Discharged ambulatory in no apparent distress.

## 2020-03-20 NOTE — TELEPHONE ENCOUNTER
Per Pierce Greenfield call [facility] and give order for bordered dressing, change daily. Called [facility]. Voicemail left regarding orders.

## 2020-03-24 NOTE — PROGRESS NOTES
Medication Management [address]  [phone] (phone)  [phone] (fax)    INR drawn by [facility]. Nursing assessment reviewed and appreciated. Nursing assessment within the normal limits with the exception of the following:  None. Spoke to daughter, Darryl Mendes. Assessment:  Lab Results   Component Value Date    INR 2.08 (H) 03/24/2020    INR 2.45 (H) 03/10/2020    INR 2.73 (H) 03/04/2020     INR therapeutic   Recent Labs     03/24/20  1235   INR 2.08*         Plan:  Continue Coumadin 2.5mg MF and 1.25mg TWThSaS. Recheck INR in 3 weeks, on 4/14/20. Patient reminded to call the Anticoagulation Clinic with any signs or symptoms of bleeding or with any medication changes. Patient given instructions utilizing the teach back method.

## 2020-03-30 PROBLEM — N39.0 UTI (URINARY TRACT INFECTION): Status: RESOLVED | Noted: 2020-01-01 | Resolved: 2020-01-01

## 2020-05-07 NOTE — PROGRESS NOTES
Medication Management [address]  [phone] (phone)  [phone] (fax)    INR drawn by [facility]. Nursing assessment reviewed and appreciated. Nursing assessment within the normal limits with the exception of the following:  SOB, which is nl for her. Spoke to daughter, Tammie Zhong. Assessment:  Lab Results   Component Value Date    INR 1.91 (H) 05/07/2020    INR 2.98 (H) 04/16/2020    INR 2.08 (H) 03/24/2020     INR subtherapeutic   Recent Labs     05/07/20  1045   INR 1.91*         Plan:  Coumadin 2.5mg today, 1.25mg tomorrow, then continue 2.5mg MF and 1.25mg TWThSaS. Recheck INR in 2 weeks, on 5/21/20 via [facility]. Patient reminded to call the Anticoagulation Clinic with any signs or symptoms of bleeding or with any medication changes. Patient given instructions utilizing the teach back method.     CLINICAL PHARMACY CONSULT: MED RECONCILIATION/REVIEW ADDENDUM    For Pharmacy Admin Tracking Only    PHSO: No  Total # of Interventions Recommended: 1  - Increased Dose #: 1  - Maintenance Safety Lab Monitoring #: 1  Total Interventions Accepted: 1  Time Spent (min): 15    Tutu GeorgesD

## 2020-05-21 NOTE — TELEPHONE ENCOUNTER
Spoke with Jacque Almanza, per Jacque cabrales to order UA. Bernadette Rahman informed samra for UA.

## 2020-05-21 NOTE — PROGRESS NOTES
Medication Management [address]   [phone] (phone)  [phone] (fax)    INR drawn by [address]. Nursing assessment reviewed and appreciated. Nursing assessment within the normal limits with the exception of the following:  Spoke to daughter Marquis Long:  Lab Results   Component Value Date    INR 2.12 (H) 05/21/2020    INR 1.91 (H) 05/07/2020    INR 2.98 (H) 04/16/2020     INR therapeutic   Recent Labs     05/21/20  1130   INR 2.12*         Plan:  Continue Coumadin 2.5mg MF and 1.25mg TWThSaS. Recheck INR in 3 weeks, on 6/11/20. Patient reminded to call the Anticoagulation Clinic with any signs or symptoms of bleeding or with any medication changes. Patient given instructions utilizing the teach back method.     CLINICAL PHARMACY CONSULT: MED RECONCILIATION/REVIEW ADDENDUM    For Pharmacy Admin Tracking Only    PHSO: No  Total # of Interventions Recommended: 0  - Maintenance Safety Lab Monitoring #: 1  Total Interventions Accepted: 1  Time Spent (min): 15    Rosemary Velarde PharmD

## 2020-05-26 NOTE — TELEPHONE ENCOUNTER
Daughter informed via voicemail that patient urine showed positive for UTI and Stew Johnson would like to treat with 3 days of ATB. Requested daughter give the office a call back tomorrow morning as to what pharmacy to send script to.

## 2020-06-09 NOTE — PROGRESS NOTES
Medication Management [address]  [phone] (phone)  [phone] (fax)    INR drawn by [facility]. Nursing assessment reviewed and appreciated. Nursing assessment within the normal limits with the exception of the following:  None; bactrim finished 6/3    Interview  Conducted with patient's daughter Augusto Horta:  Lab Results   Component Value Date    INR 1.93 (H) 06/09/2020    INR 2.28 (H) 06/01/2020    INR 2.12 (H) 05/21/2020     INR subtherapeutic   Recent Labs     06/09/20  1150   INR 1.93*     Daughter would appreciate if we could extend visits as much as possible because it is hard on the patient to have all of these lab draws. Plan:  Coumadin 2.5 mg daily then continue Coumadin 2.5 mg MF, 1.25 mg TWThSS. Recheck INR in 1 weeks. Patient reminded to call the Anticoagulation Clinic with any signs or symptoms of bleeding or with any medication changes. Patient given instructions utilizing the teach back method.     Haroldo Lopez, TtuuD, BCPS  6/9/2020  3:47 PM

## 2020-06-24 NOTE — TELEPHONE ENCOUNTER
Patients daughter called stating she thinks patient has a UTI she is having some confusion requesting an order for home health to check urine. Okay for order?

## 2020-07-07 NOTE — PROGRESS NOTES
Medication Management [address]  [phone] (phone)  [phone] (fax)    INR drawn by [facility]. Nursing assessment reviewed and appreciated. Nursing assessment within the normal limits. Spoke with patient's daughter Scarlett Bain. Patient's daughter Scarlett Bain verifies current dosing regimen and tablet strength. No missed or extra doses. Patient denies s/s bleeding/bruising/swelling/chest pain. Patient has SOB on exertion that is not unusual.  No blood in urine or stool. No dietary changes. No changes in medication/OTC agents. No longer takes [facility]  No change in alcohol use or tobacco use. No change in activity level. Patient denies headaches/dizziness/lightheadedness/falls. No vomiting/diarrhea or acute illness. No Procedures scheduled in the future at this time. Assessment:  Lab Results   Component Value Date    INR 2.20 (H) 07/07/2020    INR 2.14 (H) 06/16/2020    INR 1.93 (H) 06/09/2020     INR therapeutic   Recent Labs     07/07/20  1200   INR 2.20*     Patient presents with second consecutive therapeutic INR. Patient is being discharged from home health services. Plan:  Continue Coumadin 2.5 mg MF and 1.25 mg TuWThSaSu. Recheck INR in 3 weeks in clinic. Patient's daughter Scarlett Bain reminded to call the Anticoagulation Clinic with any signs or symptoms of bleeding or with any medication changes. Patient's daughter Scarlett Bain given instructions utilizing the teach back method. Delaney Messina PharmD, BCPS  7/7/2020  4:36 PM    CLINICAL PHARMACY CONSULT: MED RECONCILIATION/REVIEW ADDENDUM    For Pharmacy Admin Tracking Only    PHSO: No  Total # of Interventions Recommended: 0  - Updated Order #: 1 Updated Order Reason(s):  Other updated home medication list  - Maintenance Safety Lab Monitoring #: 1  Total Interventions Accepted: 0  Time Spent (min): [address], PharmD

## 2020-07-27 NOTE — TELEPHONE ENCOUNTER
Received a call from patient's daughter - her mother is not feeling well and wants to reschedule the appt. Appt rescheduled for next week since patient has been stable on current dose.     Zeke Mendez, TutuD, BCPS  7/27/2020  12:58 PM

## 2020-08-06 NOTE — PROGRESS NOTES
Medication Management [address]  [phone] (phone)  [phone] (fax)      Ms. Darcy Johnson is a 81 y.o.  female with history of DVT, PE, Afib who presents today for anticoagulation monitoring and adjustment. Patient verifies current dosing regimen and tablet strength. No missed or extra doses. Patient denies s/s bleeding/bruising/swelling/SOB/chest pain. Small bruise on L middle finger  No blood in urine or stool. No dietary changes. No changes in medication/OTC agents/Herbals. No change in alcohol use or tobacco use. No change in activity level. Patient denies headaches/dizziness/lightheadedness/falls. No vomiting/diarrhea or acute illness. No Procedures scheduled in the future at this time. Patient states she is not feeling well this am. Daughter didn't want to cancel appointment for today, since last week's appt cancelled. Daughter states it is too hard to get patient to appointments. Offered for patient to have blood draw at lab during [employer], but patient doesn't want venipuncture draw. She would like to have a home INR machine. Explained to patient ad daughter that the clinic is unable to follow her with a home machine. Daughter plans on asking family doc if he can follow INRs with home machine. Assessment:   Lab Results   Component Value Date    INR 2.30 (H) 08/06/2020    INR 2.20 (H) 07/07/2020    INR 2.14 (H) 06/16/2020     INR therapeutic   Recent Labs     08/06/20  1130   INR 2.30*         Plan:  Continue Coumadin 2.5mg MF, 1.25mg TWThSS. Recheck INR in 5  weeks. Patient reminded to call the Anticoagulation Clinic with any signs or symptoms of bleeding or with any medication changes. Patient and daughter given instructions utilizing the teach back method. Discharged ambulatory in no apparent distress. After visit summary printed and reviewed with patient.       Medications reviewed and updated on home medication list Yes      CLINICAL PHARMACY CONSULT: MED RECONCILIATION/REVIEW ADDENDUM    For Pharmacy Admin Tracking Only    PHSO: No  Total # of Interventions Recommended: 0  - Updated Order #: 0 Updated Order Reason(s):  Other Patient home med listed reviewed  - Maintenance Safety Lab Monitoring #: 1  Total Interventions Accepted: 0  Time Spent (min): [address], PharmD

## 2020-08-13 NOTE — PROGRESS NOTES
carelink medtronic single pacer     7 years on device   RV imped 532  R waves 3.8  81.2% RVp  Threshold 0.5 @ 0.4  VT vs SVT

## 2020-08-27 NOTE — PROGRESS NOTES
INR 3.1. Need to decrease dose by 5-10%. Take 2.5 mg warfarin every Fri, every other day take 1.25 mg    Repeat INR in 7 days.

## 2020-08-31 NOTE — TELEPHONE ENCOUNTER
Patient's daughter called to let us know that patient started home testing for INR last week. Jed Lopez will be managing her INR and Coumadin dosing.   Any questions you can call the patient's daughter Vida Wilsonmegan

## 2020-09-02 NOTE — TELEPHONE ENCOUNTER
Patients daughter, Nicola Stafford does not want to do home INR anymore for patient. She states its just not working out. They have been having a hard time poking patients finger and doesn't think its best for her to do at home.  Wanting to get set back up with coumadin clinic at [place] [address]

## 2020-09-03 NOTE — TELEPHONE ENCOUNTER
Pt re-referred to [facility] [address]. [facility]) for Coumadin management by Dr. Ethelle Kayser for afib, goal INR 2-3, therapy duration indefinite, initial referral 9/3/20. Called pt, spoke to daughter, aPrtha Ceron. Pt had previously been managed by [facility]. Was discharged as she was going to be managed as a PST pt per PCP office. Daughter, Ed Martini, states that it was \"just too difficult\". Pt will continue current regimen and appt made for 9/9.

## 2020-09-09 NOTE — PROGRESS NOTES
Medication Management [address]  [phone] (phone)  [phone] (fax)      Ms. Ami Rabago is a 81 y.o.  female with history of DVT, PE, Afib who presents today for anticoagulation monitoring and adjustment. Patient verifies current dosing regimen and tablet strength. Has been taking 2.5mg F and 1.25mg all other days. No missed or extra doses. Patient denies s/s bleeding/bruising/swelling/SOB/chest pain  No blood in urine or stool. No dietary changes. No changes in medication/OTC agents/Herbals. Medical marijuana gummies started on Fri or Sat (9/4-9/5), up to 1/2 gummy PRN. Has taken foot neuropathy pain down to 2/10. Also started new supplement, Cataplex E2 - 2 tabs daily with supper, may possibly contain vitamin K. No change in alcohol use or tobacco use. No change in activity level. Patient denies headaches/dizziness/lightheadedness/falls. No vomiting/diarrhea or acute illness. No Procedures scheduled in the future at this time. Eye injection next week, usually gets every 4-6 weeks. Hasn't had any in awhile due to COVID    Assessment:   Lab Results   Component Value Date    INR 1.80 (H) 09/09/2020    INR 3.10 08/25/2020    INR 2.30 (H) 08/06/2020     INR supratherapeutic   Recent Labs     09/09/20  1025   INR 1.80*     Patient interview completed and discussed with pharmacist by ERMA Irvin PharmD Candidate    Plan:  Coumadin 2.5 mg today, then continue Coumadin 2.5 mg on Fridays and 1.25 mg all other days. Recheck INR in 2 week(s). Patient reminded to call the Anticoagulation Clinic with any signs or symptoms of bleeding or with any medication changes. Patient given instructions utilizing the teach back method. Discharged ambulatory in no apparent distress. After visit summary printed and reviewed with patient.       Medications reviewed and updated on home medication list Yes    CLINICAL PHARMACY CONSULT: MED [address]: No  Total # of Interventions Recommended: 1  - Increased Dose #: 1  - Maintenance Safety Lab Monitoring #: 1  Total Interventions Accepted: 1  Time Spent (min): [address], PharmD, BCPS  9/9/2020  10:58 AM

## 2020-09-14 NOTE — TELEPHONE ENCOUNTER
Patient is now doing INR through coumadin clinic. Daughter contacted [address] to  home INR device. [address] is needing an order to discontinue home INR.

## 2020-09-22 NOTE — TELEPHONE ENCOUNTER
Daughter is requesting an order for a waffle cushion to be sent to [facility].

## 2020-09-24 NOTE — PROGRESS NOTES
Medication Management [address]   [phone] (phone)  [phone] (fax)      Ms. Darcy Johnson is a 81 y.o.  female with history of Afib who presents today for anticoagulation monitoring and adjustment. Pt here with daughter, who answers most of the interview questions. Daughter verifies current dosing regimen and tablet strength. No missed or extra doses. Daughter denies s/s bleeding/bruising/swelling/SOB/chest pain  No blood in urine or stool. No dietary changes. No changes in medication/OTC agents/Herbals. No change in alcohol use or tobacco use. No change in activity level. Patient denies headaches/dizziness/lightheadedness/falls. No vomiting/diarrhea or acute illness. No Procedures scheduled in the future at this time. Assessment:   Lab Results   Component Value Date    INR 2.20 (H) 09/24/2020    INR 1.80 (H) 09/09/2020    INR 3.10 08/25/2020     INR therapeutic   Recent Labs     09/24/20  1003   INR 2.20*         Plan:  Continue Coumadin 2.5mg F and 1.25mg MTWThSaS. Recheck INR in 4 week(s). Patient reminded to call the Anticoagulation Clinic with any signs or symptoms of bleeding or with any medication changes. Patient given instructions utilizing the teach back method. Discharged in [facility] in no apparent distress, with daughter who agrees with plan. After visit summary printed and reviewed with patient.       Medications reviewed and updated on home medication list Yes    Influenza vaccine:     [] given    [] declined   [] received previously   [] plans to receive at a later time   [] refused    [x] documented in [address]: MED [address]: No  Total # of Interventions Recommended: 0  - Maintenance Safety Lab Monitoring #: 1  Total Interventions Accepted: 0  Time Spent (min): 15

## 2020-10-08 NOTE — TELEPHONE ENCOUNTER
Last visit- 4/23/2020      Requested Prescriptions     Pending Prescriptions Disp Refills    lisinopril (PRINIVIL;ZESTRIL) 5 MG tablet [Pharmacy Med Name: LISINOPRIL TABS 5MG] 90 tablet 3     Sig: TAKE 1 TABLET DAILY

## 2020-10-22 NOTE — PROGRESS NOTES
Medication Management [address]   [phone] (phone)  [phone] (fax)      Ms. Giorgi Heredia is a 81 y.o.  female with history of coagulopathy/atrial fib. , per Dr. Luis Bettencourt referral, who presents today for Warfarin monitoring and adjustment (4 week visit). Daughter verifies current dosing regimen and tablet strength. Patient appeared to sleep through most of visit (typical). No missed or extra doses. Daughter denies bleeding/bruising/SOB/chest pain. Has minor swelling of legs infrequently. No blood in urine or stool. No dietary changes. No changes in medication/OTC agents/herbals. Added CBD/THC gummie to list - has used since late August.  Advised daughter there is not a lot of data on interaction with that and prescription meds. No change in alcohol use or tobacco use. No change in activity level. Daughter denies headaches/dizziness/lightheadedness/falls. No vomiting/diarrhea or acute illness. No procedures scheduled in the future at this time. Assessment:   Lab Results   Component Value Date    INR 3.00 (H) 10/22/2020    INR 2.20 (H) 09/24/2020    INR 1.80 (H) 09/09/2020     INR therapeutic - goal 2-3. Recent Labs     10/22/20  0954   INR 3.00*     Plan:  POCT INR ordered/performed/result reviewed. Continue PO Coumadin 2.5 mg F, 1.25 mg MTWThSS. Recheck INR in 4 week(s). Daughter reminded to call the Anticoagulation Clinic with any signs or symptoms of bleeding or with any medication changes. Daughter given instructions utilizing the teach back method. Discharged via wheelchair in no apparent distress, wearing mask, with daughter. After visit summary printed and reviewed with daughter.   Medications reviewed and updated on home medication list.    Influenza vaccine:     [] given    [x] declined   [] received previously   [] plans to receive at a later time   [] refused    [] documented in Epic

## 2020-11-03 PROBLEM — I25.10 CORONARY ARTERY DISEASE INVOLVING NATIVE HEART: Status: RESOLVED | Noted: 2019-02-26 | Resolved: 2020-01-01

## 2020-11-10 NOTE — TELEPHONE ENCOUNTER
Lakia Daughter Kamini Darnellrocky called and left a message stating patient was started on two new antibiotics. Cipro 500mg BID x 10 days and Doxycycline 100mg BID x 10 days. She started taking these medications yesterday 11/9/2020. Verified patients current regimen is Coumadin 2.5mg Friday 1.25mg SMTuWThS (10mg/week). Instructed Jessica to change Coumadin to 1.25mg daily. This is a 12.5% weekly dose decrease. We got her scheduled to come in during therapy for and INR check on Friday 11/13/20. Patient has 2.5mg tablets at home so if further dose decrease is necessary may need to change to a smaller tablet size. Lakia Daughter voiced understanding. Patient has a follow up with the foot doctor on Monday 11/16. Nancyann Cranker said each antibiotic also has a refill on it so she will let us know if they continue it further than the 10 days.

## 2020-11-13 NOTE — PROGRESS NOTES
Medication Management [address]  [phone] (phone)  [phone] (fax)      Ms. Rosa Tobar is a 81 y.o.  female with history of Afib who presents today for anticoagulation monitoring and adjustment. Patient verifies current dosing regimen and tablet strength. No missed or extra doses. Patient denies s/s bleeding/bruising/swelling/SOB/chest pain  No blood in urine or stool. No dietary changes. No changes in OTC agents/Herbals. Started on Trental BID yesterday, may increase INR, will monitor; started Cipro 500mg bid x 10 days and Doxycycline 100mg bid x 10 days on 11/9/20. No change in alcohol use or tobacco use. No change in activity level. Patient denies headaches/dizziness/lightheadedness/falls. No vomiting/diarrhea. Has BLE cellulitis, seeing Dr. Janay Grove for this. No Procedures scheduled in the future at this time. Assessment:   Lab Results   Component Value Date    INR 3.20 (H) 11/13/2020    INR 3.00 (H) 10/22/2020    INR 2.20 (H) 09/24/2020     INR supratherapeutic   Recent Labs     11/13/20  1016   INR 3.20*         Plan:  Hold Coumadin today, then continue Coumadin 2.5mg F and 1.25mg MTWThSaS. Recheck INR in 6 day(s). May need a new tablet size in the future. Patient reminded to call the Anticoagulation Clinic with any signs or symptoms of bleeding or with any medication changes. Patient given instructions utilizing the teach back method. Discharged in [facility] in no apparent distress, with daughter who agrees with plan. After visit summary printed and reviewed with patient.       Medications reviewed and updated on home medication list Yes    Influenza vaccine:     [] given    [] declined   [] received previously   [] plans to receive at a later time   [] refused    [x] documented in [address]:  [facility] [address]: No  Total # of Interventions Recommended: 1  - Decreased Dose #: 1  - Maintenance Safety Lab Monitoring #: 1  Total Interventions Accepted: 1  Time Spent (min): 20    Ximena Grounds, PharmD

## 2020-11-19 NOTE — PROGRESS NOTES
Medication Management [address]  [phone] (phone)  [phone] (fax)      Ms. Rudy Arellano is a 81 y.o.  female with history of Afib who presents today for anticoagulation monitoring and adjustment. Daughter provides majority of info at appt. Daugther verifies current dosing regimen and tablet strength. No missed or extra doses. Patient denies s/s bleeding/bruising/swelling/SOB/chest pain  No blood in urine or stool. No dietary changes. Daughter reports that she is not taking pentoxifylline anymore due to allergy. Patient complained of nausea/vomitting with severe headache and itching; allergy list updated. Patient is also taking doxycycline and cipro for 2 more days and possibly more after she sees her podiatrist tomorrow. No change in alcohol use or tobacco use. No change in activity level. Patient denies headaches/dizziness/lightheadedness/falls. No vomiting/diarrhea. Currently has BLE cellulitis, both legs wrapped. No Procedures scheduled in the future at this time. Assessment:   Lab Results   Component Value Date    INR 4.80 (H) 11/19/2020    INR 3.20 (H) 11/13/2020    INR 3.00 (H) 10/22/2020     INR supratherapeutic   Recent Labs     11/19/20  1112   INR 4.80*     Patient interview completed and discussed with pharmacist by Flora Ahumada PharmD candidate    Supratherapeutic INR potentially due to acute illness, and drug interactions from Cipro and Doxycycline. Pt has received 7.5mg warfarin in the past 7 days. Plan:  Hold x 2 days, then decrease Coumadin 1mg daily. Recheck INR in 6 day(s). Rx for new tablet size sent to pharmacy. Patient reminded to call the Anticoagulation Clinic with any signs or symptoms of bleeding or with any medication changes. Patient given instructions utilizing the teach back method. Discharged in [facility] in no apparent distress. After visit summary printed and reviewed with patient. Medications reviewed and updated on home medication list Yes    Influenza vaccine:     [] given    [x] declined   [] received previously   [] plans to receive at a later time   [x] refused    [] documented in [address]: MED RECONCILIATION/REVIEW [address]: No  Total # of Interventions Recommended: 3  - Decreased Dose #: 1  - New Order #: 1 New Medication Order Reason(s): Needs Additional Medication Therapy  - Updated Order #: 1 Updated Order Reason(s):  Other  - Maintenance Safety Lab Monitoring #: 1  Total Interventions Accepted: 3  Time Spent (min): 20    Jackie Mcfarland, PharmD

## 2020-11-25 NOTE — PROGRESS NOTES
Medication Management [address]  [phone] (phone)  [phone] (fax)      Ms. Corinne Quintana is a 81 y.o.  female with history of Afib, coagulopathy who presents today for anticoagulation monitoring and adjustment. Daughter verifies current dosing regimen and tablet strength. No missed or extra doses. Patient denies s/s bleeding/bruising/swelling/SOB/chest pain  No blood in urine or stool. No dietary changes. No changes in medication/OTC agents/Herbals. Patient is continuing to take doxycycline and cipro until podiatrist appointment 12/3/20. No change in alcohol use or tobacco use. No change in activity level. Patient denies headaches/dizziness/lightheadedness/falls. No vomiting/diarrhea or acute illness. No Procedures scheduled in the future at this time. Patient interview completed and discussed with pharmacist by Garrett CamD candidate    Assessment:   Lab Results   Component Value Date    INR 3.40 (H) 11/25/2020    INR 4.80 (H) 11/19/2020    INR 3.20 (H) 11/13/2020     INR supratherapeutic   Recent Labs     11/25/20  1114   INR 3.40*     Patient presents with third consecutive supratherapeutic INR despite recent dose adjustments. Interactions with antibiotics are likely contributing. Plan:  HOLD Coumadin x 1 dose today 11/25/20 then decrease Coumadin from 1 mg daily to Coumadin 0.5 mg MF and 1 mg TuWThSaSu (14.3% decrease). Recheck INR in 1 week(s). Patient reminded to call the Anticoagulation Clinic with signs or symptoms of bleeding or with any medication changes. Patient given instructions utilizing the teach back method. Discharged via wheelchair in no apparent distress. After visit summary printed and reviewed with patient.       Medications reviewed and updated on home medication list Yes    Influenza vaccine:     [] given    [x] declined   [] received previously   [] plans to receive at a later time   [x] refused    [] documented in [address]: [address] [facility] [address] Tracking Only    PHSO: No  Total # of Interventions Recommended: 1  - Decreased Dose #: 1  - Maintenance Safety Lab Monitoring #: 1  Total Interventions Accepted: 1  Time Spent (min): 6079  [facility], [place], BCPS  11/25/2020  12:03 PM

## 2020-12-04 NOTE — PROGRESS NOTES
Medication Management [address]  [phone] (phone)  [phone] (fax)      Ms. Daniel Voss is a 81 y.o.  female with history of Afib who presents today for anticoagulation monitoring and adjustment. Patient verifies current dosing regimen and tablet strength. No missed or extra doses. Patient denies s/s bleeding/bruising/swelling/SOB/chest pain  No blood in urine or stool. No dietary changes. Prescribed Bactrim DS BID and Fluconazole 100mg daily, has not started yet, daugher is not sure of treatment duration. Finished doxycyline and cipro. No change in alcohol use or tobacco use. No change in activity level. Patient denies headaches/dizziness/lightheadedness/falls. No vomiting/diarrhea or acute illness. Eye appointment next week for injection. Assessment:   Lab Results   Component Value Date    INR 3.30 (H) 12/04/2020    INR 3.40 (H) 11/25/2020    INR 4.80 (H) 11/19/2020     INR supratherapeutic   Recent Labs     12/04/20  1119   INR 3.30*     INR remains supratherapeutic despite recent dose decreases. Interactions with antibiotics are likely contributing. Plan:  Hold today, then empirically decrease Coumadin 0.5mg daily while on antibiotics due to expected drug interaction. Recheck INR in 5 day(s). Patient reminded to call the Anticoagulation Clinic with signs or symptoms of bleeding or with any medication changes. Patient given instructions utilizing the teach back method. Discharged ambulatory in no apparent distress. After visit summary printed and reviewed with patient.       Medications reviewed and updated on home medication list Yes    Influenza vaccine:     [] given    [x] declined   [] received previously   [] plans to receive at a later time   [x] refused    [x] documented in [address]:  [place]: No  Total # of Interventions Recommended: 1  - Decreased Dose #: 1  - Maintenance Safety Lab Monitoring #: 1  Total Interventions Accepted: 1  Time Spent (min): 20    Tutu RamosD

## 2020-12-08 NOTE — TELEPHONE ENCOUNTER
----- Message from [employer] sent at 12/8/2020  1:58 PM EST -----  Contact: [phone]  Kenzie's daughter Kaleigh Whitten called to let us know that Dr Sam Kearns took Ivan Gloss off of the diflucan and she is only taking the Bactrim DS. She has an appt here in the clinic tomorrow 12/9/2020. Kaleigh Whitten wanted to know if she should still give Ivan Gloss only 1/2 tablet tonight. Please call Kaleigh Whitten to let her know.

## 2020-12-08 NOTE — TELEPHONE ENCOUNTER
Called pt's daughter, [address]. Instructed her to have pt continue with Coumadin dosing instructions as given at last appt and keep appt scheduled for tomorrow.

## 2020-12-09 NOTE — PROGRESS NOTES
declined   [] received previously   [] plans to receive at a later time   [x] refused    [] documented in [address]: No  Total # of Interventions Recommended: 1  - Maintenance Safety Lab Monitoring #: 1  Total Interventions Accepted: 1  Time Spent (min): [address], PharmD, BCPS  12/9/2020  10:50 AM

## 2020-12-15 NOTE — TELEPHONE ENCOUNTER
Called daughter, states that pt had side effects of Bactrim of itching, mood changes, felt shaky. Explained to daughter that this will be added as an intolerance. Gave instructions for pt to continue current plan of Coumadin 0.5mg daily, appt on 12/18.

## 2020-12-15 NOTE — TELEPHONE ENCOUNTER
----- Message from [employer] sent at 12/15/2020  1:08 PM EST -----  Contact: [phone]  [facility] daughter Rina Delcid called to let us know that the Dr stopped the Bactrim DS for [facility] due to side effects. Wants to know what dose of coumadin to take.   Please call Rina Delcid

## 2020-12-17 PROBLEM — N17.9 AKI (ACUTE KIDNEY INJURY) (HCC): Status: ACTIVE | Noted: 2020-01-01

## 2020-12-17 NOTE — PLAN OF CARE
Problem: DISCHARGE BARRIERS  Goal: Patient's continuum of care needs are met  Outcome: Ongoing  Note: Patient discharge home with daughter and agreeable to [facility]. See  notes 12/17/2020.

## 2020-12-17 NOTE — TELEPHONE ENCOUNTER
Patient's daughter called to let us know that Boom Fuentes is in the hospital.  Admitted 12/17/2020 to [facility].

## 2020-12-17 NOTE — CONSULTS
Podiatric Surgery Consult    Reason for Consult:  Painful swollen legs  Requesting Physician:  Raisa Ramey PA-C    CHIEF COMPLAINT:  Painful legs    HISTORY OF PRESENT ILLNESS:                The patient is a 81 y.o. female with significant past medical history of PAD and neuropathy who being seen at bedside with complaints of swollen legs that are painful. Patient follows outpatient with Dr. Sam Kearns for this condition. Patient is currently obtunded, and history taken from patient's daughter. Patient was taken by her daughter to the ED last night as the patient had stopped walking and was having trouble speaking. Per ED, there is no current evidence of a stroke, however, the patient was found to have AVELINO with multiple electrolyte abnormalities. This was the reason for the patient's admission. Per patient's daughter, patient recently was given a course of bactrim for a presumed infection in her legs. Other than that, her left leg is significantly more painful than her right. Patient has pain in her legs due whenever they are elevated for too long. Patient has had chills beginning last night. Patient does not have any recent history of nausea, vomiting, or fever. No other pedal complaints. Past Medical History:    History reviewed. No pertinent past medical history. Past Surgical History:    History reviewed. No pertinent surgical history.   Current Medications:    Current Facility-Administered Medications: sodium chloride flush 0.9 % injection 10 mL, 10 mL, Intravenous, 2 times per day  sodium chloride flush 0.9 % injection 10 mL, 10 mL, Intravenous, PRN  promethazine (PHENERGAN) tablet 12.5 mg, 12.5 mg, Oral, Q6H PRN **OR** ondansetron (ZOFRAN) injection 4 mg, 4 mg, Intravenous, Q6H PRN  acetaminophen (TYLENOL) tablet 650 mg, 650 mg, Oral, Q6H PRN **OR** acetaminophen (TYLENOL) suppository 650 mg, 650 mg, Rectal, Q6H PRN  0.9 % sodium chloride infusion, , Intravenous, Continuous fentaNYL (SUBLIMAZE) injection 50 mcg, 50 mcg, Intravenous, Once  HYDROcodone-acetaminophen (NORCO) 5-325 MG per tablet 1 tablet, 1 tablet, Oral, Q6H PRN  Allergies:  Patient has no allergy information on record. Social History:    TOBACCO:   has no history on file for tobacco.  ETOH:   has no history on file for alcohol. DRUGS:   has no history on file for drug. Family History:   History reviewed. No pertinent family history. REVIEW OF SYSTEMS:    Pertinent ROS in HPI  PHYSICAL EXAM:      Vitals:    BP (!) 117/58   Pulse 63   Temp 97.4 °F (36.3 °C) (Axillary)   Resp 16   SpO2 94%     Exam:   Patient is not alert and oriented at time of interview. Patient is mumbling incoherently. Vascular: Dorsalis pedis and posterior tibial pulses are barely palpable bilaterally. Skin temperature is warm to warm from proximal tibial tuberosity to distal digits. CFT <3 seconds to exposed digits. Edema present bilaterally in the dorsal foot and in the leg. Hair growth negative. Quality of skin atrophic    Dermatologic:  No open lesions, rashes or subcutaneous nodules of note. Patient has red coloration of her feet and legs secondary to a long standing venous stasis dermatitis. Neurovascular: Unable to assess as patient obtunded. Musculoskeletal: Muscle strength testing deferred. Pain on palpation of the bilateral legs, left much more painful than right. No gross osseous abnormality noted. LABS:    Recent Labs     12/17/20  0052   WBC 6.1   HGB 13.4   HCT 42.6           Recent Labs     12/17/20  0955      K 5.2      CO2 22*   BUN 67*   CREATININE 3.2*        Recent Labs     12/17/20  0052 12/17/20  0955   PROT 6.7 5.4*   INR 2.44*  --    APTT 46.1*  --       No results for input(s): CKTOTAL, CKMB, CKMBINDEX, TROPONINI in the last 72 hours.           Assessment  Principle  1) Venous stasis dermatitis, bilateral    Plan  - Patient initially examined and evaluated - Clinically legs appear uninfected  - Discussed with daughter than patient has no wounds on her legs right now  - Increased swelling likely due to AVELINO and CHF  - Swelling control is of utmost importance for prevention of venous leg ulcerations  - Lightly applied 4-inch ACE bandage applied bilaterally  - Elevate legs as much as is comfortable for patient  - WBAT bilateral  - Discussed possibility of arterial dopplers, patient's daughter would not like patient to undergo testing that is too painful for her  - Podiatry will continue to follow patient while in house    DISPO: Patient is okay for discharge per podiatry    Lita De Jesus, thank you for the consultation, allowing podiatry to assist in the medical welfare of this patient. Podiatry will continue to follow this patient throughout the duration of hospitalization.    Isidro Warner DPM  12/17/2020 11:46 AM

## 2020-12-17 NOTE — PROGRESS NOTES
Patient has not voided since prior to 0500. Update ROYA Astudillo. Bladder scan ordered. This RN into the room to update the patient's daughter. Pt voided at this time. Large amount of urine on pad and 100 ml into external catheter canister.

## 2020-12-17 NOTE — ED NOTES
Patient resting in bed. Respirations easy and unlabored. No distress noted. Lotion applied to patients back. Call light within reach.        Beba Mcfarland RN  12/17/20 7625

## 2020-12-17 NOTE — PROGRESS NOTES
Patient's daughter does not know patient's home meds without having a list to look at, states the list got lost with EMS. Stated the coumadin clinic should have the list. Coumadin clinic called and message left. Received call from [facility] in the coumadin clinic. She stated the patient's meds in her chart are correct and were last reviewed on 12/09/2020. Faxed med list to this RN. Spoke with pharmacist, Nicole Barrera. She stated she would review the medications with the patient's daughter and place them in the chart.

## 2020-12-17 NOTE — ED NOTES
Patient resting in bed. Respirations easy and unlabored. No distress noted. Call light within reach.        Bettye Bhakta RN  12/17/20 9890

## 2020-12-17 NOTE — CONSULTS
[facility]          [facility]        [address]        [place] [id number]           Inpatient Initial consult note         12/17/2020 11:26 AM    Patient Name:   Jc Navarrete:    1935  Primary Care Physician:  Cecille Duverney, APRN - CNP     History Obtained From:  family member - daughter     Consultation requested by : LUZ Sims CNP    requested for  : Evaluation of  worsening renal function and hyperkalemia     History of presentingillness   Emily Connor is a 81 y.o.   female with Past Medical History as stated below presented with a chief complaint of Aphasia   on 12/17/2020 . Patient is confused so accurate history and review of systems cannot be obtained. Apparently as per the daughter patient presented with complaints of weakness, which has been progressively getting worse within the last 10 days, very severe to the point patient is not able to walk and unable to perform her ADLs. She has not been eating or drinking well. No other modifying factors. Patient has seen podiatry as an outpatient who has noted probably cellulitis and she was started on some Bactrim. As per the daughter patient also take some diuretic Lasix and also potassium supplements at home. Upon arrival to the ER patient was found to have acute kidney injury and hyperkalemia and so nephrology has been consulted for further evaluation and management     Past History    History reviewed. No pertinent past medical history. History reviewed. No pertinent surgical history.   Social History     Socioeconomic History    Marital status:      Spouse name: Not on file    Number of children: Not on file    Years of education: Not on file    Highest education level: Not on file   Occupational History    Not on file   Social Needs    Financial resource strain: Not on file    Food insecurity     Worry: Not on file Left eye: No discharge. Conjunctiva/sclera: Conjunctivae normal.   Neck:      Musculoskeletal: Normal range of motion and neck supple. Thyroid: No thyromegaly. Vascular: No JVD. Cardiovascular:      Rate and Rhythm: Normal rate and regular rhythm. Heart sounds: Normal heart sounds. No murmur. Pulmonary:      Effort: Pulmonary effort is normal. No respiratory distress. Breath sounds: Normal breath sounds. No stridor. No wheezing or rales. Comments: Diminished breath sounds bilaterally  Chest:      Chest wall: No tenderness. Abdominal:      General: Bowel sounds are normal. There is no distension. Palpations: Abdomen is soft. Tenderness: There is no abdominal tenderness. Musculoskeletal:         General: No tenderness. Right lower leg: Edema present. Left lower leg: Edema present. Skin:     General: Skin is warm and dry. Findings: Erythema and rash present. Comments: Both legs and Ace wraps   Neurological:      General: No focal deficit present. Mental Status: She is alert and oriented to person, place, and time. Psychiatric:         Mood and Affect: Mood normal.         Behavior: Behavior normal.           Vitals:    12/17/20 1058   BP: (!) 117/58   Pulse: 63   Resp: 16   Temp: 97.4 °F (36.3 °C)   SpO2: 94%     Labs, Radiology and Tests       Recent Labs     12/17/20  0052   WBC 6.1   RBC 4.26   HGB 13.4   HCT 42.6   .0*   MCH 31.5   MCHC 31.5*        Recent Labs     12/17/20  0052 12/17/20  0955    142   K 6.0* 5.2    108   CO2 20* 22*   BUN 73* 67*   CREATININE 3.5* 3.2*   CALCIUM 9.3 8.7   PROT 6.7 5.4*   LABALBU 2.8* 2.9*   BILITOT 0.2* 0.2*   ALKPHOS 71 65   AST 15 14   ALT 8* 7*       Radiology : Chest x-ray-reviewed by me shows slight prominence of pulmonary vasculature but no overt congestion.   No significant consolidation Other : All lab data have been reviewed and noted patient's baseline creatinine in May 2020 is around 1.2. Assessment    1 Renal -acute kidney injury on chronic kidney disease stage III most likely secondary to multifactorial etiology which include use of diuretics, poor oral intake and also primarily due to the use of Bactrim  ? Currently on IV fluids will continue for now creatinine slightly better we will closely monitor BMP  ? If not much improvement in renal function might screen for AIN. 2 Electrolytes -hyperkalemia secondary to above. Improving  3 Essential hypertension running well continue current medications cellulitis lower extremities seen by podiatry. Ace wraps were done  4 Meds reviewed and discussed with podiatry, patient's daughter in detail      **This report has been created using voice recognition software. It maycontain minor  errors which are inherent in voice recognition technology. **    Charan Farias M.D  [facility].

## 2020-12-17 NOTE — ED NOTES
ED to inpatient nurses report    Chief Complaint   Patient presents with    Aphasia      Present to ED from home  LOC: alert and orientated to name, place, date  Vital signs   Vitals:    12/17/20 0121 12/17/20 0305 12/17/20 0330 12/17/20 0405   BP: (!) 118/51 102/82 (!) 107/44 (!) 114/42   Pulse: 65 73 77 81   Resp: 14 16 17 22   Temp:       SpO2: 97% 100% 100% 98%      Oxygen Baseline 0L    Current needs required 0L Bipap/Cpap No  LDAs: 20 gauge left Forearm  Peripheral IV 12/17/20 Antecubital (Active)     Mobility: Requires assistance * 2  Pending ED orders: none  Present condition: stable   Note: daughter with patient. Patient blood pressure can jump around, especially because patient will try to take off cuff when pumping up. Patient does pick at IV sites as well. If patient is distraught, a back scratch and some lotion will help settle her down.      Electronically signed by John Field RN on 12/17/2020 at 4:24 AM       John Field RN  12/17/20 3460

## 2020-12-17 NOTE — ED TRIAGE NOTES
Patient presents to the ED with chief complaint of Slurred speech. Patient arrives by EMS. Patient daughter at bedside at this time. Daughter states that patient has had slight slurred speech while at home. Upon arrival, speech does not seem slurred or any deficits apart from patient's norm. Patient's lower legs seeping excess fluid, but patient is taking an antibiotic for it. Patient AOx4. Respirations unlabored and regular at this time.

## 2020-12-17 NOTE — ED NOTES
Pt is transported to [facility] without incident. Floor nurse was contacted prior to departure.

## 2020-12-17 NOTE — ED PROVIDER NOTES
[facility]  eMERGENCY dEPARTMENT eNCOUnter          CHIEF COMPLAINT       Chief Complaint   Patient presents with    Aphasia       Nurses Notes reviewed and I agree except as noted in the HPI.       HISTORY OF PRESENT ILLNESS Corey Carranza is a 81 y.o. female who presents distally for the complaint of slurred speech however this is not true, cording to daughter at bedside patient has been declining over the last week and a half. Daughter says that there was no real slurred speech. States that mother was ambulatory a week ago and is now not ambulatory. She is under the care of podiatry for lower extremity wounds. She has extensive venous stasis dermatitis. She has congestive heart failure. She is not wrapping her legs. As this appears to be too painful for her. She is having active weeping from her lower extremities. She has exfoliation of her skin down there there are no open wounds. Patient has been on Bactrim for quite some time for possible infection of the lower extremities, they were told by the treating physician that this is probably a medication reaction. At bedside the patient is able to converse with me. She has generalized body aches and pains all over. She feels very weak. She is no longer willing to stand on her feet. She has a lot of pain in the left heel. She has a gait belt on. Patient is not complaining of any chest pain or overt shortness of breath. She denies any fevers chills or sweats. I asked the daughter at bedside explicitly was there any loss of function of 1 side of her body to which she reported no. Patient is able to move her arms. It is obvious that her legs are full of fluid and she does not readily want to move them. She does have a significant amount of pain on that left heel to touch. She also has significant amount of pain to the left posterior calf to touch. The right leg appears to be swollen more than the left. However the patient is on Coumadin, and the daughter insists at bedside that the patient is always like this and there is no significant change.   Currently the patient is resting comfortably on the cot she does have pain with movement, no pain at rest.  Generalized body aches at rest.  Patient was called into us as a possible slurred speech immediate CT of the head was performed. NIH was performed at bedside, any weakness or sensory loss according to patient is chronic in nature but worsening. REVIEW OF SYSTEMS     Review of Systems   Constitutional: Positive for diaphoresis and fatigue. Negative for activity change, appetite change, fever and unexpected weight change. HENT: Negative for congestion, ear discharge, ear pain, hearing loss, rhinorrhea, sinus pressure, sore throat, trouble swallowing and voice change. Eyes: Negative for photophobia, pain, discharge, redness and itching. Respiratory: Positive for shortness of breath. Negative for cough, choking, chest tightness and wheezing. Cardiovascular: Positive for leg swelling. Negative for chest pain and palpitations. Gastrointestinal: Negative for abdominal distention, abdominal pain, blood in stool, constipation, diarrhea, nausea and vomiting. Endocrine: Negative for polydipsia, polyphagia and polyuria. Genitourinary: Negative for decreased urine volume, difficulty urinating, dysuria, enuresis, frequency, hematuria and urgency. Musculoskeletal: Positive for arthralgias, gait problem and myalgias. Negative for back pain, neck pain and neck stiffness. Skin: Negative for pallor and rash. Allergic/Immunologic: Negative for immunocompromised state. Neurological: Negative for dizziness, tremors, seizures, syncope, facial asymmetry, weakness, light-headedness, numbness and headaches. Hematological: Negative for adenopathy. Does not bruise/bleed easily. Psychiatric/Behavioral: Negative for agitation, hallucinations and suicidal ideas. The patient is not nervous/anxious. PAST MEDICAL HISTORY    has no past medical history on file. SURGICAL HISTORY      has no past surgical history on file.     CURRENT MEDICATIONS       Previous Medications    No medications on file       ALLERGIES has no allergies on file. FAMILY HISTORY     has no family status information on file. family history is not on file. SOCIAL HISTORY          PHYSICAL EXAM     INITIAL VITALS:  temperature is 97 °F (36.1 °C). Her blood pressure is 118/51 (abnormal) and her pulse is 65. Her respiration is 14 and oxygen saturation is 97%. Physical Exam  Vitals signs and nursing note reviewed. Constitutional:       General: She is not in acute distress. Appearance: She is well-developed. She is ill-appearing. She is not toxic-appearing or diaphoretic. HENT:      Head: Normocephalic and atraumatic. Right Ear: External ear normal.      Left Ear: External ear normal.      Nose: Nose normal.      Mouth/Throat:      Mouth: Mucous membranes are moist.      Pharynx: Oropharynx is clear. No oropharyngeal exudate. Eyes:      General: No scleral icterus. Right eye: No discharge. Left eye: No discharge. Conjunctiva/sclera: Conjunctivae normal.      Pupils: Pupils are equal, round, and reactive to light. Neck:      Musculoskeletal: Normal range of motion and neck supple. Thyroid: No thyromegaly. Vascular: No JVD. Trachea: No tracheal deviation. Cardiovascular:      Rate and Rhythm: Normal rate and regular rhythm. Pulses:           Carotid pulses are 2+ on the right side and 2+ on the left side. Radial pulses are 2+ on the right side and 2+ on the left side. Femoral pulses are 2+ on the right side and 2+ on the left side. Dorsalis pedis pulses are 1+ on the right side and 1+ on the left side. Posterior tibial pulses are 1+ on the right side and 1+ on the left side. Heart sounds: S1 normal and S2 normal. Murmur present. Systolic murmur present with a grade of 2/6. No friction rub. No gallop.     Pulmonary:      Effort: Pulmonary effort is normal. Breath sounds: No stridor. Examination of the right-lower field reveals decreased breath sounds. Examination of the left-lower field reveals decreased breath sounds. Decreased breath sounds present. No wheezing, rhonchi or rales. Chest:      Chest wall: No lacerations, deformity, swelling, tenderness, crepitus or edema. Abdominal:      General: Bowel sounds are normal. There is no distension. Palpations: Abdomen is soft. There is no mass. Tenderness: There is no abdominal tenderness. There is no guarding or rebound. Hernia: No hernia is present. Musculoskeletal: Normal range of motion. General: No tenderness. Right lower le+ Pitting Edema present. Left lower le+ Pitting Edema present. Lymphadenopathy:      Cervical: No cervical adenopathy. Skin:     General: Skin is warm and dry. Findings: Rash present. No bruising, ecchymosis, lesion, petechiae or wound. Rash is scaling. Comments: Bilateral lower extremity edema with scaling of the skin, no open wounds or lesions, venous stasis dermatitis. Neurological:      Mental Status: She is alert and oriented to person, place, and time. Cranial Nerves: No cranial nerve deficit. Coordination: Coordination normal.      Deep Tendon Reflexes: Reflexes are normal and symmetric. Psychiatric:         Speech: Speech normal.         Behavior: Behavior normal.         Thought Content:  Thought content normal.         Judgment: Judgment normal.           DIFFERENTIAL DIAGNOSIS:   Differential diagnosis discussed extensively bedside with patient including but not limited to infection, CVA, electrolyte abnormality, COVID-19 pneumonia bronchitis, wound infection    DIAGNOSTIC RESULTS     EKG: All EKG's are interpreted by the Emergency Department Physician who either signs or Co-signs this chart in the absence of a cardiologist. EKG revealed a ventricularly paced rhythm with PVCs ventricular rate of 46 QRS duration of 64 QT interval 458 QTC of 400    RADIOLOGY: non-plain film images(s) such as CT, Ultrasound and MRI are read by the radiologist.  XR CHEST PORTABLE   Final Result   Impression:   1. Enlarged cardiac silhouette with prominence to the central pulmonary    vasculature concerning for central venous congestion. This document has been electronically signed by: Guadalupe Sprague MD on    12/17/2020 01:45 AM         CT HEAD WO CONTRAST (CODE STROKE)   Final Result   Impression:   1. No acute intracranial hemorrhage   2. Cerebral volume loss. This document has been electronically signed by: Guadalupe Sprague MD on    12/17/2020 12:54 AM      All CT scans at this facility use dose modulation, iterative    reconstruction, and/or weight-based   dosing when appropriate to reduce radiation dose to as low as reasonably    achievable.              LABS:   Labs Reviewed   CBC WITH AUTO DIFFERENTIAL - Abnormal; Notable for the following components:       Result Value    .0 (*)     MCHC 31.5 (*)     RDW-CV 15.1 (*)     RDW-SD 56.2 (*)     Eosinophils Absolute 0.6 (*)     All other components within normal limits   BRAIN NATRIURETIC PEPTIDE - Abnormal; Notable for the following components:    Pro-BNP 5790.0 (*)     All other components within normal limits   BASIC METABOLIC PANEL - Abnormal; Notable for the following components:    Potassium 6.0 (*)     CO2 20 (*)     BUN 73 (*)     CREATININE 3.5 (*)     All other components within normal limits   HEPATIC FUNCTION PANEL - Abnormal; Notable for the following components:    Alb 2.8 (*)     Total Bilirubin 0.2 (*)     ALT 8 (*)     All other components within normal limits   MAGNESIUM - Abnormal; Notable for the following components:    Magnesium 3.1 (*)     All other components within normal limits   TSH WITHOUT REFLEX - Abnormal; Notable for the following components: TSH 7.570 (*)     All other components within normal limits   PROTIME-INR - Abnormal; Notable for the following components:    INR 2.44 (*)     All other components within normal limits   APTT - Abnormal; Notable for the following components:    aPTT 46.1 (*)     All other components within normal limits   GLOMERULAR FILTRATION RATE, ESTIMATED - Abnormal; Notable for the following components:    Est, Glom Filt Rate 12 (*)     All other components within normal limits   URINE WITH REFLEXED MICRO - Abnormal; Notable for the following components:    Protein, UA TRACE (*)     All other components within normal limits   RAPID INFLUENZA A/B ANTIGENS   LIPASE   TROPONIN   ETHANOL   URINE DRUG SCREEN   LACTATE, SEPSIS   ANION GAP   OSMOLALITY   LACTATE, SEPSIS   POCT GLUCOSE       EMERGENCY DEPARTMENT COURSE:   Vitals:    Vitals:    12/17/20 0040 12/17/20 0041 12/17/20 0121   BP: (!) 122/50  (!) 118/51   Pulse: 64  65   Resp: 17  14   Temp:  97 °F (36.1 °C)    SpO2: 98%  97% Patient was assessed at bedside appropriate labs and imaging were ordered. I do not feel that this is a stroke. She appears neurologically intact. She is just suffering from body aches and generalized fatigue. Patient is on Bactrim and a diuretic. There may be some renal involvement here. I reviewed all labs and imaging. She does not have a urinary tract infection however her drug screen did come back positive for marijuana. Her hemoglobin hematocrit appear within normal limits she does not have a white blood cell count platelet count was normal her proBNP was 5790 which is reflective of her chronic congestive heart failure. Her electrolytes appear to be affected. Her potassium is 6 her BUN is 73 and her creatinine is 3.5. This may be because of dehydration as well as the effects of being on Bactrim and a diuretic. Patient was given calcium sodium and glucose and Kayexalate. Her glucose initially was only 75. Patient is on Coumadin her INR is 2.44. CT of the head showed no acute intracranial hemorrhage and cerebral volume loss. Chest x-ray showed cardiac pacemaker with what appears to be congestive heart failure. EKG did not reflect any acute changes, and her troponin was less than 0.010. This point I believe that she has multifactorial problems including dehydration with the combination of Bactrim and a diuretic. She has hyperkalemia. She has congestive heart failure. At this point I called the hospitalist group discussed the case with them. They graciously accepted the admission. Patient is admitted in stable but guarded condition for further evaluation and treatment. CRITICAL CARE:   None    CONSULTS:  Hospitalist    PROCEDURES:  None    FINAL IMPRESSION      1. Acute renal failure, unspecified acute renal failure type ([address]    2. Dehydration    3. Hyperkalemia    4.  Acute systolic congestive heart failure ([address]          DISPOSITION/PLAN   Admission    PATIENT REFERRED TO: No follow-up provider specified.     DISCHARGE MEDICATIONS:  New Prescriptions    No medications on file       (Please note that portions of this note were completed with a voice recognition program.  Efforts were made to edit the dictations but occasionally words are mis-transcribed.)    Kaleigh Arredondo, [address] [place] [address],   12/17/20 7439

## 2020-12-17 NOTE — H&P
 Drug use: Not on file    Sexual activity: Not on file   Lifestyle    Physical activity     Days per week: Not on file     Minutes per session: Not on file    Stress: Not on file   Relationships    Social connections     Talks on phone: Not on file     Gets together: Not on file     Attends Oriental orthodox service: Not on file     Active member of club or organization: Not on file     Attends meetings of clubs or organizations: Not on file     Relationship status: Not on file    Intimate partner violence     Fear of current or ex partner: Not on file     Emotionally abused: Not on file     Physically abused: Not on file     Forced sexual activity: Not on file   Other Topics Concern    Not on file   Social History Narrative    Not on file     FHX: History reviewed. No pertinent family history.   Allergies: Not on File  Medications:       dextrose  25 g Intravenous Once    calcium gluconate IVPB  1 g Intravenous Once    sodium bicarbonate  50 mEq Intravenous Once    sodium polystyrene  15 g Oral Once          Labs:   Recent Results (from the past 24 hour(s))   POCT Glucose    Collection Time: 12/17/20 12:31 AM   Result Value Ref Range    POC Glucose 77 70 - 108 mg/dl   EKG Altered Mental Status    Collection Time: 12/17/20 12:35 AM   Result Value Ref Range    Ventricular Rate 46 BPM    Atrial Rate 29 BPM    QRS Duration 64 ms    Q-T Interval 458 ms    QTc Calculation (Bazett) 400 ms    R Axis 15 degrees    T Axis -70 degrees   Rapid influenza A/B antigens    Collection Time: 12/17/20 12:50 AM    Specimen: Nasopharyngeal   Result Value Ref Range    Flu A Antigen Negative NEGATIVE    Flu B Antigen Negative NEGATIVE   CBC auto differential    Collection Time: 12/17/20 12:52 AM   Result Value Ref Range    WBC 6.1 4.8 - 10.8 thou/mm3    RBC 4.26 4.20 - 5.40 mill/mm3    Hemoglobin 13.4 12.0 - 16.0 gm/dl    Hematocrit 42.6 37.0 - 47.0 %    .0 (H) 81.0 - 99.0 fL    MCH 31.5 26.0 - 33.0 pg Protime-INR    Collection Time: 12/17/20 12:52 AM   Result Value Ref Range    INR 2.44 (H) 0.85 - 1.13   APTT    Collection Time: 12/17/20 12:52 AM   Result Value Ref Range    aPTT 46.1 (H) 22.0 - 38.0 seconds   Lactate, Sepsis    Collection Time: 12/17/20 12:52 AM   Result Value Ref Range    Lactic Acid, Sepsis 1.6 0.5 - 1.9 mmol/L   Anion Gap    Collection Time: 12/17/20 12:52 AM   Result Value Ref Range    Anion Gap 12.0 8.0 - 16.0 meq/L   Glomerular Filtration Rate, Estimated    Collection Time: 12/17/20 12:52 AM   Result Value Ref Range    Est, Glom Filt Rate 12 (A) ml/min/1.73m2   Osmolality    Collection Time: 12/17/20 12:52 AM   Result Value Ref Range    Osmolality Calc 290.3 275.0 - 300.0 mOsmol/kg   Urine Drug Screen    Collection Time: 12/17/20  1:15 AM   Result Value Ref Range    AMPHETAMINE+METHAMPHETAMINE URINE SCREEN Negative NEGATIVE    Barbiturate Quant, Ur Negative NEGATIVE    Benzodiazepine Quant, Ur Negative NEGATIVE    Cannabinoid Quant, Ur POSITIVE NEGATIVE    Cocaine Metab Quant, Ur Negative NEGATIVE    Opiates, Urine POSITIVE NEGATIVE    Oxycodone Negative NEGATIVE    PCP Quant, Ur Negative NEGATIVE   Urine with Reflexed Micro    Collection Time: 12/17/20  1:15 AM   Result Value Ref Range    Glucose, Ur NEGATIVE NEGATIVE mg/dl    Bilirubin Urine NEGATIVE NEGATIVE    Ketones, Urine NEGATIVE NEGATIVE    Specific Gravity, Urine 1.018 1.002 - 1.030    Blood, Urine NEGATIVE NEGATIVE    pH, UA 5.0 5.0 - 9.0    Protein, UA TRACE (A) NEGATIVE    Urobilinogen, Urine 0.2 0.0 - 1.0 eu/dl    Nitrite, Urine NEGATIVE NEGATIVE    Leukocyte Esterase, Urine NEGATIVE NEGATIVE    Color, UA YELLOW STRAW-YELLOW    Character, Urine CLEAR CLEAR-SL CLOUD    RBC, UA 0-2 0-2/hpf /hpf    WBC, UA 2-4 0-4/hpf /hpf    Epithelial Cells, UA 0-2 3-5/hpf /hpf    Bacteria, UA NONE SEEN FEW/NONE SEEN /hpf    Casts UA 8-15 HYALINE NONE SEEN /lpf    Crystals, UA NONE SEEN NONE SEEN    Renal Epithelial, UA NONE SEEN NONE SEEN Yeast, UA NONE SEEN NONE SEEN    CASTS 2 NONE SEEN NONE SEEN /lpf    MISCELLANEOUS 2 NONE SEEN          Vital Signs: T: 97F  P: 81 RR: 22 B/P: 114/42: FiO2: RA: O2 Sat:98%: I/O: No intake or output data in the 24 hours ending 12/17/20 0339      General:   Elderly appearing, ill appearing  HEENT:  normocephalic and atraumatic. No scleral icterus. PEARLA, mucous membranes moist  Neck: supple. Trachea midline. No JVD. Full ROM, no meningismus. Lungs: clear to auscultation. No retractions, no accessory muscle use. Cardiac: RRR, no murmur, 2+ pulses  Abdomen: soft. Nontender. Bowel sounds active  Extremities:  No clubbing, cyanosis x 4, 3+ edema    Vasculature: capillary refill < 3 seconds. Skin:  warm and dry. Open wounds on the lower extremities  Psych:  Alert and oriented x3. Affect appropriate  Lymph:  No supraclavicular adenopathy. Neurologic:  CN II-XII grossly intact. No focal deficit. Data: (All radiographs, tracings, PFTs, and imaging are personally viewed and interpreted unless otherwise noted).    ? EKG: rhythm: normal pacemaker rhythm, rate=46 bpm, pr=. ms, qrs=64 ms, ew=357 ms, axis=15 degrees        Electronically signed by  Tommy Narvaez PA-C

## 2020-12-17 NOTE — ED NOTES
NIH of 2 at this time, but could be contributed to patients normal deficit.       Marika Ulrich RN  12/17/20 [id number]

## 2020-12-17 NOTE — CARE COORDINATION
DISASTER CHARTING    12/17/20, 8:21 AM EST    DISCHARGE ONGOING EVALUATION:     [facility] day: 0  Location: -16/016-A Reason for admit: AVELINO (acute kidney injury) ([facility] [address] [N17.9]   Barriers to Discharge:  K+ 6, creat 3.5, BNP 5790. IVF, pain control. Kayexelate given. Podiatry consult. PCP: LUZ Hoyt - CNP  Patient Goals/Plan/Treatment Preferences: From home. SW consulted.

## 2020-12-18 NOTE — PROGRESS NOTES
[facility]         Renal Inpatient Follow-Up note         12/18/2020 8:01 AM    Pt Name:   Nurys Cross  YOB: 1935  Attending:   LUZ Hernandez CNP    Chief Complaint : Nurys Cross is a 81 y.o. female being followed by nephrology for Yao and hyperkalemia    Interval History :   Patient seen and examined by me. No distress  Resting comfortably. No cp or SOB. Daughter at bed-side. Scheduled Medications :    isosorbide mononitrate  60 mg Oral Daily    metoprolol succinate  50 mg Oral Daily    lisinopril  5 mg Oral Daily    sodium chloride flush  10 mL Intravenous 2 times per day    fentanNYL  50 mcg Intravenous Once    aspirin  81 mg Oral Daily    furosemide  20 mg Oral Daily    melatonin  19.5 mg Oral Nightly    polyethylene glycol  17 g Oral Nightly    warfarin (COUMADIN) daily dosing (placeholder)   Other RX Placeholder      sodium chloride 75 mL/hr at 12/18/20 0258       Vitals :  /86   Pulse 95   Temp 98.9 °F (37.2 °C) (Oral)   Resp 16   Wt 188 lb 9.6 oz (85.5 kg)   SpO2 94%     24HR INTAKE/OUTPUT:      Intake/Output Summary (Last 24 hours) at 12/18/2020 0801  Last data filed at 12/18/2020 0300  Gross per 24 hour   Intake 1709.18 ml   Output    Net 1709.18 ml     Last 3 weights  Wt Readings from Last 3 Encounters:   12/18/20 188 lb 9.6 oz (85.5 kg)           Physical Exam :  General Appearance:  Cachectic and ill nourished .    Mouth/Throat:  Oral mucosa moist  Neck:  Supple, no JVD  Lungs:  Breath sounds: clear  Heart[de-identified]  S1,S2 heard  Abdomen:  Soft, non - tender  Musculoskeletal:  Edema - none         Last 3 CBC   Recent Labs     12/17/20  0052   WBC 6.1   RBC 4.26   HGB 13.4   HCT 42.6        Last 3 CMP  Recent Labs     12/17/20  0052 12/17/20  0955 12/17/20 1958    142 140   K 6.0* 5.2 5.1    108 107   CO2 20* 22* 21*   BUN 73* 67* 56*   CREATININE 3.5* 3.2* 2.6*   CALCIUM 9.3 8.7 8.7   LABALBU 2.8* 2.9* 2.7* BILITOT 0.2* 0.2* 0.3             Assessment    1. Renal -acute kidney injury on chronic kidney disease stage III most likely secondary to multifactorial etiology which include use of diuretics, poor oral intake and also primarily due to the use of Bactrim  ? Currently on IV fluids, creat is getting better. Continue IVF  ? BMP in AM. Stop lisinopril and lasix due to AVELINO for now     2. Electrolytes -hyperkalemia secondary to above. Improving. Hold lisinopril  3. Essential hypertension running well continue current medications   4. cellulitis lower extremities seen by podiatry. Ace wraps were done  5. Meds reviewed and discussed with patients daughter    CRISTINE Raymond D.  [facility].

## 2020-12-18 NOTE — PROGRESS NOTES
[address] [address]  PHYSICAL THERAPY MISSED TREATMENT NOTE  ACUTE CARE  [facility] RENAL TELEMETRY 6K          Missed Treatment:  Attempted PT eval, however pt asleep upon arrival and difficul to wake. Pt daughter refused PT evaluation, stating pt is not herself, is very weak, and needs more time. Education provided on the importance of mobility during admission. Will check back as able.

## 2020-12-18 NOTE — PROGRESS NOTES
[facility]  OCCUPATIONAL THERAPY MISSED TREATMENT NOTE  [facility] RENAL TELEMETRY 6K  6K-16/016-A      Date: 2020  Patient Name: Emily Connor        CSN: [id number]   : 1935  (81 y.o.)  Gender: female                REASON FOR MISSED TREATMENT: Pt sleeping in bed upon arrival with daughter at bedside. Pt's daughter refused therapy stating that she has had a rought few days and is just too weak for it right now and would like to hold on therapy. Daughter educated on importance of increasing activity soon.

## 2020-12-18 NOTE — PROGRESS NOTES
Clinical Pharmacy Note    Warfarin consult follow-up    Recent Labs     12/18/20  1208   INR 4.18*     Recent Labs     12/17/20  0052 12/18/20  1208   HGB 13.4 13.9   HCT 42.6 43.8    240       Significant Drug-Drug Interactions:  New warfarin drug-drug interactions: none  Discontinued drug-drug interactions: none  Current warfarin drug-drug interactions: aspirin        Date INR Warfarin Dose   12/17/20 2.44 0.5 mg   12/18/2020  4.18   No Coumadin                                               Notes:                     Daily PT/INR until stable within therapeutic range.      Regina Marcelo PharmD, BCPS  12/18/2020  2:52 PM

## 2020-12-18 NOTE — PROGRESS NOTES
Clinical Pharmacy Note    Merl Buerger is a 81 y.o. female for whom pharmacy has been asked to manage warfarin therapy. Reason for Admission: AVELINO    Consulting Physician: Edi Gonzalez  Warfarin dose prior to admission: Coumadin 0.5 mg daily  Warfarin indication: afib  Target INR range: 2-3   Outpatient war[facility] provider: [facility]    History reviewed. No pertinent past medical history. Recent Labs     12/17/20  0052   INR 2.44*     Recent Labs     12/17/20 0052   HGB 13.4   HCT 42.6          Current warfarin drug-drug interactions: aspirin      Date INR Warfarin Dose   12/17/20 2.44 0.5 mg                                   Daily PT/INR until stable within therapeutic range. Thank you for the consult.

## 2020-12-18 NOTE — PLAN OF CARE
Patient was personally seen by myself. Plan of care reviewed. Nursing retrieving home medications to be reconciled. Patient remains disoriented, daughter at the bedside. Questions were answered and plan to reevaluate tomorrow morning.     Electronically signed by LUZ Sims CNP on 12/17/2020 at 10:09 PM

## 2020-12-19 NOTE — PROGRESS NOTES
Renal Progress Note  [facility]    Patient :  Elder Montes; 81 y.o. MRN# [id number]  Location:  [facility]16/016-A  Attending:  LUZ Grider CNP  Admit Date:  12/17/2020   Hospital Day: 2      Subjective:     Nephrology is following the patient for AVELINO. Patient seen and examined. Daughter at bedside. Patient doesn't offer much. Apparently just got some pain medication. Not eating much per daughter.     Outpatient Medications:     Medications Prior to Admission: aspirin 81 MG EC tablet, Take 81 mg by mouth daily  Cholecalciferol (VITAMIN D3) 1.25 MG (49884 UT) CAPS, Take 1 capsule by mouth once a week On tuesday  cyanocobalamin 1000 MCG/ML injection, Inject 1,000 mcg into the muscle every 30 days  D-MANNOSE PO, Take 1 capsule by mouth nightly  zinc oxide (PINXAV) 30 % OINT, Apply 1 each topically 4 times daily as needed (bedsores)  furosemide (LASIX) 20 MG tablet, Take 20 mg by mouth daily  isosorbide mononitrate (IMDUR) 60 MG extended release tablet, Take 60 mg by mouth daily  lisinopril (PRINIVIL;ZESTRIL) 5 MG tablet, Take 5 mg by mouth daily  Melatonin 10 MG TABS, Take 2 tablets by mouth nightly  metoprolol succinate (TOPROL XL) 50 MG extended release tablet, Take 50 mg by mouth daily  5-Methyltetrahydrofolate POWD, 800 mcg by Does not apply route every other day  NONFORMULARY, Phenytoin 10% topical cream - apply to affected areas BID  NONFORMULARY, CHF #14 multi mineral supplement Take one capsule in the AM and 3 capsules in PM for leg cramps  NONFORMULARY, Cataplex E2 (vitamin E27 mg/selenium 9 mcg/misc 470 mg) Take 2 tablets by mouth daily with dinner  polyethylene glycol (GLYCOLAX) 17 g packet, Take 17 g by mouth nightly  potassium chloride (MICRO-K) 10 MEQ extended release capsule, Take 10 mEq by mouth daily  NONFORMULARY, CBD/THC gummie (1:1) 1/4 pill as needed  warfarin (COUMADIN) 2.5 MG tablet, Take as directed by [facility] warfarin (COUMADIN) 1 MG tablet, Take as directed by [facility]  zinc gluconate 50 MG tablet, Take 50 mg by mouth daily  zinc oxide (DESITIN) 40 % ointment, Apply topically as needed for Dry Skin Apply topically as needed. B Complex-C (B COMPLEX-B12-C IJ), Inject as directed  HYDROcodone-acetaminophen (NORCO)  MG per tablet, Take 1-2 tablets by mouth. Every 4-6 hours as needed    Current Medications:     Scheduled Meds:    isosorbide mononitrate  60 mg Oral Daily    metoprolol succinate  50 mg Oral Daily    [Held by provider] lisinopril  5 mg Oral Daily    sodium chloride flush  10 mL Intravenous 2 times per day    fentanNYL  50 mcg Intravenous Once    aspirin  81 mg Oral Daily    [Held by provider] furosemide  20 mg Oral Daily    melatonin  19.5 mg Oral Nightly    polyethylene glycol  17 g Oral Nightly    warfarin (COUMADIN) daily dosing (placeholder)   Other RX Placeholder     Continuous Infusions:    sodium chloride       PRN Meds:  zinc oxide, sodium chloride flush, promethazine **OR** ondansetron, acetaminophen **OR** acetaminophen, HYDROcodone 5 mg - acetaminophen    Input/Output:       I/O last 3 completed shifts: In: 2407.4 [I.V.:2407.4]  Out: 600 [Urine:600].       Patient Vitals for the past 96 hrs (Last 3 readings):   Weight   20 0340 187 lb 11.2 oz (85.1 kg)   20 0254 188 lb 9.6 oz (85.5 kg)       Vital Signs:   Temperature:  Temp: 97.7 °F (36.5 °C)  TMax:   Temp (24hrs), Av.8 °F (36.6 °C), Min:97 °F (36.1 °C), Max:98.3 °F (36.8 °C)    Respirations:  Resp: 20  Pulse:   Pulse: 92  BP:    BP: (!) 162/91  BP Range: Systolic (23WAV), OKJ:991 , Min:128 , VJP:167       Diastolic (48LCZ), BFM:51, Min:60, Max:92      Physical Examination:     General:  Eyes closed, responds to name but not very conversive, frail  HEENT: NC/AT/ MMM  Chest:               Clear B/L no W/R/R  Cardiac:  S1 S2   Abdomen: Soft, non-tender,  Neuro:  No facial droop Urine Protein:   No components found for: TOTALPROTEIN, URINE   Urine Creatinine:   No results found for: LABCREA  Urine Eosinophils:  No components found for: UEOS        Impression and Plan:  1. AVELINO  : multifactorial from Bactrim, diuretics: overall improving  2. Hyperkalemia resolved  3. Hypernatremia/hyperchloremic metabolic acidosis: change IV fluids to half normal saline. Check venous blood ga in AM  4. LE cellulitis  5. HTN  6. Altered mental status        Please don't hesitate to call with any questions.   Electronically signed by Juventino Hernandes DO on 12/19/2020 at 3:11 PM

## 2020-12-19 NOTE — PROGRESS NOTES
Podiatric Progress Note  [facility]  Subjective :   12/19/20  Patient seen bedside today on behalf of Dr. Yefri Dixon. Pt was not well oriented and was very obtunded. Pt complains of pain to legs but not to exactly where. Unable to get ROS at this time. Patient has no further pedal concerns at this point in time. HISTORY OF PRESENT ILLNESS:                 The patient is a 81 y.o. female with significant past medical history of PAD and neuropathy who being seen at bedside with complaints of swollen legs that are painful. Patient follows outpatient with Dr. Yefri Dixon for this condition. Patient is currently obtunded, and history taken from patient's daughter. Patient was taken by her daughter to the ED last night as the patient had stopped walking and was having trouble speaking. Per ED, there is no current evidence of a stroke, however, the patient was found to have AVELINO with multiple electrolyte abnormalities. This was the reason for the patient's admission. Per patient's daughter, patient recently was given a course of bactrim for a presumed infection in her legs. Other than that, her left leg is significantly more painful than her right. Patient has pain in her legs due whenever they are elevated for too long. Patient has had chills beginning last night. Patient does not have any recent history of nausea, vomiting, or fever. No other pedal complaints.      Current Medications:    Current Facility-Administered Medications: phytonadione (VITAMIN K) tablet 5 mg, 5 mg, Oral, Once  isosorbide mononitrate (IMDUR) extended release tablet 60 mg, 60 mg, Oral, Daily  metoprolol succinate (TOPROL XL) extended release tablet 50 mg, 50 mg, Oral, Daily  [Held by provider] lisinopril (PRINIVIL;ZESTRIL) tablet 5 mg, 5 mg, Oral, Daily  zinc oxide (PINXAV) 30 % ointment, , Topical, 4x Daily PRN  sodium chloride flush 0.9 % injection 10 mL, 10 mL, Intravenous, 2 times per day sodium chloride flush 0.9 % injection 10 mL, 10 mL, Intravenous, PRN  promethazine (PHENERGAN) tablet 12.5 mg, 12.5 mg, Oral, Q6H PRN **OR** ondansetron (ZOFRAN) injection 4 mg, 4 mg, Intravenous, Q6H PRN  acetaminophen (TYLENOL) tablet 650 mg, 650 mg, Oral, Q6H PRN **OR** acetaminophen (TYLENOL) suppository 650 mg, 650 mg, Rectal, Q6H PRN  0.9 % sodium chloride infusion, , Intravenous, Continuous  fentaNYL (SUBLIMAZE) injection 50 mcg, 50 mcg, Intravenous, Once  HYDROcodone-acetaminophen (NORCO) 5-325 MG per tablet 1 tablet, 1 tablet, Oral, Q6H PRN  aspirin EC tablet 81 mg, 81 mg, Oral, Daily  [Held by provider] furosemide (LASIX) tablet 20 mg, 20 mg, Oral, Daily  melatonin tablet 19.5 mg, 19.5 mg, Oral, Nightly  polyethylene glycol (GLYCOLAX) packet 17 g, 17 g, Oral, Nightly  warfarin (COUMADIN) daily dosing (placeholder), , Other, RX Placeholder    Objective     BP (!) 144/92   Pulse 91   Temp 97 °F (36.1 °C) (Axillary)   Resp 18   Wt 187 lb 11.2 oz (85.1 kg)   SpO2 94%      I/O:    Intake/Output Summary (Last 24 hours) at 12/19/2020 0919  Last data filed at 12/19/2020 0340  Gross per 24 hour   Intake 1755.75 ml   Output 600 ml   Net 1155.75 ml              Wt Readings from Last 3 Encounters:   12/19/20 187 lb 11.2 oz (85.1 kg)       LABS:    Recent Labs     12/17/20  0052 12/18/20  1208   WBC 6.1 7.0   HGB 13.4 13.9   HCT 42.6 43.8    240        Recent Labs     12/19/20  0443      K 4.4  4.4   *   CO2 17*   BUN 40*   CREATININE 2.2*        Recent Labs     12/17/20  0052 12/17/20  0052 12/18/20  1208 12/18/20 2027 12/19/20  0443   PROT 6.7   < > 6.2 5.9* 6.0*   INR 2.44*  --  4.18*  --  7.17*   APTT 46.1*  --   --   --   --     < > = values in this interval not displayed. No results for input(s): CKTOTAL, CKMB, CKMBINDEX, TROPONINI in the last 72 hours.     Focused Lower Extremity Examination:    Vitals: BP (!) 144/92   Pulse 91   Temp 97 °F (36.1 °C) (Axillary)   Resp 18   Wt 187 lb 11.2 oz (85.1 kg)   SpO2 94%      Vascular: Dorsalis pedis and posterior tibial pulses are barely palpable bilaterally. Skin temperature is warm to warm from proximal tibial tuberosity to distal digits. CFT <3 seconds to exposed digits. Edema present bilaterally in the dorsal foot and in the leg. Hair growth negative. Quality of skin atrophic     Dermatologic:  No rashes or subcutaneous nodules of note. Patient has red coloration of her feet and legs secondary to a long standing venous stasis dermatitis. There is a fibrotic wound to the dorsum of her left foot. There is serous drainage present. No malodor noted. Wound is superficial. No prudencio wound erythema.     Neurovascular: Unable to assess as patient obtunded.     Musculoskeletal: Muscle strength testing deferred. Pain on palpation of the bilateral legs, left much more painful than right. No gross osseous abnormality noted. ASSESSMENT: Pt. is a 81 y.o. female with:  Principle  Venous stasis ulceration with dermatitis, bilateral    Chronic  Patient Active Problem List   Diagnosis    AVELINO (acute kidney injury) ([facility] [address]       PLAN:     - Patient initially examined and evaluated  - Clinically legs appear uninfected  - Pt will not tolerate dressing. Will attempt to have borderd foam placed on left foot wound.   - Increased swelling likely due to AVELINO and CHF  - Swelling control is of utmost importance for prevention of venous leg ulcerations  - Elevate legs as much as is comfortable for patient  - WBAT bilateral  - Discussed possibility of arterial dopplers, patient's daughter would not like patient to undergo testing that is too painful for her  - Podiatry will continue to follow patient while in house    DISPO: OK to d/c from podiatry standpoint      Urvashi Persaud DPM PGY-II  Podiatric Surgical Resident  12/19/2020   9:19 AM

## 2020-12-19 NOTE — PROGRESS NOTES
Hospitalist Progress Note      Patient:  Corey Carranza    Unit/Bed:6K-16/016-A  YOB: 1935  MRN: [id number]   Acct: [de-identified]   PCP: LUZ Carlton CNP  Date of Admission: 12/17/2020    Assessment/Plan:    1. AVELINO:  Improving. Continue gentle hydration. Nephrology following, appreciate assistance. 2. Hyperkalemia:  Resolved. Corrected in ED, reassess chemistry in the morning. 3. Acute metabolic encephalopathy: Secondary to above issues. Gradually improving, continue to monitor. 4. Essential hypertension: History, stable. Currently normotensive. Continue Imdur and Toprol-XL. 5. Lower extremity wounds:  Podiatry consulted, appreciate assistance. 6. Physical deconditioning: PT/OT consulted, worsened by the AVELINO and wounds    Chief Complaint: Weakness    Initial H and P:-    **Per Chart Review:  \"Patient presents to the ED with her daughter with a complaint of weakness that is worsening over the last 10 days. The patient is no longer able to walk and can't help herself with daily functions. The patient has been seeing someone outpatient for her leg wounds and was started on Bactrim. Since then the patient has been declining. \"    Subjective (past 24 hours):   Patient resting in bed with daughter at the bedside at the time of interview. Currently sleepy/groggy and still disoriented, daughter states that there are is improvements from yesterday that she is seen. Plan of care is reviewed and there are no other needs or questions at this time. Past medical history, family history, social history and allergies reviewed again and is unchanged since admission. ROS (14 point review of systems completed. Pertinent positives noted. Otherwise ROS is negative) : Unable to sufficiently obtain subjective data at this time due to current mental status.     Medications:  Reviewed    Infusion Medications  sodium chloride 75 mL/hr at 12/18/20 1807     Scheduled Medications    isosorbide mononitrate  60 mg Oral Daily    metoprolol succinate  50 mg Oral Daily    [Held by provider] lisinopril  5 mg Oral Daily    sodium chloride flush  10 mL Intravenous 2 times per day    fentanNYL  50 mcg Intravenous Once    aspirin  81 mg Oral Daily    [Held by provider] furosemide  20 mg Oral Daily    melatonin  19.5 mg Oral Nightly    polyethylene glycol  17 g Oral Nightly    warfarin (COUMADIN) daily dosing (placeholder)   Other RX Placeholder     PRN Meds: zinc oxide, sodium chloride flush, promethazine **OR** ondansetron, acetaminophen **OR** acetaminophen, HYDROcodone 5 mg - acetaminophen      Intake/Output Summary (Last 24 hours) at 12/18/2020 2243  Last data filed at 12/18/2020 2050  Gross per 24 hour   Intake 2143.29 ml   Output 400 ml   Net 1743.29 ml       Diet:  DIET RENAL;    Exam:  /60 Comment: manual  Pulse 88   Temp 97.8 °F (36.6 °C) (Axillary)   Resp 18   Wt 188 lb 9.6 oz (85.5 kg)   SpO2 94%     General appearance: Sleepy/groggy, chronically ill-appearing geriatric female. No apparent distress, appears stated age and cooperative. HEENT: Pupils equal, round, and reactive to light. Conjunctivae/corneas clear. Neck: Supple, with full range of motion. No jugular venous distention. Trachea midline. Respiratory:  Normal respiratory effort. Clear to auscultation, bilaterally without Rales/Wheezes/Rhonchi. Cardiovascular: Regular rate and rhythm with normal S1/S2 without murmurs, rubs or gallops. Abdomen: Soft, non-tender, non-distended with normal bowel sounds. Musculoskeletal: Passive and active ROM x 4 extremities. Skin: Skin color pale; texture and turgor normal.  No rashes or lesions. Neurologic:  Neurovascularly intact without any focal sensory/motor deficits. Psychiatric: Sleepy/groggy, disoriented.   Capillary Refill: Brisk,< 3 seconds This document has been electronically signed by: Tian Ruiz MD on    12/17/2020 12:54 AM      All CT scans at this facility use dose modulation, iterative    reconstruction, and/or weight-based   dosing when appropriate to reduce radiation dose to as low as reasonably    achievable. Ct Head Wo Contrast (code Stroke)    Result Date: 12/17/2020  Exam: CT brain without contrast Comparison: None Clinical history: Slurred speech Findings: No acute intracranial hemorrhage identified. Enlargement of the ventricles and sulci secondary to volume loss. No abnormal extra-axial fluid collections are seen. No intracranial mass or midline shift. No skull fracture. Convex ossification seen at the left anterior calvarium may be due to hyperostosis versus ossified meningioma. Impression: 1. No acute intracranial hemorrhage 2. Cerebral volume loss. This document has been electronically signed by: Tian Ruiz MD on 12/17/2020 12:54 AM All CT scans at this facility use dose modulation, iterative reconstruction, and/or weight-based dosing when appropriate to reduce radiation dose to as low as reasonably achievable. Xr Chest Portable    Result Date: 12/17/2020  Exam: One View of the chest Comparison: None Findings: Left-sided pacemaker Cardiac silhouette is enlarged. Central pulmonary vasculature is prominent in size which may represent central venous congestion. No pneumothorax. No pleural fluid collection. No pneumonia. Impression: 1. Enlarged cardiac silhouette with prominence to the central pulmonary vasculature concerning for central venous congestion. This document has been electronically signed by: Tian Ruiz MD on 12/17/2020 01:45 AM       **This report has been created using voice recognition software. It may contain minor errors which are inherent in voice recognition technology. **  Electronically signed by LUZ Delgado CNP on 12/18/2020 at 10:43 PM

## 2020-12-19 NOTE — FLOWSHEET NOTE
12/19/20 0556   Provider Notification   Reason for Communication Evaluate   Provider Name Lilian Schaffer [place]   Provider Notification Advance Practice Clinician (CNS, NP, CNM, CRNA, PA)   Method of Communication Secure Message   Response See orders   Notification Time ROSA Nieves updated on INR 7.17 this am. She placed new order for hepatic function panel to be drawn.

## 2020-12-19 NOTE — PROGRESS NOTES
MRI not completed due to pt having pacemaker. Cardiology forms need to be filled out by cardiology before we can scan. [employer] has to be here for exam.  Once forms are filled out we can schedule mri with [employer].

## 2020-12-19 NOTE — PROGRESS NOTES
Comprehensive Nutrition Assessment    Type and Reason for Visit:  Initial, Positive Nutrition Screen    Nutrition Recommendations/Plan:   *Recommend Folbee Plus (Renal Multivitamin) daily,  *Started Glucerna TID. *Continue current diet. Nutrition Assessment: Pt. nutritionally compromised AEB pt with decreased appetite and intake PTA and since admit consuming 0-25% of most meals. At risk for further nutrition compromise r/t admit d/t AVELINO, metabolic encephalopathy, increased nutrient needs to aid in wound healing and need for nutrition support. Nutrition recommendations/interventions as per above. Malnutrition Assessment:  Malnutrition Status: At risk for malnutrition (Comment)    Context:  Acute Illness     Findings of the 6 clinical characteristics of malnutrition:  Energy Intake:  (less than 50% x2 days)  Weight Loss:  Unable to assess(no weight history per EMR)     Body Fat Loss:  Unable to assess     Muscle Mass Loss:  1 - Mild muscle mass loss Temples (temporalis)  Fluid Accumulation:  No significant fluid accumulation Extremities   Strength:  Not Performed    Nutrition Related Findings:  admit d/t AVELINO; pt sleeping during visit; spoke with patients daughter- she reports pt with decreased appetite and intake PTA and sometimes would drink an Ensure if she didn't eat much of a meal; daughter reports pt has not been eating much of anything since admit d/t not feeling well; last BM x1 on 12/17.  Rx includes: Glycolax      Wounds:  Pressure Injury, Stage II, Deep Tissue Injury(Stage II Pressure Injury and DTI on buttocks)       Current Nutrition Therapies:    DIET RENAL;  Dietary Nutrition Supplements: Diabetic Oral Supplement    Anthropometric Measures:  · Height: 5' 8.5\" (174 cm)  · Current Body Weight: 187 lb 11.2 oz (85.1 kg)(12/19;)   · Admission Body Weight: 188 lb 9.6 oz (85.5 kg)(12/18;)    · Usual Body Weight: (No weight history per EMR.)     · Ideal Body Weight: 143 lbs  · BMI: 28.1 · BMI Categories: Overweight (BMI 25.0-29. 9)       Nutrition Diagnosis:   · Inadequate oral intake related to inadequate protein-energy intake as evidenced by intake 0-25%    Nutrition Interventions:   Food and/or Nutrient Delivery:  Continue Current Diet, Start Oral Nutrition Supplement, Vitamin Supplement  Nutrition Education/Counseling:  No recommendation at this time   Coordination of Nutrition Care:  Continue to monitor while inpatient    Goals:  Pt will consume 75% or more of meals during LOS       Nutrition Monitoring and Evaluation:   Behavioral-Environmental Outcomes:  None Identified   Food/Nutrient Intake Outcomes:  Diet Advancement/Tolerance, Food and Nutrient Intake, Supplement Intake, Vitamin/Mineral Intake  Physical Signs/Symptoms Outcomes:  Biochemical Data, Weight, Skin, Nutrition Focused Physical Findings, Fluid Status or Edema     Discharge Planning:     Too soon to determine     Electronically signed by Becky Valverde RD, LD on 12/19/20 at 10:10 AM EST    Contact: [phone]

## 2020-12-19 NOTE — PROGRESS NOTES
Clinical Pharmacy Note    Warfarin consult follow-up    Recent Labs     12/19/20  0443   INR 7.17*     Recent Labs     12/17/20  0052 12/18/20  1208   HGB 13.4 13.9   HCT 42.6 43.8    240       Significant Drug-Drug Interactions:  New warfarin drug-drug interactions: none  Discontinued drug-drug interactions: none  Current warfarin drug-drug interactions: aspirin        Date INR Warfarin Dose   12/17/20 2.44 0.5 mg   12/18/2020  4.18   No Coumadin   12/19/2020  7.17  Vitamin K 5 mg IV, no warfarin                                       Notes:                     Daily PT/INR until stable within therapeutic range.      Yeni Ashford PharmD 12/19/2020 2:34 PM

## 2020-12-19 NOTE — PROGRESS NOTES
[facility]  PHYSICAL THERAPY MISSED TREATMENT NOTE  [facility] RENAL TELEMETRY 6K    Date: 2020  Patient Name: Malia Lawson        MRN: [id number]   : 1935  (81 y.o.)  Gender: female                REASON FOR MISSED TREATMENT:  Missed Treat. Pt sleeping soundly upon PT arrival with dtr present. Daughter continues to decline PT to wake pt, states pt needs to rest and is not ready for therapy at this time. She requests PT to not check back until Monday.     Augustin Mackay, PT, DPT

## 2020-12-19 NOTE — PLAN OF CARE
[facility]  OCCUPATIONAL THERAPY MISSED TREATMENT NOTE  [facility] RENAL TELEMETRY 6K  6K-16/016-A      Date: 2020  Patient Name: Kavin Krishnamurthy        CSN: [id number]   : 1935  (81 y.o.)  Gender: female                REASON FOR MISSED TREATMENT: Patient Unavailable  Pt was being taken to MRI. Per PT note, pt has been very lethargic and family requesting that she be allowed to sleep at this time. Will recheck on Monday, .

## 2020-12-20 NOTE — PROGRESS NOTES
Hospitalist Progress Note      Patient:  Jeffy Alexander    Unit/Bed:6K-16/016-A  YOB: 1935  MRN: [id number]   Acct: [de-identified]   PCP: LUZ Myles CNP  Date of Admission: 12/17/2020    Assessment/Plan:    1. AVELINO:  Improving. Continue gentle hydration, transition fluids to half-normal saline per nephrology recommendations, appreciate assistance. 2. Electrolyte abnormalities:  · Hyperkalemia:  Resolved. · Hypernatremia: Transition fluids to half-normal saline. · Hyperchloremic metabolic acidosis: Transition fluids to half-normal saline and assess venous blood gas in the morning. 3. Acute metabolic encephalopathy: Secondary to above issues? Gradually improving but significantly different from baseline per report. Patient does not appear infectious, ammonia level normal, thyroid studies stable, continue to explore differentials. Possible MRI? Neurology consulted to further investigate, appreciate recommendations. .  4. Essential hypertension: History, stable. Currently normotensive. Continue Imdur and Toprol-XL. 5. Lower extremity wounds:  Podiatry consulted, appreciate assistance. 6. Dementia: History. Palliative care consulted to review goals of care and CODE STATUS, currently full code. 7. Physical deconditioning: PT/OT consulted, worsened by the AVELINO and wounds    Chief Complaint: Weakness    Initial H and P:-    **Per Chart Review:  \"Patient presents to the ED with her daughter with a complaint of weakness that is worsening over the last 10 days. The patient is no longer able to walk and can't help herself with daily functions. The patient has been seeing someone outpatient for her leg wounds and was started on Bactrim. Since then the patient has been declining. \"    Subjective (past 24 hours): Patient resting in bed with daughter at the bedside at the time of interview. Currently sleepy/groggy and still disoriented, daughter states that there are is slight improvements from yesterday that she has seen. Palliative care brought on board to discuss goals of care as well as CODE STATUS as the patient is currently a full code, patient's daughter did not appear receptive to this. Plan of care is reviewed and there are no other needs or questions at this time. Past medical history, family history, social history and allergies reviewed again and is unchanged since admission. ROS (14 point review of systems completed. Pertinent positives noted. Otherwise ROS is negative) : Unable to sufficiently obtain subjective data at this time due to current mental status.     Medications:  Reviewed    Infusion Medications    sodium chloride 100 mL/hr at 12/19/20 1526     Scheduled Medications    isosorbide mononitrate  60 mg Oral Daily    metoprolol succinate  50 mg Oral Daily    [Held by provider] lisinopril  5 mg Oral Daily    sodium chloride flush  10 mL Intravenous 2 times per day    fentanNYL  50 mcg Intravenous Once    aspirin  81 mg Oral Daily    [Held by provider] furosemide  20 mg Oral Daily    melatonin  19.5 mg Oral Nightly    polyethylene glycol  17 g Oral Nightly    warfarin (COUMADIN) daily dosing (placeholder)   Other RX Placeholder     PRN Meds: zinc oxide, sodium chloride flush, promethazine **OR** ondansetron, acetaminophen **OR** acetaminophen, HYDROcodone 5 mg - acetaminophen      Intake/Output Summary (Last 24 hours) at 12/19/2020 2136  Last data filed at 12/19/2020 2013  Gross per 24 hour   Intake 2285.76 ml   Output 700 ml   Net 1585.76 ml       Diet:  DIET RENAL;  Dietary Nutrition Supplements: Diabetic Oral Supplement    Exam: BP (!) 154/78   Pulse 77   Temp 97.5 °F (36.4 °C) (Oral)   Resp 20   Ht 5' 8.5\" (1.74 m)   Wt 187 lb 11.2 oz (85.1 kg)   SpO2 94%   BMI 28.12 kg/m²     General appearance: Sleepy/groggy, chronically ill-appearing geriatric female. No apparent distress, appears stated age and cooperative. HEENT: Pupils equal, round, and reactive to light. Conjunctivae/corneas clear. Neck: Supple, with full range of motion. No jugular venous distention. Trachea midline. Respiratory:  Normal respiratory effort. Clear to auscultation, bilaterally without Rales/Wheezes/Rhonchi. Cardiovascular: Regular rate and rhythm with normal S1/S2 without murmurs, rubs or gallops. Abdomen: Soft, non-tender, non-distended with normal bowel sounds. Musculoskeletal: Passive and active ROM x 4 extremities. Skin: Skin color pale; texture and turgor normal.  No rashes or lesions. Neurologic:  Neurovascularly intact without any focal sensory/motor deficits. Psychiatric: Sleepy/groggy, disoriented. Capillary Refill: Brisk,< 3 seconds   Peripheral Pulses: +2 palpable, equal bilaterally     Labs:   Recent Labs     12/17/20 0052 12/18/20  1208   WBC 6.1 7.0   HGB 13.4 13.9   HCT 42.6 43.8    240     Recent Labs     12/18/20  1208 12/18/20 2027 12/19/20  0443    143 147*  141   K 4.8 4.6 4.4  4.7  4.4    113* 116*  113*   CO2 20* 20* 13*  17*   BUN 46* 43* 40*  40*   CREATININE 2.0* 2.3* 2.1*  2.2*   CALCIUM 8.8 8.8 8.9  9.1     Recent Labs     12/17/20  0052 12/17/20  0052 12/18/20  1208 12/18/20 2027 12/19/20  0443   AST 15   < > 15 16 17  16   ALT 8*   < > 7* 8* 8*  8*   BILIDIR <0.2  --   --   --  <0.2   BILITOT 0.2*   < > 0.3 0.2* 0.2*  0.3   ALKPHOS 71   < > 64 63 62  64    < > = values in this interval not displayed. Recent Labs     12/17/20  0052 12/18/20  1208 12/19/20  0443   INR 2.44* 4.18* 7.17*     No results for input(s): CKTOTAL, TROPONINI in the last 72 hours.     Microbiology: Blood culture #1: No results found for: BC    Blood culture #2:No results found for: Miguea Zachery    Organism:No results found for: ORG    No results found for: LABGRAM    MRSA culture only:No results found for: [facility]    Urine culture: No results found for: LABURIN    Respiratory culture: No results found for: CULTRESP    Aerobic and Anaerobic :  No results found for: LABAERO  No results found for: LABANAE    Urinalysis:      Lab Results   Component Value Date    NITRU NEGATIVE 12/17/2020    WBCUA 2-4 12/17/2020    BACTERIA NONE SEEN 12/17/2020    RBCUA 0-2 12/17/2020    BLOODU NEGATIVE 12/17/2020    GLUCOSEU NEGATIVE 12/17/2020       Radiology:  XR CHEST PORTABLE   Final Result   Impression:   1. Enlarged cardiac silhouette with prominence to the central pulmonary    vasculature concerning for central venous congestion. This document has been electronically signed by: Arabella Perdomo MD on    12/17/2020 01:45 AM         CT HEAD WO CONTRAST (CODE STROKE)   Final Result   Impression:   1. No acute intracranial hemorrhage   2. Cerebral volume loss. This document has been electronically signed by: Arabella Perdomo MD on    12/17/2020 12:54 AM      All CT scans at this facility use dose modulation, iterative    reconstruction, and/or weight-based   dosing when appropriate to reduce radiation dose to as low as reasonably    achievable. MRI BRAIN WO CONTRAST    (Results Pending)     Ct Head Wo Contrast (code Stroke)    Result Date: 12/17/2020  Exam: CT brain without contrast Comparison: None Clinical history: Slurred speech Findings: No acute intracranial hemorrhage identified. Enlargement of the ventricles and sulci secondary to volume loss. No abnormal extra-axial fluid collections are seen. No intracranial mass or midline shift. No skull fracture. Convex ossification seen at the left anterior calvarium may be due to hyperostosis versus ossified meningioma. Impression: 1. No acute intracranial hemorrhage 2. Cerebral volume loss. This document has been electronically signed by: Shawn Mendez MD on 12/17/2020 12:54 AM All CT scans at this facility use dose modulation, iterative reconstruction, and/or weight-based dosing when appropriate to reduce radiation dose to as low as reasonably achievable. Xr Chest Portable    Result Date: 12/17/2020  Exam: One View of the chest Comparison: None Findings: Left-sided pacemaker Cardiac silhouette is enlarged. Central pulmonary vasculature is prominent in size which may represent central venous congestion. No pneumothorax. No pleural fluid collection. No pneumonia. Impression: 1. Enlarged cardiac silhouette with prominence to the central pulmonary vasculature concerning for central venous congestion. This document has been electronically signed by: Shawn Mendez MD on 12/17/2020 01:45 AM       **This report has been created using voice recognition software. It may contain minor errors which are inherent in voice recognition technology. **  Electronically signed by LUZ Walker CNP on 12/19/2020 at 9:36 PM

## 2020-12-20 NOTE — PROGRESS NOTES
Clinical Pharmacy Note    Warfarin consult follow-up    Recent Labs     12/20/20  0523   INR 1.35*     Recent Labs     12/18/20  1208   HGB 13.9   HCT 43.8          Significant Drug-Drug Interactions:  New warfarin drug-drug interactions: none  Discontinued drug-drug interactions: none  Current warfarin drug-drug interactions: aspirin        Date INR Warfarin Dose   12/17/20 2.44 0.5 mg   12/18/2020  4.18   No Coumadin   12/19/2020  7.17  Vitamin K 5 mg IV, no warfarin      12/20/2020 1.35  0.5 mg                                 Notes:                     Daily PT/INR until stable within therapeutic range.      Jakob Haile PharmD 12/20/2020 3:55 PM

## 2020-12-20 NOTE — PROGRESS NOTES
 warfarin (COUMADIN) daily dosing (placeholder)   Other RX Placeholder     PRN Meds: zinc oxide, sodium chloride flush, promethazine **OR** ondansetron, acetaminophen **OR** acetaminophen, HYDROcodone 5 mg - acetaminophen      Intake/Output Summary (Last 24 hours) at 12/20/2020 1031  Last data filed at 12/20/2020 0425  Gross per 24 hour   Intake 2735.29 ml   Output 500 ml   Net 2235.29 ml       Diet:  DIET RENAL;  Dietary Nutrition Supplements: Diabetic Oral Supplement    Exam:  /66   Pulse 72   Temp 98.5 °F (36.9 °C) (Oral)   Resp 18   Ht 5' 8.5\" (1.74 m)   Wt 187 lb 11.2 oz (85.1 kg)   SpO2 95%   BMI 28.12 kg/m²     General appearance: Intermittently engaged, chronically ill-appearing geriatric female. No apparent distress, appears stated age and cooperative. HEENT: Pupils equal, round, and reactive to light. Conjunctivae/corneas clear. Neck: Supple, with full range of motion. No jugular venous distention. Trachea midline. Respiratory:  Normal respiratory effort. Clear to auscultation, bilaterally without Rales/Wheezes/Rhonchi. Cardiovascular: Regular rate and rhythm with normal S1/S2 without murmurs, rubs or gallops. Abdomen: Soft, non-tender, non-distended with normal bowel sounds. Musculoskeletal: Passive and active ROM x 4 extremities. Skin: Skin color pale; texture and turgor normal.  No rashes or lesions. Neurologic:  Neurovascularly intact without any focal sensory/motor deficits. Psychiatric: Sleepy/groggy, but appropriate when simple questions are asked. Pleasantly disoriented.   Capillary Refill: Brisk,< 3 seconds   Peripheral Pulses: +2 palpable, equal bilaterally     Labs:   Recent Labs     12/18/20  1208   WBC 7.0   HGB 13.9   HCT 43.8        Recent Labs     12/18/20 2027 12/19/20  0443 12/20/20  0523    147*  141 142   K 4.6 4.4  4.7  4.4 4.6  4.6   * 116*  113* 114*   CO2 20* 13*  17* 17*   BUN 43* 40*  40* 33*   CREATININE 2.3* 2.1*  2.2* 1.8* CALCIUM 8.8 8.9  9.1 8.7     Recent Labs     12/18/20  1208 12/18/20 2027 12/19/20  0443   AST 15 16 17  16   ALT 7* 8* 8*  8*   BILIDIR  --   --  <0.2   BILITOT 0.3 0.2* 0.2*  0.3   ALKPHOS 64 63 62  64     Recent Labs     12/18/20  1208 12/19/20  0443 12/20/20  0523   INR 4.18* 7.17* 1.35*     No results for input(s): CKTOTAL, TROPONINI in the last 72 hours. Microbiology:    Blood culture #1: No results found for: BC    Blood culture #2:No results found for: Jama Ruiz    Organism:No results found for: ORG    No results found for: LABGRAM    MRSA culture only:No results found for: [facility]    Urine culture: No results found for: LABURIN    Respiratory culture: No results found for: CULTRESP    Aerobic and Anaerobic :  No results found for: LABAERO  No results found for: LABANAE    Urinalysis:      Lab Results   Component Value Date    NITRU NEGATIVE 12/17/2020    WBCUA 2-4 12/17/2020    BACTERIA NONE SEEN 12/17/2020    RBCUA 0-2 12/17/2020    BLOODU NEGATIVE 12/17/2020    GLUCOSEU NEGATIVE 12/17/2020       Radiology:  XR CHEST PORTABLE   Final Result   Impression:   1. Enlarged cardiac silhouette with prominence to the central pulmonary    vasculature concerning for central venous congestion. This document has been electronically signed by: Guadalupe Sprague MD on    12/17/2020 01:45 AM         CT HEAD WO CONTRAST (CODE STROKE)   Final Result   Impression:   1. No acute intracranial hemorrhage   2. Cerebral volume loss. This document has been electronically signed by: Guadalupe Sprague MD on    12/17/2020 12:54 AM      All CT scans at this facility use dose modulation, iterative    reconstruction, and/or weight-based   dosing when appropriate to reduce radiation dose to as low as reasonably    achievable.          MRI BRAIN WO CONTRAST    (Results Pending)     Ct Head Wo Contrast (code Stroke)    Result Date: 12/17/2020 Exam: CT brain without contrast Comparison: None Clinical history: Slurred speech Findings: No acute intracranial hemorrhage identified. Enlargement of the ventricles and sulci secondary to volume loss. No abnormal extra-axial fluid collections are seen. No intracranial mass or midline shift. No skull fracture. Convex ossification seen at the left anterior calvarium may be due to hyperostosis versus ossified meningioma. Impression: 1. No acute intracranial hemorrhage 2. Cerebral volume loss. This document has been electronically signed by: Tian Ruiz MD on 12/17/2020 12:54 AM All CT scans at this facility use dose modulation, iterative reconstruction, and/or weight-based dosing when appropriate to reduce radiation dose to as low as reasonably achievable. Xr Chest Portable    Result Date: 12/17/2020  Exam: One View of the chest Comparison: None Findings: Left-sided pacemaker Cardiac silhouette is enlarged. Central pulmonary vasculature is prominent in size which may represent central venous congestion. No pneumothorax. No pleural fluid collection. No pneumonia. Impression: 1. Enlarged cardiac silhouette with prominence to the central pulmonary vasculature concerning for central venous congestion. This document has been electronically signed by: Tian Ruiz MD on 12/17/2020 01:45 AM       **This report has been created using voice recognition software. It may contain minor errors which are inherent in voice recognition technology. **  Electronically signed by LUZ Delgado CNP on 12/20/2020 at 10:31 AM

## 2020-12-20 NOTE — PROGRESS NOTES
zinc gluconate 50 MG tablet, Take 50 mg by mouth daily  zinc oxide (DESITIN) 40 % ointment, Apply topically as needed for Dry Skin Apply topically as needed. B Complex-C (B COMPLEX-B12-C ), Inject as directed  HYDROcodone-acetaminophen (NORCO)  MG per tablet, Take 1-2 tablets by mouth. Every 4-6 hours as needed    Current Medications:     Scheduled Meds:    isosorbide mononitrate  60 mg Oral Daily    metoprolol succinate  50 mg Oral Daily    [Held by provider] lisinopril  5 mg Oral Daily    sodium chloride flush  10 mL Intravenous 2 times per day    fentanNYL  50 mcg Intravenous Once    aspirin  81 mg Oral Daily    [Held by provider] furosemide  20 mg Oral Daily    melatonin  19.5 mg Oral Nightly    polyethylene glycol  17 g Oral Nightly    warfarin (COUMADIN) daily dosing (placeholder)   Other RX Placeholder     Continuous Infusions:    sodium chloride 100 mL/hr at 20 0946     PRN Meds:  zinc oxide, sodium chloride flush, promethazine **OR** ondansetron, acetaminophen **OR** acetaminophen, HYDROcodone 5 mg - acetaminophen    Input/Output:       I/O last 3 completed shifts: In: 2735.3 [P.O.:100; I.V.:2635.3]  Out: 500 [Urine:500].       Patient Vitals for the past 96 hrs (Last 3 readings):   Weight   20 0340 187 lb 11.2 oz (85.1 kg)   20 0254 188 lb 9.6 oz (85.5 kg)       Vital Signs:   Temperature:  Temp: 97.8 °F (36.6 °C)  TMax:   Temp (24hrs), Av.8 °F (36.6 °C), Min:97.5 °F (36.4 °C), Max:98.5 °F (36.9 °C)    Respirations:  Resp: 18  Pulse:   Pulse: 68  BP:    BP: 139/77  BP Range: Systolic (85NXK), UCI:156 , Min:135 , WQA:531       Diastolic (24NGW), DNK:04, Min:66, Max:103      Physical Examination:     General:  Awake, more conversive today  HEENT: NC/AT/ MMM  Chest:               Clear B/L no W/R/R  Cardiac:  S1 S2   Abdomen: Soft, non-tender,  Neuro:  No facial droop  SKIN:  Venous stasis skin changes  Extremities:  B/l venous skin changes and chronic lymphedema Labs:       Recent Labs     12/18/20  1208   WBC 7.0   RBC 4.45   HGB 13.9   HCT 43.8   MCV 98.4   MCH 31.2   MCHC 31.7*      MPV 9.7      BMP:   Recent Labs     12/18/20 2027 12/19/20  0443 12/20/20  0523    147*  141 142   K 4.6 4.4  4.7  4.4 4.6  4.6   * 116*  113* 114*   CO2 20* 13*  17* 17*   BUN 43* 40*  40* 33*   CREATININE 2.3* 2.1*  2.2* 1.8*   GLUCOSE 84 76  81 104   CALCIUM 8.8 8.9  9.1 8.7      Phosphorus:   No results for input(s): PHOS in the last 72 hours.   Magnesium:    Recent Labs     12/20/20  0523   MG 2.3     Albumin:    Recent Labs     12/18/20  1208 12/18/20 2027 12/19/20  0443   LABALBU 2.9* 2.5* 2.4*  2.5*     BNP:    No results found for: BNP  OFE:    No results found for: OFE  SPEP:  Lab Results   Component Value Date    PROT 6.2 12/19/2020    PROT 6.0 12/19/2020     UPEP:   No results found for: LABPE  C3:   No results found for: C3  C4:   No results found for: C4  MPO ANCA:   No results found for: MPO  PR3 ANCA:   No results found for: PR3  Anti-GBM:   No results found for: GBMABIGG  Hep BsAg:       No results found for: HEPBSAG  Hep C AB:        No results found for: HEPCAB    Urinalysis/Chemistries:      Lab Results   Component Value Date    NITRU NEGATIVE 12/17/2020    COLORU YELLOW 12/17/2020    PHUR 5.0 12/17/2020    WBCUA 2-4 12/17/2020    RBCUA 0-2 12/17/2020    YEAST NONE SEEN 12/17/2020    BACTERIA NONE SEEN 12/17/2020    LEUKOCYTESUR NEGATIVE 12/17/2020    UROBILINOGEN 0.2 12/17/2020    BILIRUBINUR NEGATIVE 12/17/2020    BLOODU NEGATIVE 12/17/2020    GLUCOSEU NEGATIVE 12/17/2020    KETUA NEGATIVE 12/17/2020     Urine Sodium:   No results found for: MARKELL  Urine Potassium:  No results found for: KUR  Urine Chloride:  No results found for: CLUR  Urine Osmolarity: No results found for: OSMOU  Urine Protein:   No components found for: TOTALPROTEIN, URINE   Urine Creatinine:   No results found for: LABCREA Urine Eosinophils:  No components found for: UEOS        Impression and Plan:  1. AVELINO  : multifactorial from Bactrim, diuretics: overall improving  2. Hyperkalemia resolved  3. Hypernatremia/hyperchloremic metabolic acidosis: on hypotonic IV fluids. Add po bicarb  4. LE cellulitis  5. HTN  6. Altered mental status improved        Please don't hesitate to call with any questions.   Electronically signed by Griselda Karvonen, DO on 12/20/2020 at 2:04 PM

## 2020-12-21 NOTE — PROGRESS NOTES
Palliative care eval received to discuss code status and goals of care. Pt admitted with AVELINO, multifactoral causes. Pt has severe PAD and neuropathy. Current code status is Full code, pt does not have LW or DPOA in file. Case discussed with pt's nurse,EddaRN. Writer in to see pt. Pt sitting in bedside chair, awake and alert, oriented to all spheres. Pt states significant pain in left knee and foot but not as bad as it's been on previous occasions. Pt's daughter, Kaleigh Whitten, is here. Palliative care introduced, advance care planning discussed, including LW and DPOA. Pt states that she has these documents at home. Code status, specifically, Full code versus DNR-CCA, explained, along with possible complications and likely poor outcome. Questions answered, pt and her daughter state understanding. Pt requests to continue with Full code status. Goals of care reviewed, pt states that her daughter,Jessica, takes care of herself and her  at home, and they have all that they need. Kaleigh Whitten states that home health will follow at discharge. Pt and Kaleigh Whitten state no further questions or needs. Emotional support given. Pt's nurse,Edda,RN updated. Secure text sent to Rosio Kaiser to update him on above conversation. Palliative care will remain available, floor to notify PRN for further needs.

## 2020-12-21 NOTE — PROGRESS NOTES
[facility]  INPATIENT OCCUPATIONAL THERAPY  [facility] RENAL TELEMETRY 6K  EVALUATION    Time:    Time In: 7232  Time Out: 1010  Timed Code Treatment Minutes: 14 Minutes  Minutes: 28      co-eval completed d/t medical complexities for safety and pt not able to tolerate 2 separate sessions. Date: 2020  Patient Name: Elmira Miranda,   Gender: female      MRN: [id number]  : 1935  (81 y.o.)  Referring Practitioner: ZAK Ayala CNP  Diagnosis: AVELINO  Additional Pertinent Hx: Patient presents to the ED with her daughter with a complaint of weakness that is worsening over the last 10 days. The patient is no longer able to walk and can't help herself with daily functions. The patient has been seeing someone outpatient for her leg wounds and was started on Bactrim. Since then the patient has been declining    Restrictions/Precautions:  Restrictions/Precautions: Weight Bearing  Right Lower Extremity Weight Bearing: Weight Bearing As Tolerated  Left Lower Extremity Weight Bearing: Weight Bearing As Tolerated  Position Activity Restriction  Other position/activity restrictions: increased distal bilat LE sensitivity    Subjective  Chart Reviewed: Yes, Progress Notes, History and Physical, Orders  Patient assessed for rehabilitation services?: Yes    Subjective: Pt lying in bed with daughter present thorughout. Daughter initially stating she was sure how therapist was going to get pts socks on d/t her pain in feet. Pt received pain medication prior to session with pt demo cognitive deficits possbily due to.     Pain:  Pain Assessment  Patient Currently in Pain: Yes  Pain Level: 8  Pain Type: Chronic pain  Pain Location: Foot    Social/Functional History:  Lives With: Spouse, Daughter  Type of Home: House  Home Layout: One level  Home Access: Ramped entrance  Home Equipment: Wheelchair-manual, Rolling walker   Bathroom Shower/Tub: (sponge bathes)  Bathroom Toilet: Bedside commode ADL Assistance: Needs assistance  Ambulation Assistance: Needs assistance  Transfer Assistance: Independent          Additional Comments: Pt reports ambulating short distances with walker and dtr's assist for safety. DAughter stating she assist pt with all ADL tasks including sponge bathing    VISION:Corrected    HEARING:  slightly hard of hearing    COGNITION: Slow Processing, Decreased Recall, Decreased Insight, Impaired Memory, Decreased Problem Solving, Decreased Safety Awareness and Difficulty Following Commands    RANGE OF MOTION:  Bilateral Upper Extremity:  pt able to reach up and grab onto bar of shayy st[employer]    STRENGTH:  Bilateral Upper Extremity:  bilateral UE general weakness and deconditioning throughout    ADL:   Lower Extremity Dressing: Maximum Assistance. donning socks with pt making no complaints when donning right foot and only slight complaints of left foot. Pt conversing with pT during   Toileting: Maximum Assistance. for hygiene after incontinence. [place] Stands BALANCE:  Sitting Balance:  Maximum Assistance. to min A at EOB for approx 8-10 min. Pt attempting to use bilateral UEs for support but tending to push to left wiht right UE in a supported position. Pt with complaints of increased dizziness with initially sitting up at EOB and on occasion througout session. Pt needing redirection throughout for proper balance and to follow commands to look at daughtyer and keep eyes open  Standing Balance: Moderate Assistance, X 2. inside shayy ste    BED MOBILITY:  Supine to Sit: Minimal Assistance, Moderate Assistance, X 2      TRANSFERS:  Sit to Stand: Moderate Assistance, X 2. from eOB into shayy stedy, min A x2 from seat of shayy stedy  Stand to Sit: Moderate Assistance, X 2. into bedside chair    FUNCTIONAL MOBILITY:  OTR to further assess. [place] major used this date to complete transfer. Educated Garrick JAQUEZ on using it and +2 to transfer back to bed. Activity Tolerance:  Patient tolerance of  treatment: fair. Cues throughout for cognition and redirection to tasks       Assessment:  Assessment: Pt demo decreased ability to complete her ADLs and mobility at PLOF. pt would benefit from skilled OT Services to increase her endurance and ability to complete her ADL tasks and mobility at PLOF. Performance deficits / Impairments: Decreased functional mobility , Decreased safe awareness, Decreased balance, Decreased ADL status, Decreased cognition, Decreased endurance, Decreased strength  Prognosis: Fair  REQUIRES OT FOLLOW UP: Yes  Decision Making: Medium Complexity    Treatment Initiated: Treatment and education initiated within context of evaluation. Evaluation time included review of current medical information, gathering information related to past medical, social and functional history, completion of standardized testing, formal and informal observation of tasks, assessment of data and development of plan of care and goals. Treatment time included skilled education and facilitation of tasks to increase safety and independence with ADL's for improved functional independence and quality of life. Discharge Recommendations:  Patient would benefit from continued therapy after discharge, Continue to assess pending progress    Patient Education:  OT Education: OT Role, Plan of Care  Patient Education: imprortance of OOB tasks , safeyt with mobility and transfers    Equipment Recommendations:  Equipment Needed: No    Plan:  Times per week: 3-5x  Current Treatment Recommendations: Strengthening, Endurance Training, Patient/Caregiver Education & Training, Self-Care / ADL, Balance Training, Functional Mobility Training, Safety Education & Training. See long-term goal time frame for expected duration of plan of care. If no long-term goals established, a short length of stay is anticipated.     Goals:  Patient goals : get strogner to go home  Short term goals Time Frame for Short term goals: by discharge  Short term goal 1: Pt to sit EOB > 10 min with consistent fbalance of CGA and no cues for posture to increase participation in ADL atsks  Short term goal 2: Pt to complete sit to stand from vakrious surfaces including BSC with mod x 1 and min A x1 to increase indep wtih toileting  Short term goal 3: Pt to complete simple grooming tasks with SBA  Short term goal 4: OTR to further assess mobility as able         Following session, patient left in safe position with all fall risk precautions in place.

## 2020-12-21 NOTE — PROGRESS NOTES
Clinical Pharmacy Note    Warfarin consult follow-up    Recent Labs     12/21/20  0524   INR 1.12     Recent Labs     12/18/20  1208   HGB 13.9   HCT 43.8        Significant Drug-Drug Interactions:  New warfarin drug-drug interactions: none  Discontinued drug-drug interactions: none  Current warfarin drug-drug interactions: aspirin        Date INR Warfarin Dose   12/17/20 2.44 0.5 mg   12/18/2020  4.18   No Coumadin   12/19/2020  7.17  Vitamin K 5 mg IV, no warfarin      12/20/2020  1.35  0.5 mg      12/21/2020  1.12 0.5 mg                        Notes:                   Daily PT/INR until stable within therapeutic range.      Erasto Garcia, PharmD, BCPS, [facility] 12/21/2020 7:21 AM

## 2020-12-21 NOTE — CARE COORDINATION
DISASTER CHARTING    12/21/20, 8:22 AM EST    DISCHARGE ONGOING EVALUATION:     [facility] day: 4  Location: [facility]16/016-A Reason for admit: AVELINO (acute kidney injury) ([facility] [address] [N17.9]   Barriers to Discharge: Creat 1.5. More alert today. PCP: LUZ Myles - CNP  Patient Goals/Plan/Treatment Preferences: Possible discharge to home with daughter, spouse, and [facility].

## 2020-12-21 NOTE — PROGRESS NOTES
[facility]  INPATIENT PHYSICAL THERAPY  EVALUATION  STRZ RENAL TELEMETRY 6K - 6K-16/016-A    Time In: 6715  Time Out: 1010  Timed Code Treatment Minutes: 12 Minutes  Minutes: 28          Date: 2020  Patient Name: Corey Carranza,  Gender:  female        MRN: [id number]  : 1935  (81 y.o.)      Referring Practitioner: RENETTA Jhaveri  Diagnosis: AVELINO  Additional Pertinent Hx: Per podiatry HPI: \"The patient is a 81 y.o. female with significant past medical history of PAD and neuropathy who being seen at bedside with complaints of swollen legs that are painful. Patient follows outpatient with Dr. Vera Zhong for this condition. Patient is currently obtunded, and history taken from patient's daughter. Patient was taken by her daughter to the ED last night as the patient had stopped walking and was having trouble speaking. Per ED, there is no current evidence of a stroke, however, the patient was found to have AVELINO with multiple electrolyte abnormalities. This was the reason for the patient's admission. Per patient's daughter, patient recently was given a course of bactrim for a presumed infection in her legs. Other than that, her left leg is significantly more painful than her right. Patient has pain in her legs due whenever they are elevated for too long.  \"     Restrictions/Precautions:  Restrictions/Precautions: Weight Bearing  Right Lower Extremity Weight Bearing: Weight Bearing As Tolerated  Left Lower Extremity Weight Bearing: Weight Bearing As Tolerated  Position Activity Restriction  Other position/activity restrictions: increased distal bilat LE sensitivity    Subjective:  Chart Reviewed: Yes  Patient assessed for rehabilitation services?: Yes  Family / Caregiver Present: No Subjective: RN approved PT evaluation. Pt in supine upon entry and was agreeable to PT interventions. Adiel completed wiht OT secondary to pt's medical acuity and decreased activity tolerance. Post session, pt in bedside chair with all needs in reach. RN aware, chair alarm on. Dtr present in room. Per dtr, pt just received norco and seemed to be heavily affected by this. Pt rubbing/touching this therapists arms, belly and buttock. Saying\" you have a small butt! \". Hand over hand for appropriate positioning of her hands. General:  Overall Orientation Status: Impaired  Orientation Level: Oriented to person, Oriented to place, Disoriented to time, Disoriented to situation  Follows Commands: Within Functional Limits         Hearing: Exceptions to [facility]  Hearing Exceptions: Hard of hearing/hearing concerns         Pain: bilat LE pain, increased sensitivity - value not rated    Social/Functional History:    Lives With: Spouse, Daughter  Type of Home: House  Home Layout: One level  Home Access: Ramped entrance  Home Equipment: Wheelchair-manual, Rolling walker     Bathroom Shower/Tub: (sponge bathes)  Bathroom Toilet: Bedside commode             Ambulation Assistance: Needs assistance(dtr reports being with mother for all transfers with AD and 2WW)  Transfer Assistance: Independent          Additional Comments: Pt reports ambulating short distances with walker and dtr's assist for safety. OBJECTIVE:  Range of Motion:  Bilateral Lower Extremity: WFL    Strength:  Bilateral Lower Extremity: Impaired - grossly deconditioned    Balance:  sitting EOB: pt required contact guard to maxAx1 - pt with heavvy intermittent left lean with right UE pushing, pt wavering in all planes EOB. She notes increased dizziness initially upon sitting which improved with increased time.   Pt tolerated sitting EOB     Bed Mobility: Rolling to Right: Contact Guard Assistance, Minimal Assistance, with head of bed raised, with verbal cues , with increased time for completion   Supine to Sit: Minimal Assistance, Moderate Assistance, X 2, with head of bed raised, with verbal cues , with increased time for completion    Transfers:  Sit to Stand: Moderate Assistance, X 2, with increased time for completion, cues for hand placement, to/from chair with arms, within shayy steady- pt was minAx2  Stand to Sit:Minimal Assistance, Moderate Assistance, X 2, with increased time for completion, cues for hand placement, to/from chair with arms  [facility] steady transfer completed this date. Pt with increased wavering of trunk and closing of her eyes throughout session, cueing provided throughout transfer for improved safety and safety of transfers. Functional Outcome Measures: Completed  AM-PAC Inpatient Mobility without Stair Climbing Raw Score : 14  AM-PAC Inpatient without Stair Climbing T-Scale Score : 40.85    ASSESSMENT:  Activity Tolerance:  Patient tolerance of  treatment: good. Treatment Initiated: Treatment and education initiated within context of evaluation. Evaluation time included review of current medical information, gathering information related to past medical, social and functional history, completion of standardized testing, formal and informal observation of tasks, assessment of data and development of plan of care and goals. Treatment time included skilled education and facilitation of tasks to increase safety and independence with functional mobility for improved independence and quality of life. Increased time required to complete transfer. Pt tolerated sitting EOB for 8-10 minutes with physical assist. Cueing for sequencing and safety with mobility.      Assessment: Body structures, Functions, Activity limitations: Decreased functional mobility , Decreased endurance, Decreased posture, Decreased strength, Decreased safe awareness, Decreased balance, Increased pain  Assessment: Pt admitted secondary to bilat LE pain with weight bearing and pt with difficulty speaking. Pt was found to have AVELINO. She demonstrates a decrease in baseline by way of transfers, bed mobility and ambulation. She will benefit from skilled PT services during admission and post d/c for improved functional I and safety with mobility. Prognosis: Good    REQUIRES PT FOLLOW UP: Yes    Discharge Recommendations:  Discharge Recommendations: Subacute/Skilled Nursing Facility(pt requires 2 asssist, dtr is unable to safely take care of patient at this time)    Patient Education:  PT Education: Goals, PT Role, Plan of Care    Equipment Recommendations:  Equipment Needed: No  Other: defer to next level of care    Plan:  Times per week: 5xGM  Current Treatment Recommendations: Strengthening, Gait Training, Patient/Caregiver Education & Training, Balance Training, Functional Mobility Training, Endurance Training, Transfer Training, Stair training, Neuromuscular Re-education, Equipment Evaluation, Education, & procurement, Home Exercise Program, Safety Education & Training    Goals:  Patient goals : go home  Short term goals  Time Frame for Short term goals: by discharge  Short term goal 1: bed mobility with minAx1 for ease of getting in/out of bed  Short term goal 2: sit <> stand with minAx1 for ease of transfers  Short term goal 3: pt to tolerate standing for >2 minutes while completing dynamic standing task to prepare for gait activity  Short term goal 4: PT to assess gait when appropriate. Long term goals  Time Frame for Long term goals : N/A secondary to short ELOS    Following session, patient left in safe position with all fall risk precautions in place.

## 2020-12-21 NOTE — PROGRESS NOTES
[facility]         Renal Inpatient Follow-Up note         12/21/2020 12:13 PM    Pt Name:   Landon Turpin:   1935  Attending:   LUZ Lagunas CNP    Chief Complaint : Talita Doyle is a 81 y.o. female being followed by nephrology for Yao and hyperkalemia    Interval History :   Patient seen and examined by me. No distress  She is much more awake and alert. No cp or SOB. Daughter at bed-side. Eating and drinking better as well. Scheduled Medications :    warfarin  0.5 mg Oral Once    miconazole   Topical BID    sodium bicarbonate  1,300 mg Oral BID    isosorbide mononitrate  60 mg Oral Daily    metoprolol succinate  50 mg Oral Daily    [Held by provider] lisinopril  5 mg Oral Daily    sodium chloride flush  10 mL Intravenous 2 times per day    fentanNYL  50 mcg Intravenous Once    aspirin  81 mg Oral Daily    [Held by provider] furosemide  20 mg Oral Daily    melatonin  19.5 mg Oral Nightly    polyethylene glycol  17 g Oral Nightly    warfarin (COUMADIN) daily dosing (placeholder)   Other RX Placeholder      sodium chloride 100 mL/hr at 12/21/20 1106       Vitals :  /60 Comment: manual  Pulse 61   Temp 97.5 °F (36.4 °C) (Oral)   Resp 18   Ht 5' 8.5\" (1.74 m)   Wt 199 lb 12.8 oz (90.6 kg)   SpO2 97%   BMI 29.94 kg/m²     24HR INTAKE/OUTPUT:      Intake/Output Summary (Last 24 hours) at 12/21/2020 1213  Last data filed at 12/21/2020 1140  Gross per 24 hour   Intake 3143.11 ml   Output 400 ml   Net 2743.11 ml     Last 3 weights  Wt Readings from Last 3 Encounters:   12/21/20 199 lb 12.8 oz (90.6 kg)           Physical Exam :  General Appearance:  Cachectic and ill nourished .    Mouth/Throat:  Oral mucosa moist  Neck:  Supple, no JVD  Lungs:  Breath sounds: clear  Heart[de-identified]  S1,S2 heard  Abdomen:  Soft, non - tender  Musculoskeletal:  Edema - mild with erythema and chronic skin changes noted         Last 3 CBC No results for input(s): WBC, RBC, HGB, HCT, PLT in the last 72 hours. Last 3 CMP  Recent Labs     12/18/20 2027 12/19/20  0443 12/20/20  0523 12/21/20  0524    147*  141 142 139   K 4.6 4.4  4.7  4.4 4.6  4.6 4.4   * 116*  113* 114* 110   CO2 20* 13*  17* 17* 19*   BUN 43* 40*  40* 33* 27*   CREATININE 2.3* 2.1*  2.2* 1.8* 1.5*   CALCIUM 8.8 8.9  9.1 8.7 8.5   LABALBU 2.5* 2.4*  2.5*  --   --    BILITOT 0.2* 0.2*  0.3  --   --              Assessment    1. Renal -acute kidney injury on chronic kidney disease stage III most likely secondary to multifactorial etiology which include use of diuretics, poor oral intake and also primarily due to the use of Bactrim  ? Currently on IV fluids, creat is getting better. Down to 1.5.  ? BMP in AM. Decrease IVF to 60 ml/hr     2. Electrolytes - WNL  3. Essential hypertension running well continue current medications   4. Metabolic acidosis - on sod bicarb tabs  5. cellulitis lower extremities seen by podiatry. 6. Meds reviewed and discussed with patient and daughter    CRISTINE Armendariz D.  [facility].

## 2020-12-21 NOTE — PROGRESS NOTES
Podiatric Progress Note  Agapito Grant  Subjective :   12.21.20   Patient seen bedside on behalf of Dr. Radhika Faust. Patient states she is not feeling well and had a rough weekend. She states her foot is very painful and she does not want anything to be put on it. Discussed importance of dressing the wound and edema control but she is adamant that it is too painful. She has no other complaints at this time. 12/19/20  Patient seen bedside today on behalf of Dr. Radhika Faust. Pt was not well oriented and was very obtunded. Pt complains of pain to legs but not to exactly where. Unable to get ROS at this time. Patient has no further pedal concerns at this point in time. HISTORY OF PRESENT ILLNESS:                 The patient is a 81 y.o. female with significant past medical history of PAD and neuropathy who being seen at bedside with complaints of swollen legs that are painful. Patient follows outpatient with Dr. Radhika Faust for this condition. Patient is currently obtunded, and history taken from patient's daughter. Patient was taken by her daughter to the ED last night as the patient had stopped walking and was having trouble speaking. Per ED, there is no current evidence of a stroke, however, the patient was found to have AVELINO with multiple electrolyte abnormalities. This was the reason for the patient's admission. Per patient's daughter, patient recently was given a course of bactrim for a presumed infection in her legs. Other than that, her left leg is significantly more painful than her right. Patient has pain in her legs due whenever they are elevated for too long. Patient has had chills beginning last night. Patient does not have any recent history of nausea, vomiting, or fever. No other pedal complaints.      Current Medications:    Current Facility-Administered Medications: warfarin (COUMADIN) split-tablet 0.5 mg, 0.5 mg, Oral, Once  sodium bicarbonate tablet 1,300 mg, 1,300 mg, Oral, BID 0.45 % sodium chloride infusion, , Intravenous, Continuous  isosorbide mononitrate (IMDUR) extended release tablet 60 mg, 60 mg, Oral, Daily  metoprolol succinate (TOPROL XL) extended release tablet 50 mg, 50 mg, Oral, Daily  [Held by provider] lisinopril (PRINIVIL;ZESTRIL) tablet 5 mg, 5 mg, Oral, Daily  zinc oxide (PINXAV) 30 % ointment, , Topical, 4x Daily PRN  sodium chloride flush 0.9 % injection 10 mL, 10 mL, Intravenous, 2 times per day  sodium chloride flush 0.9 % injection 10 mL, 10 mL, Intravenous, PRN  promethazine (PHENERGAN) tablet 12.5 mg, 12.5 mg, Oral, Q6H PRN **OR** ondansetron (ZOFRAN) injection 4 mg, 4 mg, Intravenous, Q6H PRN  acetaminophen (TYLENOL) tablet 650 mg, 650 mg, Oral, Q6H PRN **OR** acetaminophen (TYLENOL) suppository 650 mg, 650 mg, Rectal, Q6H PRN  fentaNYL (SUBLIMAZE) injection 50 mcg, 50 mcg, Intravenous, Once  HYDROcodone-acetaminophen (NORCO) 5-325 MG per tablet 1 tablet, 1 tablet, Oral, Q6H PRN  aspirin EC tablet 81 mg, 81 mg, Oral, Daily  [Held by provider] furosemide (LASIX) tablet 20 mg, 20 mg, Oral, Daily  melatonin tablet 19.5 mg, 19.5 mg, Oral, Nightly  polyethylene glycol (GLYCOLAX) packet 17 g, 17 g, Oral, Nightly  warfarin (COUMADIN) daily dosing (placeholder), , Other, RX Placeholder    Objective     /65   Pulse 53   Temp 97.7 °F (36.5 °C) (Oral)   Resp 16   Ht 5' 8.5\" (1.74 m)   Wt 199 lb 12.8 oz (90.6 kg)   SpO2 92%   BMI 29.94 kg/m²      I/O:    Intake/Output Summary (Last 24 hours) at 12/21/2020 0734  Last data filed at 12/21/2020 0433  Gross per 24 hour   Intake 2695.11 ml   Output 400 ml   Net 2295.11 ml              Wt Readings from Last 3 Encounters:   12/21/20 199 lb 12.8 oz (90.6 kg)       LABS:    Recent Labs     12/18/20  1208   WBC 7.0   HGB 13.9   HCT 43.8           Recent Labs     12/21/20  0524      K 4.4      CO2 19*   BUN 27*   CREATININE 1.5*        Recent Labs     12/18/20 2027 12/19/20  0443 12/20/20  0523 12/21/20 0524   PROT 5.9* 6.2  6.0*  --   --    INR  --  7.17* 1.35* 1.12      No results for input(s): CKTOTAL, CKMB, CKMBINDEX, TROPONINI in the last 72 hours. Focused Lower Extremity Examination:    Vitals:    /65   Pulse 53   Temp 97.7 °F (36.5 °C) (Oral)   Resp 16   Ht 5' 8.5\" (1.74 m)   Wt 199 lb 12.8 oz (90.6 kg)   SpO2 92%   BMI 29.94 kg/m²      Vascular: Dorsalis pedis and posterior tibial pulses are barely palpable bilaterally. Skin temperature is warm to warm from proximal tibial tuberosity to distal digits. CFT <3 seconds to exposed digits. Edema present bilaterally in the dorsal foot and in the leg. Hair growth negative. Quality of skin atrophic     Dermatologic:  No rashes or subcutaneous nodules of note. Patient has red coloration of her feet and legs secondary to a long standing venous stasis dermatitis. There is a fibrotic/granular wound to the dorsum of her left foot. There is serous drainage present. No malodor noted. Wound is superficial. No prudencio wound erythema.     Neurovascular: Unable to assess as patient obtunded.     Musculoskeletal: Muscle strength testing deferred. Pain on palpation of the bilateral legs, left much more painful than right. No gross osseous abnormality noted. ASSESSMENT: Pt. is a 81 y.o. female with:  Principle  Venous stasis ulceration with dermatitis, bilateral    Chronic  Patient Active Problem List   Diagnosis    AVELINO (acute kidney injury) ([facility] [address]       PLAN:   Venous stasis ulceration with dermatitis, bilateral  Right dorsal foot wound     - Patient initially examined and evaluated  - Clinically legs appear uninfected  - Pt will not tolerate dressing.  Discussed with patient that if she feels she can tolerate a dressing to let us know and we will apply an adaptic or bordered foam dressing.   - Increased swelling likely due to AVELINO and CHF  - Swelling control is of utmost importance for prevention of venous leg ulcerations - Elevate legs as much as is comfortable for patient  - WBAT bilateral  - Discussed possibility of arterial dopplers, patient's daughter would not like patient to undergo testing that is too painful for her  - Podiatry will continue to follow patient while in house    DISPO: OK to d/c from podiatry standpoint      Katerine Groves, [id number] [facility]  Podiatric Surgical Resident  12/21/2020   7:34 AM

## 2020-12-22 NOTE — PROGRESS NOTES
[address] RENAL TELEMETRY 6K  Occupational Therapy  Daily Note  Time:   Time In:   Time Out: 0  Timed Code Treatment Minutes: 30 Minutes  Minutes: 30          Date: 2020  Patient Name: Mahogany Solis,   Gender: female      Room: [facility]16/016-A  MRN: [id number]  : 1935  (81 y.o.)  Referring Practitioner: ZAK Ayala CNP  Diagnosis: AVELINO  Additional Pertinent Hx: Patient presents to the ED with her daughter with a complaint of weakness that is worsening over the last 10 days. The patient is no longer able to walk and can't help herself with daily functions. The patient has been seeing someone outpatient for her leg wounds and was started on Bactrim. Since then the patient has been declining    Restrictions/Precautions:  Restrictions/Precautions: Weight Bearing  Right Lower Extremity Weight Bearing: Weight Bearing As Tolerated  Left Lower Extremity Weight Bearing: Weight Bearing As Tolerated  Position Activity Restriction  Other position/activity restrictions: increased distal bilat LE sensitivity     SUBJECTIVE: Nurse Logan herman'debo session, In bed upon arrival, agreeable to OT session, daughter present    PAIN: 4/10: all over, moans and groans during session    COGNITION: Difficulty following directions at times due to being distracted by pain    ADL:   Grooming: with set-up. Combed hair sitting in bedside chair  Lower Extremity Dressing: Dependent. for donning B slipper socks. BALANCE:  Sitting Balance:  Stand By Assistance.  at EOB greater than 3 minutes with L lean at times, poor posture    BED MOBILITY:  Supine to Sit: Moderate Assistance A for trunk, HOB elevated, used bedrail and increased time    TRANSFERS:  Sit to Stand:  Minimal Assistance, X 2. from EOB using [employer] lift equipment  Stand to Sit: Minimal Assistance, X 2. bedside chair    ADDITIONAL ACTIVITIES: Guided patient through BUE AROM this date x10 reps x1 set in chair in order to increase BUE strength and improve activity tolerance for ADLs and homemaking tasks. ASSESSMENT:     Activity Tolerance:  Patient tolerance of  treatment: good. Discharge Recommendations: Patient would benefit from continued therapy after discharge, Continue to assess pending progress   Equipment Recommendations: Equipment Needed: No  Plan: Times per week: 3-5x  Current Treatment Recommendations: Strengthening, Endurance Training, Patient/Caregiver Education & Training, Self-Care / ADL, Balance Training, Functional Mobility Training, Safety Education & Training    Patient Education  Patient Education: Home Exercise Program    Goals  Short term goals  Time Frame for Short term goals: by discharge  Short term goal 1: Pt to sit EOB > 10 min with consistent fbalance of CGA and no cues for posture to increase participation in ADL atsks  Short term goal 2: Pt to complete sit to stand from vakrious surfaces including BSC with mod x 1 and min A x1 to increase indep wtih toileting  Short term goal 3: Pt to complete simple grooming tasks with SBA  Short term goal 4: OTR to further assess mobility as able    Following session, patient left in safe position with all fall risk precautions in place.

## 2020-12-22 NOTE — PROGRESS NOTES
Patient sitting up in chair with daughter at the bedside at the time of interview. Engaged in conversation, denies physical complaints, appropriate when questions were asked. She was updated on the plan of care and the patient and daughter had no other needs or questions at this time. Past medical history, family history, social history and allergies reviewed again and is unchanged since admission. ROS (14 point review of systems completed. Pertinent positives noted. Otherwise ROS is negative) :    GENERAL: No fever,chills, or night sweats. SKIN: Bilateral leg wounds noted. HEAD: No headaches or recent injury. EYES: No acute changes in vision, no diplopia or blurred vision. EARS: No hearing loss, no tinnitus. NOSE/THROAT: No rhinorrhea or pharyngitis, no nasal drainage. NECK: No lumps or unusual neck stiffness. PULMONARY: Respirations easy and non-labored, no acute distress. CARDIAC: No chest pain, pressure, Negative for lower leg edema. GI: Abdomen is soft and non-tender, non-distended. Endorses postoperative nausea. PERIPHERAL VASCULAR: No intermittent claudication or unusual leg cramps. MUSCULOSKELETAL: Occasional arthralgias, myalgias. NEUROLOGICAL: Denies any headache, near syncope, seizures or syncope. HEMATOLOGIC:  No unusual bruising or bleeding. PSYCH: Denies any homicidal or suicidial ideations.     Medications:  Reviewed    Infusion Medications    sodium chloride 60 mL/hr at 12/21/20 1239     Scheduled Medications    miconazole   Topical BID    sodium bicarbonate  1,300 mg Oral BID    isosorbide mononitrate  60 mg Oral Daily    metoprolol succinate  50 mg Oral Daily    [Held by provider] lisinopril  5 mg Oral Daily    sodium chloride flush  10 mL Intravenous 2 times per day    aspirin  81 mg Oral Daily    [Held by provider] furosemide  20 mg Oral Daily    melatonin  19.5 mg Oral Nightly    polyethylene glycol  17 g Oral Nightly  warfarin (COUMADIN) daily dosing (placeholder)   Other RX Placeholder     PRN Meds: zinc oxide, sodium chloride flush, promethazine **OR** ondansetron, acetaminophen **OR** acetaminophen, HYDROcodone 5 mg - acetaminophen      Intake/Output Summary (Last 24 hours) at 12/21/2020 2134  Last data filed at 12/21/2020 1558  Gross per 24 hour   Intake 2227.81 ml   Output 0 ml   Net 2227.81 ml       Diet:  DIET RENAL;  Dietary Nutrition Supplements: Diabetic Oral Supplement    Exam:  /65   Pulse 61   Temp 97.5 °F (36.4 °C) (Oral)   Resp 18   Ht 5' 8.5\" (1.74 m)   Wt 199 lb 12.8 oz (90.6 kg)   SpO2 96%   BMI 29.94 kg/m²     General appearance: Intermittently engaged, chronically ill-appearing geriatric female. No apparent distress, appears stated age and cooperative. HEENT: Pupils equal, round, and reactive to light. Conjunctivae/corneas clear. Neck: Supple, with full range of motion. No jugular venous distention. Trachea midline. Respiratory:  Normal respiratory effort. Clear to auscultation, bilaterally without Rales/Wheezes/Rhonchi. Cardiovascular: Regular rate and rhythm with normal S1/S2 without murmurs, rubs or gallops. Abdomen: Soft, non-tender, non-distended with normal bowel sounds. Musculoskeletal: Passive and active ROM x 4 extremities. Skin: Skin color pale; texture and turgor normal.  No rashes or lesions. Neurologic:  Neurovascularly intact without any focal sensory/motor deficits. Psychiatric: Sleepy/groggy, but appropriate when simple questions are asked. Pleasantly disoriented. Capillary Refill: Brisk,< 3 seconds   Peripheral Pulses: +2 palpable, equal bilaterally     Labs:   No results for input(s): WBC, HGB, HCT, PLT in the last 72 hours.   Recent Labs     12/19/20  0443 12/20/20  0523 12/21/20  0524   *  141 142 139   K 4.4  4.7  4.4 4.6  4.6 4.4   *  113* 114* 110   CO2 13*  17* 17* 19*   BUN 40*  40* 33* 27*   CREATININE 2.1*  2.2* 1.8* 1.5* CALCIUM 8.9  9.1 8.7 8.5     Recent Labs     12/19/20  0443   AST 17  16   ALT 8*  8*   BILIDIR <0.2   BILITOT 0.2*  0.3   ALKPHOS 62  64     Recent Labs     12/19/20  0443 12/20/20  0523 12/21/20  0524   INR 7.17* 1.35* 1.12     No results for input(s): Margette Dec in the last 72 hours. Microbiology:    Blood culture #1: No results found for: BC    Blood culture #2:No results found for: Shane Lav    Organism:No results found for: ORG    No results found for: LABGRAM    MRSA culture only:No results found for: [facility]    Urine culture: No results found for: LABURIN    Respiratory culture: No results found for: CULTRESP    Aerobic and Anaerobic :  No results found for: LABAERO  No results found for: LABANAE    Urinalysis:      Lab Results   Component Value Date    NITRU NEGATIVE 12/17/2020    WBCUA 2-4 12/17/2020    BACTERIA NONE SEEN 12/17/2020    RBCUA 0-2 12/17/2020    BLOODU NEGATIVE 12/17/2020    GLUCOSEU NEGATIVE 12/17/2020       Radiology:  XR CHEST PORTABLE   Final Result   Impression:   1. Enlarged cardiac silhouette with prominence to the central pulmonary    vasculature concerning for central venous congestion. This document has been electronically signed by: Shaun Sargent MD on    12/17/2020 01:45 AM         CT HEAD WO CONTRAST (CODE STROKE)   Final Result   Impression:   1. No acute intracranial hemorrhage   2. Cerebral volume loss. This document has been electronically signed by: Shaun Sargent MD on    12/17/2020 12:54 AM      All CT scans at this facility use dose modulation, iterative    reconstruction, and/or weight-based   dosing when appropriate to reduce radiation dose to as low as reasonably    achievable.            Ct Head Wo Contrast (code Stroke)    Result Date: 12/17/2020 Exam: CT brain without contrast Comparison: None Clinical history: Slurred speech Findings: No acute intracranial hemorrhage identified. Enlargement of the ventricles and sulci secondary to volume loss. No abnormal extra-axial fluid collections are seen. No intracranial mass or midline shift. No skull fracture. Convex ossification seen at the left anterior calvarium may be due to hyperostosis versus ossified meningioma. Impression: 1. No acute intracranial hemorrhage 2. Cerebral volume loss. This document has been electronically signed by: Will Katz MD on 12/17/2020 12:54 AM All CT scans at this facility use dose modulation, iterative reconstruction, and/or weight-based dosing when appropriate to reduce radiation dose to as low as reasonably achievable. Xr Chest Portable    Result Date: 12/17/2020  Exam: One View of the chest Comparison: None Findings: Left-sided pacemaker Cardiac silhouette is enlarged. Central pulmonary vasculature is prominent in size which may represent central venous congestion. No pneumothorax. No pleural fluid collection. No pneumonia. Impression: 1. Enlarged cardiac silhouette with prominence to the central pulmonary vasculature concerning for central venous congestion. This document has been electronically signed by: Will Katz MD on 12/17/2020 01:45 AM       **This report has been created using voice recognition software. It may contain minor errors which are inherent in voice recognition technology. **  Electronically signed by LUZ Ventura CNP on 12/21/2020 at 9:34 PM

## 2020-12-22 NOTE — PROGRESS NOTES
Clinical Pharmacy Note                                               Warfarin Discharge Recommendations      Pt discharged from [facility] today after admission for AVELINO    INR today:  Recent Labs     12/22/20  0534   INR 1.05       Coumadin 1 mg and 2.5 mg tabs    Interacting medications at discharge: Lovenox 1 mg/kg Q12H (bridging), aspirin     INR goal during admission: 2-3    Recommendations for discharge:   Date Lovenox Dose Warfarin Dose   12/22/2020 90 mg PM   (given in hospital) 1 mg   (given in hospital)   12/23/2020 90 mg AM +   90 mg PM 0.5 mg   12/24/2020 90 mg AM +   90 mg PM 0.5 mg   12/25/2020 90 mg AM +   90 mg PM 0.5 mg   12/26/2020 90 mg AM +   90 mg PM 0.5 mg   12/27/2020 90 mg AM +   90 mg PM 0.5 mg   12/28/2020 90 mg AM Check INR       Provider dosing warfarin: [facility]    Recheck INR:  Home Health to draw INR on Monday, 12/28/2020 with results to [address] PharmD   12/22/2020, 5:15 PM

## 2020-12-22 NOTE — DISCHARGE INSTR - MEDS
Warfarin recommendations for discharge:   Date Lovenox Dose Warfarin Dose   12/22/2020 90 mg PM   (given in hospital) 1 mg   (given in hospital)   12/23/2020 90 mg AM +   90 mg PM 0.5 mg   12/24/2020 90 mg AM +   90 mg PM 0.5 mg   12/25/2020 90 mg AM +   90 mg PM 0.5 mg   12/26/2020 90 mg AM +   90 mg PM 0.5 mg   12/27/2020 90 mg AM +   90 mg PM 0.5 mg   12/28/2020 90 mg AM Check INR       Provider dosing warfarin: [facility]    Recheck INR:  [facility] to draw INR on Monday, 12/28/2020 with results to [address]

## 2020-12-22 NOTE — PROGRESS NOTES
Discharge instructions discussed with patients daughter. No concerns voiced. Meds to beds delivered. Lovenox instructions discussed with daughter. Coumadin orders discussed with pt daughter. Assisted pt in to car with assistance of other nurse. Discharged with daughter home.

## 2020-12-22 NOTE — DISCHARGE INSTR - DIET
? Good nutrition is important when healing from an illness, injury, or surgery. Follow any nutrition recommendations given to you during your hospital stay. ? If you were given an oral nutrition supplement while in the hospital, continue to take this supplement at home. You can take it with meals, in-between meals, and/or before bedtime. These supplements can be purchased at most local grocery stores, pharmacies, and chain [employer]-stores. ? If you have any questions about your diet or nutrition, call the hospital and ask for the dietitian. Low Sodium Diet (2,000 Milligram): Care Instructions  Your Care Instructions     Too much sodium causes your body to hold on to extra water. This can raise your blood pressure and force your heart and kidneys to work harder. In very serious cases, this could cause you to be put in the hospital. It might even be life-threatening. By limiting sodium, you will feel better and lower your risk of serious problems. The most common source of sodium is salt. People get most of the salt in their diet from canned, prepared, and packaged foods. Fast food and restaurant meals also are very high in sodium. Your doctor will probably limit your sodium to less than 2,000 milligrams (mg) a day. This limit counts all the sodium in prepared and packaged foods and any salt you add to your food. Follow-up care is a key part of your treatment and safety. Be sure to make and go to all appointments, and call your doctor if you are having problems. It's also a good idea to know your test results and keep a list of the medicines you take. How can you care for yourself at home? Read food labels  · Read labels on cans and food packages. The labels tell you how much sodium is in each serving. Make sure that you look at the serving size. If you eat more than the serving size, you have eaten more sodium. · Food labels also tell you the Percent Daily Value for sodium. Choose products with low Percent Daily Values for sodium. · Be aware that sodium can come in forms other than salt, including monosodium glutamate (MSG), sodium citrate, and sodium bicarbonate (baking soda). MSG is often added to Asian food. When you eat out, you can sometimes ask for food without MSG or added salt. Buy low-sodium foods  · Buy foods that are labeled \"unsalted\" (no salt added), \"sodium-free\" (less than 5 mg of sodium per serving), or \"low-sodium\" (less than 140 mg of sodium per serving). Foods labeled \"reduced-sodium\" and \"light sodium\" may still have too much sodium. Be sure to read the label to see how much sodium you are getting. · Buy fresh vegetables, or frozen vegetables without added sauces. Buy low-sodium versions of canned vegetables, soups, and other canned goods. Prepare low-sodium meals  · Cut back on the amount of salt you use in cooking. This will help you adjust to the taste. Do not add salt after cooking. One teaspoon of salt has about 2,300 mg of sodium. · Take the salt shaker off the table. · Flavor your food with garlic, lemon juice, onion, vinegar, herbs, and spices. Do not use soy sauce, lite soy sauce, steak sauce, onion salt, garlic salt, celery salt, mustard, or ketchup on your food. · Use low-sodium salad dressings, sauces, and ketchup. Or make your own salad dressings and sauces without adding salt. · Use less salt (or none) when recipes call for it. You can often use half the salt a recipe calls for without losing flavor. Other foods such as rice, pasta, and grains do not need added salt. · Rinse canned vegetables, and cook them in fresh water. This removes somebut not allof the salt. · Avoid water that is naturally high in sodium or that has been treated with water softeners, which add sodium. Call your local water company to find out the sodium content of your water supply. If you buy bottled water, read the label and choose a sodium-free brand. Avoid high-sodium foods  · Avoid eating:  ? Smoked, cured, salted, and canned meat, fish, and poultry. ? Ham, stokes, hot dogs, and luncheon meats. ? Regular, hard, and processed cheese and regular peanut butter. ? Crackers with salted tops, and other salted snack foods such as pretzels, chips, and salted popcorn. ? Frozen prepared meals, unless labeled low-sodium. ? Canned and dried soups, broths, and bouillon, unless labeled sodium-free or low-sodium. ? Canned vegetables, unless labeled sodium-free or low-sodium. ? Western [employer] fries, pizza, tacos, and other fast foods. ? Pickles, olives, ketchup, and other condiments, especially soy sauce, unless labeled sodium-free or low-sodium. Where can you learn more? Go to https://[email or web address] org and sign in to your [employer] account. Enter M325 in the [facility] box to learn more about \"Low Sodium Diet (2,000 Milligram): Care Instructions. \"     If you do not have an account, please click on the \"Sign Up Now\" link. Current as of: August 22, 2019               Content Version: 12.6  © 6964-2623 [employer]. Care instructions adapted under license by [facility] ([facility]). If you have questions about a medical condition or this instruction, always ask your healthcare professional. [place] [address] any warranty or liability for your use of this information.

## 2020-12-22 NOTE — DISCHARGE SUMMARY
Hospitalist Discharge Summary        Patient: Trung Gupta  YOB: 1935  MRN: [id number]   Acct: [de-identified]    Primary Care Physician: LUZ Hackett CNP    Admit date  12/17/2020    Discharge date:  12/22/2020 4:14 PM    Chief Complaint on presentation :-  Weakness    Discharge Assessment and Plan:-   Encourage  1. AVELINO on CKD:  Improving. Creatinine level 1.6 this morning, close to baseline. Patient responded well to gentle hydration with half-normal saline per nephrology recommendations. 2. Electrolyte abnormalities:  · Hyperkalemia:  Resolved. · Hypernatremia: Resolved. · Hypomagnesemia: Resolved. 3. Acute metabolic encephalopathy: Resolved, back to baseline. Most likely secondary to above issues. Patient does not appear infectious, ammonia level normal, thyroid studies stable. 4. Essential hypertension: History, stable. Currently normotensive. Continue Imdur and Toprol-XL at discharge. 5. Lower extremity wounds:  Podiatry evaluated. 6. Dementia: History. Palliative care consulted to review goals of care and CODE STATUS, currently full code. 7. Physical deconditioning: PT/OT evaluated, recommendations for subacute/SNF. Daughter adamant about taking patient home despite PT/OT recommendations. Significant needs noted, thoroughly explained to daughter and she verbalized her understanding. Initial H and P and Hospital course:-  **Per Chart Review:  \"Patient presents to the ED with her daughter with a complaint of weakness that is worsening over the last 10 days. The patient is no longer able to walk and can't help herself with daily functions.  The patient has been seeing someone outpatient for her leg wounds and was started on Bactrim.  Since then the patient has been declining. \" During the patient's stay, she was evaluated and treated for an AVELINO as well as an acute metabolic encephalopathy. The patient slowly came back around to her baseline after gentle fluid hydration and responded well to therapies provided by the nephrology team as well. Unfortunately, physical deconditioning was noted and PT/OT recommended subacute/skilled nursing upon discharge to further work on endurance and strength. The patient's daughter adamantly refused these recommendations, stating that she is taking care of her mother at home for a long time and can manage things on her own. At the time of discharge, the patient appears back to her baseline alert and pleasantly confused, denying any physical complaints. The discharge plan of care was reviewed with the daughter and she verbalized her understanding and had no other needs or questions at this time. Physical Exam:-  Vitals:   Patient Vitals for the past 24 hrs:   BP Temp Temp src Pulse Resp SpO2   12/22/20 1515 (!) 128/59 97.7 °F (36.5 °C) Oral 70 16 93 %   12/22/20 0946      97 %   12/22/20 0930 126/66 97.5 °F (36.4 °C) Oral 73 16 97 %   12/22/20 0401 114/64 97.4 °F (36.3 °C) Oral 54 16 96 %   12/21/20 2321 (!) 112/56 97.7 °F (36.5 °C) Oral 62 18 94 %   12/21/20 2010 119/65 97.5 °F (36.4 °C) Oral 61 18 96 %     Weight:   Weight: 199 lb 12.8 oz (90.6 kg)   24 hour intake/output:     Intake/Output Summary (Last 24 hours) at 12/22/2020 1614  Last data filed at 12/22/2020 0405  Gross per 24 hour   Intake 772 ml   Output    Net 772 ml     1. General appearance: No apparent distress, appears stated age and cooperative. 2. HEENT: Normal cephalic, atraumatic without obvious deformity. Pupils equal, round, and reactive to light. Extra ocular muscles intact. Conjunctivae/corneas clear. 3. Neck: Supple, with full range of motion. No jugular venous distention. Trachea midline. 4. Respiratory:  Normal respiratory effort. Clear to auscultation, bilaterally without Rales/Wheezes/Rhonchi. 5. Cardiovascular: Regular rate and rhythm with normal S1/S2 without murmurs, rubs or gallops. 6. Abdomen: Soft, non-tender, non-distended with normal bowel sounds. 7. Musculoskeletal:  No clubbing, cyanosis or edema bilaterally. Physical deconditioning noted. 8. Skin: Skin color, texture, turgor normal.  No rashes or lesions. Lower extremity wounds noted. 9. Neurologic:  Neurovascularly intact without any focal sensory/motor deficits. Cranial nerves: II-XII intact, grossly non-focal.  10. Psychiatric: Alert and engaged, pleasantly confused  11. Capillary Refill: Brisk,< 3 seconds   12. Peripheral Pulses: +2 palpable, equal bilaterally     Discharge Medications:-      Medication List      START taking these medications    enoxaparin 100 MG/ML injection  Commonly known as: LOVENOX  Inject 0.9 mLs into the skin every 12 hours for 7 days Please bridge INR levels with Lovenox until follow-up appointment can be made with PCP     miconazole 2 % powder  Commonly known as: MICOTIN  Apply topically 2 times daily.         CONTINUE taking these medications    5-Methyltetrahydrofolate Powd     aspirin 81 MG EC tablet     B COMPLEX-B12-C IJ     cyanocobalamin 1000 MCG/ML injection     D-MANNOSE PO     furosemide 20 MG tablet  Commonly known as: LASIX     isosorbide mononitrate 60 MG extended release tablet  Commonly known as: IMDUR     lisinopril 5 MG tablet  Commonly known as: PRINIVIL;ZESTRIL     Melatonin 10 MG Tabs     metoprolol succinate 50 MG extended release tablet  Commonly known as: TOPROL XL     NONFORMULARY     NONFORMULARY     NONFORMULARY     NONFORMULARY     * Pinxav 30 % Oint  Generic drug: zinc oxide     * zinc oxide 40 % ointment  Commonly known as: DESITIN     polyethylene glycol 17 g packet  Commonly known as: GLYCOLAX     potassium chloride 10 MEQ extended release capsule Commonly known as: MICRO-K     Vitamin D3 1.25 MG (45598 UT) Caps     * warfarin 2.5 MG tablet  Commonly known as: COUMADIN     * warfarin 1 MG tablet  Commonly known as: COUMADIN     zinc gluconate 50 MG tablet         * This list has 4 medication(s) that are the same as other medications prescribed for you. Read the directions carefully, and ask your doctor or other care provider to review them with you.             STOP taking these medications    HYDROcodone-acetaminophen  MG per tablet  Commonly known as: Esau Salguero           Where to Get Your Medications      These medications were sent to [facility] [address], [address]  [address], [place] [address]    Phone: [phone]   · enoxaparin 100 MG/ML injection  · miconazole 2 % powder          Labs :-  Recent Results (from the past 72 hour(s))   Vitamin B12 & Folate    Collection Time: 12/20/20  5:23 AM   Result Value Ref Range    Vitamin B-12 1965 (H) 211 - 911 pg/mL    Folate 12.7 4.8 - 24.2 ng/mL   Basic Metabolic Panel w/ Reflex to MG    Collection Time: 12/20/20  5:23 AM   Result Value Ref Range    Sodium 142 135 - 145 meq/L    Potassium reflex Magnesium 4.6 3.5 - 5.2 meq/L    Chloride 114 (H) 98 - 111 meq/L    CO2 17 (L) 23 - 33 meq/L    Glucose 104 70 - 108 mg/dL    BUN 33 (H) 7 - 22 mg/dL    CREATININE 1.8 (H) 0.4 - 1.2 mg/dL    Calcium 8.7 8.5 - 10.5 mg/dL   Protime-INR    Collection Time: 12/20/20  5:23 AM   Result Value Ref Range    INR 1.35 (H) 0.85 - 1.09   Basic Metabolic Panel    Collection Time: 12/20/20  5:23 AM   Result Value Ref Range    Potassium 4.6 3.5 - 5.2 meq/L   Anion Gap    Collection Time: 12/20/20  5:23 AM   Result Value Ref Range    Anion Gap 11.0 8.0 - 16.0 meq/L   Glomerular Filtration Rate, Estimated    Collection Time: 12/20/20  5:23 AM   Result Value Ref Range    Est, Glom Filt Rate 27 (A) ml/min/1.73m2   Magnesium Collection Time: 12/20/20  5:23 AM   Result Value Ref Range    Magnesium 2.3 1.6 - 2.4 mg/dL   Basic Metabolic Panel w/ Reflex to MG    Collection Time: 12/21/20  5:24 AM   Result Value Ref Range    Sodium 139 135 - 145 meq/L    Potassium reflex Magnesium 4.4 3.5 - 5.2 meq/L    Chloride 110 98 - 111 meq/L    CO2 19 (L) 23 - 33 meq/L    Glucose 99 70 - 108 mg/dL    BUN 27 (H) 7 - 22 mg/dL    CREATININE 1.5 (H) 0.4 - 1.2 mg/dL    Calcium 8.5 8.5 - 10.5 mg/dL   Protime-INR    Collection Time: 12/21/20  5:24 AM   Result Value Ref Range    INR 1.12 0.85 - 1.13   Anion Gap    Collection Time: 12/21/20  5:24 AM   Result Value Ref Range    Anion Gap 10.0 8.0 - 16.0 meq/L   Glomerular Filtration Rate, Estimated    Collection Time: 12/21/20  5:24 AM   Result Value Ref Range    Est, Glom Filt Rate 33 (A) MC/SGU/0.26Z6   Basic Metabolic Panel w/ Reflex to MG    Collection Time: 12/22/20  5:34 AM   Result Value Ref Range    Sodium 142 135 - 145 meq/L    Potassium reflex Magnesium 4.4 3.5 - 5.2 meq/L    Chloride 109 98 - 111 meq/L    CO2 21 (L) 23 - 33 meq/L    Glucose 87 70 - 108 mg/dL    BUN 23 (H) 7 - 22 mg/dL    CREATININE 1.6 (H) 0.4 - 1.2 mg/dL    Calcium 8.4 (L) 8.5 - 10.5 mg/dL   Protime-INR    Collection Time: 12/22/20  5:34 AM   Result Value Ref Range    INR 1.05 0.85 - 1.13   Anion Gap    Collection Time: 12/22/20  5:34 AM   Result Value Ref Range    Anion Gap 12.0 8.0 - 16.0 meq/L   Glomerular Filtration Rate, Estimated    Collection Time: 12/22/20  5:34 AM   Result Value Ref Range    Est, Glom Filt Rate 31 (A) ml/min/1.73m2        Microbiology:    Blood culture #1: No results found for: BC    Blood culture #2:No results found for: BLOODCULT2    Organism:  No results found for: LABGRAM    MRSA culture only:No results found for: [facility]    Urine culture: No results found for: LABURIN  No results found for: ORG     Respiratory culture: No results found for: CULTRESP    Aerobic and Anaerobic : No results found for: LABAERO  No results found for: LABANAE    Urinalysis:      Lab Results   Component Value Date    NITRU NEGATIVE 12/17/2020    WBCUA 2-4 12/17/2020    BACTERIA NONE SEEN 12/17/2020    RBCUA 0-2 12/17/2020    BLOODU NEGATIVE 12/17/2020    GLUCOSEU NEGATIVE 12/17/2020       Radiology:-  Ct Head Wo Contrast (code Stroke)    Result Date: 12/17/2020  Exam: CT brain without contrast Comparison: None Clinical history: Slurred speech Findings: No acute intracranial hemorrhage identified. Enlargement of the ventricles and sulci secondary to volume loss. No abnormal extra-axial fluid collections are seen. No intracranial mass or midline shift. No skull fracture. Convex ossification seen at the left anterior calvarium may be due to hyperostosis versus ossified meningioma. Impression: 1. No acute intracranial hemorrhage 2. Cerebral volume loss. This document has been electronically signed by: Alethea Choi MD on 12/17/2020 12:54 AM All CT scans at this facility use dose modulation, iterative reconstruction, and/or weight-based dosing when appropriate to reduce radiation dose to as low as reasonably achievable. Xr Chest Portable    Result Date: 12/17/2020  Exam: One View of the chest Comparison: None Findings: Left-sided pacemaker Cardiac silhouette is enlarged. Central pulmonary vasculature is prominent in size which may represent central venous congestion. No pneumothorax. No pleural fluid collection. No pneumonia. Impression: 1. Enlarged cardiac silhouette with prominence to the central pulmonary vasculature concerning for central venous congestion.  This document has been electronically signed by: Alethea Choi MD on 12/17/2020 01:45 AM      Follow-up scheduled after discharge :-    in 1 week with LUZ Wilcox - CNP    Consultations during this hospital stay:-

## 2020-12-22 NOTE — DISCHARGE INSTR - COC
Continuity of Care Form    Patient Name: Sedrick Cristobal   :  1935  MRN:  [id number]    Admit date:  2020  Discharge date:  ***    Code Status Order: Full Code   Advance Directives:     Admitting Physician:  London Lloyd DO  PCP: LUZ Fountain CNP    Discharging Nurse: [facility] Unit/Room#: 6K-16/016-A  Discharging Unit Phone Number: ***    Emergency Contact:   Extended Emergency Contact Information  Primary Emergency Contact: Lena Antonio [facility] Phone: [phone]  Relation: Spouse   needed? No  Secondary Emergency Contact: Jerrell Antonio Phone: [phone]  Relation: Child   needed? No    Past Surgical History:  History reviewed. No pertinent surgical history. Immunization History: There is no immunization history on file for this patient.     Active Problems:  Patient Active Problem List   Diagnosis Code    [facility] N17.9       Isolation/Infection:   Isolation          No Isolation        Patient Infection Status     None to display          Nurse Assessment:  Last Vital Signs: /66   Pulse 73   Temp 97.5 °F (36.4 °C) (Oral)   Resp 16   Ht 5' 8.5\" (1.74 m)   Wt 199 lb 12.8 oz (90.6 kg)   SpO2 97%   BMI 29.94 kg/m²     Last documented pain score (0-10 scale): Pain Level: 0  Last Weight:   Wt Readings from Last 1 Encounters:   20 199 lb 12.8 oz (90.6 kg)     Mental Status:  {IP PT MENTAL STATUS:}    IV Access:  { MANNY IV ACCESS:518577020}    Nursing Mobility/ADLs:  Walking   {CHP DME AEJB:375675193}  Transfer  {CHP DME AHTU:084793467}  Bathing  {CHP DME AIHQ:751043781}  Dressing  {CHP DME DPSH:973228940}  Toileting  {CHP DME UMNA:275679998}  Feeding  {CHP DME YFD}  Med Admin  {P DME GSII:217677760}  Med Delivery   { MANNY MED Delivery:140388388}    Wound Care Documentation and Therapy:  Wound 20 Buttocks 3 opened spots (Active) Wound Etiology Pressure Stage  2 20 0943   Dressing/Treatment Protective barrier 20 0943   Wound Assessment Pink/red 20 0943   Alexsandra-wound Assessment Non-blanchable erythema 20 0943   Number of days: 5       Wound 20 Buttocks DTI (Active)   Wound Etiology Deep tissue/Injury 20 0943   Wound Assessment Purple/maroon 20 0943   Alexsandra-wound Assessment Non-blanchable erythema 20 0943   Number of days: 5        Elimination:  Continence:   · Bowel: {YES / PA:18580}  · Bladder: {YES / YJ:34620}  Urinary Catheter: {Urinary Catheter:290147418}   Colostomy/Ileostomy/Ileal Conduit: {YES / NC:60253}       Date of Last BM: ***    Intake/Output Summary (Last 24 hours) at 2020 1035  Last data filed at 2020 0405  Gross per 24 hour   Intake 1809.9 ml   Output 0 ml   Net 1809.9 ml     I/O last 3 completed shifts: In: 1809.9 [P.O.:600;  I.V.:1209.9]  Out: 0     Safety Concerns:     [email or web address] Safety Concerns:869379696}    Impairments/Disabilities:      [email or web address] Impairments/Disabilities:799543725}    Nutrition Therapy:  Current Nutrition Therapy:   [email or web address] Diet List:168553275}    Routes of Feeding: {CHP DME Other Feedings:995974349}  Liquids: {Slp liquid thickness:02144}  Daily Fluid Restriction: {CHP DME Yes amt example:725978613}  Last Modified Barium Swallow with Video (Video Swallowing Test): {Done Not Done ZYWK:613423503}    Treatments at the Time of Hospital Discharge:   Respiratory Treatments: ***  Oxygen Therapy:  {Therapy; copd oxygen:69093}  Ventilator:    { CC Vent PBKY:734891236}    Rehab Therapies: {THERAPEUTIC INTERVENTION:9614640589}  Weight Bearing Status/Restrictions: [employer]  Weight Bearin}  Other Medical Equipment (for information only, NOT a DME order):  {EQUIPMENT:390990268}  Other Treatments: ***    Patient's personal belongings (please select all that are sent with patient):  {LAURA DME Belongings:413811248}    RN SIGNATURE:  {Esignature:903332528} CASE MANAGEMENT/SOCIAL WORK SECTION    Inpatient Status Date: ***    Readmission Risk Assessment Score:  Readmission Risk              Risk of Unplanned Readmission:        19           Discharging to Facility/ Agency   · Name:   · Address:  · Phone:  · Fax:    Dialysis Facility (if applicable)   · Name:  · Address:  · Dialysis Schedule:  · Phone:  · Fax:    / signature: {Esignature:412803842}    PHYSICIAN SECTION    Prognosis: {Prognosis:9816222562}    Condition at Discharge: [address] [place] [address] Patient Condition:069464736}    Rehab Potential (if transferring to Rehab): {Prognosis:5894341935}    Recommended Labs or Other Treatments After Discharge: ***    Physician Certification: I certify the above information and transfer of Trevin Hutchins  is necessary for the continuing treatment of the diagnosis listed and that she requires {Admit to Appropriate Level of Care:37931} for {GREATER/LESS:912877657} 30 days.      Update Admission H&P: {CHP DME Changes in EMAN}    PHYSICIAN SIGNATURE:  {Esignature:911656528}

## 2020-12-22 NOTE — CARE COORDINATION
DISASTER CHARTING    12/22/20, 8:20 AM EST    DISCHARGE ONGOING EVALUATION:     [place] campus day: 5  Location: -16/016-A Reason for admit: AVELINO (acute kidney injury) ([address] [N17.9]   Barriers to Discharge: Creat 1.6. IVF decreased. Podiatry s/o. Medically ready for discharge. PCP: LUZ Quevedo - CNP  Patient Goals/Plan/Treatment Preferences: Plan is for home with daughter and [facility]. Tony Recinos to reach out to daughter today as therapy recommends ECF.

## 2020-12-22 NOTE — PROGRESS NOTES
[facility]         Renal Inpatient Follow-Up note         12/22/2020 9:17 AM    Pt Name:   Dion Richardson  YOB: 1935  Attending:   LUZ Keene CNP    Chief Complaint : Dion Richardson is a 81 y.o. female being followed by nephrology for Yao and hyperkalemia    Interval History :   Patient seen and examined by me. No distress  She is resting comfortably. Daughter at bedside  Does not appear to be having any shortness of breath. Scheduled Medications :    miconazole   Topical BID    sodium bicarbonate  1,300 mg Oral BID    isosorbide mononitrate  60 mg Oral Daily    metoprolol succinate  50 mg Oral Daily    [Held by provider] lisinopril  5 mg Oral Daily    sodium chloride flush  10 mL Intravenous 2 times per day    aspirin  81 mg Oral Daily    [Held by provider] furosemide  20 mg Oral Daily    melatonin  19.5 mg Oral Nightly    polyethylene glycol  17 g Oral Nightly    warfarin (COUMADIN) daily dosing (placeholder)   Other RX Placeholder      sodium chloride 60 mL/hr at 12/21/20 2339       Vitals :  /64   Pulse 54   Temp 97.4 °F (36.3 °C) (Oral)   Resp 16   Ht 5' 8.5\" (1.74 m)   Wt 199 lb 12.8 oz (90.6 kg)   SpO2 96%   BMI 29.94 kg/m²     24HR INTAKE/OUTPUT:      Intake/Output Summary (Last 24 hours) at 12/22/2020 0917  Last data filed at 12/22/2020 0405  Gross per 24 hour   Intake 1809.9 ml   Output 0 ml   Net 1809.9 ml     Last 3 weights  Wt Readings from Last 3 Encounters:   12/21/20 199 lb 12.8 oz (90.6 kg)           Physical Exam :  General Appearance:  Cachectic and ill nourished . Mouth/Throat:  Oral mucosa moist  Neck:  Supple, no JVD  Lungs:  Breath sounds: clear  Heart[de-identified]  S1,S2 heard  Abdomen:  Soft, non - tender  Musculoskeletal:  Edema - mild with erythema and chronic skin changes noted         Last 3 CBC   No results for input(s): WBC, RBC, HGB, HCT, PLT in the last 72 hours.   Last 3 CMP  Recent Labs     12/20/20

## 2020-12-22 NOTE — PROGRESS NOTES
[address] [address]  INPATIENT PHYSICAL THERAPY  DAILY NOTE  [facility] RENAL TELEMETRY 6K - 6K-16/016-A     Time In: 8763  Time Out: 1226  Timed Code Treatment Minutes: 34 Minutes  Minutes: 34          Date: 2020  Patient Name: Lucrecia Mckinnon,  Gender:  female        MRN: [id number]  : 1935  (81 y.o.)     Referring Practitioner: RENETTA Vega  Diagnosis: AVELINO  Additional Pertinent Hx: Per podiatry HPI: \"The patient is a 81 y.o. female with significant past medical history of PAD and neuropathy who being seen at bedside with complaints of swollen legs that are painful. Patient follows outpatient with Dr. Odalys Escalante for this condition. Patient is currently obtunded, and history taken from patient's daughter. Patient was taken by her daughter to the ED last night as the patient had stopped walking and was having trouble speaking. Per ED, there is no current evidence of a stroke, however, the patient was found to have AVELINO with multiple electrolyte abnormalities. This was the reason for the patient's admission. Per patient's daughter, patient recently was given a course of bactrim for a presumed infection in her legs. Other than that, her left leg is significantly more painful than her right. Patient has pain in her legs due whenever they are elevated for too long. \"     Prior Level of Function:  Lives With: Spouse, Daughter  Type of Home: House  Home Layout: One level  Home Access: Ramped entrance  Home Equipment: Wheelchair-manual, Rolling walker   Bathroom Shower/Tub: (sponge bathes)  Bathroom Toilet: Bedside commode    ADL Assistance: Needs assistance  Ambulation Assistance: Needs assistance  Transfer Assistance: Independent  Additional Comments: Pt reports ambulating short distances with walker and dtr's assist for safety.  DAughter stating she assist pt with all ADL tasks including sponge bathing    Restrictions/Precautions:  Restrictions/Precautions: Weight Bearing Right Lower Extremity Weight Bearing: Weight Bearing As Tolerated  Left Lower Extremity Weight Bearing: Weight Bearing As Tolerated  Position Activity Restriction  Other position/activity restrictions: increased distal bilat LE sensitivity      SUBJECTIVE: Pt resting in recliner and is ready to return to bed and willing to try ambulating. PAIN: not stated      OBJECTIVE:  Bed Mobility:  Rolling to Left: Moderate Assistance   Rolling to Right: Moderate Assistance   Sit to Supine: Maximum Assistance, for LEs onto bed     Transfers:  Sit to Stand: [employer], cues for hand placement  Stand to [address] [address], cues for hand placement, with verbal cues, to be properly positioned to sit. Ambulation:  Contact Guard Assistance, Minimal Assistance  Distance: 5 ft  Surface: Level Tile  Device:Rolling Walker  Gait Deviations: Forward Flexed Posture, Slow Hope, Decreased Step Length Bilaterally, Decreased Weight Shift Bilaterally, Decreased Gait Speed, Decreased Heel Strike Bilaterally and Unsteady Gait    Balance:  Static Sitting Balance:  Stand By Assistance  Dynamic Sitting Balance: Stand By Assistance  Static Standing Balance: Contact Guard Assistance  Dynamic Standing Balance: Contact Guard Assistance    Exercise:  Patient was guided in 1 set(s) 10 reps of exercise to both lower extremities. Ankle pumps, Seated marches, Seated heel/toe raises and Long arc quads. Exercises were completed for increased independence with functional mobility. Functional Outcome Measures: Completed  AM-PAC Inpatient Mobility without Stair Climbing Raw Score : 15  AM-PAC Inpatient without Stair Climbing T-Scale Score : 43.03    ASSESSMENT:  Assessment: Patient progressing toward established goals. and would benefit from continued skilled PT for further strengthening, transfers and gait training for improved safety with functional mobility. Activity Tolerance:  Patient tolerance of  treatment: fair. Plus       Equipment Recommendations:Equipment Needed: No  Other: defer to next level of care  Discharge Recommendations: While pt would greatly benefit from continued skilled PT at SNF, family is insisting on going home with Home Health services. Daughter was able to observe session and feels that she could manage pt with how she was able to move this time.  Subacute/Skilled Nursing Facility    Plan: Times per week: 5xGM  Current Treatment Recommendations: Strengthening, Gait Training, Patient/Caregiver Education & Training, Balance Training, Functional Mobility Training, Endurance Training, Transfer Training, Stair training, Neuromuscular Re-education, Equipment Evaluation, Education, & procurement, Home Exercise Program, Safety Education & Training    Patient Education  Patient Education: Plan of Care, Home Exercise Program    Goals:  Patient goals : go home  Short term goals  Time Frame for Short term goals: by discharge  Short term goal 1: bed mobility with minAx1 for ease of getting in/out of bed  Short term goal 2: sit <> stand with minAx1 for ease of transfers - MET, see revisions  Short term goal 3: pt to tolerate standing for >2 minutes while completing dynamic standing task to prepare for gait activity  Short term goal 4: PT to assess gait when appropriate. - MET, see revisions  Revised Short-Term Goals:    Short term goals  Time Frame for Short term goals: by discharge  Short term goal 1: bed mobility with minAx1 for ease of getting in/out of bed  Short term goal 2: sit <> stand with CGAx1 for ease of transfers  Short term goal 3: pt to tolerate standing for >2 minutes while completing dynamic standing task to prepare for gait activity  Short term goal 4: Pt to ambulate > 10 ft with RW with CGA to get to bathroom      Long term goals  Time Frame for Long term goals : N/A secondary to short ELOS Following session, patient left in safe position with all fall risk precautions in place. Mulugeta Armijo.  Sheri Pelletier, [place] [address]

## 2020-12-23 NOTE — PROGRESS NOTES
CLINICAL PHARMACY NOTE: MEDS TO [address] Select Patient?: No  Total # of Prescriptions Filled: 2   The following medications were delivered to the patient:  Micro Guard 2% powder  Enoxaparin 100mg/ml  Total # of Interventions Completed: 2  Time Spent (min): 30    Additional Documentation:

## 2020-12-28 NOTE — TELEPHONE ENCOUNTER
Received phone call from [facility] (daughter/caregiver of Diana Sánchez). She reports that Diana Sánchez is having some bleeding from a leg scratch (trickle of blood down leg). [facility] placed a piece of gauze over scratch, but did not apply pressure. [facility] was wondering if Lovenox dose this AM should be omitted. Instructed Jessica to apply light pressure to leg scratch to promote clotting. If leg scratch does not stop bleeding or starts to worsen, instructed [facility] to call back clinic or proceed to urgent care. If bleeding stops, instructed [facility] to administer Lovenox as instructed.     Electronically signed by LADY Mckeon [facility] on 12/28/2020 at 9:35 AM

## 2020-12-31 NOTE — PROGRESS NOTES
Medication Management [address]  [phone] (phone)  [phone] (fax)        INR drawn by [facility]. Anticoagulation encounter completed via telephone. Spoke with Duarte Hutson as okay by patient. Verifies current dosing regimen and tablet strength. No missed or extra doses. Patient denies s/s bleeding/bruising/swelling/SOB/chest pain  No blood in urine or stool. Had an Ensure yesterday  No changes in medication/OTC agents/Herbals. No change in alcohol use or tobacco use. No change in activity level. Patient denies headaches/dizziness/lightheadedness/falls. No vomiting/diarrhea or acute illness. No Procedures scheduled in the future at this time. Assessment:  Lab Results   Component Value Date    INR 1.42 (H) 12/31/2020    INR 1.48 (H) 12/28/2020    INR 1.05 12/22/2020     INR subtherapeutic   Recent Labs     12/31/20  1130   INR 1.42*     Plan:  Continue Lovenox 90 mg Q12H. Coumadin 2.5 mg 12/31, 1/1; 1.25 mg 1/2, 1/3. Recheck INR in 4 day(s) on 1/4/2021. Patient reminded to call the Anticoagulation Clinic with any signs or symptoms of bleeding or with any medication changes. Patient given instructions utilizing the teach back method. The following statement was review with patient regarding this virtual visit:  We want to confirm that, for purposes of billing, this is a virtual visit with your provider for which we will submit a claim for reimbursement with your insurance company. You may be responsible for any copays, coinsurance amounts or other amounts not covered by your insurance company. If you do not accept this, unfortunately we will not be able to schedule a virtual visit with the provider. Do you accept?   Yes    CLINICAL PHARMACY CONSULT: MED RECONCILIATION/REVIEW ADDENDUM    For Pharmacy Admin Tracking Only    PHSO: Yes  Total # of Interventions Recommended: 2  - Increased Dose #: 1  - Refills Provided #: 1  - Maintenance Safety Lab Monitoring #: 1  Total Interventions Accepted: 2  Time Spent (min): [address]

## 2021-01-01 ENCOUNTER — APPOINTMENT (OUTPATIENT)
Dept: GENERAL RADIOLOGY | Age: 86
DRG: 689 | End: 2021-01-01
Payer: MEDICARE

## 2021-01-01 ENCOUNTER — TELEPHONE (OUTPATIENT)
Dept: PHARMACY | Age: 86
End: 2021-01-01

## 2021-01-01 ENCOUNTER — APPOINTMENT (OUTPATIENT)
Dept: CT IMAGING | Age: 86
DRG: 689 | End: 2021-01-01
Payer: MEDICARE

## 2021-01-01 ENCOUNTER — HOSPITAL ENCOUNTER (OUTPATIENT)
Dept: PHARMACY | Age: 86
Setting detail: THERAPIES SERIES
Discharge: HOME | End: 2021-01-07
Payer: MEDICARE

## 2021-01-01 ENCOUNTER — TELEPHONE (OUTPATIENT)
Dept: INTERNAL MEDICINE CLINIC | Age: 86
End: 2021-01-01

## 2021-01-01 ENCOUNTER — NURSE ONLY (OUTPATIENT)
Dept: LAB | Age: 86
End: 2021-01-01

## 2021-01-01 ENCOUNTER — HOSPITAL ENCOUNTER (OUTPATIENT)
Dept: PHARMACY | Age: 86
Setting detail: THERAPIES SERIES
Discharge: HOME | End: 2021-01-14
Payer: MEDICARE

## 2021-01-01 ENCOUNTER — HOSPITAL ENCOUNTER (INPATIENT)
Age: 86
LOS: 6 days | DRG: 689 | End: 2021-02-04
Attending: EMERGENCY MEDICINE | Admitting: INTERNAL MEDICINE
Payer: MEDICARE

## 2021-01-01 ENCOUNTER — HOSPITAL ENCOUNTER (OUTPATIENT)
Dept: PHARMACY | Age: 86
Setting detail: THERAPIES SERIES
Discharge: HOME | End: 2021-01-04
Payer: MEDICARE

## 2021-01-01 ENCOUNTER — APPOINTMENT (OUTPATIENT)
Dept: PHARMACY | Age: 86
End: 2021-01-01
Payer: MEDICARE

## 2021-01-01 ENCOUNTER — HOSPITAL ENCOUNTER (OUTPATIENT)
Dept: PHARMACY | Age: 86
Setting detail: THERAPIES SERIES
Discharge: HOME | End: 2021-01-21
Payer: MEDICARE

## 2021-01-01 ENCOUNTER — APPOINTMENT (OUTPATIENT)
Dept: CT IMAGING | Age: 86
End: 2021-01-01
Payer: MEDICARE

## 2021-01-01 ENCOUNTER — APPOINTMENT (OUTPATIENT)
Dept: GENERAL RADIOLOGY | Age: 86
End: 2021-01-01
Payer: MEDICARE

## 2021-01-01 ENCOUNTER — HOSPITAL ENCOUNTER (EMERGENCY)
Age: 86
Discharge: HOME | End: 2021-01-05
Attending: EMERGENCY MEDICINE
Payer: MEDICARE

## 2021-01-01 ENCOUNTER — VIRTUAL VISIT (OUTPATIENT)
Dept: INTERNAL MEDICINE CLINIC | Age: 86
End: 2021-01-01
Payer: MEDICARE

## 2021-01-01 VITALS
HEART RATE: 97 BPM | DIASTOLIC BLOOD PRESSURE: 96 MMHG | RESPIRATION RATE: 21 BRPM | OXYGEN SATURATION: 98 % | HEIGHT: 69 IN | TEMPERATURE: 98.3 F | SYSTOLIC BLOOD PRESSURE: 144 MMHG | BODY MASS INDEX: 29.82 KG/M2

## 2021-01-01 VITALS
SYSTOLIC BLOOD PRESSURE: 103 MMHG | TEMPERATURE: 98.4 F | HEIGHT: 69 IN | DIASTOLIC BLOOD PRESSURE: 59 MMHG | RESPIRATION RATE: 19 BRPM | WEIGHT: 199 LBS | HEART RATE: 77 BPM | BODY MASS INDEX: 29.47 KG/M2 | OXYGEN SATURATION: 95 %

## 2021-01-01 DIAGNOSIS — N18.9 ACUTE RENAL FAILURE SUPERIMPOSED ON CHRONIC KIDNEY DISEASE, UNSPECIFIED CKD STAGE, UNSPECIFIED ACUTE RENAL FAILURE TYPE (HCC): ICD-10-CM

## 2021-01-01 DIAGNOSIS — N39.0 URINARY TRACT INFECTION IN FEMALE: ICD-10-CM

## 2021-01-01 DIAGNOSIS — R53.83 FATIGUE, UNSPECIFIED TYPE: Primary | ICD-10-CM

## 2021-01-01 DIAGNOSIS — I10 ESSENTIAL HYPERTENSION: ICD-10-CM

## 2021-01-01 DIAGNOSIS — D68.9 COAGULOPATHY (HCC): ICD-10-CM

## 2021-01-01 DIAGNOSIS — I48.19 PERSISTENT ATRIAL FIBRILLATION (HCC): ICD-10-CM

## 2021-01-01 DIAGNOSIS — N18.4 CHRONIC KIDNEY DISEASE (CKD), STAGE IV (SEVERE) (HCC): ICD-10-CM

## 2021-01-01 DIAGNOSIS — N17.9 ACUTE RENAL FAILURE SUPERIMPOSED ON CHRONIC KIDNEY DISEASE, UNSPECIFIED CKD STAGE, UNSPECIFIED ACUTE RENAL FAILURE TYPE (HCC): ICD-10-CM

## 2021-01-01 DIAGNOSIS — R41.82 ALTERED MENTAL STATUS, UNSPECIFIED ALTERED MENTAL STATUS TYPE: Primary | ICD-10-CM

## 2021-01-01 DIAGNOSIS — R41.82 ALTERED MENTAL STATUS, UNSPECIFIED ALTERED MENTAL STATUS TYPE: ICD-10-CM

## 2021-01-01 DIAGNOSIS — Z79.01 ANTICOAGULATED ON COUMADIN: ICD-10-CM

## 2021-01-01 DIAGNOSIS — E86.0 DEHYDRATION: Primary | ICD-10-CM

## 2021-01-01 DIAGNOSIS — I87.2 CHRONIC VENOUS STASIS DERMATITIS OF LEFT LOWER EXTREMITY: ICD-10-CM

## 2021-01-01 DIAGNOSIS — I50.22 CHRONIC SYSTOLIC HEART FAILURE (HCC): ICD-10-CM

## 2021-01-01 DIAGNOSIS — R53.83 FATIGUE, UNSPECIFIED TYPE: ICD-10-CM

## 2021-01-01 LAB
ALBUMIN SERPL-MCNC: 2.9 G/DL (ref 3.5–5.1)
ALBUMIN SERPL-MCNC: 3 G/DL (ref 3.5–5.1)
ALP BLD-CCNC: 78 U/L (ref 38–126)
ALP BLD-CCNC: 86 U/L (ref 38–126)
ALT SERPL-CCNC: 6 U/L (ref 11–66)
ALT SERPL-CCNC: 7 U/L (ref 11–66)
AMMONIA: 36 UMOL/L (ref 11–60)
ANION GAP SERPL CALCULATED.3IONS-SCNC: 10 MEQ/L (ref 8–16)
ANION GAP SERPL CALCULATED.3IONS-SCNC: 11 MEQ/L (ref 8–16)
ANION GAP SERPL CALCULATED.3IONS-SCNC: 11 MEQ/L (ref 8–16)
ANION GAP SERPL CALCULATED.3IONS-SCNC: 13 MEQ/L (ref 8–16)
ANION GAP SERPL CALCULATED.3IONS-SCNC: 14 MEQ/L (ref 8–16)
ANION GAP SERPL CALCULATED.3IONS-SCNC: 7 MEQ/L (ref 8–16)
ANION GAP SERPL CALCULATED.3IONS-SCNC: 9 MEQ/L (ref 8–16)
AST SERPL-CCNC: 15 U/L (ref 5–40)
AST SERPL-CCNC: 16 U/L (ref 5–40)
BACTERIA: ABNORMAL /HPF
BACTERIA: ABNORMAL /HPF
BASOPHILS # BLD: 0.6 %
BASOPHILS # BLD: 0.7 %
BASOPHILS ABSOLUTE: 0.1 THOU/MM3 (ref 0–0.1)
BASOPHILS ABSOLUTE: 0.1 THOU/MM3 (ref 0–0.1)
BILIRUB SERPL-MCNC: 0.4 MG/DL (ref 0.3–1.2)
BILIRUB SERPL-MCNC: 0.5 MG/DL (ref 0.3–1.2)
BILIRUBIN DIRECT: < 0.2 MG/DL (ref 0–0.3)
BILIRUBIN URINE: NEGATIVE
BILIRUBIN URINE: NEGATIVE
BLOOD CULTURE, ROUTINE: NORMAL
BLOOD CULTURE, ROUTINE: NORMAL
BLOOD, URINE: NEGATIVE
BLOOD, URINE: NEGATIVE
BUN BLDV-MCNC: 31 MG/DL (ref 7–22)
BUN BLDV-MCNC: 32 MG/DL (ref 7–22)
BUN BLDV-MCNC: 35 MG/DL (ref 7–22)
BUN BLDV-MCNC: 35 MG/DL (ref 7–22)
BUN BLDV-MCNC: 37 MG/DL (ref 7–22)
BUN BLDV-MCNC: 44 MG/DL (ref 7–22)
BUN BLDV-MCNC: 47 MG/DL (ref 7–22)
C-REACTIVE PROTEIN: 6.86 MG/DL (ref 0–1)
CALCIUM SERPL-MCNC: 8.6 MG/DL (ref 8.5–10.5)
CALCIUM SERPL-MCNC: 8.8 MG/DL (ref 8.5–10.5)
CALCIUM SERPL-MCNC: 8.9 MG/DL (ref 8.5–10.5)
CALCIUM SERPL-MCNC: 9 MG/DL (ref 8.5–10.5)
CALCIUM SERPL-MCNC: 9.3 MG/DL (ref 8.5–10.5)
CALCIUM SERPL-MCNC: 9.4 MG/DL (ref 8.5–10.5)
CALCIUM SERPL-MCNC: 9.5 MG/DL (ref 8.5–10.5)
CASTS 2: ABNORMAL /LPF
CASTS 2: ABNORMAL /LPF
CASTS UA: ABNORMAL /LPF
CASTS UA: ABNORMAL /LPF
CHARACTER, URINE: ABNORMAL
CHARACTER, URINE: CLEAR
CHLORIDE BLD-SCNC: 106 MEQ/L (ref 98–111)
CHLORIDE BLD-SCNC: 107 MEQ/L (ref 98–111)
CHLORIDE BLD-SCNC: 107 MEQ/L (ref 98–111)
CHLORIDE BLD-SCNC: 110 MEQ/L (ref 98–111)
CHLORIDE BLD-SCNC: 111 MEQ/L (ref 98–111)
CHLORIDE BLD-SCNC: 115 MEQ/L (ref 98–111)
CHLORIDE BLD-SCNC: 117 MEQ/L (ref 98–111)
CO2: 19 MEQ/L (ref 23–33)
CO2: 21 MEQ/L (ref 23–33)
CO2: 27 MEQ/L (ref 23–33)
COLOR: YELLOW
COLOR: YELLOW
CREAT SERPL-MCNC: 1.5 MG/DL (ref 0.4–1.2)
CREAT SERPL-MCNC: 1.5 MG/DL (ref 0.4–1.2)
CREAT SERPL-MCNC: 1.6 MG/DL (ref 0.4–1.2)
CREAT SERPL-MCNC: 1.7 MG/DL (ref 0.4–1.2)
CREAT SERPL-MCNC: 1.8 MG/DL (ref 0.4–1.2)
CREAT SERPL-MCNC: 1.9 MG/DL (ref 0.4–1.2)
CREAT SERPL-MCNC: 1.9 MG/DL (ref 0.4–1.2)
CRYSTALS, UA: ABNORMAL
CRYSTALS, UA: ABNORMAL
EKG ATRIAL RATE: 65 BPM
EKG ATRIAL RATE: 78 BPM
EKG Q-T INTERVAL: 410 MS
EKG Q-T INTERVAL: 430 MS
EKG QRS DURATION: 136 MS
EKG QRS DURATION: 176 MS
EKG QTC CALCULATION (BAZETT): 498 MS
EKG QTC CALCULATION (BAZETT): 528 MS
EKG R AXIS: -24 DEGREES
EKG R AXIS: -85 DEGREES
EKG T AXIS: -42 DEGREES
EKG T AXIS: 80 DEGREES
EKG VENTRICULAR RATE: 89 BPM
EKG VENTRICULAR RATE: 91 BPM
EOSINOPHIL # BLD: 3.1 %
EOSINOPHIL # BLD: 5.9 %
EOSINOPHILS ABSOLUTE: 0.3 THOU/MM3 (ref 0–0.4)
EOSINOPHILS ABSOLUTE: 0.5 THOU/MM3 (ref 0–0.4)
EPITHELIAL CELLS, UA: ABNORMAL /HPF
EPITHELIAL CELLS, UA: ABNORMAL /HPF
ERYTHROCYTE [DISTWIDTH] IN BLOOD BY AUTOMATED COUNT: 14.8 % (ref 11.5–14.5)
ERYTHROCYTE [DISTWIDTH] IN BLOOD BY AUTOMATED COUNT: 14.9 % (ref 11.5–14.5)
ERYTHROCYTE [DISTWIDTH] IN BLOOD BY AUTOMATED COUNT: 15 % (ref 11.5–14.5)
ERYTHROCYTE [DISTWIDTH] IN BLOOD BY AUTOMATED COUNT: 15.2 % (ref 11.5–14.5)
ERYTHROCYTE [DISTWIDTH] IN BLOOD BY AUTOMATED COUNT: 15.9 % (ref 11.5–14.5)
ERYTHROCYTE [DISTWIDTH] IN BLOOD BY AUTOMATED COUNT: 17.2 % (ref 11.5–14.5)
ERYTHROCYTE [DISTWIDTH] IN BLOOD BY AUTOMATED COUNT: 54.5 FL (ref 35–45)
ERYTHROCYTE [DISTWIDTH] IN BLOOD BY AUTOMATED COUNT: 55.8 FL (ref 35–45)
ERYTHROCYTE [DISTWIDTH] IN BLOOD BY AUTOMATED COUNT: 57 FL (ref 35–45)
ERYTHROCYTE [DISTWIDTH] IN BLOOD BY AUTOMATED COUNT: 57 FL (ref 35–45)
ERYTHROCYTE [DISTWIDTH] IN BLOOD BY AUTOMATED COUNT: 59.4 FL (ref 35–45)
ERYTHROCYTE [DISTWIDTH] IN BLOOD BY AUTOMATED COUNT: 65 FL (ref 35–45)
FOLATE: 6.6 NG/ML (ref 4.8–24.2)
GFR SERPL CREATININE-BSD FRML MDRD: 25 ML/MIN/1.73M2
GFR SERPL CREATININE-BSD FRML MDRD: 25 ML/MIN/1.73M2
GFR SERPL CREATININE-BSD FRML MDRD: 27 ML/MIN/1.73M2
GFR SERPL CREATININE-BSD FRML MDRD: 29 ML/MIN/1.73M2
GFR SERPL CREATININE-BSD FRML MDRD: 31 ML/MIN/1.73M2
GFR SERPL CREATININE-BSD FRML MDRD: 33 ML/MIN/1.73M2
GFR SERPL CREATININE-BSD FRML MDRD: 33 ML/MIN/1.73M2
GLUCOSE BLD-MCNC: 109 MG/DL (ref 70–108)
GLUCOSE BLD-MCNC: 111 MG/DL (ref 70–108)
GLUCOSE BLD-MCNC: 111 MG/DL (ref 70–108)
GLUCOSE BLD-MCNC: 121 MG/DL (ref 70–108)
GLUCOSE BLD-MCNC: 132 MG/DL (ref 70–108)
GLUCOSE BLD-MCNC: 133 MG/DL (ref 70–108)
GLUCOSE BLD-MCNC: 154 MG/DL (ref 70–108)
GLUCOSE URINE: NEGATIVE MG/DL
GLUCOSE URINE: NEGATIVE MG/DL
HCT VFR BLD CALC: 38.5 % (ref 37–47)
HCT VFR BLD CALC: 41.2 % (ref 37–47)
HCT VFR BLD CALC: 42 % (ref 37–47)
HCT VFR BLD CALC: 43.9 % (ref 37–47)
HCT VFR BLD CALC: 45.1 % (ref 37–47)
HCT VFR BLD CALC: 49.7 % (ref 37–47)
HEMOGLOBIN: 11.9 GM/DL (ref 12–16)
HEMOGLOBIN: 12.6 GM/DL (ref 12–16)
HEMOGLOBIN: 13.2 GM/DL (ref 12–16)
HEMOGLOBIN: 13.3 GM/DL (ref 12–16)
HEMOGLOBIN: 13.7 GM/DL (ref 12–16)
HEMOGLOBIN: 15 GM/DL (ref 12–16)
IMMATURE GRANS (ABS): 0.03 THOU/MM3 (ref 0–0.07)
IMMATURE GRANS (ABS): 0.04 THOU/MM3 (ref 0–0.07)
IMMATURE GRANULOCYTES: 0.3 %
IMMATURE GRANULOCYTES: 0.4 %
INR BLD: 1.49 (ref 0.85–1.13)
INR BLD: 2.03 (ref 0.85–1.13)
INR BLD: 2.09 (ref 0.85–1.13)
INR BLD: 2.62 (ref 0.85–1.13)
INR BLD: 3.12 (ref 0.85–1.13)
INR BLD: 3.31 (ref 0.85–1.13)
INR BLD: 3.55 (ref 0.85–1.13)
KETONES, URINE: NEGATIVE
KETONES, URINE: NEGATIVE
LACTIC ACID: 1.2 MMOL/L (ref 0.5–2.2)
LEUKOCYTE ESTERASE, URINE: ABNORMAL
LEUKOCYTE ESTERASE, URINE: NEGATIVE
LYMPHOCYTES # BLD: 15.9 %
LYMPHOCYTES # BLD: 18.3 %
LYMPHOCYTES ABSOLUTE: 1.6 THOU/MM3 (ref 1–4.8)
LYMPHOCYTES ABSOLUTE: 1.7 THOU/MM3 (ref 1–4.8)
MAGNESIUM: 2 MG/DL (ref 1.6–2.4)
MAGNESIUM: 2.3 MG/DL (ref 1.6–2.4)
MCH RBC QN AUTO: 30.6 PG (ref 26–33)
MCH RBC QN AUTO: 30.9 PG (ref 26–33)
MCH RBC QN AUTO: 31.1 PG (ref 26–33)
MCH RBC QN AUTO: 31.3 PG (ref 26–33)
MCH RBC QN AUTO: 31.4 PG (ref 26–33)
MCH RBC QN AUTO: 31.9 PG (ref 26–33)
MCHC RBC AUTO-ENTMCNC: 30.2 GM/DL (ref 32.2–35.5)
MCHC RBC AUTO-ENTMCNC: 30.3 GM/DL (ref 32.2–35.5)
MCHC RBC AUTO-ENTMCNC: 30.4 GM/DL (ref 32.2–35.5)
MCHC RBC AUTO-ENTMCNC: 30.6 GM/DL (ref 32.2–35.5)
MCHC RBC AUTO-ENTMCNC: 30.9 GM/DL (ref 32.2–35.5)
MCHC RBC AUTO-ENTMCNC: 31.4 GM/DL (ref 32.2–35.5)
MCV RBC AUTO: 100.9 FL (ref 81–99)
MCV RBC AUTO: 102.1 FL (ref 81–99)
MCV RBC AUTO: 102.7 FL (ref 81–99)
MCV RBC AUTO: 103.1 FL (ref 81–99)
MCV RBC AUTO: 103.2 FL (ref 81–99)
MCV RBC AUTO: 99.5 FL (ref 81–99)
MISCELLANEOUS 2: ABNORMAL
MISCELLANEOUS 2: ABNORMAL
MONOCYTES # BLD: 10.9 %
MONOCYTES # BLD: 11.8 %
MONOCYTES ABSOLUTE: 1 THOU/MM3 (ref 0.4–1.3)
MONOCYTES ABSOLUTE: 1.1 THOU/MM3 (ref 0.4–1.3)
NITRITE, URINE: NEGATIVE
NITRITE, URINE: POSITIVE
NUCLEATED RED BLOOD CELLS: 0 /100 WBC
NUCLEATED RED BLOOD CELLS: 0 /100 WBC
ORGANISM: ABNORMAL
OSMOLALITY CALCULATION: 290.4 MOSMOL/KG (ref 275–300)
OSMOLALITY CALCULATION: 294.3 MOSMOL/KG (ref 275–300)
PH UA: 5 (ref 5–9)
PH UA: 8 (ref 5–9)
PLATELET # BLD: 256 THOU/MM3 (ref 130–400)
PLATELET # BLD: 277 THOU/MM3 (ref 130–400)
PLATELET # BLD: 284 THOU/MM3 (ref 130–400)
PLATELET # BLD: 295 THOU/MM3 (ref 130–400)
PLATELET # BLD: 309 THOU/MM3 (ref 130–400)
PLATELET # BLD: 334 THOU/MM3 (ref 130–400)
PMV BLD AUTO: 10.2 FL (ref 9.4–12.4)
PMV BLD AUTO: 10.2 FL (ref 9.4–12.4)
PMV BLD AUTO: 10.3 FL (ref 9.4–12.4)
PMV BLD AUTO: 10.3 FL (ref 9.4–12.4)
PMV BLD AUTO: 10.5 FL (ref 9.4–12.4)
PMV BLD AUTO: 10.6 FL (ref 9.4–12.4)
POTASSIUM REFLEX MAGNESIUM: 4.2 MEQ/L (ref 3.5–5.2)
POTASSIUM REFLEX MAGNESIUM: 4.4 MEQ/L (ref 3.5–5.2)
POTASSIUM REFLEX MAGNESIUM: 4.5 MEQ/L (ref 3.5–5.2)
POTASSIUM REFLEX MAGNESIUM: 4.7 MEQ/L (ref 3.5–5.2)
POTASSIUM REFLEX MAGNESIUM: 5 MEQ/L (ref 3.5–5.2)
POTASSIUM REFLEX MAGNESIUM: 5 MEQ/L (ref 3.5–5.2)
POTASSIUM SERPL-SCNC: 5.1 MEQ/L (ref 3.5–5.2)
PRO-BNP: 7346 PG/ML (ref 0–1800)
PROTEIN UA: 100
PROTEIN UA: 30
RBC # BLD: 3.73 MILL/MM3 (ref 4.2–5.4)
RBC # BLD: 4.01 MILL/MM3 (ref 4.2–5.4)
RBC # BLD: 4.22 MILL/MM3 (ref 4.2–5.4)
RBC # BLD: 4.3 MILL/MM3 (ref 4.2–5.4)
RBC # BLD: 4.47 MILL/MM3 (ref 4.2–5.4)
RBC # BLD: 4.82 MILL/MM3 (ref 4.2–5.4)
RBC URINE: ABNORMAL /HPF
RBC URINE: ABNORMAL /HPF
RENAL EPITHELIAL, UA: ABNORMAL
RENAL EPITHELIAL, UA: ABNORMAL
SARS-COV-2, NAAT: NOT DETECTED
SARS-COV-2, NAAT: NOT DETECTED
SEDIMENTATION RATE, ERYTHROCYTE: 52 MM/HR (ref 0–20)
SEG NEUTROPHILS: 63 %
SEG NEUTROPHILS: 69.1 %
SEGMENTED NEUTROPHILS ABSOLUTE COUNT: 5.5 THOU/MM3 (ref 1.8–7.7)
SEGMENTED NEUTROPHILS ABSOLUTE COUNT: 7.2 THOU/MM3 (ref 1.8–7.7)
SODIUM BLD-SCNC: 141 MEQ/L (ref 135–145)
SODIUM BLD-SCNC: 142 MEQ/L (ref 135–145)
SODIUM BLD-SCNC: 142 MEQ/L (ref 135–145)
SODIUM BLD-SCNC: 144 MEQ/L (ref 135–145)
SODIUM BLD-SCNC: 145 MEQ/L (ref 135–145)
SODIUM BLD-SCNC: 147 MEQ/L (ref 135–145)
SODIUM BLD-SCNC: 150 MEQ/L (ref 135–145)
SPECIFIC GRAVITY, URINE: 1.02 (ref 1–1.03)
SPECIFIC GRAVITY, URINE: 1.02 (ref 1–1.03)
TOTAL CK: 22 U/L (ref 30–135)
TOTAL PROTEIN: 6.6 G/DL (ref 6.1–8)
TOTAL PROTEIN: 7.3 G/DL (ref 6.1–8)
TROPONIN T: < 0.01 NG/ML
TROPONIN T: < 0.01 NG/ML
URINE CULTURE REFLEX: ABNORMAL
UROBILINOGEN, URINE: 0.2 EU/DL (ref 0–1)
UROBILINOGEN, URINE: 0.2 EU/DL (ref 0–1)
VITAMIN B-12: 1076 PG/ML (ref 211–911)
WBC # BLD: 10.4 THOU/MM3 (ref 4.8–10.8)
WBC # BLD: 11.8 THOU/MM3 (ref 4.8–10.8)
WBC # BLD: 8.4 THOU/MM3 (ref 4.8–10.8)
WBC # BLD: 8.5 THOU/MM3 (ref 4.8–10.8)
WBC # BLD: 8.6 THOU/MM3 (ref 4.8–10.8)
WBC # BLD: 8.7 THOU/MM3 (ref 4.8–10.8)
WBC UA: > 100 /HPF
WBC UA: ABNORMAL /HPF
YEAST: ABNORMAL
YEAST: ABNORMAL

## 2021-01-01 PROCEDURE — 85610 PROTHROMBIN TIME: CPT

## 2021-01-01 PROCEDURE — 80048 BASIC METABOLIC PNL TOTAL CA: CPT

## 2021-01-01 PROCEDURE — 99232 SBSQ HOSP IP/OBS MODERATE 35: CPT | Performed by: PHYSICIAN ASSISTANT

## 2021-01-01 PROCEDURE — 93005 ELECTROCARDIOGRAM TRACING: CPT | Performed by: STUDENT IN AN ORGANIZED HEALTH CARE EDUCATION/TRAINING PROGRAM

## 2021-01-01 PROCEDURE — G8400 PT W/DXA NO RESULTS DOC: HCPCS | Performed by: NURSE PRACTITIONER

## 2021-01-01 PROCEDURE — 99284 EMERGENCY DEPT VISIT MOD MDM: CPT

## 2021-01-01 PROCEDURE — 51798 US URINE CAPACITY MEASURE: CPT

## 2021-01-01 PROCEDURE — 51701 INSERT BLADDER CATHETER: CPT

## 2021-01-01 PROCEDURE — 6370000000 HC RX 637 (ALT 250 FOR IP): Performed by: PHYSICIAN ASSISTANT

## 2021-01-01 PROCEDURE — 70450 CT HEAD/BRAIN W/O DYE: CPT

## 2021-01-01 PROCEDURE — 85027 COMPLETE CBC AUTOMATED: CPT

## 2021-01-01 PROCEDURE — 87186 SC STD MICRODIL/AGAR DIL: CPT

## 2021-01-01 PROCEDURE — 1090F PRES/ABSN URINE INCON ASSESS: CPT | Performed by: NURSE PRACTITIONER

## 2021-01-01 PROCEDURE — 1200000000 HC SEMI PRIVATE

## 2021-01-01 PROCEDURE — 99231 SBSQ HOSP IP/OBS SF/LOW 25: CPT | Performed by: INTERNAL MEDICINE

## 2021-01-01 PROCEDURE — 96360 HYDRATION IV INFUSION INIT: CPT

## 2021-01-01 PROCEDURE — 99211 OFF/OP EST MAY X REQ PHY/QHP: CPT

## 2021-01-01 PROCEDURE — 36415 COLL VENOUS BLD VENIPUNCTURE: CPT

## 2021-01-01 PROCEDURE — 84484 ASSAY OF TROPONIN QUANT: CPT

## 2021-01-01 PROCEDURE — 2580000003 HC RX 258: Performed by: EMERGENCY MEDICINE

## 2021-01-01 PROCEDURE — 2580000003 HC RX 258: Performed by: INTERNAL MEDICINE

## 2021-01-01 PROCEDURE — 99214 OFFICE O/P EST MOD 30 MIN: CPT | Performed by: NURSE PRACTITIONER

## 2021-01-01 PROCEDURE — 86140 C-REACTIVE PROTEIN: CPT

## 2021-01-01 PROCEDURE — 96361 HYDRATE IV INFUSION ADD-ON: CPT

## 2021-01-01 PROCEDURE — 96365 THER/PROPH/DIAG IV INF INIT: CPT

## 2021-01-01 PROCEDURE — 83735 ASSAY OF MAGNESIUM: CPT

## 2021-01-01 PROCEDURE — 92526 ORAL FUNCTION THERAPY: CPT

## 2021-01-01 PROCEDURE — G8427 DOCREV CUR MEDS BY ELIG CLIN: HCPCS | Performed by: NURSE PRACTITIONER

## 2021-01-01 PROCEDURE — 6360000002 HC RX W HCPCS: Performed by: PHYSICIAN ASSISTANT

## 2021-01-01 PROCEDURE — 99233 SBSQ HOSP IP/OBS HIGH 50: CPT | Performed by: PHYSICIAN ASSISTANT

## 2021-01-01 PROCEDURE — 97530 THERAPEUTIC ACTIVITIES: CPT

## 2021-01-01 PROCEDURE — 85025 COMPLETE CBC W/AUTO DIFF WBC: CPT

## 2021-01-01 PROCEDURE — 81001 URINALYSIS AUTO W/SCOPE: CPT

## 2021-01-01 PROCEDURE — 71045 X-RAY EXAM CHEST 1 VIEW: CPT

## 2021-01-01 PROCEDURE — 2500000003 HC RX 250 WO HCPCS: Performed by: PHYSICIAN ASSISTANT

## 2021-01-01 PROCEDURE — 2700000000 HC OXYGEN THERAPY PER DAY

## 2021-01-01 PROCEDURE — 80076 HEPATIC FUNCTION PANEL: CPT

## 2021-01-01 PROCEDURE — G8417 CALC BMI ABV UP PARAM F/U: HCPCS | Performed by: NURSE PRACTITIONER

## 2021-01-01 PROCEDURE — 6370000000 HC RX 637 (ALT 250 FOR IP): Performed by: PHARMACIST

## 2021-01-01 PROCEDURE — 6370000000 HC RX 637 (ALT 250 FOR IP): Performed by: INTERNAL MEDICINE

## 2021-01-01 PROCEDURE — 4040F PNEUMOC VAC/ADMIN/RCVD: CPT | Performed by: NURSE PRACTITIONER

## 2021-01-01 PROCEDURE — 94760 N-INVAS EAR/PLS OXIMETRY 1: CPT

## 2021-01-01 PROCEDURE — U0002 COVID-19 LAB TEST NON-CDC: HCPCS

## 2021-01-01 PROCEDURE — 6370000000 HC RX 637 (ALT 250 FOR IP): Performed by: STUDENT IN AN ORGANIZED HEALTH CARE EDUCATION/TRAINING PROGRAM

## 2021-01-01 PROCEDURE — 97163 PT EVAL HIGH COMPLEX 45 MIN: CPT

## 2021-01-01 PROCEDURE — 82550 ASSAY OF CK (CPK): CPT

## 2021-01-01 PROCEDURE — 74176 CT ABD & PELVIS W/O CONTRAST: CPT

## 2021-01-01 PROCEDURE — 1123F ACP DISCUSS/DSCN MKR DOCD: CPT | Performed by: NURSE PRACTITIONER

## 2021-01-01 PROCEDURE — 6360000002 HC RX W HCPCS: Performed by: INTERNAL MEDICINE

## 2021-01-01 PROCEDURE — 2500000003 HC RX 250 WO HCPCS: Performed by: INTERNAL MEDICINE

## 2021-01-01 PROCEDURE — 2580000003 HC RX 258: Performed by: PHYSICIAN ASSISTANT

## 2021-01-01 PROCEDURE — 1111F DSCHRG MED/CURRENT MED MERGE: CPT | Performed by: NURSE PRACTITIONER

## 2021-01-01 PROCEDURE — 83880 ASSAY OF NATRIURETIC PEPTIDE: CPT

## 2021-01-01 PROCEDURE — 99223 1ST HOSP IP/OBS HIGH 75: CPT | Performed by: INTERNAL MEDICINE

## 2021-01-01 PROCEDURE — G8484 FLU IMMUNIZE NO ADMIN: HCPCS | Performed by: NURSE PRACTITIONER

## 2021-01-01 PROCEDURE — 82140 ASSAY OF AMMONIA: CPT

## 2021-01-01 PROCEDURE — 87040 BLOOD CULTURE FOR BACTERIA: CPT

## 2021-01-01 PROCEDURE — 83605 ASSAY OF LACTIC ACID: CPT

## 2021-01-01 PROCEDURE — 80053 COMPREHEN METABOLIC PANEL: CPT

## 2021-01-01 PROCEDURE — 99239 HOSP IP/OBS DSCHRG MGMT >30: CPT | Performed by: INTERNAL MEDICINE

## 2021-01-01 PROCEDURE — 87077 CULTURE AEROBIC IDENTIFY: CPT

## 2021-01-01 PROCEDURE — 87086 URINE CULTURE/COLONY COUNT: CPT

## 2021-01-01 PROCEDURE — 85651 RBC SED RATE NONAUTOMATED: CPT

## 2021-01-01 PROCEDURE — 99211 OFF/OP EST MAY X REQ PHY/QHP: CPT | Performed by: PHARMACIST

## 2021-01-01 PROCEDURE — 6360000002 HC RX W HCPCS: Performed by: EMERGENCY MEDICINE

## 2021-01-01 PROCEDURE — 1036F TOBACCO NON-USER: CPT | Performed by: NURSE PRACTITIONER

## 2021-01-01 RX ORDER — MORPHINE SULFATE 20 MG/ML
5 SOLUTION ORAL
Status: DISCONTINUED | OUTPATIENT
Start: 2021-01-01 | End: 2021-01-01

## 2021-01-01 RX ORDER — WARFARIN SODIUM 1 MG/1
1 TABLET ORAL DAILY
Status: DISCONTINUED | OUTPATIENT
Start: 2021-01-01 | End: 2021-01-01

## 2021-01-01 RX ORDER — 0.9 % SODIUM CHLORIDE 0.9 %
1000 INTRAVENOUS SOLUTION INTRAVENOUS ONCE
Status: COMPLETED | OUTPATIENT
Start: 2021-01-01 | End: 2021-01-01

## 2021-01-01 RX ORDER — ACETAMINOPHEN 325 MG/1
650 TABLET ORAL EVERY 6 HOURS PRN
Status: DISCONTINUED | OUTPATIENT
Start: 2021-01-01 | End: 2021-01-01 | Stop reason: HOSPADM

## 2021-01-01 RX ORDER — ISOSORBIDE MONONITRATE 60 MG/1
60 TABLET, EXTENDED RELEASE ORAL DAILY
Qty: 30 TABLET | Refills: 0 | Status: SHIPPED | OUTPATIENT
Start: 2021-01-01 | End: 2021-01-01 | Stop reason: HOSPADM

## 2021-01-01 RX ORDER — LORAZEPAM 2 MG/ML
0.5 INJECTION INTRAMUSCULAR EVERY 4 HOURS PRN
Status: DISCONTINUED | OUTPATIENT
Start: 2021-01-01 | End: 2021-01-01

## 2021-01-01 RX ORDER — DIPHENHYDRAMINE HYDROCHLORIDE 50 MG/ML
12.5 INJECTION INTRAMUSCULAR; INTRAVENOUS EVERY 6 HOURS PRN
Status: DISCONTINUED | OUTPATIENT
Start: 2021-01-01 | End: 2021-01-01 | Stop reason: HOSPADM

## 2021-01-01 RX ORDER — ISOSORBIDE MONONITRATE 60 MG/1
60 TABLET, EXTENDED RELEASE ORAL DAILY
Status: DISCONTINUED | OUTPATIENT
Start: 2021-01-01 | End: 2021-01-01 | Stop reason: HOSPADM

## 2021-01-01 RX ORDER — FERROUS SULFATE 325(65) MG
325 TABLET ORAL
Qty: 30 TABLET | Refills: 3 | Status: SHIPPED | OUTPATIENT
Start: 2021-01-01 | End: 2021-01-01 | Stop reason: HOSPADM

## 2021-01-01 RX ORDER — ACETAMINOPHEN 325 MG/1
650 TABLET ORAL ONCE
Status: COMPLETED | OUTPATIENT
Start: 2021-01-01 | End: 2021-01-01

## 2021-01-01 RX ORDER — WARFARIN SODIUM 1 MG/1
1 TABLET ORAL ONCE
Status: DISCONTINUED | OUTPATIENT
Start: 2021-01-01 | End: 2021-01-01

## 2021-01-01 RX ORDER — MORPHINE SULFATE 2 MG/ML
1 INJECTION, SOLUTION INTRAMUSCULAR; INTRAVENOUS
Status: DISCONTINUED | OUTPATIENT
Start: 2021-01-01 | End: 2021-01-01

## 2021-01-01 RX ORDER — POLYETHYLENE GLYCOL 3350 17 G/17G
17 POWDER, FOR SOLUTION ORAL DAILY PRN
Status: DISCONTINUED | OUTPATIENT
Start: 2021-01-01 | End: 2021-01-01 | Stop reason: HOSPADM

## 2021-01-01 RX ORDER — MORPHINE SULFATE 20 MG/ML
10 SOLUTION ORAL EVERY 4 HOURS
Status: DISCONTINUED | OUTPATIENT
Start: 2021-01-01 | End: 2021-01-01 | Stop reason: HOSPADM

## 2021-01-01 RX ORDER — MELATONIN 5 MG
2 TABLET,CHEWABLE ORAL NIGHTLY PRN
Status: DISCONTINUED | OUTPATIENT
Start: 2021-01-01 | End: 2021-01-01 | Stop reason: RX

## 2021-01-01 RX ORDER — CEFDINIR 300 MG/1
300 CAPSULE ORAL 2 TIMES DAILY
Qty: 20 CAPSULE | Refills: 0 | Status: SHIPPED | OUTPATIENT
Start: 2021-01-01 | End: 2021-01-01 | Stop reason: HOSPADM

## 2021-01-01 RX ORDER — PROMETHAZINE HYDROCHLORIDE 25 MG/1
12.5 TABLET ORAL EVERY 6 HOURS PRN
Status: DISCONTINUED | OUTPATIENT
Start: 2021-01-01 | End: 2021-01-01 | Stop reason: HOSPADM

## 2021-01-01 RX ORDER — METOPROLOL TARTRATE 5 MG/5ML
5 INJECTION INTRAVENOUS EVERY 6 HOURS PRN
Status: DISCONTINUED | OUTPATIENT
Start: 2021-01-01 | End: 2021-01-01

## 2021-01-01 RX ORDER — SODIUM CHLORIDE, SODIUM LACTATE, POTASSIUM CHLORIDE, CALCIUM CHLORIDE 600; 310; 30; 20 MG/100ML; MG/100ML; MG/100ML; MG/100ML
INJECTION, SOLUTION INTRAVENOUS CONTINUOUS
Status: ACTIVE | OUTPATIENT
Start: 2021-01-01 | End: 2021-01-01

## 2021-01-01 RX ORDER — ONDANSETRON 2 MG/ML
4 INJECTION INTRAMUSCULAR; INTRAVENOUS EVERY 6 HOURS PRN
Status: DISCONTINUED | OUTPATIENT
Start: 2021-01-01 | End: 2021-01-01 | Stop reason: HOSPADM

## 2021-01-01 RX ORDER — DOCUSATE SODIUM 100 MG/1
100 CAPSULE, LIQUID FILLED ORAL DAILY PRN
Qty: 90 CAPSULE | Refills: 0 | Status: SHIPPED | OUTPATIENT
Start: 2021-01-01 | End: 2021-01-01 | Stop reason: HOSPADM

## 2021-01-01 RX ORDER — HYDROXYZINE HYDROCHLORIDE 50 MG/ML
25 INJECTION, SOLUTION INTRAMUSCULAR EVERY 6 HOURS PRN
Status: DISCONTINUED | OUTPATIENT
Start: 2021-01-01 | End: 2021-01-01

## 2021-01-01 RX ORDER — METOPROLOL SUCCINATE 50 MG/1
TABLET, EXTENDED RELEASE ORAL
Qty: 90 TABLET | Refills: 3 | Status: SHIPPED | OUTPATIENT
Start: 2021-01-01 | End: 2021-01-01 | Stop reason: HOSPADM

## 2021-01-01 RX ORDER — BETAMETHASONE DIPROPIONATE 0.5 MG/G
CREAM TOPICAL 2 TIMES DAILY
Status: DISCONTINUED | OUTPATIENT
Start: 2021-01-01 | End: 2021-01-01 | Stop reason: HOSPADM

## 2021-01-01 RX ORDER — LORAZEPAM 2 MG/ML
1 INJECTION INTRAMUSCULAR
Status: DISCONTINUED | OUTPATIENT
Start: 2021-01-01 | End: 2021-01-01 | Stop reason: HOSPADM

## 2021-01-01 RX ORDER — MORPHINE SULFATE 2 MG/ML
2 INJECTION, SOLUTION INTRAMUSCULAR; INTRAVENOUS
Status: DISCONTINUED | OUTPATIENT
Start: 2021-01-01 | End: 2021-01-01

## 2021-01-01 RX ORDER — SODIUM CHLORIDE 0.9 % (FLUSH) 0.9 %
10 SYRINGE (ML) INJECTION EVERY 12 HOURS SCHEDULED
Status: DISCONTINUED | OUTPATIENT
Start: 2021-01-01 | End: 2021-01-01 | Stop reason: HOSPADM

## 2021-01-01 RX ORDER — ASPIRIN 81 MG/1
81 TABLET ORAL DAILY
Status: DISCONTINUED | OUTPATIENT
Start: 2021-01-01 | End: 2021-01-01 | Stop reason: HOSPADM

## 2021-01-01 RX ORDER — ZINC SULFATE 50(220)MG
50 CAPSULE ORAL DAILY
Status: DISCONTINUED | OUTPATIENT
Start: 2021-01-01 | End: 2021-01-01 | Stop reason: HOSPADM

## 2021-01-01 RX ORDER — MORPHINE SULFATE 2 MG/ML
2 INJECTION, SOLUTION INTRAMUSCULAR; INTRAVENOUS
Status: DISCONTINUED | OUTPATIENT
Start: 2021-01-01 | End: 2021-01-01 | Stop reason: HOSPADM

## 2021-01-01 RX ORDER — ACETAMINOPHEN 650 MG/1
650 SUPPOSITORY RECTAL EVERY 6 HOURS PRN
Status: DISCONTINUED | OUTPATIENT
Start: 2021-01-01 | End: 2021-01-01 | Stop reason: HOSPADM

## 2021-01-01 RX ORDER — DOCUSATE SODIUM 100 MG/1
100 CAPSULE, LIQUID FILLED ORAL DAILY PRN
Status: DISCONTINUED | OUTPATIENT
Start: 2021-01-01 | End: 2021-01-01 | Stop reason: HOSPADM

## 2021-01-01 RX ORDER — LANOLIN ALCOHOL/MO/W.PET/CERES
3 CREAM (GRAM) TOPICAL NIGHTLY PRN
Status: DISCONTINUED | OUTPATIENT
Start: 2021-01-01 | End: 2021-01-01 | Stop reason: HOSPADM

## 2021-01-01 RX ORDER — SODIUM CHLORIDE 9 MG/ML
INJECTION, SOLUTION INTRAVENOUS CONTINUOUS
Status: DISCONTINUED | OUTPATIENT
Start: 2021-01-01 | End: 2021-01-01

## 2021-01-01 RX ORDER — WARFARIN SODIUM 2.5 MG/1
2.5 TABLET ORAL
Status: DISPENSED | OUTPATIENT
Start: 2021-01-01 | End: 2021-01-01

## 2021-01-01 RX ORDER — SODIUM CHLORIDE 0.9 % (FLUSH) 0.9 %
10 SYRINGE (ML) INJECTION PRN
Status: DISCONTINUED | OUTPATIENT
Start: 2021-01-01 | End: 2021-01-01 | Stop reason: HOSPADM

## 2021-01-01 RX ORDER — LORAZEPAM 2 MG/ML
0.5 INJECTION INTRAMUSCULAR
Status: DISCONTINUED | OUTPATIENT
Start: 2021-01-01 | End: 2021-01-01

## 2021-01-01 RX ORDER — FUROSEMIDE 20 MG/1
20 TABLET ORAL DAILY
Status: DISCONTINUED | OUTPATIENT
Start: 2021-01-01 | End: 2021-01-01 | Stop reason: HOSPADM

## 2021-01-01 RX ORDER — HYDROCODONE BITARTRATE AND ACETAMINOPHEN 10; 325 MG/1; MG/1
1 TABLET ORAL EVERY 6 HOURS PRN
Status: DISCONTINUED | OUTPATIENT
Start: 2021-01-01 | End: 2021-01-01

## 2021-01-01 RX ORDER — METOPROLOL SUCCINATE 50 MG/1
50 TABLET, EXTENDED RELEASE ORAL DAILY
Status: DISCONTINUED | OUTPATIENT
Start: 2021-01-01 | End: 2021-01-01 | Stop reason: HOSPADM

## 2021-01-01 RX ADMIN — HYDROMORPHONE HYDROCHLORIDE 0.5 MG: 1 INJECTION, SOLUTION INTRAMUSCULAR; INTRAVENOUS; SUBCUTANEOUS at 14:36

## 2021-01-01 RX ADMIN — SODIUM CHLORIDE: 9 INJECTION, SOLUTION INTRAVENOUS at 12:28

## 2021-01-01 RX ADMIN — HYDROMORPHONE HYDROCHLORIDE 0.5 MG: 1 INJECTION, SOLUTION INTRAMUSCULAR; INTRAVENOUS; SUBCUTANEOUS at 23:44

## 2021-01-01 RX ADMIN — ASPIRIN 81 MG: 81 TABLET, COATED ORAL at 12:26

## 2021-01-01 RX ADMIN — MICONAZOLE NITRATE: 20 POWDER TOPICAL at 10:00

## 2021-01-01 RX ADMIN — HYDROXYZINE HYDROCHLORIDE 25 MG: 50 INJECTION, SOLUTION INTRAMUSCULAR at 21:54

## 2021-01-01 RX ADMIN — CEFEPIME HYDROCHLORIDE 1000 MG: 1 INJECTION, POWDER, FOR SOLUTION INTRAMUSCULAR; INTRAVENOUS at 12:32

## 2021-01-01 RX ADMIN — ACETAMINOPHEN 650 MG: 325 TABLET ORAL at 00:13

## 2021-01-01 RX ADMIN — MORPHINE SULFATE 2 MG: 2 INJECTION, SOLUTION INTRAMUSCULAR; INTRAVENOUS at 14:12

## 2021-01-01 RX ADMIN — SODIUM CHLORIDE, POTASSIUM CHLORIDE, SODIUM LACTATE AND CALCIUM CHLORIDE: 600; 310; 30; 20 INJECTION, SOLUTION INTRAVENOUS at 10:06

## 2021-01-01 RX ADMIN — LORAZEPAM 0.5 MG: 2 INJECTION INTRAMUSCULAR; INTRAVENOUS at 09:51

## 2021-01-01 RX ADMIN — Medication 5 MG: at 10:35

## 2021-01-01 RX ADMIN — HYDROMORPHONE HYDROCHLORIDE 0.5 MG: 1 INJECTION, SOLUTION INTRAMUSCULAR; INTRAVENOUS; SUBCUTANEOUS at 01:10

## 2021-01-01 RX ADMIN — MORPHINE SULFATE 1 MG: 2 INJECTION, SOLUTION INTRAMUSCULAR; INTRAVENOUS at 21:26

## 2021-01-01 RX ADMIN — HYDROMORPHONE HYDROCHLORIDE 0.5 MG: 1 INJECTION, SOLUTION INTRAMUSCULAR; INTRAVENOUS; SUBCUTANEOUS at 02:23

## 2021-01-01 RX ADMIN — Medication 10 MG: at 21:23

## 2021-01-01 RX ADMIN — HYDROCODONE BITARTRATE AND ACETAMINOPHEN 1 TABLET: 10; 325 TABLET ORAL at 12:26

## 2021-01-01 RX ADMIN — HYDROXYZINE HYDROCHLORIDE 25 MG: 50 INJECTION, SOLUTION INTRAMUSCULAR at 23:34

## 2021-01-01 RX ADMIN — HYDROMORPHONE HYDROCHLORIDE 0.5 MG: 1 INJECTION, SOLUTION INTRAMUSCULAR; INTRAVENOUS; SUBCUTANEOUS at 19:55

## 2021-01-01 RX ADMIN — CEFEPIME HYDROCHLORIDE 1000 MG: 1 INJECTION, POWDER, FOR SOLUTION INTRAMUSCULAR; INTRAVENOUS at 13:24

## 2021-01-01 RX ADMIN — MORPHINE SULFATE 2 MG: 2 INJECTION, SOLUTION INTRAMUSCULAR; INTRAVENOUS at 18:26

## 2021-01-01 RX ADMIN — Medication 5 MG: at 16:35

## 2021-01-01 RX ADMIN — METOPROLOL TARTRATE 5 MG: 1 INJECTION, SOLUTION INTRAVENOUS at 08:22

## 2021-01-01 RX ADMIN — BETAMETHASONE DIPROPIONATE: 0.5 CREAM TOPICAL at 21:18

## 2021-01-01 RX ADMIN — ISOSORBIDE MONONITRATE 60 MG: 60 TABLET ORAL at 13:27

## 2021-01-01 RX ADMIN — BETAMETHASONE DIPROPIONATE: 0.5 CREAM TOPICAL at 20:19

## 2021-01-01 RX ADMIN — LORAZEPAM 0.5 MG: 2 INJECTION, SOLUTION INTRAMUSCULAR; INTRAVENOUS at 02:32

## 2021-01-01 RX ADMIN — MORPHINE SULFATE 1 MG: 2 INJECTION, SOLUTION INTRAMUSCULAR; INTRAVENOUS at 07:01

## 2021-01-01 RX ADMIN — SODIUM CHLORIDE 1000 ML: 9 INJECTION, SOLUTION INTRAVENOUS at 00:41

## 2021-01-01 RX ADMIN — SODIUM CHLORIDE, PRESERVATIVE FREE 10 ML: 5 INJECTION INTRAVENOUS at 13:27

## 2021-01-01 RX ADMIN — DIPHENHYDRAMINE HYDROCHLORIDE 12.5 MG: 50 INJECTION, SOLUTION INTRAMUSCULAR; INTRAVENOUS at 18:35

## 2021-01-01 RX ADMIN — HYDROCODONE BITARTRATE AND ACETAMINOPHEN 1 TABLET: 10; 325 TABLET ORAL at 11:08

## 2021-01-01 RX ADMIN — LORAZEPAM 0.5 MG: 2 INJECTION INTRAMUSCULAR; INTRAVENOUS at 14:57

## 2021-01-01 RX ADMIN — LORAZEPAM 1 MG: 2 INJECTION INTRAMUSCULAR; INTRAVENOUS at 23:28

## 2021-01-01 RX ADMIN — ACETAMINOPHEN 650 MG: 650 SUPPOSITORY RECTAL at 13:03

## 2021-01-01 RX ADMIN — FUROSEMIDE 20 MG: 20 TABLET ORAL at 13:27

## 2021-01-01 RX ADMIN — LORAZEPAM 1 MG: 2 INJECTION INTRAMUSCULAR; INTRAVENOUS at 21:23

## 2021-01-01 RX ADMIN — HYDROMORPHONE HYDROCHLORIDE 0.5 MG: 1 INJECTION, SOLUTION INTRAMUSCULAR; INTRAVENOUS; SUBCUTANEOUS at 16:58

## 2021-01-01 RX ADMIN — MICONAZOLE NITRATE: 20 POWDER TOPICAL at 20:06

## 2021-01-01 RX ADMIN — BETAMETHASONE DIPROPIONATE: 0.5 CREAM TOPICAL at 10:06

## 2021-01-01 RX ADMIN — LORAZEPAM 0.5 MG: 2 INJECTION, SOLUTION INTRAMUSCULAR; INTRAVENOUS at 06:57

## 2021-01-01 RX ADMIN — MICONAZOLE NITRATE: 20 POWDER TOPICAL at 09:51

## 2021-01-01 RX ADMIN — MICONAZOLE NITRATE: 20 POWDER TOPICAL at 21:10

## 2021-01-01 RX ADMIN — MICONAZOLE NITRATE: 20 POWDER TOPICAL at 10:06

## 2021-01-01 RX ADMIN — MICONAZOLE NITRATE: 20 POWDER TOPICAL at 10:16

## 2021-01-01 RX ADMIN — Medication: at 12:32

## 2021-01-01 RX ADMIN — SODIUM CHLORIDE, PRESERVATIVE FREE 10 ML: 5 INJECTION INTRAVENOUS at 20:06

## 2021-01-01 RX ADMIN — SODIUM CHLORIDE, PRESERVATIVE FREE 10 ML: 5 INJECTION INTRAVENOUS at 10:16

## 2021-01-01 RX ADMIN — BETAMETHASONE DIPROPIONATE: 0.5 CREAM TOPICAL at 19:48

## 2021-01-01 RX ADMIN — CEFEPIME HYDROCHLORIDE 1000 MG: 1 INJECTION, POWDER, FOR SOLUTION INTRAMUSCULAR; INTRAVENOUS at 13:38

## 2021-01-01 RX ADMIN — SODIUM CHLORIDE: 9 INJECTION, SOLUTION INTRAVENOUS at 12:58

## 2021-01-01 RX ADMIN — MICONAZOLE NITRATE: 20 POWDER TOPICAL at 21:14

## 2021-01-01 RX ADMIN — Medication 50 MG: at 13:27

## 2021-01-01 RX ADMIN — MICONAZOLE NITRATE: 20 POWDER TOPICAL at 21:47

## 2021-01-01 RX ADMIN — BETAMETHASONE DIPROPIONATE: 0.5 CREAM TOPICAL at 10:16

## 2021-01-01 RX ADMIN — MICONAZOLE NITRATE: 20 POWDER TOPICAL at 21:18

## 2021-01-01 RX ADMIN — CEFTRIAXONE SODIUM 1000 MG: 1 INJECTION, POWDER, FOR SOLUTION INTRAMUSCULAR; INTRAVENOUS at 06:41

## 2021-01-01 RX ADMIN — Medication 5 MG: at 08:27

## 2021-01-01 RX ADMIN — BETAMETHASONE DIPROPIONATE: 0.5 CREAM TOPICAL at 21:14

## 2021-01-01 RX ADMIN — Medication 10 MG: at 16:40

## 2021-01-01 RX ADMIN — SODIUM CHLORIDE, PRESERVATIVE FREE 10 ML: 5 INJECTION INTRAVENOUS at 21:14

## 2021-01-01 RX ADMIN — LORAZEPAM 0.5 MG: 2 INJECTION INTRAMUSCULAR; INTRAVENOUS at 13:05

## 2021-01-01 RX ADMIN — BETAMETHASONE DIPROPIONATE: 0.5 CREAM TOPICAL at 14:42

## 2021-01-01 RX ADMIN — MICONAZOLE NITRATE: 20 POWDER TOPICAL at 10:25

## 2021-01-01 RX ADMIN — METOPROLOL TARTRATE 5 MG: 1 INJECTION, SOLUTION INTRAVENOUS at 23:36

## 2021-01-01 RX ADMIN — SODIUM CHLORIDE 1000 ML: 9 INJECTION, SOLUTION INTRAVENOUS at 06:48

## 2021-01-01 RX ADMIN — MORPHINE SULFATE 2 MG: 2 INJECTION, SOLUTION INTRAMUSCULAR; INTRAVENOUS at 20:06

## 2021-01-01 RX ADMIN — SODIUM CHLORIDE, PRESERVATIVE FREE 10 ML: 5 INJECTION INTRAVENOUS at 09:51

## 2021-01-01 RX ADMIN — HYDROXYZINE HYDROCHLORIDE 25 MG: 50 INJECTION, SOLUTION INTRAMUSCULAR at 00:46

## 2021-01-01 RX ADMIN — HYDROCODONE BITARTRATE AND ACETAMINOPHEN 1 TABLET: 10; 325 TABLET ORAL at 04:58

## 2021-01-01 RX ADMIN — BETAMETHASONE DIPROPIONATE: 0.5 CREAM TOPICAL at 11:37

## 2021-01-01 RX ADMIN — HYDROMORPHONE HYDROCHLORIDE 0.5 MG: 1 INJECTION, SOLUTION INTRAMUSCULAR; INTRAVENOUS; SUBCUTANEOUS at 06:57

## 2021-01-01 RX ADMIN — MORPHINE SULFATE 2 MG: 2 INJECTION, SOLUTION INTRAMUSCULAR; INTRAVENOUS at 23:27

## 2021-01-01 RX ADMIN — BETAMETHASONE DIPROPIONATE: 0.5 CREAM TOPICAL at 21:10

## 2021-01-01 RX ADMIN — HYDROMORPHONE HYDROCHLORIDE 0.5 MG: 1 INJECTION, SOLUTION INTRAMUSCULAR; INTRAVENOUS; SUBCUTANEOUS at 13:53

## 2021-01-01 RX ADMIN — HYDROMORPHONE HYDROCHLORIDE 0.5 MG: 1 INJECTION, SOLUTION INTRAMUSCULAR; INTRAVENOUS; SUBCUTANEOUS at 11:42

## 2021-01-01 RX ADMIN — HYDROMORPHONE HYDROCHLORIDE 0.5 MG: 1 INJECTION, SOLUTION INTRAMUSCULAR; INTRAVENOUS; SUBCUTANEOUS at 00:30

## 2021-01-01 RX ADMIN — SODIUM CHLORIDE: 9 INJECTION, SOLUTION INTRAVENOUS at 23:04

## 2021-01-01 RX ADMIN — LORAZEPAM 0.5 MG: 2 INJECTION, SOLUTION INTRAMUSCULAR; INTRAVENOUS at 17:27

## 2021-01-01 RX ADMIN — Medication 5 MG: at 12:47

## 2021-01-01 RX ADMIN — LORAZEPAM 1 MG: 2 INJECTION INTRAMUSCULAR; INTRAVENOUS at 18:26

## 2021-01-01 RX ADMIN — MICONAZOLE NITRATE: 20 POWDER TOPICAL at 19:48

## 2021-01-01 RX ADMIN — BETAMETHASONE DIPROPIONATE: 0.5 CREAM TOPICAL at 10:00

## 2021-01-01 RX ADMIN — MORPHINE SULFATE 1 MG: 2 INJECTION, SOLUTION INTRAMUSCULAR; INTRAVENOUS at 04:45

## 2021-01-01 RX ADMIN — SODIUM CHLORIDE, PRESERVATIVE FREE 10 ML: 5 INJECTION INTRAVENOUS at 21:21

## 2021-01-01 RX ADMIN — LORAZEPAM 1 MG: 2 INJECTION INTRAMUSCULAR; INTRAVENOUS at 16:41

## 2021-01-01 RX ADMIN — METOPROLOL SUCCINATE 50 MG: 50 TABLET, FILM COATED, EXTENDED RELEASE ORAL at 13:27

## 2021-01-01 RX ADMIN — HYDROMORPHONE HYDROCHLORIDE 0.5 MG: 1 INJECTION, SOLUTION INTRAMUSCULAR; INTRAVENOUS; SUBCUTANEOUS at 05:49

## 2021-01-01 ASSESSMENT — PAIN SCALES - WONG BAKER
WONGBAKER_NUMERICALRESPONSE: 2
WONGBAKER_NUMERICALRESPONSE: 6
WONGBAKER_NUMERICALRESPONSE: 2
WONGBAKER_NUMERICALRESPONSE: 4
WONGBAKER_NUMERICALRESPONSE: 4
WONGBAKER_NUMERICALRESPONSE: 0
WONGBAKER_NUMERICALRESPONSE: 8
WONGBAKER_NUMERICALRESPONSE: 8
WONGBAKER_NUMERICALRESPONSE: 2
WONGBAKER_NUMERICALRESPONSE: 4
WONGBAKER_NUMERICALRESPONSE: 4
WONGBAKER_NUMERICALRESPONSE: 8

## 2021-01-01 ASSESSMENT — PAIN DESCRIPTION - FREQUENCY
FREQUENCY: CONTINUOUS

## 2021-01-01 ASSESSMENT — PAIN DESCRIPTION - ONSET
ONSET: AWAKENED FROM SLEEP
ONSET: UNABLE TO ASSESS
ONSET: ON-GOING
ONSET: AWAKENED FROM SLEEP

## 2021-01-01 ASSESSMENT — ENCOUNTER SYMPTOMS
ABDOMINAL DISTENTION: 0
EYE ITCHING: 0
EYE DISCHARGE: 0
COLOR CHANGE: 1
NAUSEA: 0
VOMITING: 0
ABDOMINAL DISTENTION: 0
VOMITING: 0
PHOTOPHOBIA: 0
CONSTIPATION: 0
WHEEZING: 0
BACK PAIN: 0
SHORTNESS OF BREATH: 0
EYE PAIN: 0
CONSTIPATION: 0
COUGH: 0
SINUS PAIN: 0
TROUBLE SWALLOWING: 0
RHINORRHEA: 0
ABDOMINAL PAIN: 0
VOMITING: 0
BACK PAIN: 1
RHINORRHEA: 0
DIARRHEA: 0
DIARRHEA: 0
PHOTOPHOBIA: 0
DIARRHEA: 0
EYE REDNESS: 0
BLOOD IN STOOL: 0
NAUSEA: 0
SHORTNESS OF BREATH: 0
SINUS PAIN: 0
NAUSEA: 0
CHEST TIGHTNESS: 0
SORE THROAT: 0
SHORTNESS OF BREATH: 0
WHEEZING: 0
SORE THROAT: 0
COUGH: 0
BACK PAIN: 0
EYE REDNESS: 0
SINUS PRESSURE: 0
SORE THROAT: 0
STRIDOR: 0
ABDOMINAL PAIN: 0
COUGH: 0
ABDOMINAL PAIN: 0

## 2021-01-01 ASSESSMENT — PAIN - FUNCTIONAL ASSESSMENT
PAIN_FUNCTIONAL_ASSESSMENT: PREVENTS OR INTERFERES WITH ALL ACTIVE AND SOME PASSIVE ACTIVITIES

## 2021-01-01 ASSESSMENT — PAIN SCALES - GENERAL
PAINLEVEL_OUTOF10: 4
PAINLEVEL_OUTOF10: 0
PAINLEVEL_OUTOF10: 7
PAINLEVEL_OUTOF10: 6
PAINLEVEL_OUTOF10: 8
PAINLEVEL_OUTOF10: 7
PAINLEVEL_OUTOF10: 4
PAINLEVEL_OUTOF10: 8
PAINLEVEL_OUTOF10: 8
PAINLEVEL_OUTOF10: 4
PAINLEVEL_OUTOF10: 8
PAINLEVEL_OUTOF10: 6
PAINLEVEL_OUTOF10: 8
PAINLEVEL_OUTOF10: 0
PAINLEVEL_OUTOF10: 8
PAINLEVEL_OUTOF10: 8
PAINLEVEL_OUTOF10: 7
PAINLEVEL_OUTOF10: 8
PAINLEVEL_OUTOF10: 8
PAINLEVEL_OUTOF10: 9
PAINLEVEL_OUTOF10: 8
PAINLEVEL_OUTOF10: 4
PAINLEVEL_OUTOF10: 4
PAINLEVEL_OUTOF10: 6
PAINLEVEL_OUTOF10: 2

## 2021-01-01 ASSESSMENT — PAIN DESCRIPTION - PAIN TYPE
TYPE: ACUTE PAIN

## 2021-01-01 ASSESSMENT — PAIN DESCRIPTION - PROGRESSION
CLINICAL_PROGRESSION: RAPIDLY WORSENING
CLINICAL_PROGRESSION: GRADUALLY WORSENING
CLINICAL_PROGRESSION: GRADUALLY IMPROVING
CLINICAL_PROGRESSION: RAPIDLY WORSENING
CLINICAL_PROGRESSION: GRADUALLY WORSENING
CLINICAL_PROGRESSION: RAPIDLY IMPROVING
CLINICAL_PROGRESSION: GRADUALLY IMPROVING
CLINICAL_PROGRESSION: RAPIDLY WORSENING

## 2021-01-01 ASSESSMENT — PAIN DESCRIPTION - DESCRIPTORS
DESCRIPTORS: PATIENT UNABLE TO DESCRIBE
DESCRIPTORS: PATIENT UNABLE TO DESCRIBE
DESCRIPTORS: NAGGING;SHARP;SHOOTING
DESCRIPTORS: PATIENT UNABLE TO DESCRIBE
DESCRIPTORS: ACHING;SORE;DISCOMFORT
DESCRIPTORS: PATIENT UNABLE TO DESCRIBE

## 2021-01-01 ASSESSMENT — PAIN DESCRIPTION - LOCATION
LOCATION: LEG
LOCATION: OTHER (COMMENT)

## 2021-01-01 ASSESSMENT — PAIN DESCRIPTION - ORIENTATION
ORIENTATION: RIGHT
ORIENTATION: OTHER (COMMENT)
ORIENTATION: RIGHT;LEFT
ORIENTATION: LEFT
ORIENTATION: OTHER (COMMENT)

## 2021-01-04 NOTE — PROGRESS NOTES
Rufina Scheuermann (:  1935) is a 81 y.o. female,Established patient, here for evaluation of the following chief complaint(s): recent hospital follow up, deconditioning, pressure ulcers,     I last seen Willie Valentin 9 months ago. Patient location: Home  Provider location: Office  Others present/name/role: daughter, Vandana Dubon  It is difficult for Willie Valentin to come to in person appointments due to her condition, and with COVID- family prefers video visit. Patient is very sleepy, and unable to maintain conversation for visit. Her daughter Luis E Fletcher answers questions. States that this has been normal since her hospital discharge. She denies that patient has had any confusion of falls however. Is currently receiving home health services and home oxygenation. Noted that prior to  Willie Valentin was placed on Bactrim for lower extremity cellulitis. On  she was increasingly confused, thus taken to the ER for evaluation. She was diagnosed with AVELINO and hyperkalemia. She was hospitalized from -. She follows with pain management in [place]. Daughter reports that she still has a small pressure ulcer to her buttocks. I am unable to visualize. Home health is watching. She has difficulty ambulating. Has a waffle cushion. Is not incontinent or urine or stool. Pinxav is being applied, as well as a bordered dressing that was ordered post hospital discharge. She is on coumadin for atrial fibrillation, managed by the coumadin clinic. BLE are being wrapped with ACE wraps. Unable to visualize currently. ACE wraps are dry and intact.     Rufina Scheuermann will require the following home care treatments or therapies: nursing and aides. Home care will be necessary because of deconditioning, inability to ambulate with maximum assistance.  The patient is in agreement to receiving home care.    Randal Britton requires the assistance of a [facility] Bed to make frequent and immediate changes of body positions not feasible with an ordinary bed to alleviate pain. Patient needs bed height requirements to perform patient transfers, grooming, and daily living tasks. The patient requires the head of the bed to be elevated more than 30 degrees most of the time, due to patient's diagnosis. Pillows and wedges have been considered and ruled out. Review of Systems   Constitutional: Positive for fatigue. Negative for chills and fever. HENT: Negative for sinus pressure, sinus pain, sore throat, tinnitus and trouble swallowing. Eyes: Positive for visual disturbance (corrective lenses). Negative for photophobia. Respiratory: Negative for cough, shortness of breath and wheezing. Cardiovascular: Negative for chest pain, palpitations and leg swelling. Gastrointestinal: Negative for abdominal distention, abdominal pain, blood in stool, constipation, diarrhea, nausea and vomiting. Endocrine: Negative for polydipsia, polyphagia and polyuria. Genitourinary: Negative for difficulty urinating, dysuria, frequency, hematuria and urgency. Musculoskeletal: Positive for gait problem (uses walker and/or wheelchair as needed). Negative for back pain. Skin: Positive for wound. Negative for rash. Neurological: Positive for weakness and numbness (neuropathy). Negative for dizziness, tremors, light-headedness and headaches. Psychiatric/Behavioral: Positive for sleep disturbance. Negative for confusion, dysphoric mood and hallucinations. The patient is not nervous/anxious. Physical Exam  Constitutional:       General: She is not in acute distress. Appearance: She is well-developed. HENT:      Head: Normocephalic and atraumatic.       Right Ear: External ear normal.      Left Ear: External ear normal.      Nose: Nose normal.   Neck: --Asuncion Davis, APRN - CNP

## 2021-01-04 NOTE — PROGRESS NOTES
Medication Management [address]  [phone] (phone)  [phone] (fax)    INR drawn by [facility]. Nursing assessment reviewed and appreciated. Nursing assessment within the normal limits with the exception of the following:  Bruising to abdomen, \"goose egg\" from Lovenox, SOB     Anticoagulation encounter completed via telephone. Patient's daughter Billey Court verifies current dosing regimen and tablet strength. No missed or extra doses. Patient denies s/s bleeding/swelling/chest pain. Per daughter, patient was only SOB due to pain from having blood drawn. She has some bruising and \"goose egg\" from Lovenox. No blood in urine or stool. No dietary changes. Patient is no longer taking 5-methyltetrahydrofolate  nystatin powder, phenytoin 10% topical cream, Cataplex E2, or Zinc oxide 40%. No change in alcohol use or tobacco use. No change in activity level. Patient denies headaches/dizziness/lightheadedness/falls. No vomiting/diarrhea or acute illness. No Procedures scheduled in the future at this time. Assessment:  Lab Results   Component Value Date    INR 1.61 (H) 01/04/2021    INR 1.42 (H) 12/31/2020    INR 1.48 (H) 12/28/2020     INR subtherapeutic   Recent Labs     01/04/21  1100   INR 1.61*     Patient remains subtherapeutic and on Lovenox. Plan:  Continue Lovenox 90 mg (1 mg/kg) SQ BID. E-scribed Lovenox 100 mg syringe #4 with 0 refills (patient has 1 shot left at home) to [facility] per patient request. Take Coumadin 2.5 mg x 3 days (1/4/21-1/6/21). Recheck INR in 3 days on 1/7/21. Patient's daughter Billey Court reminded to call the Anticoagulation Clinic with any signs or symptoms of bleeding or with any medication changes. Patient's daughter Billey Court given instructions utilizing the teach back method.     The following statement was review with patient regarding this virtual visit:  We want to confirm that, for purposes of billing, this is a virtual visit with your provider for which we will submit a claim for reimbursement with your insurance company. You may be responsible for any copays, coinsurance amounts or other amounts not covered by your insurance company. If you do not accept this, unfortunately we will not be able to schedule a virtual visit with the provider. Do you accept?   Yes    CLINICAL PHARMACY CONSULT: MED RECONCILIATION/REVIEW ADDENDUM    For Pharmacy Admin Tracking Only    PHSO: No  Total # of Interventions Recommended: 2  - Increased Dose #: 1  - New Order #: 1 New Medication Order Reason(s): Needs Additional Medication Therapy Lovenox  - Maintenance Safety Lab Monitoring #: 1  Total Interventions Accepted: 2  Time Spent (min): [address], PharmD, BCPS  1/4/2021  4:06 PM

## 2021-01-05 NOTE — ED TRIAGE NOTES
Pt presents to the ED with complaints of altered mental status and fatigue. Pt lives at home with daughter who is her primary care giver. On arrival patient is alert and oriented to name, location, and reason in ED. Pt states the most recent holiday was mary. Pt daughter states that patient was discharged from the hospital approx a month ago and was admitted for hyperkalemia. Pt is tender to the touch and will scream if touched. Pt had a difficult time standing from wheelchair to bed.

## 2021-01-05 NOTE — TELEPHONE ENCOUNTER
Noted patient went to ER last night for dehydration/altered mental status. INR there early this morning 1.49. Currently due to have [facility] draw INR 1/7.   Message forwarded to clinic pharmacist.

## 2021-01-05 NOTE — ED PROVIDER NOTES
I performed a history and physical examination of the patient and discussed management with the resident. I reviewed the residents note and agree with the documented findings and plan of care. Any areas of disagreement are noted on the chart. I was personally present for the key portions of any procedures. I have documented in the chart those procedures where I was not present during the key portions. I have reviewed the emergency nurses triage note. I agree with the chief complaint, past medical history, past surgical history, allergies, medications, social and family history as documented unless otherwise noted below. Documentation of the HPI, Physical Exam and Medical Decision Making performed by medical students or scribes is based on my personal performance of the HPI, PE and MDM. For Phys Assistant/ Nurse Practitioner cases/documentation I have personally evaluated this patient and have completed at least one if not all key elements of the E/M (history, physical exam, and MDM). My findings are as noted below         Patient presents with: Alteration mental status. I admitted this patient approximately 2 weeks ago for kidney dysfunction. This was most likely due to antibiotic. She has been herself according to daughter at bedside. They are having trouble getting her INR elevated. She is getting Lovenox shots. She has a painful area on her right lower abdomen. It is not hot red or warm, it is very tender to the touch. It is approximately 3 x 5 cm. Exquisitely tender to the touch. Currently the patient is resting comfortably in, no apparent distress.     EKG reveals ventricularly paced rhythm occasional PVCs, ventricular rate of 91 CA indeterminate QRS duration 176 QT interval 430 QTC of 528     Labs Reviewed   CBC WITH AUTO DIFFERENTIAL - Abnormal; Notable for the following components:       Result Value    RBC 3.73 (*)     Hemoglobin 11.9 (*)     .2 (*)     MCHC 30.9 (*)     RDW-CV 17.2 (*) RDW-SD 65.0 (*)     Eosinophils Absolute 0.5 (*)     All other components within normal limits   COMPREHENSIVE METABOLIC PANEL W/ REFLEX TO MG FOR LOW K - Abnormal; Notable for the following components:    Glucose 111 (*)     CREATININE 1.5 (*)     BUN 31 (*)     Alb 2.9 (*)     ALT 6 (*)     All other components within normal limits   PROTIME-INR - Abnormal; Notable for the following components:    INR 1.49 (*)     All other components within normal limits   URINE WITH REFLEXED MICRO - Abnormal; Notable for the following components:    Protein, UA 30 (*)     All other components within normal limits   GLOMERULAR FILTRATION RATE, ESTIMATED - Abnormal; Notable for the following components:    Est, Glom Filt Rate 33 (*)     All other components within normal limits   TROPONIN   AMMONIA   MAGNESIUM   ANION GAP   OSMOLALITY   COVID-19     CT Head WO Contrast   Final Result   No acute intracranial findings. Chronic changes as described. This document has been electronically signed by: Raad Alfaro MD on    01/05/2021 01:31 AM      All CT scans at this facility use dose modulation, iterative    reconstruction, and/or weight-based   dosing when appropriate to reduce radiation dose to as low as reasonably    achievable. CT ABDOMEN PELVIS WO CONTRAST Additional Contrast? None   Final Result   Subcutaneous body wall hematomas, likely related to medication injection,    largest anteriorly on the right as described. No rectus sheath hematoma. No internal hematomas or free fluid. Stable large gallstone. Dense fecal retention without obstruction. This document has been electronically signed by: Raad Alfaro MD on    01/05/2021 01:42 AM      All CT scans at this facility use dose modulation, iterative    reconstruction, and/or weight-based   dosing when appropriate to reduce radiation dose to as low as reasonably    achievable.          XR CHEST PORTABLE   Final Result   Impression:   No acute pathology. This document has been electronically signed by: Eileen Jaeger MD on    01/05/2021 12:52 AM           I have seen this patient with the resident Dr. Philippe Jean-Baptiste and agree with his assessment and plan.      Grace Bridges, DO  01/05/21 0126

## 2021-01-05 NOTE — ED NOTES
ED nurse-to-nurse bedside report    Chief Complaint   Patient presents with    Fatigue    Altered Mental Status      LOC: alert and orientated to name and place  Vital signs   Vitals:    01/04/21 2342 01/04/21 2355 01/05/21 0040   BP: 133/71  (!) 103/59   Pulse: 89  67   Resp: 16  18   Temp:  98.4 °F (36.9 °C)    TempSrc:  Oral    SpO2: 95%  95%   Weight:  199 lb (90.3 kg)    Height:  5' 8.5\" (1.74 m)       Pain:    Pain Interventions: medication  Pain Goal: 2  Oxygen: No    Current needs required 0 L    Telemetry: Yes  LDAs:   Peripheral IV 01/05/21 Right Forearm (Active)   Site Assessment Clean; Intact;Dry 01/05/21 0000   Line Status Blood return noted; Flushed;Specimen collected 01/05/21 0000   Dressing Status Clean;Dry; Intact 01/05/21 0000     Continuous Infusions:   Mobility: Requires assistance * 2  [place] Fall Risk Score:    Fall Risk 10/22/2019 8/27/2018 6/7/2017   2 or more falls in past year? no no yes   Fall with injury in past year? no no yes     Fall Interventions: call light in reach, family at bedside  Report given to: LEONID Arzate RN  01/05/21 0110

## 2021-01-05 NOTE — ED NOTES
Upon first contact, pt resting comfortably with no concerns voiced at this time. Daughter at bedside. Side rails up x2 and call light in reach.        Briseida Cosby RN  01/05/21 0140

## 2021-01-05 NOTE — ED PROVIDER NOTES
[place] ENCOUNTER          Pt Name: Dilma Banegas  MRN: [id number]  [place] 1935  Date of evaluation: 1/4/2021  Treating Resident Physician: Pedro Overton MD  Supervising Physician: Dr. Jagdish Coon       Chief Complaint   Patient presents with    Fatigue    Altered Mental Status     History obtained from the patient and patient's daughter. HISTORY OF PRESENT ILLNESS    HPI  Dilma Banegas is a 71-year-old female with past medical history of non-Hodgkin's lymphoma, CKD stage III, DVT, PE, CHF, CAD, CVA who is currently anticoagulated on Coumadin presenting with altered mental status last seen normal at 1600. Per daughter patient began having increased confusion after routine physician appointment today. In the emergency department patient currently has an NIH is 0, however per daughter at bedside is slower to respond and more confused than her normal baseline. Daughter also mentions that patient's primary care physician was concerned about metabolic derangement that could be contributing to patient's altered mental status. The patient has no other acute complaints at this time. REVIEW OF SYSTEMS   Review of Systems   Constitutional: Negative for chills and fever. HENT: Negative for rhinorrhea, sinus pain and sore throat. Eyes: Negative for redness. Respiratory: Negative for cough and shortness of breath. Cardiovascular: Negative for chest pain. Gastrointestinal: Negative for abdominal pain, diarrhea, nausea and vomiting. Genitourinary: Negative for dysuria. Musculoskeletal: Negative for back pain. Skin: Negative for rash. Neurological: Negative for light-headedness and headaches. Psychiatric/Behavioral: Positive for confusion.          PAST MEDICAL AND SURGICAL HISTORY     Past Medical History:   Diagnosis Date    Arthritis     Blood circulation, collateral     Cerebral artery occlusion with cerebral infarction [facility]     Chronic anticoagulation     Chronic combined systolic and diastolic CHF (congestive heart failure) (HCC)     CKD (chronic kidney disease) stage 3, GFR 30-59 ml/min     Degenerative joint disease     [facility]racture [facility] femoral head [facility] 10/09/2016    Left with intrarticular extension    GERD (gastroesophageal reflux disease)     History of DVT (deep vein thrombosis)     Hyperlipidemia     Hypertension     Ischemic heart disease     Macular degeneration, wet ([facility]     Neuromuscular disorder ([facility]     NHL (non-Hodgkin's lymphoma) ([facility] 06/2012    Dr. Shabnam Grossman Obesity (BMI 30.0-34. 9)     Permanent atrial fibrillation (HCC)     Pneumonia     Presence of IVC filter     Pulmonary embolism, bilateral (HCC)     S/P drug eluting coronary stent placement     Subdural hematoma ([facility])     s/p evacuation    Triple vessel coronary artery disease     Type II or unspecified type diabetes mellitus without mention of complication, not stated as uncontrolled      Past Surgical History:   Procedure Laterality Date    BRAIN SURGERY      Drained brain bleed    CARDIOVASCULAR STRESS TEST      with Dr. Bridges Seen- no acute findings    CENTRAL VENOUS CATHETER      Medi port placement    DIAGNOSTIC CARDIAC CATH LAB PROCEDURE      ENDOSCOPY, COLON, DIAGNOSTIC      EYE SURGERY      FRACTURE SURGERY      Bipolar femur    HIP FRACTURE SURGERY Left 10/10/2016    Bipolar Hip    JOINT REPLACEMENT      right knee    OTHER SURGICAL HISTORY Left 10/24/2016    I and D left hip, component exchange, Dr. Dennis John pacer    TOTAL KNEE ARTHROPLASTY Right     VENA CAVA FILTER PLACEMENT      VENTRICULAR CARDIAC PACEMAKER INSERTION  10/04/2016    Dr. Buell Denver   No current facility-administered medications for this encounter.      Current Outpatient Medications:     enoxaparin (LOVENOX) 100 MG/ML injection, Inject 0.9 mLs into the skin every 12 hours As instructed by [facility], Disp: 4 Syringe, Rfl: 0    isosorbide mononitrate (IMDUR) 60 MG extended release tablet, Take 1 tablet by mouth daily, Disp: 30 tablet, Rfl: 0    Melatonin 5 MG CHEW, Take 2 each by mouth nightly as needed (sleep), Disp: , Rfl:     miconazole (MICOTIN) 2 % powder, Apply topically 2 times daily. , Disp: 45 g, Rfl: 0    Cholecalciferol (VITAMIN D3) 1.25 MG (35402 UT) CAPS, Take 1 capsule by mouth once a week On tuesday, Disp: , Rfl:     zinc oxide (PINXAV) 30 % OINT, Apply 1 each topically 4 times daily as needed (bedsores), Disp: , Rfl:     NONFORMULARY, CBD/THC gummie (1:1) 1 pill as needed, Disp: , Rfl:     warfarin (COUMADIN) 2.5 MG tablet, Take as directed by [facility], Disp: , Rfl:     zinc gluconate 50 MG tablet, Take 50 mg by mouth daily, Disp: , Rfl:     warfarin (COUMADIN) 1 MG tablet, Take as directed by [facility] 30 tabs = 30 days, Disp: 30 tablet, Rfl: 3    lisinopril (PRINIVIL;ZESTRIL) 5 MG tablet, TAKE 1 TABLET DAILY, Disp: 90 tablet, Rfl: 3    warfarin (COUMADIN) 2.5 MG tablet, Take 2.5 mg by mouth daily As directed by Coumadin clinic, Disp: , Rfl:     Diaper Rash Products (PINXAV EX), Apply 1 applicator topically as needed (for bed sores), Disp: , Rfl:     potassium chloride (KLOR-CON M) 10 MEQ extended release tablet, TAKE 1 TABLET DAILY, Disp: 90 tablet, Rfl: 3    furosemide (LASIX) 20 MG tablet, TAKE 1 TABLET DAILY, Disp: 90 tablet, Rfl: 3    NONFORMULARY, CHF #14 multi mineral supplement.  Takes one capsule in am and 3 in pm for leg cramps., Disp: , Rfl:     HYDROcodone-acetaminophen (NORCO)  MG per tablet, Take 1-2 tablets by mouth every 4-6 hours as needed for Pain. , Disp: , Rfl:     metoprolol succinate (TOPROL XL) 50 MG extended release tablet, TAKE 1 TABLET DAILY, Disp: 90 tablet, Rfl: 4    NONFORMULARY, Take 800 mcg by mouth every other day Indications: Deficiency of Folic Acid L-5 methyltetrahydrofolate , Disp: , Rfl:     B Complex-C (B COMPLEX-B12-C IJ), Inject as directed every 30 days , Disp: , Rfl:     D-Mannose 500 MG CAPS, Take 1 capsule by mouth every other day Indications: Treatment to Prevent Urinary Tract Infection , Disp: , Rfl:     cyanocobalamin 1000 MCG/ML injection, Inject 1,000 mcg into the muscle every 30 days , Disp: , Rfl:     polyethylene glycol (GLYCOLAX) powder, Take 17 g by mouth nightly , Disp: , Rfl:     aspirin 81 MG EC tablet, Take 1 tablet by mouth daily, Disp: 30 tablet, Rfl: 3      SOCIAL HISTORY     Social History     Social History Narrative    ** Merged History Encounter **          Social History     Tobacco Use    Smoking status: Never Smoker    Smokeless tobacco: Never Used   Substance Use Topics    Alcohol use: No     Alcohol/week: 0.0 standard drinks    Drug use: No         ALLERGIES     Allergies   Allergen Reactions    Brilinta [Ticagrelor] Other (See Comments)     Trouble breathing; hyperventilate    Bactrim [Sulfamethoxazole-Trimethoprim] Other (See Comments)     Shaking, mood changes; kidney damage; hyperkalema    Amoxicillin-Pot Clavulanate Other (See Comments)     Made her feel like one leg is shorter than the other.  Ezetimibe-Simvastatin     Gabapentin Other (See Comments)     Felt drunk - couldn't stand.  Levofloxacin Other (See Comments)     Joint pain    Lipitor      Joint pain    Losartan     Lyrica [Pregabalin] Other (See Comments)     Dizziness & \"felt drunk as a skunk\"    Morphine Itching    Pcn [Penicillins] Hives    Pentoxifylline      Headache, N/V, itching    Pravastatin Sodium Other (See Comments)     Leg and foot pain .  Welchol [Colesevelam Hcl]          FAMILY HISTORY     Family History   Problem Relation Age of Onset    Stroke Father     Cancer Sister     Heart Disease Brother          PREVIOUS RECORDS   Previous records reviewed: Seen here on 12/17/2020 for acute renal failure.         PHYSICAL EXAM     ED Triage Vitals   BP Temp Temp Source Pulse Resp SpO2 Height Weight   01/04/21 2342 01/04/21 2355 01/04/21 2355 01/04/21 2342 01/04/21 2342 01/04/21 2342 01/04/21 2355 01/04/21 2355   133/71 98.4 °F (36.9 °C) Oral 89 16 95 % 5' 8.5\" (1.74 m) 199 lb (90.3 kg)     Initial vital signs and nursing assessment reviewed and normal. Pulsoximetry is normal per my interpretation. Additional Vital Signs:  Vitals:    01/05/21 0153   BP:    Pulse: 77   Resp: 19   Temp:    SpO2: 95%       Physical Exam  Vitals signs and nursing note reviewed. Constitutional:       General: She is in acute distress. Appearance: Normal appearance. She is ill-appearing. HENT:      Head: Normocephalic and atraumatic. Right Ear: External ear normal.      Left Ear: External ear normal.      Nose: Nose normal.      Mouth/Throat:      Mouth: Mucous membranes are dry. Pharynx: Oropharynx is clear. Eyes:      General: No visual field deficit. Left eye: Discharge present. Extraocular Movements: Extraocular movements intact. Conjunctiva/sclera: Conjunctivae normal.      Pupils: Pupils are equal, round, and reactive to light. Neck:      Musculoskeletal: Normal range of motion and neck supple. No neck rigidity or muscular tenderness. Cardiovascular:      Rate and Rhythm: Normal rate and regular rhythm. Pulses: Normal pulses. Heart sounds: Normal heart sounds. Pulmonary:      Effort: Pulmonary effort is normal. No respiratory distress. Breath sounds: Normal breath sounds. No wheezing. Abdominal:      General: Abdomen is flat. Bowel sounds are normal. There is no distension. Palpations: Abdomen is soft. Tenderness: There is no abdominal tenderness. There is no guarding or rebound. Musculoskeletal: Normal range of motion. General: Swelling present. Right lower leg: Edema present. Left lower leg: Edema present.    Lymphadenopathy:      Cervical: No cervical adenopathy. Skin:     General: Skin is warm. Capillary Refill: Capillary refill takes less than 2 seconds. Comments: Patient has bilateral lower extremity pitting edema approximately 2+. She also has a bandage covering chronic venous stasis wounds currently dry. No fluctuance, no surrounding erythema or warmth. Nontender. Patient has peripheral neuropathy and has some mildly decreased sensation bilateral lower extremities. Neurological:      General: No focal deficit present. Mental Status: She is alert and oriented to person, place, and time. GCS: GCS eye subscore is 4. GCS verbal subscore is 5. GCS motor subscore is 6. Cranial Nerves: Cranial nerves are intact. No cranial nerve deficit, dysarthria or facial asymmetry. Motor: Motor function is intact. No weakness, tremor, atrophy, abnormal muscle tone, seizure activity or pronator drift. Coordination: Coordination is intact. Psychiatric:         Mood and Affect: Mood normal.         MEDICAL DECISION MAKING   Initial Assessment: This is a 56-year-old female with past medical history of non-Hodgkin's lymphoma, CKD stage III, DVT, PE, CHF, CAD, CVA who is currently anticoagulated on Coumadin presenting with altered mental status last seen normal at 1600. Per daughter patient began having increased confusion after routine physician appointment today. In the emergency department patient currently has an NIH is 0, however per daughter at bedside is slower to respond and more confused than her normal baseline. Daughter also mentions that patient's primary care physician was concerned about metabolic derangement that could be contributing to patient's altered mental status. Differential Diagnosis Included but not limited to: CVA, metabolic encephalopathy, urinary tract infection, uremia, intracranial abnormality    Plan:  We will obtain laboratory studies as well as a CT head without contrast.  Patient will be given a Tylenol for her headache. After reexamination 0230 patient is at baseline per daughter she is alert and oriented x3. She would like to be discharged home daughter lives with patient and agrees with this plan and is able to monitor and care for. CT head and CT abdomen negative other than CT abdomen showing hematomas superficially secondary to her Lovenox injections. ED RESULTS   Laboratory results:  Labs Reviewed   CBC WITH AUTO DIFFERENTIAL - Abnormal; Notable for the following components:       Result Value    RBC 3.73 (*)     Hemoglobin 11.9 (*)     .2 (*)     MCHC 30.9 (*)     RDW-CV 17.2 (*)     RDW-SD 65.0 (*)     Eosinophils Absolute 0.5 (*)     All other components within normal limits   COMPREHENSIVE METABOLIC PANEL W/ REFLEX TO MG FOR LOW K - Abnormal; Notable for the following components:    Glucose 111 (*)     CREATININE 1.5 (*)     BUN 31 (*)     Alb 2.9 (*)     ALT 6 (*)     All other components within normal limits   PROTIME-INR - Abnormal; Notable for the following components:    INR 1.49 (*)     All other components within normal limits   URINE WITH REFLEXED MICRO - Abnormal; Notable for the following components:    Protein, UA 30 (*)     All other components within normal limits   GLOMERULAR FILTRATION RATE, ESTIMATED - Abnormal; Notable for the following components:    Est, Glom Filt Rate 33 (*)     All other components within normal limits   TROPONIN   AMMONIA   MAGNESIUM   ANION GAP   OSMOLALITY   COVID-19       Radiologic studies results:  CT Head WO Contrast   Final Result   No acute intracranial findings. Chronic changes as described. This document has been electronically signed by: Zina Dumont MD on    01/05/2021 01:31 AM      All CT scans at this facility use dose modulation, iterative    reconstruction, and/or weight-based   dosing when appropriate to reduce radiation dose to as low as reasonably    achievable.          CT ABDOMEN PELVIS WO CONTRAST Additional Contrast? None   Final Result   Subcutaneous body wall hematomas, likely related to medication injection,    largest anteriorly on the right as described. No rectus sheath hematoma. No internal hematomas or free fluid. Stable large gallstone. Dense fecal retention without obstruction. This document has been electronically signed by: Dama Primrose, MD on    01/05/2021 01:42 AM      All CT scans at this facility use dose modulation, iterative    reconstruction, and/or weight-based   dosing when appropriate to reduce radiation dose to as low as reasonably    achievable. XR CHEST PORTABLE   Final Result   Impression:   No acute pathology. This document has been electronically signed by: Dama Primrose, MD on    01/05/2021 12:52 AM             ED Medications administered this visit:   Medications   acetaminophen (TYLENOL) tablet 650 mg (650 mg Oral Given 1/5/21 0013)   0.9 % sodium chloride bolus (1,000 mLs Intravenous New Bag 1/5/21 0041)         ED COURSE     ED Course as of Jan 05 0238   Tue Jan 05, 2021   0103 Due to her recent admission a COVID-19 test was added on as well as possible concern and this may be contributing to her altered mental status. [AL]      ED Course User Index  [AL] Pancho Carmona MD     Strict return precautions and follow up instructions were discussed with the patient prior to discharge, with which the patient agrees. MEDICATION CHANGES     New Prescriptions    No medications on file         FINAL DISPOSITION     Final diagnoses:   Dehydration   Altered mental status, unspecified altered mental status type     Condition: condition: good  Dispo: Discharge to home      This transcription was electronically signed. Parts of this transcriptions may have been dictated by use of voice recognition software and electronically transcribed, and parts may have been transcribed with the assistance of an ED scribe.  The transcription may contain errors not detected in proofreading. Please refer to my supervising physician's documentation if my documentation differs.     Electronically Signed: Briseida Bonner, 01/05/21, 2:38 AM         Briseida Bonner MD  Resident  01/05/21 9115

## 2021-01-07 NOTE — PROGRESS NOTES
[address]: Yes  Total # of Interventions Recommended: 1  - Discontinued Medication #: 1 Discontinue Reason(s): Acute Therapy Complete  - Maintenance Safety Lab Monitoring #: 1  Total Interventions Accepted: 1  Time Spent (min): [address], [address] Psychiatric

## 2021-01-14 NOTE — TELEPHONE ENCOUNTER
----- Message from LUZ Dubon CNP sent at 1/14/2021  1:55 PM EST -----  Please call Radha Harper, let her know there is an iron deficiency. Start ferrous sulfate 150 mg daily. Recheck CBC in 2 weeks.

## 2021-01-14 NOTE — TELEPHONE ENCOUNTER
Patient's daughter Kishore Preston called. She is concerned about her mother. States that she has been very tired and fatigued. She is not sure if it is related to Kenzie's  being hospitalized or if there is something more going on. No other symptoms. Kishore Prestno is scheduled for an INR draw today with home health. They are requesting additional labs to be drawn. Will place order and fax to home health.

## 2021-01-14 NOTE — PROGRESS NOTES
Medication Management [address]  [phone] (phone)  [phone] (fax)        INR drawn by [facility]. Nursing assessment reviewed and appreciated. Nursing assessment within the normal limits with the exception of the following:  Bruising, fatigue, loose stools, SOB, edema    Anticoagulation encounter completed via telephone. Spoke with daughter, Delvin Avilez, okay with patient. Verifies current dosing regimen and tablet strength. No missed or extra doses. Patient denies s/s bleeding/swelling/chest pain  Bruising from Lovenox shots on stomach, SOB when moving around. No blood in urine or stool. No dietary changes. No changes in medication/OTC agents/Herbals. No change in alcohol use or tobacco use. No change in activity level. Patient denies headaches/dizziness/lightheadedness/falls. No vomiting/diarrhea  Feeling fatigued  No Procedures scheduled in the future at this time. Assessment:  Lab Results   Component Value Date    INR 1.88 (H) 01/14/2021    INR 2.46 (H) 01/07/2021    INR 1.49 (H) 01/05/2021     INR subtherapeutic   Recent Labs     01/14/21  1200   INR 1.88*     Plan:  Take Coumadin 2.5 mg today (Th) and Mon, 1.25 mg [id number]. Recheck INR in 1 week(s). Patient reminded to call the Anticoagulation Clinic with any signs or symptoms of bleeding or with any medication changes. Patient given instructions utilizing the teach back method. The following statement was review with patient regarding this virtual visit:  We want to confirm that, for purposes of billing, this is a virtual visit with your provider for which we will submit a claim for reimbursement with your insurance company. You may be responsible for any copays, coinsurance amounts or other amounts not covered by your insurance company. If you do not accept this, unfortunately we will not be able to schedule a virtual visit with the provider. Do you accept?   Yes    CLINICAL PHARMACY CONSULT: MED RECONCILIATION/REVIEW ADDENDUM    For Pharmacy Admin Tracking Only  PHSO: Yes  Total # of Interventions Recommended: 1  - Increased Dose #: 1  - Maintenance Safety Lab Monitoring #: 1  Total Interventions Accepted: 1  Time Spent (min): Jair Altman, [address] [place] [address]

## 2021-01-14 NOTE — TELEPHONE ENCOUNTER
Spoke with Erinn Henley, informed her patient has iron deficiency going to start daily iron Dami Gee already sent script) and recheck CBC in 2 weeks    CBC order faxed to home health to have drawn in 2 weeks.

## 2021-01-21 NOTE — PROGRESS NOTES
Medication Management [address]   [phone] (phone)  [phone] (fax)        INR drawn by [facility]. Nursing assessment reviewed and appreciated. Nursing assessment within the normal limits with the exception of the following:  RN assessment not available at the time of this note. Anticoagulation encounter completed via telephone with pt's daughter, Jose Luis Luevano. Patient verifies current dosing regimen and tablet strength. No missed or extra doses. Patient denies s/s bleeding/swelling/SOB/chest pain still has hematoma on abdomen from Lovenox, is being monitored by [facility] RN. No blood in urine or stool. No dietary changes. No changes in medication/Herbals. Started Ferrous Sulfate daily and Colace 100mg daily prn. No change in alcohol use or tobacco use. No change in activity level. Patient denies headaches/dizziness/lightheadedness/falls. No vomiting/diarrhea or acute illness. No Procedures scheduled in the future at this time. Assessment:  Lab Results   Component Value Date    INR 1.91 (H) 01/21/2021    INR 1.88 (H) 01/14/2021    INR 2.46 (H) 01/07/2021     INR subtherapeutic   Recent Labs     01/21/21  1320   INR 1.91*         Plan:  Increase Coumadin to 2.5mg MF and 1.25mg TWThSaS. Recheck INR in 1 week(s), on 1/28/20. Patient reminded to call the Anticoagulation Clinic with any signs or symptoms of bleeding or with any medication changes. Patient given instructions utilizing the teach back method. The following statement was review with patient regarding this virtual visit:  We want to confirm that, for purposes of billing, this is a virtual visit with your provider for which we will submit a claim for reimbursement with your insurance company. You may be responsible for any copays, coinsurance amounts or other amounts not covered by your insurance company.  If you do not accept this, unfortunately we will not be able to schedule a virtual visit with the provider. Do you accept?   Yes    CLINICAL PHARMACY CONSULT: MED RECONCILIATION/REVIEW ADDENDUM    For Pharmacy Admin Tracking Only    PHSO: No  Total # of Interventions Recommended: 1  - Increased Dose #: 1  - Maintenance Safety Lab Monitoring #: 1  Total Interventions Accepted: 1  Time Spent (min): 20    Tutu RileyD

## 2021-01-25 NOTE — TELEPHONE ENCOUNTER
Patients daughter reports that Adry Hunt is having difficulty with constipation since starting ferrous sulfate. She is using miralax, colace, and prune juice. They have stopped iron at this time. Questioning B12 injections as she was on it previously. Will check labs Thurs with INR draw. CBC, BMP, and B12. She is also requesting information on foods that are rich in iron but that will not interact with patients coumadin. Can you check with [facility] and see if she has anything we can send to them please? ?

## 2021-01-26 NOTE — TELEPHONE ENCOUNTER
Received fax from [facility]ordero [phone] reviewed, okay to send to patient.  Placed handout in mail

## 2021-01-28 NOTE — TELEPHONE ENCOUNTER
INR expected today via [facility], no result received. Called lab, spoke to [place], she states that blood looks to have been dropped off in [place], and then will need sent here. Daughter called, informed her the we do not have result. Instructed pt to take 1.25mg tonight. Will plan to follow up tomorrow when INR available.

## 2021-01-29 PROBLEM — G93.40 ACUTE ENCEPHALOPATHY: Status: ACTIVE | Noted: 2021-01-01

## 2021-01-29 NOTE — CONSULTS
CONSULTATION NOTE :ID       Patient - Dilma Banegas,  Age - 81 y.o.    - 1935      Room Number - 5K-02/002-A   MRN -  [id number]   [facility]  Date of Admission -  2021  4:47 AM  Patient's PCP: LUZ Dubon - CNP     Requesting Physician: Lois Castrejon MD    REASON FOR CONSULTATION   UTI.change in mental state. CHIEF COMPLAINT   Change in mental state and pain on her left leg. HISTORY OF PRESENT ILLNESS       This is a very pleasant 81 y.o. female who was admitted to the hospital with a chief complaints of change in mental state and pain on her lower extremities. I was asked to see this patient because of abnormal urine culture. Was not able to get much information from patient due to mental status change I spoke with her sister who takes care of her and was at bedside has been complaining of pain on her left leg and has not been acting right she was very weak confused for which reason she was brought to the hospital.  She has chronic leg swelling has been following with podiatry has been on multiple antibiotics and compression therapy. She has neuropathy and has very sensitive lower extremities. Her urine was abnormal.  She also reports of chills yesterday. Started on IV cefepime pending culture report.     PAST MEDICAL  HISTORY       Past Medical History:   Diagnosis Date    Arthritis     Blood circulation, collateral     Cerebral artery occlusion with cerebral infarction (HCC)     Chronic anticoagulation     Chronic combined systolic and diastolic CHF (congestive heart failure) (HCC)     CKD (chronic kidney disease) stage 3, GFR 30-59 ml/min     Degenerative joint disease     Fracture of femoral head ([address] 10/09/2016    Left with intrarticular extension    GERD (gastroesophageal reflux disease)     History of DVT (deep vein thrombosis)     Hyperlipidemia     Hypertension     Ischemic heart disease     Macular degeneration, wet [facility]     Neuromuscular disorder ([facility]     NHL (non-Hodgkin's lymphoma) ([facility] 06/2012    Dr. Magan Collier Obesity (BMI 30.0-34. 9)     Permanent atrial fibrillation (HCC)     Pneumonia     Presence of IVC filter     Pulmonary embolism, bilateral (HCC)     S/P drug eluting coronary stent placement     Subdural hematoma (HCC)     s/p evacuation    Triple vessel coronary artery disease     Type II or unspecified type diabetes mellitus without mention of complication, not stated as uncontrolled        PAST SURGICAL HISTORY     Past Surgical History:   Procedure Laterality Date    BRAIN SURGERY      Drained brain bleed    CARDIOVASCULAR STRESS TEST      with Dr. Chemo Rojas- no acute findings    CENTRAL VENOUS CATHETER      Medi port placement    DIAGNOSTIC CARDIAC CATH LAB PROCEDURE      ENDOSCOPY, COLON, DIAGNOSTIC      EYE SURGERY      FRACTURE SURGERY      Bipolar femur    HIP FRACTURE SURGERY Left 10/10/2016    Bipolar Hip    JOINT REPLACEMENT      right knee    OTHER SURGICAL HISTORY Left 10/24/2016    I and D left hip, component exchange, Dr. Saucedo [place] pacer    TOTAL KNEE ARTHROPLASTY Right     VENA CAVA FILTER PLACEMENT      VENTRICULAR CARDIAC PACEMAKER INSERTION  10/04/2016    Dr. Justa Esquivel:       Scheduled Meds:   aspirin  81 mg Oral Daily    furosemide  20 mg Oral Daily    isosorbide mononitrate  60 mg Oral Daily    metoprolol succinate  50 mg Oral Daily    zinc sulfate  50 mg Oral Daily    sodium chloride flush  10 mL Intravenous 2 times per day    cefepime  1,000 mg Intravenous Q24H    betamethasone dipropionate   Topical BID    warfarin  2.5 mg Oral Once    warfarin (COUMADIN) daily dosing (placeholder)   Other RX Placeholder    HYDROmorphone        miconazole   Topical BID     Continuous Infusions:   lactated ringers 50 mL/hr at 01/29/21 1437     PRN Meds:docusate sodium, HYDROcodone-acetaminophen, sodium chloride flush, promethazine **OR** ondansetron, acetaminophen **OR** acetaminophen, polyethylene glycol  Allergies:   ALLERGIES:    Brilinta [ticagrelor], Bactrim [sulfamethoxazole-trimethoprim], Amoxicillin-pot clavulanate, Ezetimibe-simvastatin, Gabapentin, Levofloxacin, Lipitor, Losartan, Lyrica [pregabalin], Morphine, Pcn [penicillins], Pentoxifylline, Pravastatin sodium, and Welchol [colesevelam hcl]        SOCIAL HISTORY:     TOBACCO:   reports that she has never smoked. She has never used smokeless tobacco.     ETOH:   reports no history of alcohol use. Patient currently lives with family     FAMILY HISTORY:         Problem Relation Age of Onset    Stroke Father     Cancer Sister     Heart Disease Brother        REVIEW OF SYSTEMS:   Unable to obtain much information from patient. PHYSICAL EXAM:     BP (!) 146/65   Pulse 83   Temp 98.5 °F (36.9 °C) (Axillary)   Resp 19   SpO2 98%   General apperance: Chronically sick looking, lethargic   HEENT: Slightly pale conjunctiva, unicteric sclera, dry oral mucosa. Chest: Bilateral air entry  Cardiovascular:  RRR ,S1S2,  Abdomen:  Soft, non tender to palpation. She has excoriated skin on her lower abdominal fold. Extremities: She has chronic skin changes, scaly crusted skin, the left leg is swollen sensitive to touch, no purulent drainage. Skin:  Warm and dry. CNS: Lethargic  Excoriated gluteal and abdominal fold. LABS:     CBC:   Recent Labs     01/29/21  0500   WBC 10.4   HGB 13.2        BMP:    Recent Labs     01/28/21  1423 01/29/21  0500    141   K 5.1 4.5    107   CO2 27 27   BUN 47* 44*   CREATININE 1.8* 1.9*   GLUCOSE 109* 132*     Calcium:  Recent Labs     01/29/21  0500   CALCIUM 9.0     Ionized Calcium:No results for input(s): IONCA in the last 72 hours.   Magnesium:  Recent Labs     01/29/21  0500   MG 2.3     Recent Labs     01/29/21  0500   INR 2.09*     Hepatic:   Recent Labs     01/29/21  0500   ALKPHOS 86   ALT 7*   AST 16   PROT 7.3   BILITOT 0.5   BILIDIR <0.2   LABALBU 3.0*     Amylase and Lipase:  Recent Labs     01/29/21  0500   LACTA 1.2     Lactic Acid:   Recent Labs     01/29/21  0500   LACTA 1.2     Troponin:   Recent Labs     01/29/21  1450   CKTOTAL 22*     UA:   Recent Labs     01/29/21  0523   PHUR 8.0   COLORU YELLOW   PROTEINU 100*   BLOODU NEGATIVE   RBCUA 0-2   WBCUA > 100   BACTERIA MANY   NITRU POSITIVE*   GLUCOSEU NEGATIVE   BILIRUBINUR NEGATIVE   UROBILINOGEN 0.2   KETUA NEGATIVE         IMAGING:    Micro:   Lab Results   Component Value Date    BC No growth-preliminary No growth  02/29/2020    BC No growth-preliminary No growth  02/29/2020       Problem list of patient      Patient Active Problem List   Diagnosis Code    Hyperlipidemia E78.5    History of DVT (deep vein thrombosis) Z86.718    Type 2 diabetes mellitus with complication, without long-term current use of insulin ([facility]) E11.8    Essential hypertension I10    Acute kidney injury ([facility] N17.9    CKD stage 3 due to type 2 diabetes mellitus ([facility] E11.22, N18.30    History of pulmonary embolism Z86.711    Coagulopathy ([facility]) D68.9    NHL (non-Hodgkin's lymphoma)  Dr. Malin Rather  diagnosed Kasie 2012 X15.28    Diastolic dysfunction,  EF 55-60% I51.9    Anticoagulated on Coumadin Z79.01    Polycythemia D75.1    Dyspnea R06.00    Obesity (BMI 35.0-39.9 without comorbidity) E66.9    Mild aortic stenosis I35.0    Ischemic cardiomyopathy I25.5    Lower back pain M54.5    Hypoxia R09.02    Persistent atrial fibrillation (HCC) I48.19    CHF NYHA class II ([facility]) I50.9    Ischemic heart disease I25.9    History of embolic stroke F39.40    Triple vessel coronary artery disease I25.10    S/P drug eluting coronary stent placement Z95.5    Chronic combined systolic and diastolic CHF (congestive heart failure) ([facility]) I50.42    RLS (restless legs syndrome) G25.81    S/P cardiac pacemaker procedure Z95.0    Physical deconditioning R53.81    Swelling R60.9    Pre-ulcerative calluses L84    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    Coronary artery disease involving native coronary artery of native heart without angina pectoris I25.10    Sick sinus syndrome (HCC) I49.5    Calculus of gallbladder without cholecystitis without obstruction K80.20    Chronic idiopathic constipation K59.04    History of coronary artery stent placement Z95.5    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  right mid axillary line G58.0    Spinal stenosis of lumbar region M48.061    Epigastric abdominal pain R10.13    Sepsis ([facility]) A41.9    Acute cystitis without hematuria N30.00    Kidney cysts N28.1    Opacity of lung on imaging study R91.8    Nephrolithiasis N20.0    Hyperbilirubinemia E80.6    History of CVA (cerebrovascular accident) Z80.78    Long term (current) use of anticoagulants Z79.01    Hypertensive urgency I16.0    Moderate malnutrition ([facility]) E44.0    Pneumonia J18.9    Lethargy R53.83    Open wound of foot excluding toes S91.309A    Abnormal echocardiogram R93.1    Cardiomyopathy ([facility]) I42.9    AVELINO (acute kidney injury) ([facility] N17.9    Acute encephalopathy G93.40           Impression and Recommendation:   Change in mental state. Acute kidney injury: Appears to be dehydrated clinically with decreased skin turgor  Chronic vein disease with excoriated skin  UTI  We will continue cefepime 3 to 5 days  Moisturizing cream to her left leg and apply Kerlix and Ace wrap gentle wrapping as she has very sensitive lower extremities  Excoriated skin fold will apply antifungal powder. Treatment plan discussed with her daughter and nursing staff. Thank you Lois Castrejon MD for allowing me to participate in this patient's care.     Leny Batres MD,FACP 1/29/2021 4:54 PM

## 2021-01-29 NOTE — PROGRESS NOTES
This nurse and charge RN attempted to perform skin assessment on patient for admission- pt screamed and cried in pain with minimal contact/touching to various locations on her body. Notified Dr. April Madsen- continued 1 hour after prn Norco dose given. New order for one time Dilaudid dose received.

## 2021-01-29 NOTE — ED PROVIDER NOTES
[facility]        CHIEF COMPLAINT    Chief Complaint   Patient presents with    Altered Mental Status    Leg Pain       Nurses Notes reviewed and I agree except as noted in the HPI. HPI    Terrance Aguayo is a 81 y.o. 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 The patient is obtunded and is unable to respond to questions. She was accompanied to the ED by her daughter, who acted as the primary historian. Per the patient's daughter, the patient was at home and at baseline right until supper last night. The patient is able to feed herself, but was unable to do so last night and had to be fed by her daughter. After supper, the daughter noted that the patient was \"just out of it\" and seemed as if her legs were in pain; she was yelling/moaning and writhing her legs. The patient's daughter then gave the patient a medical marijuana gummy as wall as a Norco  for the pain. However, the patient did not improve, so her daughter decided to bring her to the ED. Per the daughter, the patient did not have any F/N/V/D, though she did appear to have chills as times. The patient's daughter also states that she thinks the pain is in the legs as the patient has a history of peripheral neuropathy and weeping sores to the right lower leg. Patient also has an abdominal wound (hematoma secondary to past Lovenox injections) and a sacral ulcer. Per daughter, there are no other complaints. REVIEW OF SYSTEMS    Review of Systems   Constitutional: Positive for appetite change and chills. Negative for fever. HENT: Negative for mouth sores and nosebleeds. Eyes: Negative for discharge. Respiratory: Negative for cough. Cardiovascular: Positive for leg swelling.    Gastrointestinal: Negative for abdominal pain, diarrhea, nausea and vomiting. Endocrine: Positive for cold intolerance. Genitourinary: Negative for difficulty urinating and dysuria. Musculoskeletal: Positive for arthralgias and myalgias. Skin: Positive for color change and wound. Neurological: Positive for weakness. Negative for seizures and syncope. Hematological: Bruises/bleeds easily. Psychiatric/Behavioral: Positive for agitation and confusion. PAST MEDICAL HISTORY     has a past medical history of Arthritis, Blood circulation, collateral, Cerebral artery occlusion with cerebral infarction [facility]), Chronic anticoagulation, Chronic combined systolic and diastolic CHF (congestive heart failure) ([facility] CKD (chronic kidney disease) stage 3, GFR 30-59 ml/min, Degenerative joint disease, Fracture of femoral head (HCC), GERD (gastroesophageal reflux disease), History of DVT (deep vein thrombosis), Hyperlipidemia, Hypertension, Ischemic heart disease, Macular degeneration, wet ([facility] Neuromuscular disorder ([facility] NHL (non-Hodgkin's lymphoma) ([facility] Obesity (BMI 30.0-34.9), Permanent atrial fibrillation ([facility] Pneumonia, Presence of IVC filter, Pulmonary embolism, bilateral (HCC), S/P drug eluting coronary stent placement, Subdural hematoma ([facility] Triple vessel coronary artery disease, and Type II or unspecified type diabetes mellitus without mention of complication, not stated as uncontrolled. SURGICAL HISTORY     has a past surgical history that includes Vena Cava Filter Placement; brain surgery; central venous catheter; cardiovascular stress test; Hip fracture surgery (Left, 10/10/2016); fracture surgery; other surgical history (Left, 10/24/2016); Total knee arthroplasty (Right); eye surgery; Diagnostic Cardiac Cath Lab Procedure; pacemaker placement; joint replacement; Ventricular cardiac pacer insertion (10/04/2016); and Endoscopy, colon, diagnostic.     CURRENT MEDICATIONS    Previous Medications    ASPIRIN 81 MG EC TABLET Take 1 tablet by mouth daily    B COMPLEX-C (B COMPLEX-B12-C IJ)    Inject as directed every 30 days     CHOLECALCIFEROL (VITAMIN D3) 1.25 MG (22962 UT) CAPS    Take 1 capsule by mouth once a week On tuesday    CYANOCOBALAMIN 1000 MCG/ML INJECTION    Inject 1,000 mcg into the muscle every 30 days     D-MANNOSE 500 MG CAPS    Take 1 capsule by mouth every other day Indications: Treatment to Prevent Urinary Tract Infection     DIAPER RASH PRODUCTS (PINXAV EX)    Apply 1 applicator topically as needed (for bed sores)    DOCUSATE SODIUM (COLACE) 100 MG CAPSULE    Take 1 capsule by mouth daily as needed for Constipation    FERROUS SULFATE (LIA-SELENA) 325 (65 FE) MG TABLET    Take 1 tablet by mouth daily (with breakfast)    FUROSEMIDE (LASIX) 20 MG TABLET    TAKE 1 TABLET DAILY    HYDROCODONE-ACETAMINOPHEN (NORCO)  MG PER TABLET    Take 1-2 tablets by mouth every 4-6 hours as needed for Pain. ISOSORBIDE MONONITRATE (IMDUR) 60 MG EXTENDED RELEASE TABLET    Take 1 tablet by mouth daily    LISINOPRIL (PRINIVIL;ZESTRIL) 5 MG TABLET    TAKE 1 TABLET DAILY    MELATONIN 5 MG CHEW    Take 2 each by mouth nightly as needed (sleep)    METOPROLOL SUCCINATE (TOPROL XL) 50 MG EXTENDED RELEASE TABLET    TAKE 1 TABLET DAILY    MICONAZOLE (MICOTIN) 2 % POWDER    Apply topically 2 times daily. NONFORMULARY    Take 800 mcg by mouth every other day Indications: Deficiency of Folic Acid L-5 methyltetrahydrofolate     NONFORMULARY    CHF #14 multi mineral supplement. Takes one capsule in am and 3 in pm for leg cramps.     NONFORMULARY    CBD/THC gummie (1:1) 1 pill as needed    POLYETHYLENE GLYCOL (GLYCOLAX) POWDER    Take 17 g by mouth nightly     POTASSIUM CHLORIDE (KLOR-CON M) 10 MEQ EXTENDED RELEASE TABLET    TAKE 1 TABLET DAILY    WARFARIN (COUMADIN) 1 MG TABLET    Take as directed by [facility] 30 tabs = 30 days    WARFARIN (COUMADIN) 2.5 MG TABLET    Take 2.5 mg by mouth daily As directed by Coumadin clinic WARFARIN (COUMADIN) 2.5 MG TABLET    Take as directed by [facility] Coumad[facility]    ZINC GLUCONATE 50 MG TABLET    Take 50 mg by mouth daily    ZINC OXIDE (PINXAV) 30 % OINT    Apply 1 each topically 4 times daily as needed (bedsores)       ALLERGIES    is allergic to brilinta [ticagrelor]; bactrim [sulfamethoxazole-trimethoprim]; amoxicillin-pot clavulanate; ezetimibe-simvastatin; gabapentin; levofloxacin; lipitor; losartan; lyrica [pregabalin]; morphine; pcn [penicillins]; pentoxifylline; pravastatin sodium; and welchol [colesevelam hcl]. FAMILY HISTORY    She indicated that her mother is . She indicated that her father is . She indicated that her sister is alive. She indicated that her brother is alive. family history includes Cancer in her sister; Heart Disease in her brother; Stroke in her father. SOCIAL HISTORY     reports that she has never smoked. She has never used smokeless tobacco. She reports current drug use. Drug: Marijuana. She reports that she does not drink alcohol. PHYSICAL EXAM      INITIAL VITALS: BP (!) 140/66   Pulse 95   Temp 98.9 °F (37.2 °C) (Rectal)   Resp 30   SpO2 93%  Estimated body mass index is 29.82 kg/m² as calculated from the following:    Height as of 21: 5' 8.5\" (1.74 m). Weight as of 21: 199 lb (90.3 kg). Physical Exam  Constitutional:       General: She is not in acute distress. Appearance: She is obese. HENT:      Head: Normocephalic and atraumatic. Mouth/Throat:      Mouth: Mucous membranes are moist.   Eyes:      General: No scleral icterus. Extraocular Movements: Extraocular movements intact. Pupils: Pupils are equal, round, and reactive to light. Neck:      Musculoskeletal: Normal range of motion. Cardiovascular:      Rate and Rhythm: Normal rate and regular rhythm. Heart sounds: Normal heart sounds. Pulmonary:      Effort: Pulmonary effort is normal. No respiratory distress.       Breath sounds: Normal breath sounds. Abdominal:      General: Bowel sounds are normal.      Palpations: Abdomen is soft. Musculoskeletal:         General: Swelling and tenderness present. Right lower leg: She exhibits tenderness. Left lower leg: She exhibits tenderness. Skin:     General: Skin is warm and dry. Capillary Refill: Capillary refill takes more than 3 seconds. Neurological:      Mental Status: She is lethargic and confused. GCS: GCS eye subscore is 4. GCS verbal subscore is 4. GCS motor subscore is 6. Motor: Weakness present. Psychiatric:         Behavior: Behavior is agitated. MEDICAL DECISION MAKING  Per the patient's daughter, the patient started to show altered mentation with confusion around suppertime last night. At the ED, the daughter states that her mother responding slower than her baseline. Will obtain laboratory studies as well as CT of the head without contrast to rule out intracranial abnormalities. DIFFERENTIAL DIAGNOSIS:  Delirium secondary to UTI vs dementia vs metabolic encephalopathy      DIAGNOSTIC RESULTS    RADIOLOGY:  I have reviewedradiologic plain film image(s). The plain films will be read or overread by the radiologist.  All other non-plain film images(s) such as CT, Ultrasound and MRI have been read by the radiologist.  [address]   Final Result   No acute intracranial findings. Chronic changes as described. This document has been electronically signed by: Joi Olsen MD on    01/29/2021 06:22 AM      All CT scans at this facility use dose modulation, iterative    reconstruction, and/or weight-based   dosing when appropriate to reduce radiation dose to as low as reasonably    achievable. XR CHEST PORTABLE   Final Result   Impression:   No acute pathology. This document has been electronically signed by:  Joi Olsen MD on    01/29/2021 05:42 AM               Vitals:    Vitals:    01/29/21 5076 01/29/21 0502 01/29/21 0507   BP: (!) 140/66     Pulse: 95     Resp: 21 30    Temp:   98.9 °F (37.2 °C)   TempSrc:   Rectal   SpO2: 95% 93%        EMERGENCY DEPARTMENT COURSE:    Medications   0.9 % sodium chloride bolus (has no administration in time range)   cefTRIAXone (ROCEPHIN) 1000 mg IVPB in 50 mL D5W minibag (has no administration in time range)       The patient was seen and evaluated by me. Within the department, I observed the patient's vital signs to be within acceptable range. Laboratory and Radiological studies were performed, results were reviewed with the patient. Within the department, the patient was treated with IV fluids and IV ceftriaxone. I observed the patient's condition to improve during the duration of their stay. I explained my proposed course of treatment to the patient, and they were amenable to my decision. Discussed with the patient's daughter that the patient likely has UTI with dehydration which resulted in the altered mentation. The patient will be prescribed PO cefdinir as outpatient antibiotic therapy. They were discharged home, and they will return to the ED if their symptoms become more severe in nature, or otherwise change. Controlled Substances Monitoring:        CRITICAL CARE:   None. CONSULTS:  None    PROCEDURES:  None. FINAL IMPRESSION       1. Altered mental status, unspecified altered mental status type    2. Urinary tract infection in female    3.  Chronic kidney disease (CKD), stage IV (severe) [facility]          DISPOSITION/PLAN  PATIENT REFERRED TO:  LUZ Crabtree - CNP  [address] [address] [phone]    In 3 days  ED discharge follow-up for urinary tract infection    Tanesha Fields MD  [address] [id number]    In 3 days  Chronic kidney disease    DISCHARGEMEDICATIONS:  New Prescriptions    CEFDINIR (OMNICEF) 300 MG CAPSULE    Take 1 capsule by mouth 2 times daily for 10 days (Please note that portions of this note were completed with a voice recognition program and electronically transcribed. Efforts were [facility] edit the dictations but occasionally words are mis-transcribed . The transcription may contain errors not detected in proofreading.   This transcription was electronically signed.)         01/29/21 5:30 AM      Cassius Carr MD      Emergency room physician        China Ramos DPM PGY-1  Foot and Ankle Surgery Resident     EMELY Ortega [facility]  Resident  01/29/21 [id number]

## 2021-01-29 NOTE — ED NOTES
ED to inpatient nurses report    Chief Complaint   Patient presents with    Altered Mental Status    Leg Pain      Present to ED from home  LOC: alert to only name  Vital signs   Vitals:    01/29/21 0507 01/29/21 0625 01/29/21 0951 01/29/21 1007   BP:  (!) 142/53 (!) 147/91    Pulse:  88 83    Resp:  21 22    Temp: 98.9 °F (37.2 °C) 98.1 °F (36.7 °C)  97.9 °F (36.6 °C)   TempSrc: Rectal Axillary  Oral   SpO2:  99% 98%       Oxygen Baseline 2L NC    Current needs required 2L NC Bipap/Cpap No  LDAs:   Peripheral IV 01/29/21 Left Antecubital (Active)     Mobility: Fully dependent  Pending ED orders: NA  Present condition: STABLE  Electronically signed by Twan Padilla, RN on 1/29/2021 at 10:49 AM     Aleida Quevedo RN  01/29/21 1044

## 2021-01-29 NOTE — ED NOTES
Pt. Is lying in bed with her eyes closed. Oriented to name. Daughter at bedside. Pt given a warmed blanket. Vitals taken.       Amisha Nap  01/29/21 0631

## 2021-01-29 NOTE — ED PROVIDER NOTES
[address] ENCOUNTER      PATIENT NAME: Dian Restrepo  MRN: [id number]  : 1935  LI: 2021  PROVIDER: Saji Prado MD      CHIEF COMPLAINT       Chief Complaint   Patient presents with    Altered Mental Status    Leg Pain       Nurses Notes reviewed and I agreeexcept as noted in the HPI. HISTORY OF PRESENT ILLNESS    Dian Restrepo is a 81 y.o. female who presents to Emergency Department with altered mental status and left leg pain. Patient has chronic lower back pain and leg pain. Patient has chronic bilateral lower extremity PVD with left leg venous status changes and chronic seeping wounds. Since yesterday, patient has been acting differently according to her daughter. Patient complains increasing pain all over the body. Her daughter medicated her with medical marijuana and Ahmeek at home around midnight however patient was still acting abnormal.  Patient was yelling and confused. Patient seemed dry when she arrived. No fever, no chills. She was never tested positive for Covid. No nausea and no vomiting. No abdominal pain. She has chronic lower abdominal wound from Lovenox injection. She lives at home with her daughter. CODE STATUS is full code. This HPI was provided by the patient. REVIEW OF SYSTEMS     Review of Systems   Constitutional: Negative for activity change, appetite change, chills, fatigue, fever and unexpected weight change. HENT: Negative for congestion, ear discharge, ear pain, hearing loss, nosebleeds, rhinorrhea and sore throat. Eyes: Negative for photophobia, pain, discharge, redness and itching. Respiratory: Negative for cough, chest tightness, shortness of breath, wheezing and stridor. Cardiovascular: Negative for chest pain, palpitations and leg swelling. Gastrointestinal: Negative for abdominal distention, abdominal pain, constipation, diarrhea, nausea and vomiting.    Endocrine: Negative for STRESS TEST      with Dr. Dickson Wilkerson- no acute findings    CENTRAL VENOUS CATHETER      Medi port placement    DIAGNOSTIC CARDIAC CATH LAB PROCEDURE      ENDOSCOPY, COLON, DIAGNOSTIC      EYE SURGERY      FRACTURE SURGERY      Bipolar femur    HIP FRACTURE SURGERY Left 10/10/2016    Bipolar Hip    JOINT REPLACEMENT      right knee    OTHER SURGICAL HISTORY Left 10/24/2016    I and D left hip, component exchange, Dr. Song Ring pacer    TOTAL KNEE ARTHROPLASTY Right     VENA CAVA FILTER PLACEMENT      VENTRICULAR CARDIAC PACEMAKER INSERTION  10/04/2016    Dr. Kenzie Rodriguez       Current Discharge Medication List      CONTINUE these medications which have NOT CHANGED    Details   metoprolol succinate (TOPROL XL) 50 MG extended release tablet TAKE 1 TABLET DAILY  Qty: 90 tablet, Refills: 3    Associated Diagnoses: Chronic systolic heart failure (HCC)      docusate sodium (COLACE) 100 MG capsule Take 1 capsule by mouth daily as needed for Constipation  Qty: 90 capsule, Refills: 0      ferrous sulfate (LIA-SELENA) 325 (65 Fe) MG tablet Take 1 tablet by mouth daily (with breakfast)  Qty: 30 tablet, Refills: 3      isosorbide mononitrate (IMDUR) 60 MG extended release tablet Take 1 tablet by mouth daily  Qty: 30 tablet, Refills: 0    Associated Diagnoses: Essential hypertension      Melatonin 5 MG CHEW Take 2 each by mouth nightly as needed (sleep)      miconazole (MICOTIN) 2 % powder Apply topically 2 times daily.   Qty: 45 g, Refills: 0      !! zinc oxide (PINXAV) 30 % OINT Apply 1 each topically 4 times daily as needed (bedsores)      !! NONFORMULARY CBD/THC gummie (1:1) 1 pill as needed      !! warfarin (COUMADIN) 2.5 MG tablet Take as directed by [facility]      zinc gluconate 50 MG tablet Take 50 mg by mouth daily      !! warfarin (COUMADIN) 1 MG tablet Take as directed by [facility] 30 tabs = 30 days  Qty: 30 tablet, Refills: 3 lisinopril (PRINIVIL;ZESTRIL) 5 MG tablet TAKE 1 TABLET DAILY  Qty: 90 tablet, Refills: 3      !! warfarin (COUMADIN) 2.5 MG tablet Take 2.5 mg by mouth daily As directed by Coumadin clinic      !! Diaper Rash Products (PINXAV EX) Apply 1 applicator topically as needed (for bed sores)      potassium chloride (KLOR-CON M) 10 MEQ extended release tablet TAKE 1 TABLET DAILY  Qty: 90 tablet, Refills: 3    Associated Diagnoses: Essential hypertension      furosemide (LASIX) 20 MG tablet TAKE 1 TABLET DAILY  Qty: 90 tablet, Refills: 3    Associated Diagnoses: Essential hypertension      !! NONFORMULARY CHF #14 multi mineral supplement. Takes one capsule in am and 3 in pm for leg cramps. HYDROcodone-acetaminophen (NORCO)  MG per tablet Take 1-2 tablets by mouth every 4-6 hours as needed for Pain.       !! NONFORMULARY Take 800 mcg by mouth every other day Indications: Deficiency of Folic Acid L-5 methyltetrahydrofolate       B Complex-C (B COMPLEX-B12-C IJ) Inject as directed every 30 days       D-Mannose 500 MG CAPS Take 1 capsule by mouth every other day Indications: Treatment to Prevent Urinary Tract Infection       cyanocobalamin 1000 MCG/ML injection Inject 1,000 mcg into the muscle every 30 days       polyethylene glycol (GLYCOLAX) powder Take 17 g by mouth nightly       aspirin 81 MG EC tablet Take 1 tablet by mouth daily  Qty: 30 tablet, Refills: 3      Cholecalciferol (VITAMIN D3) 1.25 MG (26383 UT) CAPS Take 1 capsule by mouth once a week On tuesday       !! - Potential duplicate medications found. Please discuss with provider. ALLERGIES     Brilinta [ticagrelor], Bactrim [sulfamethoxazole-trimethoprim], Amoxicillin-pot clavulanate, Ezetimibe-simvastatin, Gabapentin, Levofloxacin, Lipitor, Losartan, Lyrica [pregabalin], Morphine, Pcn [penicillins], Pentoxifylline, Pravastatin sodium, and Welchol [colesevelam hcl]    FAMILY HISTORY     She indicated that her mother is .  She indicated that her father is . She indicated that her sister is alive. She indicated that her brother is alive. family history includes Cancer in her sister; Heart Disease in her brother; Stroke in her father. SOCIAL HISTORY      reports that she has never smoked. She has never used smokeless tobacco. She reports current drug use. Drug: Marijuana. She reports that she does not drink alcohol. PHYSICAL EXAM     INITIAL VITALS:  rectal temperature is 98.3 °F (36.8 °C). Her blood pressure is 138/80 and her pulse is 88. Her respiration is 18 and oxygen saturation is 94%. Physical Exam  Vitals signs and nursing note reviewed. Constitutional:       Appearance: She is well-developed. She is not diaphoretic. Comments: She looks dry. HENT:      Head: Normocephalic and atraumatic. Nose: Nose normal.   Eyes:      General: No scleral icterus. Right eye: No discharge. Left eye: No discharge. Conjunctiva/sclera: Conjunctivae normal.      Pupils: Pupils are equal, round, and reactive to light. Neck:      Musculoskeletal: Normal range of motion and neck supple. Vascular: No JVD. Trachea: No tracheal deviation. Cardiovascular:      Rate and Rhythm: Normal rate. Rhythm irregular. Heart sounds: Normal heart sounds. No murmur. No friction rub. No gallop. Pulmonary:      Effort: Pulmonary effort is normal. No respiratory distress. Breath sounds: Normal breath sounds. No stridor. No wheezing or rales. Chest:      Chest wall: No tenderness. Abdominal:      General: Bowel sounds are normal. There is no distension. Palpations: Abdomen is soft. There is no mass. Tenderness: There is no abdominal tenderness. There is no guarding or rebound. Hernia: No hernia is present. Comments: Lower abdomen chronic wound from previous Lovenox injection   Musculoskeletal:         General: No tenderness or deformity.       Comments: Diffuse tenderness to bilateral lower extremity which is chronic for her, left lower extremity has chronic venous stasis changes with multiple seeping wounds, no signs of cellulitis or acute infection. Bilateral feet are erythematous with scales which is chronic   Lymphadenopathy:      Cervical: No cervical adenopathy. Skin:     General: Skin is warm and dry. Capillary Refill: Capillary refill takes less than 2 seconds. Coloration: Skin is not pale. Findings: No erythema or rash. Neurological:      Mental Status: She is alert and oriented to person, place, and time. Cranial Nerves: No cranial nerve deficit. Sensory: No sensory deficit. Motor: No abnormal muscle tone. Coordination: Coordination normal.      Deep Tendon Reflexes: Reflexes normal.   Psychiatric:         Behavior: Behavior is agitated. DIFFERENTIAL DIAGNOSIS:   Dehydration, sepsis, UTI, pneumonia, delirium, metabolic encephalopathy, AMI    DIAGNOSTIC RESULTS   EKG: All EKG's are interpreted by the Emergency Department Physician who either signs or Co-signsthis chart in the absence of a cardiologist.  Interpreted by me  Atrial fibrillation  Ventricular rate 89 bpm  QRS duration 136 ms   ms  No ST elevation or acute T wave    RADIOLOGY: non-plain film images(s) such as CT, Ultrasound and MRI are read by the radiologist.  CT HEAD WO CONTRAST   Final Result   No acute intracranial findings. Chronic changes as described. This document has been electronically signed by: Sandie Alex MD on    01/29/2021 06:22 AM      All CT scans at this facility use dose modulation, iterative    reconstruction, and/or weight-based   dosing when appropriate to reduce radiation dose to as low as reasonably    achievable. XR CHEST PORTABLE   Final Result   Impression:   No acute pathology. This document has been electronically signed by:  Sandie Alex MD on    01/29/2021 05:42 AM             LABS:   Results for orders placed or performed during the hospital encounter of 01/29/21   Culture, Blood 1    Specimen: Blood   Result Value Ref Range    Blood Culture, Routine No growth-preliminary     Culture, Blood 2    Specimen: Blood   Result Value Ref Range    Blood Culture, Routine No growth-preliminary     CBC Auto Differential   Result Value Ref Range    WBC 10.4 4.8 - 10.8 thou/mm3    RBC 4.22 4.20 - 5.40 mill/mm3    Hemoglobin 13.2 12.0 - 16.0 gm/dl    Hematocrit 42.0 37.0 - 47.0 %    MCV 99.5 (H) 81.0 - 99.0 fL    MCH 31.3 26.0 - 33.0 pg    MCHC 31.4 (L) 32.2 - 35.5 gm/dl    RDW-CV 14.8 (H) 11.5 - 14.5 %    RDW-SD 54.5 (H) 35.0 - 45.0 fL    Platelets 399 649 - 091 thou/mm3    MPV 10.2 9.4 - 12.4 fL    Seg Neutrophils 69.1 %    Lymphocytes 15.9 %    Monocytes 10.9 %    Eosinophils 3.1 %    Basophils 0.6 %    Immature Granulocytes 0.4 %    Segs Absolute 7.2 1.8 - 7.7 thou/mm3    Lymphocytes Absolute 1.7 1.0 - 4.8 thou/mm3    Monocytes Absolute 1.1 0.4 - 1.3 thou/mm3    Eosinophils Absolute 0.3 0.0 - 0.4 thou/mm3    Basophils Absolute 0.1 0.0 - 0.1 thou/mm3    Immature Grans (Abs) 0.04 0.00 - 0.07 thou/mm3    nRBC 0 /100 wbc   Basic Metabolic Panel w/ Reflex to MG   Result Value Ref Range    Sodium 141 135 - 145 meq/L    Potassium reflex Magnesium 4.5 3.5 - 5.2 meq/L    Chloride 107 98 - 111 meq/L    CO2 27 23 - 33 meq/L    Glucose 132 (H) 70 - 108 mg/dL    BUN 44 (H) 7 - 22 mg/dL    CREATININE 1.9 (H) 0.4 - 1.2 mg/dL    Calcium 9.0 8.5 - 10.5 mg/dL   Troponin   Result Value Ref Range    Troponin T < 0.010 ng/ml   Brain Natriuretic Peptide   Result Value Ref Range    Pro-BNP 7346.0 (H) 0.0 - 1800.0 pg/mL   Hepatic Function Panel   Result Value Ref Range    Albumin 3.0 (L) 3.5 - 5.1 g/dL    Total Bilirubin 0.5 0.3 - 1.2 mg/dL    Bilirubin, Direct <0.2 0.0 - 0.3 mg/dL    Alkaline Phosphatase 86 38 - 126 U/L    AST 16 5 - 40 U/L    ALT 7 (L) 11 - 66 U/L    Total Protein 7.3 6.1 - 8.0 g/dL   Protime-INR   Result Value Ref Range    INR 2.09 (H) 0.85 - 1.13   Lactic acid, plasma   Result Value Ref Range    Lactic Acid 1.2 0.5 - 2.2 mmol/L   Magnesium   Result Value Ref Range    Magnesium 2.3 1.6 - 2.4 mg/dL   COVID-19   Result Value Ref Range    SARS-CoV-2, NAAT NOT DETECTED NOT DETECTED   Anion Gap   Result Value Ref Range    Anion Gap 7.0 (L) 8.0 - 16.0 meq/L   Glomerular Filtration Rate, Estimated   Result Value Ref Range    Est, Glom Filt Rate 25 (A) ml/min/1.73m2   Osmolality   Result Value Ref Range    Osmolality Calc 294.3 275.0 - 300.0 mOsmol/kg   Urine with Reflexed Micro   Result Value Ref Range    Glucose, Ur NEGATIVE NEGATIVE mg/dl    Bilirubin Urine NEGATIVE NEGATIVE    Ketones, Urine NEGATIVE NEGATIVE    Specific Gravity, Urine 1.018 1.002 - 1.030    Blood, Urine NEGATIVE NEGATIVE    pH, UA 8.0 5.0 - 9.0    Protein,  (A) NEGATIVE    Urobilinogen, Urine 0.2 0.0 - 1.0 eu/dl    Nitrite, Urine POSITIVE (A) NEGATIVE    Leukocyte Esterase, Urine SMALL (A) NEGATIVE    Color, UA YELLOW STRAW-YELLOW    Character, Urine CLOUDY (A) CLEAR-SL CLOUD    RBC, UA 0-2 0-2/hpf /hpf    WBC, UA > 100 0-4/hpf /hpf    Epithelial Cells, UA 0-2 3-5/hpf /hpf    Bacteria, UA MANY FEW/NONE SEEN /hpf    Casts UA 4-8 HYALINE NONE SEEN /lpf    Crystals, UA NONE SEEN NONE SEEN    Renal Epithelial, UA NONE SEEN NONE SEEN    Yeast, UA NONE SEEN NONE SEEN    CASTS 2 NONE SEEN NONE SEEN /lpf    MISCELLANEOUS 2 NONE SEEN    CK   Result Value Ref Range    Total CK 22 (L) 30 - 135 U/L   Sedimentation Rate   Result Value Ref Range    Sed Rate 52 (H) 0 - 20 mm/hr   C-reactive protein   Result Value Ref Range    CRP 6.86 (H) 0.00 - 1.00 mg/dl   EKG 12 Lead   Result Value Ref Range    Ventricular Rate 89 BPM    Atrial Rate 78 BPM    QRS Duration 136 ms    Q-T Interval 410 ms    QTc Calculation (Bazett) 498 ms    R Axis -24 degrees    T Axis -42 degrees       EMERGENCY DEPARTMENT COURSE:   Vitals:    Vitals:    01/29/21 0951 01/29/21 1007 01/29/21 1115 01/29/21 1930   BP: (!) 147/91  (!) 146/65 138/80   Pulse: 83  83 88   Resp: 22 19 18   Temp:  97.9 °F (36.6 °C) 98.5 °F (36.9 °C) 98.3 °F (36.8 °C)   TempSrc:  Oral Axillary Rectal   SpO2: 98%  98% 94%     5:18 AM: Patient is seen and evaluated in a timely fashion. ACTIONS:  Large bore IV  Tele monitor  EKG 12-LEAD  XR CHEST PORTABLE  CT HEAD WO CONTRAST  Labs Reviewed   CULTURE, BLOOD 1   CULTURE, BLOOD 2   CBC WITH AUTO DIFFERENTIAL   BASIC METABOLIC PANEL W/ REFLEX TO MG FOR LOW K   TROPONIN   BRAIN NATRIURETIC PEPTIDE   HEPATIC FUNCTION PANEL   PROTIME-INR   LACTIC ACID, PLASMA   URINE RT REFLEX TO CULTURE   COVID-19   MAGNESIUM   POCT GLUCOSE     Medications   0.9 % sodium chloride bolus (has no administration in time range)       MEDICAL DECISION MAKINGS:    Patient felt better after normal saline bolus. She was able to communicate. Pertinent labs: WBC 10.4, BUN/creatinine 44/1.9, which is around her baseline, troponin less than 0.01, BNP 7346, normal LFTs, INR 2.09, magnesium 2.3, rapid Covid negative. UA shows WBC more than 100/hpf, cloudy urine, positive nitrite, small leukocyte, many bacteria. Rocephin 1 g IV in ED for her UTI. Admission is discussed. Her daughter would like to take her home due to concern of acute delirium in the hospital setting. I suggested enough oral hydration. She is prescribed Omnicef x 10 days for UTI. She should be followed by her PCP and nephrology within next week for her UTI and CKD. CRITICAL CARE:   None    CONSULTS:  None    PROCEDURES:  None    FINAL IMPRESSION      1. Altered mental status, unspecified altered mental status type    2. Urinary tract infection in female    3. Chronic kidney disease (CKD), stage IV (severe) ([facility] [address]    4. Chronic venous stasis dermatitis of left lower extremity    5. Anticoagulated on Coumadin          DISPOSITION/PLAN   Home (planned), later admitted after I finished my shift by the next shift provider.      Addendum: As documented in the chart, admission was discussed but daughter wanted to take patient home. Later I finished my shift and did briefly dicussed with the next shift physician Dr. Isaura Alonso regarding this patient while she was finishing IV antibiotics. I noticed nursing documentation stating patient seemed more confused and admitted to Hospitalist service. No more management in ED except change of disposition from home to admission.      PATIENT REFERRED TO:  Marcial Cruz, APRN - CNP  [address] [address] [phone]    In 3 days  ED discharge follow-up for urinary tract infection    Conner Salomon MD  [facility], [address] [place] [address] [id number]    In 3 days  Chronic kidney disease      DISCHARGE MEDICATIONS:  Current Discharge Medication List      START taking these medications    Details   cefdinir (OMNICEF) 300 MG capsule Take 1 capsule by mouth 2 times daily for 10 days  Qty: 20 capsule, Refills: 0             (Please note that portions of this note were completed with a voice recognition program.  Efforts were made to edit the dictations but occasionally words aremis-transcribed.)    MD Amy Leonard MD  01/29/21 4465       Amy Ferris MD  01/29/21 7454

## 2021-01-29 NOTE — ED NOTES
Bed: 024A  Expected date: 1/29/21  Expected time: 4:45 AM  Means of arrival: [facility]  Comments:     Nieves Dangelo RN  01/29/21 4917

## 2021-01-29 NOTE — PROGRESS NOTES
Pharmacy Renal Adjustment    Patrice Nguyen is a 81 y.o. female.  Pharmacy renally adjust the following medications per P[employer] approved policy: cefepime    Recent Labs     01/28/21  1423 01/29/21  0500   BUN 47* 44*       Recent Labs     01/28/21  1423 01/29/21  0500   CREATININE 1.8* 1.9*     Calculated CrCl: 24.2 mL/min    Height:   Ht Readings from Last 1 Encounters:   01/04/21 5' 8.5\" (1.74 m)     Weight:  Wt Readings from Last 1 Encounters:   01/04/21 199 lb (90.3 kg)       Plan: Adjustments based on renal function:          Decrease cefepime to 1 gram IV every 24 hours instead of every 12 hours    Vilma Zelaya, PharmD, BCPS  1/29/2021  11:37 AM

## 2021-01-29 NOTE — ED NOTES
During discharge, daughter states that her mother is more confused and is worried about being able to take care of her.  Zurdo LEE in room for evaluation      Radha Mota, LEONID  01/29/21 3556

## 2021-01-29 NOTE — PROGRESS NOTES
Clinical Pharmacy Note    Dilma Banegas is a 81 y.o. female for whom pharmacy has been asked to manage warfarin therapy. Reason for Admission: AMS/leg pain    Consulting Physician: Dr. Glennda Schwab  Warfarin dose prior to admission: 2.5mg MF and 1.25mg TWThSaS  Warfarin indication: afib  Target INR range: 2-3   Outpatient warfarin provider: [facility]    Past Medical History:   Diagnosis Date    Arthritis     Blood circulation, collateral     Cerebral artery occlusion with cerebral infarction [facility])     Chronic anticoagulation     Chronic combined systolic and diastolic CHF (congestive heart failure) (HCC)     CKD (chronic kidney disease) stage 3, GFR 30-59 ml/min     Degenerative joint disease     Fracture of femoral head ([facility] [address] 10/09/2016    Left with intrarticular extension    GERD (gastroesophageal reflux disease)     History of DVT (deep vein thrombosis)     Hyperlipidemia     Hypertension     Ischemic heart disease     Macular degeneration, wet ([facility] [address]     Neuromuscular disorder ([facility] [address]     NHL (non-Hodgkin's lymphoma) ([facility] [address] 06/2012    Dr. Yeimy Peoples    Obesity (BMI 30.0-34. 9)     Permanent atrial fibrillation (HCC)     Pneumonia     Presence of IVC filter     Pulmonary embolism, bilateral (HCC)     S/P drug eluting coronary stent placement     Subdural hematoma (HCC)     s/p evacuation    Triple vessel coronary artery disease     Type II or unspecified type diabetes mellitus without mention of complication, not stated as uncontrolled                 Recent Labs     01/29/21  0500   INR 2.09*     Recent Labs     01/29/21  0500   HGB 13.2   HCT 42.0          Current warfarin drug-drug interactions: aspirin, cefepime      Date INR Warfarin Dose   1/29/2021 2.09 2.5 mg                                   Daily PT/INR until stable within therapeutic range. Thank you for the consult.      Francis Urbina, PharmD  1/29/2021  1:52 PM

## 2021-01-29 NOTE — TELEPHONE ENCOUNTER
INR resulted after hours yesterday, = 2.09. This am, pt is in ER due to AMS due to UTI. Daughter called and states that pt will likely be discharged home. Per ER note, current plan states to discharge home with Rx for Omnicef. Reviewed INR with daughter, gave instructions to continue current regimen of Coumadin 2.5mg MF and 1.25mg TWThSaS, INR recheck on 2/4/21.  Called ER, gave dosing instructions to Juan Alberto, who will include this on AVS.

## 2021-01-29 NOTE — CONSULTS
Podiatric Surgery Consult    Reason for Consult: Left leg erythema  Requesting Physician: Dr. Ajit Pichardo MD    CHIEF COMPLAINT: Left leg pain    HISTORY OF PRESENT ILLNESS:                The patient is a 81 y.o. female with significant past medical history of PAD and neuropathy who is being seen bedside on behalf of of Dr. Nivia Fields. Patient is currently obtunded and history was taken from patient's daughter who is at bedside. Patient is currently admitted for UTI infection and has been complaining of left leg pain. Patient has chronic venous stasis dermatitis with history of cellulitis in the past treated with antibiotics. Patient was not seen by Dr. Nivia Fields since last admission in December but daughter has been caring for lower extremity wounds since then with Ace wraps. Patient also is currently unable to tolerate vascular studies. Review of systems was unable to be obtained due to patient status. Past Medical History:        Diagnosis Date    Arthritis     Blood circulation, collateral     Cerebral artery occlusion with cerebral infarction (HCC)     Chronic anticoagulation     Chronic combined systolic and diastolic CHF (congestive heart failure) (HCC)     CKD (chronic kidney disease) stage 3, GFR 30-59 ml/min     Degenerative joint disease     Fracture of femoral head ([facility] [address] 10/09/2016    Left with intrarticular extension    GERD (gastroesophageal reflux disease)     History of DVT (deep vein thrombosis)     Hyperlipidemia     Hypertension     Ischemic heart disease     Macular degeneration, wet ([address]     Neuromuscular disorder ([address]     NHL (non-Hodgkin's lymphoma) ([facility] [address] 06/2012    Dr. Leila Castanon Obesity (BMI 30.0-34. 9)     Permanent atrial fibrillation (HCC)     Pneumonia     Presence of IVC filter     Pulmonary embolism, bilateral (HCC)     S/P drug eluting coronary stent placement     [facility]dural hematoma [facility])     s/p evacuation    Triple vessel coronary artery disease  Type II or unspecified type diabetes mellitus without mention of complication, not stated as uncontrolled      Past Surgical History:        Procedure Laterality Date    BRAIN SURGERY      Drained brain bleed    CARDIOVASCULAR STRESS TEST      with Dr. [facility]- no acute findings    CENTRAL VENOUS CATHETER      Medi port placement    DIAGNOSTIC CARDIAC CATH LAB PROCEDURE      ENDOSCOPY, COLON, DIAGNOSTIC      EYE SURGERY      FRACTURE SURGERY      Bipolar femur    HIP FRACTURE SURGERY Left 10/10/2016    Bipolar Hip    JOINT REPLACEMENT      right knee    OTHER SURGICAL HISTORY Left 10/24/2016    I and D left hip, component exchange, Dr. Jessica Fontanez pacer    TOTAL KNEE ARTHROPLASTY Right     VENA CAVA FILTER PLACEMENT      VENTRICULAR CARDIAC PACEMAKER INSERTION  10/04/2016    Dr. Manjinder Galvez     Current Medications:    Current Facility-Administered Medications: aspirin EC tablet 81 mg, 81 mg, Oral, Daily  docusate sodium (COLACE) capsule 100 mg, 100 mg, Oral, Daily PRN  furosemide (LASIX) tablet 20 mg, 20 mg, Oral, Daily  HYDROcodone-acetaminophen (NORCO)  MG per tablet 1 tablet, 1 tablet, Oral, Q6H PRN  isosorbide mononitrate (IMDUR) extended release tablet 60 mg, 60 mg, Oral, Daily  metoprolol succinate (TOPROL XL) extended release tablet 50 mg, 50 mg, Oral, Daily  zinc sulfate (ZINCATE) capsule 50 mg, 50 mg, Oral, Daily  sodium chloride flush 0.9 % injection 10 mL, 10 mL, Intravenous, 2 times per day  sodium chloride flush 0.9 % injection 10 mL, 10 mL, Intravenous, PRN  promethazine (PHENERGAN) tablet 12.5 mg, 12.5 mg, Oral, Q6H PRN **OR** ondansetron (ZOFRAN) injection 4 mg, 4 mg, Intravenous, Q6H PRN  acetaminophen (TYLENOL) tablet 650 mg, 650 mg, Oral, Q6H PRN **OR** acetaminophen (TYLENOL) suppository 650 mg, 650 mg, Rectal, Q6H PRN  polyethylene glycol (GLYCOLAX) packet 17 g, 17 g, Oral, Daily PRN  cefepime (MAXIPIME) 1000 mg IVPB minibag, 1,000 mg, Intravenous, Q24H  betamethasone dipropionate (DIPROLENE) 0.05 % cream, , Topical, BID  Allergies:  Brilinta [ticagrelor], Bactrim [sulfamethoxazole-trimethoprim], Amoxicillin-pot clavulanate, Ezetimibe-simvastatin, Gabapentin, Levofloxacin, Lipitor, Losartan, Lyrica [pregabalin], Morphine, Pcn [penicillins], Pentoxifylline, Pravastatin sodium, and Welchol [colesevelam hcl]    Social History:    TOBACCO:   reports that she has never smoked. She has never used smokeless tobacco.  Never used tobacco  ETOH:   reports no history of alcohol use. Family History:       Problem Relation Age of Onset    Stroke Father     Cancer Sister     Heart Disease Brother      REVIEW OF SYSTEMS:    Review of systems not obtained due to patient factors - mental status    PHYSICAL EXAM:    General Appearance: Obtunded. in no acute distress. Well nourished. Resting in bed. Vitals:    BP (!) 146/65   Pulse 83   Temp 98.5 °F (36.9 °C) (Axillary)   Resp 19   SpO2 98%     Exam:   ABD dressing intact but not well maintained maintained. strike through noted. Vascular: Dorsalis pedis and posterior tibial pulses are barely palpable bilaterally. Skin temperature is warm to warm from proximal tibial tuberosity to distal digits. CFT <3 seconds to exposed digits. Edema present bilaterally in the dorsal foot and in the leg. Hair growth negative. Quality of skin atrophic     Dermatologic:  No rashes or subcutaneous nodules of note. Patient has red/purple coloration of her feet and legs secondary to a long standing venous stasis dermatitis, left worse than right at this time.  There are multiple small fibrogranular wounds noted circumferentially around the left leg that are draining serous fluid. No malodor noted. Wound is superficial.  No purulence or probe to bone.     Neurovascular: Unable to assess as patient obtunded.     Musculoskeletal: Muscle strength testing deferred.  Pain on palpation of the bilateral legs, left much more painful than right. No gross osseous abnormality noted. Imaging:  Nothing indicated at this time. LABS:    Recent Labs     01/29/21  0500   WBC 10.4   HGB 13.2   HCT 42.0           Recent Labs     01/29/21  0500      K 4.5      CO2 27   BUN 44*   CREATININE 1.9*        Recent Labs     01/28/21  1423 01/29/21  0500   PROT  --  7.3   INR 2.03* 2.09*      No results for input(s): CKTOTAL, CKMB, CKMBINDEX, TROPONINI in the last 72 hours.           Assessment  Principle  Chronic venous stasis dermatitis versus cellulitis, left leg    Chronic  Patient Active Problem List   Diagnosis    Hyperlipidemia    History of DVT (deep vein thrombosis)    Type 2 diabetes mellitus with complication, without long-term current use of insulin ([facility] [address] [facility]    Essential hypertension    Acute kidney injury ([facility] [address] [facility]    CKD stage 3 due to type 2 diabetes mellitus ([facility] [address] [facility]    History of pulmonary embolism    Coagulopathy (HCC)    NHL (non-Hodgkin's lymphoma)  Dr. Dea Veloz  diagnosed June 0023    Diastolic dysfunction,  EF 55-60%    Anticoagulated on Coumadin    Polycythemia    Dyspnea    Obesity (BMI 35.0-39.9 without comorbidity)    Mild aortic stenosis    Ischemic cardiomyopathy    Lower back pain    Hypoxia    Persistent atrial fibrillation (HCC)    CHF NYHA class II (HCC)    Ischemic heart disease    History of embolic stroke    Triple vessel coronary artery disease    S/P drug eluting coronary stent placement    Chronic combined systolic and diastolic CHF (congestive heart failure) (HCC)    RLS (restless legs syndrome)    S/P cardiac pacemaker procedure    Physical deconditioning    Swelling    Pre-ulcerative calluses    Status post total replacement of left hip    Coronary artery disease involving native coronary artery of native heart without angina pectoris    Sick sinus syndrome (HCC)    Calculus of gallbladder without cholecystitis without obstruction    Chronic idiopathic constipation    History of coronary artery stent placement    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  right mid axillary line    Spinal stenosis of lumbar region    Epigastric abdominal pain    Sepsis ([address]    Acute cystitis without hematuria    Kidney cysts    Opacity of lung on imaging study    Nephrolithiasis    Hyperbilirubinemia    History of CVA (cerebrovascular accident)    Long term (current) use of anticoagulants    Hypertensive urgency    Moderate malnutrition (HCC)    Pneumonia    Lethargy    Open wound of foot excluding toes    Abnormal echocardiogram    Cardiomyopathy ([address]    AVELINO (acute kidney injury) ([address]    Acute encephalopathy       Plan  Patient initially examined and evaluated  WBC 10, afebrile  Daughter bedside today and states that patient is unable to tolerate any type of dressing on her left leg. Okay to continue antibiotics but I do not feel that left leg is source of previous leukocytosis  Rx for triamcinolone 0.5% cream to include applied twice daily  If patient is able to tolerate dress wounds with Adaptic 4 x 4's Kerlix and Ace bandage. Patient may weight-bear as tolerated  No surgical intervention indicated at this time. Podiatry will continue to follow patient    Dispo: Pending response to topical steroids. Okay to discharge from podiatry standpoint. Can follow-up with Dr. Eliana Nicolas as outpatient    Dr. Shon Johnson thank you for the consultation, allowing podiatry to assist in the medical welfare of this patient. Podiatry will continue to follow this patient throughout the duration of hospitalization.      Giovana Balbuena DPM  1/29/2021 1:23 PM

## 2021-01-29 NOTE — ED TRIAGE NOTES
Pt to ED via EMS with c/o altered mental via daughter. Daughter states the pt was starting to act altered around supper time last night, to the point where her daughter had to feed her dinner. Daughter states that she thought her mother was just in pain from her legs all night, but even after her pain medications, the pt was continuing to be altered and wailing in pain so she called squad. Pt unable to verbalize name, or anything else at this point. Covered wound on L leg and R lower abdomen. VSS, daughter bedside.  EKG complete

## 2021-01-29 NOTE — H&P
above.  -Continue to monitor. May need to consider adding as needed or adjusting medications if she is continuously above 163 systolic    Abdominal wall hematoma: Reportedly from Lovenox injections. We will continue to monitor. Will request wound/infectious disease to also assess. Chronic systolic congestive heart failure: Patient with known EF of 25 to 30%. Currently she appears to be compensated. proBNP was elevated as mentioned above but likely related to chronic kidney disease.  -Holding lisinopril as mentioned above. Continue beta-blocker and Imdur.  -Giving gentle IV fluids for AVELINO. Suspect that due to her pain she will not be eating much. Will closely monitor for volume overload. CC: Left lower extremity pain and not acting like herself  HPI: (12 point review of systems completed. Pertinent positives noted. Otherwise ROS is negative) : Patient is an 36-year-old female with a past medical history of atrial fibrillation, PE, non-Hodgkin's lymphoma, CKD stage III, CHF EF 25 to 30%, CAD. Patient presents with her daughter. Apparently the patient began acting unlike herself yesterday evening. The daughter would attempt to speak with her however the patient would not answer appropriately. Patient also began to have increasing left lower extremity pain around the same time. She presented to the emergency room and a urinalysis was obtained which demonstrated a urinary tract infection. She was started on antibiotics and the original plan was to discharge home with PCP follow-up. However, patient developed increasing pain and more confusion thus she was admitted. She is unable to provide any further review of systems. She has a history of UTI with resistant organisms. She has also been seeing podiatry over the last several months due to a chronic cellulitic process of the left lower extremity.   During my exam she is extremely tender to palpation of the lower extremity and cries out immediately upon touching her. Her daughter states that she is generally very touchy in the lower extremities. Patient has also been chilling. She has been afebrile though. PMH:  Per HPI  SHX:    Social History     Tobacco Use    Smoking status: Never Smoker    Smokeless tobacco: Never Used   Substance Use Topics    Alcohol use: No     Alcohol/week: 0.0 standard drinks    Drug use: Yes     Types: Marijuana     Comment: medical for pain     FHX:   Family History   Problem Relation Age of Onset    Stroke Father     Cancer Sister     Heart Disease Brother      Allergies: Allergies   Allergen Reactions    Brilinta [Ticagrelor] Other (See Comments)     Trouble breathing; hyperventilate    Bactrim [Sulfamethoxazole-Trimethoprim] Other (See Comments)     Shaking, mood changes; kidney damage; hyperkalema    Amoxicillin-Pot Clavulanate Other (See Comments)     Made her feel like one leg is shorter than the other.  Ezetimibe-Simvastatin     Gabapentin Other (See Comments)     Felt drunk - couldn't stand.  Levofloxacin Other (See Comments)     Joint pain    Lipitor      Joint pain    Losartan     Lyrica [Pregabalin] Other (See Comments)     Dizziness & \"felt drunk as a skunk\"    Morphine Itching    Pcn [Penicillins] Hives    Pentoxifylline      Headache, N/V, itching    Pravastatin Sodium Other (See Comments)     Leg and foot pain .      Welchol [Colesevelam Hcl]      Medications:     lactated ringers        aspirin  81 mg Oral Daily    furosemide  20 mg Oral Daily    isosorbide mononitrate  60 mg Oral Daily    metoprolol succinate  50 mg Oral Daily    zinc sulfate  50 mg Oral Daily    sodium chloride flush  10 mL Intravenous 2 times per day    cefepime  1,000 mg Intravenous Q24H    betamethasone dipropionate   Topical BID    warfarin  2.5 mg Oral Once    warfarin (COUMADIN) daily dosing (placeholder)   Other RX Placeholder       Vital Signs:   BP (!) 146/65   Pulse 83   Temp 98.5 °F (36.9 °C) EKG 12 Lead    Collection Time: 01/29/21  4:53 AM   Result Value Ref Range    Ventricular Rate 89 BPM    Atrial Rate 78 BPM    QRS Duration 136 ms    Q-T Interval 410 ms    QTc Calculation (Bazett) 498 ms    R Axis -24 degrees    T Axis -42 degrees   CBC Auto Differential    Collection Time: 01/29/21  5:00 AM   Result Value Ref Range    WBC 10.4 4.8 - 10.8 thou/mm3    RBC 4.22 4.20 - 5.40 mill/mm3    Hemoglobin 13.2 12.0 - 16.0 gm/dl    Hematocrit 42.0 37.0 - 47.0 %    MCV 99.5 (H) 81.0 - 99.0 fL    MCH 31.3 26.0 - 33.0 pg    MCHC 31.4 (L) 32.2 - 35.5 gm/dl    RDW-CV 14.8 (H) 11.5 - 14.5 %    RDW-SD 54.5 (H) 35.0 - 45.0 fL    Platelets 048 869 - 643 thou/mm3    MPV 10.2 9.4 - 12.4 fL    Seg Neutrophils 69.1 %    Lymphocytes 15.9 %    Monocytes 10.9 %    Eosinophils 3.1 %    Basophils 0.6 %    Immature Granulocytes 0.4 %    Segs Absolute 7.2 1.8 - 7.7 thou/mm3    Lymphocytes Absolute 1.7 1.0 - 4.8 thou/mm3    Monocytes Absolute 1.1 0.4 - 1.3 thou/mm3    Eosinophils Absolute 0.3 0.0 - 0.4 thou/mm3    Basophils Absolute 0.1 0.0 - 0.1 thou/mm3    Immature Grans (Abs) 0.04 0.00 - 0.07 thou/mm3    nRBC 0 /100 wbc   Basic Metabolic Panel w/ Reflex to MG    Collection Time: 01/29/21  5:00 AM   Result Value Ref Range    Sodium 141 135 - 145 meq/L    Potassium reflex Magnesium 4.5 3.5 - 5.2 meq/L    Chloride 107 98 - 111 meq/L    CO2 27 23 - 33 meq/L    Glucose 132 (H) 70 - 108 mg/dL    BUN 44 (H) 7 - 22 mg/dL    CREATININE 1.9 (H) 0.4 - 1.2 mg/dL    Calcium 9.0 8.5 - 10.5 mg/dL   Troponin    Collection Time: 01/29/21  5:00 AM   Result Value Ref Range    Troponin T < 0.010 ng/ml   Brain Natriuretic Peptide    Collection Time: 01/29/21  5:00 AM   Result Value Ref Range    Pro-BNP 7346.0 (H) 0.0 - 1800.0 pg/mL   Hepatic Function Panel    Collection Time: 01/29/21  5:00 AM   Result Value Ref Range    Albumin 3.0 (L) 3.5 - 5.1 g/dL    Total Bilirubin 0.5 0.3 - 1.2 mg/dL    Bilirubin, Direct <0.2 0.0 - 0.3 mg/dL    Alkaline Phosphatase 86 38 - 126 U/L    AST 16 5 - 40 U/L    ALT 7 (L) 11 - 66 U/L    Total Protein 7.3 6.1 - 8.0 g/dL   Protime-INR    Collection Time: 01/29/21  5:00 AM   Result Value Ref Range    INR 2.09 (H) 0.85 - 1.13   Lactic acid, plasma    Collection Time: 01/29/21  5:00 AM   Result Value Ref Range    Lactic Acid 1.2 0.5 - 2.2 mmol/L   Magnesium    Collection Time: 01/29/21  5:00 AM   Result Value Ref Range    Magnesium 2.3 1.6 - 2.4 mg/dL   Anion Gap    Collection Time: 01/29/21  5:00 AM   Result Value Ref Range    Anion Gap 7.0 (L) 8.0 - 16.0 meq/L   Glomerular Filtration Rate, Estimated    Collection Time: 01/29/21  5:00 AM   Result Value Ref Range    Est, Glom Filt Rate 25 (A) ml/min/1.73m2   Osmolality    Collection Time: 01/29/21  5:00 AM   Result Value Ref Range    Osmolality Calc 294.3 275.0 - 300.0 mOsmol/kg   Urine with Reflexed Micro    Collection Time: 01/29/21  5:23 AM   Result Value Ref Range    Glucose, Ur NEGATIVE NEGATIVE mg/dl    Bilirubin Urine NEGATIVE NEGATIVE    Ketones, Urine NEGATIVE NEGATIVE    Specific Gravity, Urine 1.018 1.002 - 1.030    Blood, Urine NEGATIVE NEGATIVE    pH, UA 8.0 5.0 - 9.0    Protein,  (A) NEGATIVE    Urobilinogen, Urine 0.2 0.0 - 1.0 eu/dl    Nitrite, Urine POSITIVE (A) NEGATIVE    Leukocyte Esterase, Urine SMALL (A) NEGATIVE    Color, UA YELLOW STRAW-YELLOW    Character, Urine CLOUDY (A) CLEAR-SL CLOUD    RBC, UA 0-2 0-2/hpf /hpf    WBC, UA > 100 0-4/hpf /hpf    Epithelial Cells, UA 0-2 3-5/hpf /hpf    Bacteria, UA MANY FEW/NONE SEEN /hpf    Casts UA 4-8 HYALINE NONE SEEN /lpf    Crystals, UA NONE SEEN NONE SEEN    Renal Epithelial, UA NONE SEEN NONE SEEN    Yeast, UA NONE SEEN NONE SEEN    CASTS 2 NONE SEEN NONE SEEN /lpf    MISCELLANEOUS 2 NONE SEEN    COVID-19    Collection Time: 01/29/21  5:35 AM   Result Value Ref Range    SARS-CoV-2, NAAT NOT DETECTED NOT DETECTED         CT HEAD WO CONTRAST   Final Result   No acute intracranial findings.    Chronic changes as described. This document has been electronically signed by: Hunter Gee MD on    01/29/2021 06:22 AM      All CT scans at this facility use dose modulation, iterative    reconstruction, and/or weight-based   dosing when appropriate to reduce radiation dose to as low as reasonably    achievable. XR CHEST PORTABLE   Final Result   Impression:   No acute pathology. This document has been electronically signed by:  Hunter Gee MD on    01/29/2021 05:42 AM                Electronically signed by Artur Rai MD on 1/29/2021 at 1:52 PM

## 2021-01-30 NOTE — PROGRESS NOTES
Hospitalist Progress Note    Patient:  Boyd Nettles    Unit/Bed:5K-02/002-A  YOB: 1935  MRN: [id number]   Acct: [de-identified]   PCP: LUZ Sharif CNP  Code Status: Full Code  Date of Admission: 1/29/2021    Expected Discharge: 2-3 days  Disposition: from home with daughter. Assessment/Plan:    1. Acute metabolic encephalopathy: I suspect that this is due to to UTI and severe left lower extremity pain. Treat UTI as below. Judicious pain control to avoid further sedation/delirium. 2. UTI: Patient has a history of growing multiple organisms with some mild resistance to ceftriaxone. Patient allergic to penicillin, started on cefepime (1/29). ID following. Urine culture (+) enteric gram negative bacilli, follow final C&S.     3. Possible acute on chronic left lower extremity cellulitic process, ruled out: Patient has reported peripheral artery disease and has been having issues with redness and pain in the left lower extremity.    - Podiatry consulted, recommended triamcinolone cream twice daily and wound dressing. WBAT. Does not feel this is the source of leukocytosis. Assess response to topical steroids, ok to dc from pod standpoint. ID also following.   - Appears to be chronic PVD with excoriated skin. She would not tolerate a ultrasound at this time and she has acute on chronic kidney disease thus would avoid any angiography    4. AVELINO on CKD stage III: Creatinine is mildly elevated at 1.9. Baseline is around 1.5. Patient appears to be somewhat dry. - Resolved with IVF, Cr back to baseline. 5. Atrial Fibrillation: anticoagulated on coumadin. Rate controlled on metoprolol. S/p PPM.      6. Essential HTN: BP stable. Continue home metoprolol, Imdur. Lisinopril held d/t #4.     7. Abdominal wall hematoma: Reportedly from Lovenox injections. We will continue to monitor. Will request wound/infectious disease to also assess. 8. Chronic HFrEF: EF 25-30% on Echo 03/2020. Currently she appears to be compensated. proBNP was elevated as mentioned above but likely related to chronic kidney disease. Holding lisinopril as mentioned above. Continue beta-blocker and Imdur. 9. Physical deconditioning: PT/OT         Chief Complaint: Left lower extremity pain and not acting like herself    HPI / Hospital Course: Per HPI: \"Patient is an 78-year-old female with a past medical history of atrial fibrillation, PE, non-Hodgkin's lymphoma, CKD stage III, CHF EF 25 to 30%, CAD. Patient presents with her daughter. Apparently the patient began acting unlike herself yesterday evening. The daughter would attempt to speak with her however the patient would not answer appropriately. Patient also began to have increasing left lower extremity pain around the same time. She presented to the emergency room and a urinalysis was obtained which demonstrated a urinary tract infection. She was started on antibiotics and the original plan was to discharge home with PCP follow-up. However, patient developed increasing pain and more confusion thus she was admitted. She is unable to provide any further review of systems. She has a history of UTI with resistant organisms. She has also been seeing podiatry over the last several months due to a chronic cellulitic process of the left lower extremity. During my exam she is extremely tender to palpation of the lower extremity and cries out immediately upon touching her. Her daughter states that she is generally very touchy in the lower extremities. Patient has also been chilling. She has been afebrile though. \"     Subjective (past 24 hours): Pt is AOx2. Daughter states her mental state appears to be improving. Reports fatigue. Leg pain is improved. Denies fever/chills, sob, cp, abd pain, n/v/d.        ROS: Pertinent positives as noted in HPI. All other systems reviewed and negative.       Past medical history, family history, social history and allergies reviewed again and is unchanged since admission. Medications:  Reviewed. Infusion Medications    lactated ringers 50 mL/hr at 01/29/21 1437     Scheduled Medications    aspirin  81 mg Oral Daily    furosemide  20 mg Oral Daily    isosorbide mononitrate  60 mg Oral Daily    metoprolol succinate  50 mg Oral Daily    zinc sulfate  50 mg Oral Daily    sodium chloride flush  10 mL Intravenous 2 times per day    cefepime  1,000 mg Intravenous Q24H    betamethasone dipropionate   Topical BID    warfarin (COUMADIN) daily dosing (placeholder)   Other RX Placeholder    miconazole   Topical BID     PRN Meds: docusate sodium, HYDROcodone-acetaminophen, sodium chloride flush, promethazine **OR** ondansetron, acetaminophen **OR** acetaminophen, polyethylene glycol, hydrOXYzine    I/O:     Intake/Output Summary (Last 24 hours) at 1/30/2021 0928  Last data filed at 1/30/2021 0430  Gross per 24 hour   Intake 803.43 ml   Output --   Net 803.43 ml       Diet:  DIET CARDIAC; Exam:  /78   Pulse 99   Temp 97.9 °F (36.6 °C) (Axillary)   Resp 18   Ht 5' 8.5\" (1.74 m)   SpO2 95%   BMI 29.82 kg/m²   General:   Chronically ill appearing female. NAD. HEENT:  normocephalic and atraumatic. No scleral icterus. PERR. Neck: supple. No JVD. No thyromegaly. Lungs: clear to auscultation. No retractions  Cardiac: RRR without murmur. Abdomen: soft. Nontender. Bowel sounds positive. Extremities:  No clubbing, cyanosis, or edema x 4. Vasculature: capillary refill < 3 seconds. Palpable LE pulses bilaterally. Skin:  warm and dry. BLE dry, cyanotic (chronic per daughter). Psych:  Alert and oriented x3. Affect appropriate  Lymph:  No supraclavicular adenopathy. Neurologic:  No focal deficit. No seizures.       Data: (All radiographs, tracings, PFTs, and imaging are personally viewed and interpreted unless otherwise noted)  Labs:   Recent Labs     01/29/21  0500 01/30/21  0342   WBC 10.4 8.5   HGB 13.2 12.6 HCT 42.0 41.2    256     Recent Labs     01/28/21  1423 01/29/21  0500 01/30/21  0342    141 147*   K 5.1 4.5 5.0    107 115*   CO2 27 27 21*   BUN 47* 44* 37*   CREATININE 1.8* 1.9* 1.5*   CALCIUM 9.3 9.0 8.8     Recent Labs     01/29/21  0500   AST 16   ALT 7*   BILIDIR <0.2   BILITOT 0.5   ALKPHOS 86     Recent Labs     01/28/21  1423 01/29/21  0500 01/30/21  0342   INR 2.03* 2.09* 2.62*     Recent Labs     01/29/21  1450   CKTOTAL 22*     Urinalysis:   Lab Results   Component Value Date    NITRU POSITIVE 01/29/2021    WBCUA > 100 01/29/2021    BACTERIA MANY 01/29/2021    RBCUA 0-2 01/29/2021    BLOODU NEGATIVE 01/29/2021    SPECGRAV 1.023 06/24/2020    GLUCOSEU NEGATIVE 01/29/2021     Urine culture:   Lab Results   Component Value Date    LABURIN [place] count: >100,000 CFU/mL 06/16/2019     Micro:   Blood culture #1:   Lab Results   Component Value Date    BC No growth-preliminary  01/29/2021     Blood culture #2:No results found for: BLOODCULT2  Organism:  Lab Results   Component Value Date    ORG enteric gram negative bacilli 01/29/2021         Lab Results   Component Value Date    LABGRAM  11/08/2016     Rare segmented neutrophils observed. No epithelial cells observed. No organisms observed. MRSA culture only:No results found for: [facility]  Respiratory culture: No results found for: CULTRESP  Aerobic and Anaerobic :  Lab Results   Component Value Date    LABAERO No growth-preliminary  No growth   11/08/2016     Lab Results   Component Value Date    LABANAE No growth-preliminary  No growth   11/08/2016       Radiology Reports:  CT HEAD WO CONTRAST   Final Result   No acute intracranial findings. Chronic changes as described. This document has been electronically signed by:  Elie Marcus MD on    01/29/2021 06:22 AM      All CT scans at this facility use dose modulation, iterative    reconstruction, and/or weight-based   dosing when appropriate to reduce radiation dose to as low as reasonably    achievable. XR CHEST PORTABLE   Final Result   Impression:   No acute pathology. This document has been electronically signed by: Raad Alfaro MD on    01/29/2021 05:42 AM           Ct Head Wo Contrast    Result Date: 1/29/2021  CT head without contrast Comparison:  CT,SR  - CT HEAD WO CONTRAST  - 01/05/2021 12:46 AM EST Findings: No intracranial mass, midline shift, hydrocephalus, or acute hemorrhage. Chronic parenchymal volume loss and white matter changes. No mastoid effusion. No air fluid levels in paranasal sinuses. There is no acute fracture. No acute intracranial findings. Chronic changes as described. This document has been electronically signed by: Raad Alfaro MD on 01/29/2021 06:22 AM All CT scans at this facility use dose modulation, iterative reconstruction, and/or weight-based dosing when appropriate to reduce radiation dose to as low as reasonably achievable. Xr Chest Portable    Result Date: 1/29/2021  Exam: 1V chest Comparison: 01/05/2021 12:15 AM EST (01/04/2021 11:15 PM CST) : CR,SR: XR CHEST PORTABLE Findings: Left sided pacer in place and intact. Mediastinum: No cardiac silhouette enlargement. Lungs: No infiltrate or mass. Pleura: No pneumothorax or significant effusion. Bones: No acute pathology. Impression: No acute pathology. This document has been electronically signed by:  Raad Alfaro MD on 01/29/2021 05:42 AM         Tele:   [x] yes             [] no      Active Hospital Problems    Diagnosis Date Noted    Acute encephalopathy [G93.40] 01/29/2021       Electronically signed by Adria Cole PA-C on 1/30/2021 at 9:28 AM

## 2021-01-30 NOTE — PLAN OF CARE
Problem: Nutrition  Goal: Optimal nutrition therapy  Outcome: Ongoing  Nutrition Problem #1: Increased nutrient needs  Intervention: Food and/or Nutrient Delivery: Start Oral Nutrition Supplement  Nutritional Goals: Pt. will tolerate and consume 75% or more of meals to promote wound healing during LOS.

## 2021-01-30 NOTE — PROGRESS NOTES
Patient is refusing turns, she prefers to lay on her Right side. She cannot tolerate laying any other way. Even touching her causes her to scream. Daughter has been at bedside through the night and states that this is extremely unlike her.

## 2021-01-30 NOTE — FLOWSHEET NOTE
01/29/21 2207   Provider Notification   Reason for Communication Review case   Provider Name Daija She Ackerman   Provider Notification Advance Practice Clinician (CNS, NP, CNM, CRNA, PA)   Method of Communication Secure Message   Response See orders   Notification Time 2207 2207: RN autumn serve messaged She Jennings regarding patient pain. Patient admitted for AMS. She is not following commands, not eating drinking, will not take her pills. She won't swallow anything but she is yelling out in pain, anxious. She is screaming, moaning, and tossing and turning all over the bed.    2247: ROSA Jennings called this RN. Orders placed for Vistaril IM Q6 PRN.

## 2021-01-30 NOTE — PROGRESS NOTES
Patient provided bed bath and thorough skin assessment by this nurse and Bc Garvey. Patient tolerated horribly (even after one time dose of dilaudid 0.5mg)- screamed out with any movements, attempts to clean face/extremities, turning, etc.) Patient cried and continues to have chills after primary care provided. Refuses to eat at this time.  Will notify Hospitalist on call

## 2021-01-30 NOTE — PROGRESS NOTES
Patient attempting to eat her eggs for breakfast and held in mouth. Pt encouraged multiple times to swallow and eventually did. Pt took a couple drinks of chocolate milk without any difficulty but when attempting again coughed. Daughter went and got patient a Pepsi and patient did cough again when swallowing. I educated the daughter to not encourage anymore drinking/eating until she is able to better follow commands to prevent aspiration. Rianna George PA-C notified and speech eval order placed.

## 2021-01-30 NOTE — PROGRESS NOTES
Comprehensive Nutrition Assessment    Type and Reason for Visit:  Initial, Consult(Nutritional Assessment for Antonio Score)    Nutrition Recommendations/Plan:   Recommend diet as per SLP. Will send Glucerna TID. Recommend MVI. Will monitor po, weight trends vs. Need for additional nutrition interventions. Nutrition Assessment:    Pt. nutritionally compromised AEB wounds, poor po intake at present. At risk for further nutrition compromise r/t AMS, increased nutrient needs for wound healing, advanced age and underlying medical condition (hx CAD, CHF, CKD, CVA, DM, NHL). Nutrition recommendations/interventions as per above. Malnutrition Assessment:  Malnutrition Status:  Insufficient data    Context:  Acute Illness     Findings of the 6 clinical characteristics of malnutrition:  Energy Intake:  Unable to assess  Weight Loss:  Unable to assess     Body Fat Loss:  Unable to assess     Muscle Mass Loss:  Unable to assess    Fluid Accumulation:  Unable to assess     Strength:  Not Performed    Estimated Daily Nutrient Needs:  Energy (kcal):  ~6701-5884 kcals (20-22); Weight Used for Energy Requirements:  (90 kgm 1/29)     Protein (g):  46-52 gm (0.7-0.8)- CKD; Weight Used for Protein Requirements:  Ideal(65 kgm)              Nutrition Related Findings:  Pt. seen with daughter - both sleeping; didn't wake as reported pt. screaming all night; RN reports pt. not alert yet; no po intake since admit; 1/30: Sodium 147, Potassium 5.0, Glucose 111, BUN 37, Cr 1.5, CRP 6.86; Rx includes Glycolax, Lasix, Colace, Zinc, Zofran; pt. was seen 12/20 admission - reports poor po intake pta and drinks occasional ONS      Wounds:  (hematoma - abdomen, stage II -buttocks, DTI- buttocks)       Current Nutrition Therapies:    DIET CARDIAC;   Dietary Nutrition Supplements: Diabetic Oral Supplement    Anthropometric Measures:  · Height: 5' 8.5\" (174 cm)  · Current Body Weight: 199 lb (90.3 kg)(no weight this admit; currently +1, +2 edema)   · Admission Body Weight: 199 lb (90.3 kg)(no weight this admit; currently +1, +2 edema)    · Usual Body Weight: (per EMR: 12/17/20: 199# 12.8 oz)     · Ideal Body Weight: 143 lbs;      · BMI: 29.8  · BMI Categories: Overweight (BMI 25.0-29. 9)       Nutrition Diagnosis:   · Increased nutrient needs related to increase demand for energy/nutrients(wound healing) as evidenced by wounds      Nutrition Interventions:   Food and/or Nutrient Delivery:  Start Oral Nutrition Supplement  Nutrition Education/Counseling:  Education not appropriate   Coordination of Nutrition Care:  Continue to monitor while inpatient    Goals:  Pt. will tolerate and consume 75% or more of meals to promote wound healing during LOS. Nutrition Monitoring and Evaluation:   Behavioral-Environmental Outcomes:  None Identified   Food/Nutrient Intake Outcomes:  Diet Advancement/Tolerance, Food and Nutrient Intake, Supplement Intake  Physical Signs/Symptoms Outcomes:  Biochemical Data, Chewing or Swallowing, GI Status, Fluid Status or Edema, Nutrition Focused Physical Findings, Skin, Weight     Discharge Planning:     Too soon to determine     Electronically signed by Tani Hamilton RD, LD on 1/30/21 at 10:14 AM EST    Contact: [phone]

## 2021-01-31 NOTE — PROGRESS NOTES
Clinical Pharmacy Note    Warfarin consult follow-up    Recent Labs     01/31/21  0349   INR 3.31*     Recent Labs     01/29/21  0500 01/30/21  0342 01/31/21  0349   HGB 13.2 12.6 13.7   HCT 42.0 41.2 45.1    256 295       Significant Drug-Drug Interactions:  New warfarin drug-drug interactions: none  Discontinued drug-drug interactions: none  Current warfarin drug-drug interactions: aspirin        Date INR Warfarin Dose   1/29/2021 2.09  pt/family refused     1/30/2021 2.62   RN canceled task    1/31/2021 3.31  No coumadin                                        Notes:                     Daily PT/INR until stable within therapeutic range.      Cristiane Maynard PharmD 1/31/2021 2:57 PM

## 2021-01-31 NOTE — PROGRESS NOTES
Patient call light on. RN entered patient room and she had pulled out her IV from her L AC. She was rubbing the blood on her arm and playing in it. RN asked patient why she removed her IV and she said \"because I wanted to\". RN stated that we need the IV to get her her medications and asked if we could put it back in and she stated yes. RN asked the daughter if we could put a telesitter in the room and she said \"you're going to have to because I just can't watch her all the time. \" RN asked if she was sitting at bedside could she tell her to not pull at things if she is. Patient's daughter stated \"I just can't monitor her all the time\". RN attempted to call telesitter multiple times with no answer. RN informed her daughter that we will keep trying and add her to the telesitter list if there is no telesitters available but we will need to place a new IV. Patient is laughing and joking about pulling out her IV. Daughter asked her to please keep the IV in as this is how she is getting medications, she asked Pooja Thornton if she could promise to keep this one in and Pooja Thornton said \"no I can't promise\". New IV placed. Wrapped in kerlix to protect. Continuing to monitor.

## 2021-01-31 NOTE — PROGRESS NOTES
Hospitalist Progress Note    Patient:  Kathy Agrawal    Unit/Bed:5K-02/002-A  YOB: 1935  MRN: [id number]   Acct: [de-identified]   PCP: LUZ White CNP  Code Status: Full Code  Date of Admission: 1/29/2021    Expected Discharge: 2-3 days  Disposition: from home with daughter. Assessment/Plan:    1. Acute metabolic encephalopathy: I suspect that this is due to to UTI and severe left lower extremity pain. Treat UTI as below. Judicious pain control to avoid further sedation/delirium. 2. UTI: Patient has a history of growing multiple organisms with some mild resistance to ceftriaxone. Patient allergic to penicillin, started on cefepime (1/29). ID following. Urine culture (+) E coli, sensitive to current abx. 3. Possible acute on chronic left lower extremity cellulitic process, ruled out: Patient has reported peripheral artery disease and has been having issues with redness and pain in the left lower extremity.    - Podiatry consulted, recommended triamcinolone cream twice daily and wound dressing. WBAT. Does not feel this is the source of leukocytosis. Assess response to topical steroids, ok to dc from pod standpoint. ID also following.   - Appears to be chronic PVD with venous stasis ulcers and excoriated skin. She has acute on chronic kidney disease thus would avoid any angiography    4. AVELINO on CKD stage III: Creatinine is mildly elevated at 1.9. Baseline is around 1.5. Patient appears to be somewhat dry. - Resolved with IVF, Cr back to baseline. 5. Atrial Fibrillation: anticoagulated on coumadin. Rate controlled on metoprolol. S/p PPM.      6. Essential HTN: BP stable. Continue home metoprolol, Imdur. Lisinopril held d/t #4.     7. Abdominal wall hematoma: Reportedly from Lovenox injections. We will continue to monitor. Will request wound/infectious disease to also assess. 8. Chronic HFrEF: EF 25-30% on Echo 03/2020. Currently she appears to be compensated. proBNP was elevated as mentioned above but likely related to chronic kidney disease. Holding lisinopril as mentioned above. Continue beta-blocker and Imdur. 9. Physical deconditioning: PT/OT         Chief Complaint: Left lower extremity pain and not acting like herself    HPI / Hospital Course: Per HPI: \"Patient is an 81-year-old female with a past medical history of atrial fibrillation, PE, non-Hodgkin's lymphoma, CKD stage III, CHF EF 25 to 30%, CAD. Patient presents with her daughter. Apparently the patient began acting unlike herself yesterday evening. The daughter would attempt to speak with her however the patient would not answer appropriately. Patient also began to have increasing left lower extremity pain around the same time. She presented to the emergency room and a urinalysis was obtained which demonstrated a urinary tract infection. She was started on antibiotics and the original plan was to discharge home with PCP follow-up. However, patient developed increasing pain and more confusion thus she was admitted. She is unable to provide any further review of systems. She has a history of UTI with resistant organisms. She has also been seeing podiatry over the last several months due to a chronic cellulitic process of the left lower extremity. During my exam she is extremely tender to palpation of the lower extremity and cries out immediately upon touching her. Her daughter states that she is generally very touchy in the lower extremities. Patient has also been chilling. She has been afebrile though. \"     1/30: Pt is AOx2. Daughter states her mental state appears to be improving. Reports fatigue. Leg pain is improved. Denies fever/chills, sob, cp, abd pain, n/v/d. Subjective (past 24 hours):  Per nursing notes overnight, the patient was in crying out in pain, received IM Vistaril and IV dilaudid. She later was more confused and pulled out her IV.  She also had urinary retention, was straight cathed with 300mL output. Per the daughter, mental state still appears to be improving. Pt had minimal leg pain to palpation today. Pt reports fatigue. Denies fever/chills, sob, cp. ROS: Pertinent positives as noted in HPI. All other systems reviewed and negative. Past medical history, family history, social history and allergies reviewed again and is unchanged since admission. Medications:  Reviewed. Infusion Medications     Scheduled Medications    warfarin  1 mg Oral Once    aspirin  81 mg Oral Daily    [Held by provider] furosemide  20 mg Oral Daily    isosorbide mononitrate  60 mg Oral Daily    metoprolol succinate  50 mg Oral Daily    zinc sulfate  50 mg Oral Daily    sodium chloride flush  10 mL Intravenous 2 times per day    cefepime  1,000 mg Intravenous Q24H    betamethasone dipropionate   Topical BID    warfarin (COUMADIN) daily dosing (placeholder)   Other RX Placeholder    miconazole   Topical BID     PRN Meds: HYDROmorphone, docusate sodium, HYDROcodone-acetaminophen, sodium chloride flush, promethazine **OR** ondansetron, acetaminophen **OR** acetaminophen, polyethylene glycol, hydrOXYzine    I/O:     Intake/Output Summary (Last 24 hours) at 1/31/2021 0925  Last data filed at 1/31/2021 0538  Gross per 24 hour   Intake 50 ml   Output 700 ml   Net -650 ml       Diet:  DIET CARDIAC; Dietary Nutrition Supplements: Diabetic Oral Supplement    Exam:  /65   Pulse 90   Temp 98.7 °F (37.1 °C) (Axillary)   Resp 20   Ht 5' 8.5\" (1.74 m)   SpO2 92%   BMI 29.82 kg/m²   General:   Chronically ill appearing female. NAD. HEENT:  normocephalic and atraumatic. No scleral icterus. PERR. Neck: supple. No JVD. No thyromegaly. Lungs: clear to auscultation. No retractions  Cardiac: RRR without murmur. Abdomen: soft. Nontender. Bowel sounds positive. Extremities:  No clubbing, cyanosis, or edema x 4.  Chronic BLE stasis ulcers, red/purple discoloration, dry/scaly. Vasculature: capillary refill < 3 seconds. Palpable LE pulses bilaterally. Skin:  warm and dry. Psych:  Alert and oriented x3. Affect appropriate  Lymph:  No supraclavicular adenopathy. Neurologic:  No focal deficit. No seizures. Data: (All radiographs, tracings, PFTs, and imaging are personally viewed and interpreted unless otherwise noted)  Labs:   Recent Labs     01/29/21  0500 01/30/21  0342 01/31/21  0349   WBC 10.4 8.5 8.6   HGB 13.2 12.6 13.7   HCT 42.0 41.2 45.1    256 295     Recent Labs     01/29/21  0500 01/30/21  0342 01/31/21  0349    147* 144   K 4.5 5.0 4.7    115* 110   CO2 27 21* 21*   BUN 44* 37* 35*   CREATININE 1.9* 1.5* 1.6*   CALCIUM 9.0 8.8 9.4     Recent Labs     01/29/21  0500   AST 16   ALT 7*   BILIDIR <0.2   BILITOT 0.5   ALKPHOS 86     Recent Labs     01/29/21  0500 01/30/21  0342 01/31/21  0349   INR 2.09* 2.62* 3.31*     Recent Labs     01/29/21  1450   CKTOTAL 22*     Urinalysis:   Lab Results   Component Value Date    NITRU POSITIVE 01/29/2021    WBCUA > 100 01/29/2021    BACTERIA MANY 01/29/2021    RBCUA 0-2 01/29/2021    BLOODU NEGATIVE 01/29/2021    SPECGRAV 1.023 06/24/2020    GLUCOSEU NEGATIVE 01/29/2021     Urine culture:   Lab Results   Component Value Date    LABURIN [place] count: >100,000 CFU/mL 06/16/2019     Micro:   Blood culture #1:   Lab Results   Component Value Date    BC No growth-preliminary  01/29/2021     Blood culture #2:No results found for: BLOODCULT2  Organism:  Lab Results   Component Value Date    ORG Escherichia coli 01/29/2021         Lab Results   Component Value Date    LABGRAM  11/08/2016     Rare segmented neutrophils observed. No epithelial cells observed. No organisms observed.        MRSA culture only:No results found for: [address] [place] [address]  Respiratory culture: No results found for: CULTRESP  Aerobic and Anaerobic :  Lab Results   Component Value Date    LABAERO No growth-preliminary  No growth   11/08/2016     Lab Results   Component Value Date    VENTURA No growth-preliminary  No growth   11/08/2016       Radiology Reports:  CT HEAD WO CONTRAST   Final Result   No acute intracranial findings. Chronic changes as described. This document has been electronically signed by: Sergio Sweet MD on    01/29/2021 06:22 AM      All CT scans at this facility use dose modulation, iterative    reconstruction, and/or weight-based   dosing when appropriate to reduce radiation dose to as low as reasonably    achievable. XR CHEST PORTABLE   Final Result   Impression:   No acute pathology. This document has been electronically signed by: Sergio Sweet MD on    01/29/2021 05:42 AM           Ct Head Wo Contrast    Result Date: 1/29/2021  CT head without contrast Comparison:  CT,SR  - CT HEAD WO CONTRAST  - 01/05/2021 12:46 AM EST Findings: No intracranial mass, midline shift, hydrocephalus, or acute hemorrhage. Chronic parenchymal volume loss and white matter changes. No mastoid effusion. No air fluid levels in paranasal sinuses. There is no acute fracture. No acute intracranial findings. Chronic changes as described. This document has been electronically signed by: Sergio Sweet MD on 01/29/2021 06:22 AM All CT scans at this facility use dose modulation, iterative reconstruction, and/or weight-based dosing when appropriate to reduce radiation dose to as low as reasonably achievable. Xr Chest Portable    Result Date: 1/29/2021  Exam: 1V chest Comparison: 01/05/2021 12:15 AM EST (01/04/2021 11:15 PM CST) : CR,SR: XR CHEST PORTABLE Findings: Left sided pacer in place and intact. Mediastinum: No cardiac silhouette enlargement. Lungs: No infiltrate or mass. Pleura: No pneumothorax or significant effusion. Bones: No acute pathology. Impression: No acute pathology. This document has been electronically signed by:  Sergio Sweet MD on 01/29/2021 05:42 AM         Tele:   [x] yes             [] no      Active Hospital Problems    Diagnosis Date Noted    Acute encephalopathy [G93.40] 01/29/2021       Electronically signed by Rajat Lopez PA-C on 1/31/2021 at 9:24 AM

## 2021-01-31 NOTE — FLOWSHEET NOTE
01/30/21 2245   Provider Notification   Reason for Communication Review case   Provider Name She Fernandez   Provider Notification [facility] Practice Clinician (CNS, NP, CNM, CRNA, PA)   Method of Communication Face to face   Response See orders   Notification Time [id number]     Discussing with ROSA Fernandez about patient severe pain, patient yelling out, crying, tearful. Patient was administered the IM Vistaril with some improvement. She Fernandez ordered IV Dilaudid q6 PRN.

## 2021-01-31 NOTE — PROGRESS NOTES
Upon giving patient a bath, noted bleeding coming from RLQ hematoma. Moderate amount of blood noted and site appears to be pink/red in areas as well as black. Noted a foul odor coming from site when dressing was removed. Jonh Jaramillo PA-C notified and assessed patient. States to keep dry dressing over site and change as needed.

## 2021-01-31 NOTE — PROGRESS NOTES
Progress note: Infectious diseases    Patient - Winnie Carrasco,  Age - 81 y.o.    - 1935      Room Number - 5K-02/002-A   MRN -  [id number]   Acct # - [de-identified]  Date of Admission -  2021  4:47 AM    SUBJECTIVE:   No new issues. I spoke with her daughter. No fever, Kidney function better  OBJECTIVE   VITALS    height is 5' 8.5\" (1.74 m). Her axillary temperature is 98.9 °F (37.2 °C). Her blood pressure is 130/62 and her pulse is 79. Her respiration is 18 and oxygen saturation is 95%. Wt Readings from Last 3 Encounters:   21 199 lb (90.3 kg)   20 199 lb 12.8 oz (90.6 kg)   20 226 lb (102.5 kg)       I/O (24 Hours)    Intake/Output Summary (Last 24 hours) at 2021 1905  Last data filed at 2021 1843  Gross per 24 hour   Intake 853.43 ml   Output 400 ml   Net 453.43 ml       General Appearance  Chronically sick looking.   HEENT - normocephalic, atraumatic, slighlty paleconjunctiva,  anicteric sclera  Neck - Supple, no mass  Lungs -  Bilateral   air entry, no rhonchi, no wheeze  Cardiovascular - Heart sounds are normal.    Abdomen - soft, not distended, nontender, excoriated skin fold  Neurologic -asleep  Skin - No bruising or bleeding  Extremities - chronic skin changes    MEDICATIONS:      warfarin  1 mg Oral Once    aspirin  81 mg Oral Daily    [Held by provider] furosemide  20 mg Oral Daily    isosorbide mononitrate  60 mg Oral Daily    metoprolol succinate  50 mg Oral Daily    zinc sulfate  50 mg Oral Daily    sodium chloride flush  10 mL Intravenous 2 times per day    cefepime  1,000 mg Intravenous Q24H    betamethasone dipropionate   Topical BID    warfarin (COUMADIN) daily dosing (placeholder)   Other RX Placeholder    miconazole   Topical BID       docusate sodium, HYDROcodone-acetaminophen, sodium chloride flush, promethazine **OR** ondansetron, acetaminophen **OR** acetaminophen, polyethylene glycol, hydrOXYzine      LABS:     CBC:   Recent Labs     01/29/21  0500 01/30/21  0342   WBC 10.4 8.5   HGB 13.2 12.6    256     BMP:    Recent Labs     01/28/21  1423 01/29/21  0500 01/30/21  0342    141 147*   K 5.1 4.5 5.0    107 115*   CO2 27 27 21*   BUN 47* 44* 37*   CREATININE 1.8* 1.9* 1.5*   GLUCOSE 109* 132* 111*     Calcium:  Recent Labs     01/30/21  0342   CALCIUM 8.8     Ionized Calcium:No results for input(s): IONCA in the last 72 hours. Magnesium:  Recent Labs     01/29/21  0500   MG 2.3    INR:   Recent Labs     01/28/21  1423 01/29/21  0500 01/30/21  0342   INR 2.03* 2.09* 2.62*     Hepatic:   Recent Labs     01/29/21  0500   ALKPHOS 86   ALT 7*   AST 16   PROT 7.3   BILITOT 0.5   BILIDIR <0.2   LABALBU 3.0*     Amylase and Lipase:  Recent Labs     01/29/21  0500   LACTA 1.2     Lactic Acid:   Recent Labs     01/29/21  0500   LACTA 1.2     Troponin:   Recent Labs     01/29/21  1450   CKTOTAL 22*     BNP: No results for input(s): BNP in the last 72 hours.     CULTURES:   UA:   Recent Labs     01/29/21  0523   PHUR 8.0   COLORU YELLOW   PROTEINU 100*   BLOODU NEGATIVE   RBCUA 0-2   WBCUA > 100   BACTERIA MANY   NITRU POSITIVE*   GLUCOSEU NEGATIVE   BILIRUBINUR NEGATIVE   UROBILINOGEN 0.2   KETUA NEGATIVE     Micro:   Lab Results   Component Value Date    BC No growth-preliminary  01/29/2021          Problem list of patient:     Patient Active Problem List   Diagnosis Code    Hyperlipidemia E78.5    History of DVT (deep vein thrombosis) Z86.718    Type 2 diabetes mellitus with complication, without long-term current use of insulin (HCC) E11.8    Essential hypertension I10    Acute kidney injury ([address] N17.9    CKD stage 3 due to type 2 diabetes mellitus (HCC) E11.22, N18.30    History of pulmonary embolism Z86.711    Coagulopathy (HCC) D68.9    NHL (non-Hodgkin's lymphoma)  Dr. Field  diagnosed June 2012 C75.89    Diastolic dysfunction,  EF 55-60% I51.9    Anticoagulated on Coumadin Z79.01    Polycythemia D75.1    Dyspnea R06.00    Obesity (BMI 35.0-39.9 without comorbidity) E66.9    Mild aortic stenosis I35.0    Ischemic cardiomyopathy I25.5    Lower back pain M54.5    Hypoxia R09.02    Persistent atrial fibrillation ([facility]) I48.19    CHF NYHA class II ([facility]) I50.9    Ischemic heart disease I25.9    History of embolic stroke X96.25    Triple vessel coronary artery disease I25.10    S/P drug eluting coronary stent placement Z95.5    Chronic combined systolic and diastolic CHF (congestive heart failure) ([facility]) I50.42    RLS (restless legs syndrome) G25.81    S/P cardiac pacemaker procedure Z95.0    Physical deconditioning R53.81    Swelling R60.9    Pre-ulcerative calluses L84    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    Coronary artery disease involving native coronary artery of native heart without angina pectoris I25.10    Sick sinus syndrome ([facility]) I49.5    Calculus of gallbladder without cholecystitis without obstruction K80.20    Chronic idiopathic constipation K59.04    History of coronary artery stent placement Z95.5    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  right mid axillary line G58.0    Spinal stenosis of lumbar region M48.061    Epigastric abdominal pain R10.13    Sepsis ([facility]) A41.9    Acute cystitis without hematuria N30.00    Kidney cysts N28.1    Opacity of lung on imaging study R91.8    Nephrolithiasis N20.0    Hyperbilirubinemia E80.6    History of CVA (cerebrovascular accident) Z80.78    Long term (current) use of anticoagulants Z79.01    Hypertensive urgency I16.0    Moderate malnutrition (HCC) E44.0    Pneumonia J18.9    Lethargy R53.83    Open wound of foot excluding toes S91.309A    Abnormal echocardiogram R93.1    Cardiomyopathy ([facility]) I42.9    AVELINO (acute kidney injury) ([facility] [address] N17.9    Acute encephalopathy G93.40         ASSESSMENT/PLAN   Change in mental state:better  AVELINO: better  UTI: will stop antibiotic on Monday  Chronic stasis left leg: continue current treatment        Gaurang Sweet MD, FACP 1/30/2021 7:05 PM

## 2021-02-01 NOTE — PROGRESS NOTES
[address] [place] [address]  SPEECH THERAPY  STRZ ONC MED 5K  Bedside Swallowing Evaluation      SLP Individual Minutes  Time In: 8966  Time Out:   Minutes: 8  Timed Code Treatment Minutes: 0 Minutes       Date: 2021  Patient Name: Dale Benson      CSN: [id number]   : 1935  (81 y.o.)  Gender: female   Referring Physician: Bettye Adams PA-C  Diagnosis: Acute encephalopathy   Secondary Diagnosis: Dysphagia     History of Present Illness/Injury: Pt admit with acute encephalopathy and UTI. Hx of AVELINO. Please refer to H&P for full pt medical hx. Pt with significant confusion and poor alertness given the above. High risks for aspiration. ST consult for clinical swallowing evaluation to further assess and determine pt appropriateness to resume PO means of nutrition. Past Medical History:   Diagnosis Date    Arthritis     Blood circulation, collateral     Cerebral artery occlusion with cerebral infarction (HCC)     Chronic anticoagulation     Chronic combined systolic and diastolic CHF (congestive heart failure) (HCC)     CKD (chronic kidney disease) stage 3, GFR 30-59 ml/min     Degenerative joint disease     Fracture of femoral head ([facility] [address] 10/09/2016    Left with intrarticular extension    GERD (gastroesophageal reflux disease)     History of DVT (deep vein thrombosis)     Hyperlipidemia     Hypertension     Ischemic heart disease     Macular degeneration, wet ([address]     Neuromuscular disorder ([address]     NHL (non-Hodgkin's lymphoma) ([facility] [address] 2012    Dr. Huong Avila Obesity (BMI 30.0-34. 9)     Permanent atrial fibrillation (HCC)     Pneumonia     Presence of IVC filter     Pulmonary embolism, bilateral (HCC)     S/P drug eluting coronary stent placement     Subdural hematoma (HCC)     s/p evacuation    Triple vessel coronary artery disease     Type II or unspecified type diabetes mellitus without mention of complication, not stated as uncontrolled        SUBJECTIVE:  Pt seen resting in bed completely naked with daughter present. Offered pt a blanket to maximize privacy; however, pt refusing given c/o feeling hot. Towel placed across patient's chest by daughter upon ST arrival. Pt with significant confusion and poor alertness. Required MAX verbal cues to maintain eye opening throughout session. OBJECTIVE:    Pain:  No pain reported. Current Diet: Regular diet and thin liquids (nursing holding PO medications given high risks for aspiration). Respiratory Status:  Independent    Behavioral Observation:  Confused, Lethargic and Limited Direction Following    Oral Mechanism Evaluation:  Unable to complete OME given severely poor direction following. The following obtained via informal observations with PO intake. Facial / Labial Impaired Generalized weakness and decreased ROM/coordination   Lingual Impaired Decreased strength/ROM/coordination    Dentition Not Tested *unable to visualize given severely poor oral opening    Velum Not Tested    Vocal Quality Impaired Soft vocal intensity    Sensation Not Tested    Cough Not Tested      Patient Evaluated Using:  Puree, thin liquids by spoon     Oral Phase:  Impaired:  Impaired AP Movement, Reduced Bolus Formation, Impaired Oral Initiation and moderate oral stasis     Pharyngeal Phase: Impaired:  Delayed Swallow and Decreased Hyolaryngeal Elevation    Signs and Symptoms of Laryngeal Penetration/Aspiration: No signs/symptoms of aspiration evident in this evaluation, but cannot rule out silent aspiration. Impresssions: Pt presents with severe oropharyngeal dysphagia evidenced by the above skilled level observations. Pt's current swallow function highly influenced by altered mental status, poor basic direction following and poor basic alertness. Severely poor oral initiation with lack of oral opening upon ST presentation of bolus by spoon. Required max tactile/verbal cues to elicit opening.  Decreased labial closure to complete adequate bolus extraction from spoon. Pt with severe oral holding with need for continued max verbal/tactile cues to elicit a pharyngeal swallow trigger. Pt with slow bolus formation/transit with a delayed swallow and decreased laryngeal elevation upon manual palpation. At least moderate oral stasis to follow. Partial clearance achieved with liquid wash by spoon. Pt with no overt s/s of aspiration; however, certainly at risks for silent aspiration given the above compounding factors and presence of severe dysfunction. Recommendations for diet change to NPO with consideration for NG tube. ST to follow up with dysphagia management and repeat clinical swallow evaluation to further assess and determine pt appropriateness to progress towards PO means of nutrition. RECOMMENDATIONS/ASSESSMENT:   Modified Barium Swallow:  MBS is not indicated at this time. Will recommend as appropriate  Diet Recommendations:  NPO   Strategies:  Recommend NPO   Rehabilitation Potential: good    EDUCATION:  Learner: Patient and daughter   Education:  Reviewed results and recommendations of this evaluation, Reviewed diet and strategies, Reviewed ST goals and Plan of Care and Reviewed recommendations for follow-up  Evaluation of Education: Dung Woodruff understanding, Needs further instruction and No evidence of learning    PLAN:  Skilled SLP intervention on acute care 3-5 x per week or until goals met and/or pt plateaus in function. Specific interventions for next session may include: dysphagia management . PATIENT GOAL:    Did not state. Will further assess during treatment. SHORT TERM GOALS:  Short-term Goals  Timeframe for Short-term Goals: 2 weeks  Goal 1: Pt will consume trials of ice chips, thin liquids by spoon/cup and puree with ST only with appropriate alertness, timely swallow response and without overt difficulty to determine pt appropriateness for instrumental assessment vs initiation of PO diet.   Goal 2: Staff/family will verbalize/demonstrate appropriate carryover of rigorious oral care completion in order to reduce colonization of oral bacteria, improve oral hygiene and minimize risks for aspiration pneumonia.     LONG TERM GOALS:  No established LTG's given short BLAISE Flores 2/1/2021

## 2021-02-01 NOTE — PROGRESS NOTES
[address] [address]  INPATIENT PHYSICAL THERAPY  EVALUATION  [facility] ONC MED 5K - 5K-02/002-A    Time In: 4016  Time Out: 1133  Timed Code Treatment Minutes: 11 Minutes  Minutes: 31          Date: 2021  Patient Name: Dale Benson,  Gender:  female        MRN: [id number]  : 1935  (81 y.o.)      Referring Practitioner: Dr. Jennifer Schmitz  Diagnosis: acute encephalopathy  Additional Pertinent Hx: admit with above diagnosis, UTI, possible acute on chronic LLE cellulitis, AVELINO on CKD, a fib, abdominal wall hematoma, CHF     Restrictions/Precautions:  Restrictions/Precautions: General Precautions, Fall Risk    Subjective:  Chart Reviewed: Yes  Patient assessed for rehabilitation services?: Yes  Subjective: pt confused, dec alertness, dtr present and supportive, dtr reports that pt had a rough night and inc disorientation today    General:                      Pain: pt yelling out with pain at times, especially with touch to BLE but also at times throughout body-pt not able to verbalize in words about her pain    Social/Functional History:    Lives With: Spouse, Daughter(dtr provides 24 hr care for pt and spouse)  Type of Home: House  Home Layout: Two level, Able to Live on Main level with bedroom/bathroom  Home Access: Ramped entrance  Home Equipment: Rolling walker, Lift chair(transport chair)     Bathroom Toilet: Bedside commode  Bathroom Equipment: Shower chair, Toilet raiser    Additional Comments: per dtr pt at times can amb with CGA and use of walker in home and at times requires more assist, per dtr has HHaides and therapy    OBJECTIVE:  Range of Motion:  Bilateral Lower Extremity: Impaired - P/AAROM WFL    Strength:  Bilateral Lower Extremity: Impaired - grossly >/=3-/5, weakness noted, pt not following commands for MMT    Balance:  Static Sitting Balance:  fluctuated maxA  to briefly CGA, pt fluctuating levels of alertness, when more alert with cues for pt to assist with BUE support for balance would briefly be CGA however quickly fatigued and dec alertness and inc to maxA, tolerated around 9 min    Bed Mobility:  Rolling to Left: Maximum Assistance, X 1-2, with tactile and vcs pt would reach to roll   Rolling to Right: Maximum Assistance, X 1-2   Supine to Sit: Maximum Assistance, X 1  Sit to Supine: Maximum Assistance, X 2   Scooting: Maximum Assistance, X 1  HOB up 20 degrees, cues for log roll technique and to reach for BR for pt to assist  Transfers:  Not Tested    Ambulation:  Not Tested      Exercise:  Patient was guided in 1 set(s) 3-5 reps of exercise to both lower extremities. Ankle pumps and Heelslides. Exercises were completed for increased independence with functional mobility. Functional Outcome Measures: Completed  AM-PAC Inpatient Mobility Raw Score : 6  AM-PAC Inpatient T-Scale Score : 23.55    ASSESSMENT:  Activity Tolerance:  Patient tolerance of  treatment: poor. Treatment Initiated: Treatment and education initiated within context of evaluation. Evaluation time included review of current medical information, gathering information related to past medical, social and functional history, completion of standardized testing, formal and informal observation of tasks, assessment of data and development of plan of care and goals. Treatment time included skilled education and facilitation of tasks to increase safety and independence with functional mobility for improved independence and quality of life. Assessment:   Body structures, Functions, Activity limitations: Decreased functional mobility , Decreased safe awareness, Decreased cognition, Decreased balance, Increased pain, Decreased endurance, Decreased strength  Assessment: pt tolerated poor, dec alertness, inc confusion, pt yelling out in pain especially with touch to BLE and at times throughout body, generalized weakness, deconditioning, dec balance, inc assist for safe mobility, recommend cont PT to inc pt functional mobility  Prognosis: Fair    REQUIRES PT FOLLOW UP: Yes    Discharge Recommendations:  Discharge Recommendations: Continue to assess pending progress, 24 hour supervision or assist, Patient would benefit from continued therapy after discharge    Patient Education:  PT Education: Goals, PT Role, Plan of Care, Functional Mobility Training    Equipment Recommendations: Other: cont to assess needs    Plan:  Times per week: 3-5X GM  Times per day: Daily  Specific instructions for Next Treatment: therex, bed mobility, sitting balance, PT to assess transfers/gait when appropriate    Goals:  Patient goals : not stated  Short term goals  Time Frame for Short term goals: by discharge  Short term goal 1: roll L/R with Michael for pressure relief  Short term goal 2: sidelying to/from sit with Michael to get in/out of bed  Short term goal 3: tolerate >10 min sitting balance activity with </=SBA to demonstrate inc strength for progression to transfers  Short term goal 4: PT to assess transfers/gait when appropriate  Long term goals  Time Frame for Long term goals : no LTGs set secondary to short ELOS    Following session, patient left in safe position with all fall risk precautions in place.

## 2021-02-01 NOTE — PROGRESS NOTES
Clinical Pharmacy Note    Warfarin consult follow-up    Recent Labs     02/01/21  0400   INR 3.12*     Recent Labs     01/30/21  0342 01/31/21  0349 02/01/21  0400   HGB 12.6 13.7 13.3   HCT 41.2 45.1 43.9    295 277       Significant Drug-Drug Interactions:  New warfarin drug-drug interactions: none  Discontinued drug-drug interactions: none  Current warfarin drug-drug interactions: aspirin        Date INR Warfarin Dose   1/29/2021 2.09  pt/family refused     1/30/2021 2.62   RN canceled task    1/31/2021 3.31  No coumadin      2/1/2021  3.12 0.5 mg                                 Notes:                     Daily PT/INR until stable within therapeutic range.      Electronically signed by Manuelito Aparicio, [facility] on 2/1/2021 at 2:54 PM

## 2021-02-01 NOTE — PROGRESS NOTES
Patient resting in bed. At times, patient becomes restless/agitated but she does not awaken enough to participate in conversation. Patient's daughter, Jose Luis Luevano is at the bedside. Palliative care introduced. Discussed at length code status levels and what each level entails. Discussed complications of rib fractures, brain and organ damage associated with resuscitative measures. Discussed NG tube and its purpose. Discussed PEG tube and its purpose. Jose Luis Luevano expresses difficulty \"wrapping my mind around this\" as she expresses that just last evening the patient ate mandarin oranges/drank juice without any difficulty. Discussed that the nurse/speech therapist at this point do not recommend anything by mouth. Discussed that pain medications have side effects and it may be necessary to lighten the medications to allow for more alertness. Jose Luis Luevano states that the patient has had severe neuropathy pain and that they finally had it under control with norco and cannabis gummies. Jose Luis Luevano states that she does not want her mother to experience such pain as she has in the past.  Did discuss that it can be difficult to provide sufficient pain relief without the side effects coming into play. Discussed that it is reasonable to give the patient another day or two to assess her alertness/swallowing. Discussed that if the doctor is recommending more things that do not align with what the patient would want or family does not believe that the patient would want, it would be important to consider changing the patient's code status to reflect a more conservative approach to the patient's care. Discussed the level of dnr cc/hospice and when this would be appropriate. Much emotional support provided. Contact information provided if family have further questions. Please call palliative care if further needs arise.

## 2021-02-01 NOTE — PROGRESS NOTES
Hospitalist Progress Note    Patient:  Rosalie Brock    Unit/Bed:5K-02/002-A  YOB: 1935  MRN: [id number]   Acct: [de-identified]   PCP: LUZ Huerta - CNP  Code Status: Full Code  Date of Admission: 1/29/2021    Expected Discharge: 2-3 days  Disposition: from home with daughter. Assessment/Plan:    1. Acute metabolic encephalopathy: I suspect that this is due to to UTI and severe left lower extremity pain. Treat UTI as below. Judicious pain control to avoid further sedation/delirium. 2. UTI: Patient has a history of growing multiple organisms with some mild resistance to ceftriaxone. Patient allergic to penicillin, started on cefepime (1/29). ID following. Urine culture (+) E coli, sensitive to current abx. 3. Dysphagia: SLP evaluated and recommended pt to be NPO with consideration of NGT. States pt has severe oral holding following cues to elicit pharyngeal swallow; moderate oral stasis. 4. Possible acute on chronic left lower extremity cellulitic process, ruled out: Patient has reported peripheral artery disease and has been having issues with redness and pain in the left lower extremity.    - Podiatry consulted, recommended triamcinolone cream twice daily and wound dressing. WBAT. Does not feel this is the source of leukocytosis. Assess response to topical steroids, ok to dc from pod standpoint. ID also following.   - Appears to be chronic PVD with venous stasis ulcers and excoriated skin. She has acute on chronic kidney disease thus would avoid any angiography    5. AVELINO on CKD stage III: Creatinine is mildly elevated at 1.9. Baseline is around 1.5. Patient appears to be somewhat dry. - Resolved with IVF, Cr back to baseline. 6. Atrial Fibrillation: anticoagulated on coumadin. Rate controlled on metoprolol. S/p PPM.      7. Essential HTN: BP stable. Continue home metoprolol, Imdur.  Lisinopril held d/t #4.     8. Abdominal wall hematoma: Reportedly from Lovenox injections. We will continue to monitor. Will request wound/infectious disease to also assess. 9. Chronic HFrEF: EF 25-30% on Echo 03/2020. Currently she appears to be compensated. proBNP was elevated as mentioned above but likely related to chronic kidney disease. Holding lisinopril as mentioned above. Continue beta-blocker and Imdur. 10. Physical deconditioning: PT/OT     11. Code status / goals of care: Discussed at length with the daughter. SLP evaluated and recommended pt to be NPO with consideration of NGT. States pt has severe oral holding following cues to elicit pharyngeal swallow; moderate oral stasis. Discussed that part of the problem likely is that she is not awake enough from medication. Advised if we are going to treat aggressively, we would need to remove pain medication to assess mental state and swallowing at that time and consider PEG if she is still not swallowing. Or that we can take a comfort approach, allow her to have pain medication and eat/drink when she wants but we have risk of aspiration and would need to change code status. Daughter still has many thought/questions. Would like to meet with palliative care, consulted. Chief Complaint: Left lower extremity pain and not acting like herself    HPI / Hospital Course: Per HPI: \"Patient is an 81-year-old female with a past medical history of atrial fibrillation, PE, non-Hodgkin's lymphoma, CKD stage III, CHF EF 25 to 30%, CAD. Patient presents with her daughter. Apparently the patient began acting unlike herself yesterday evening. The daughter would attempt to speak with her however the patient would not answer appropriately. Patient also began to have increasing left lower extremity pain around the same time. She presented to the emergency room and a urinalysis was obtained which demonstrated a urinary tract infection.   She was started on antibiotics and the original plan was to discharge home with PCP follow-up. However, patient developed increasing pain and more confusion thus she was admitted. She is unable to provide any further review of systems. She has a history of UTI with resistant organisms. She has also been seeing podiatry over the last several months due to a chronic cellulitic process of the left lower extremity. During my exam she is extremely tender to palpation of the lower extremity and cries out immediately upon touching her. Her daughter states that she is generally very touchy in the lower extremities. Patient has also been chilling. She has been afebrile though. \"     1/30: Pt is AOx2. Daughter states her mental state appears to be improving. Reports fatigue. Leg pain is improved. Denies fever/chills, sob, cp, abd pain, n/v/d.     1/31: Per nursing notes overnight, the patient was in crying out in pain, received IM Vistaril and IV dilaudid. She later was more confused and pulled out her IV. She also had urinary retention, was straight cathed with 300mL output. Per the daughter, mental state still appears to be improving. Pt had minimal leg pain to palpation today. Pt reports fatigue. Denies fever/chills, sob, cp. Subjective (past 24 hours): Patient is lethargic, resting comfortably; however, grimacing at times. No acute events overnight. She is pocking her foods and pills. SLP evaluated and recommended NPO, continue to assess if she is more awake. Pt taking pain medication for leg pain. Discussed discontinuing pain medication with the daughter in order to assess her mental state at that time. Daughter did not want to stop pain medication. See A/P for further details. Palliative care consulted. ROS: Pertinent positives as noted in HPI. All other systems reviewed and negative. Past medical history, family history, social history and allergies reviewed again and is unchanged since admission. Medications:  Reviewed.   Infusion Medications    sodium chloride 50 mL/hr at 01/31/21 1228     Scheduled Medications    aspirin  81 mg Oral Daily    [Held by provider] furosemide  20 mg Oral Daily    isosorbide mononitrate  60 mg Oral Daily    metoprolol succinate  50 mg Oral Daily    zinc sulfate  50 mg Oral Daily    sodium chloride flush  10 mL Intravenous 2 times per day    cefepime  1,000 mg Intravenous Q24H    betamethasone dipropionate   Topical BID    warfarin (COUMADIN) daily dosing (placeholder)   Other RX Placeholder    miconazole   Topical BID     PRN Meds: zinc oxide, HYDROmorphone, docusate sodium, HYDROcodone-acetaminophen, sodium chloride flush, promethazine **OR** ondansetron, acetaminophen **OR** acetaminophen, polyethylene glycol, hydrOXYzine    I/O:     Intake/Output Summary (Last 24 hours) at 2/1/2021 0949  Last data filed at 2/1/2021 0345  Gross per 24 hour   Intake 906.82 ml   Output 750 ml   Net 156.82 ml       Diet:  DIET CARDIAC; Dietary Nutrition Supplements: Diabetic Oral Supplement    Exam:  BP (!) 162/80 Comment: manual  Pulse 88   Temp 97.8 °F (36.6 °C) (Oral)   Resp 22   Ht 5' 8.5\" (1.74 m)   SpO2 96%   BMI 29.82 kg/m²   General:   Chronically ill appearing female. NAD. HEENT:  normocephalic and atraumatic. No scleral icterus. PERR. Neck: supple. No JVD. No thyromegaly. Lungs: clear to auscultation. No retractions  Cardiac: RRR without murmur. Abdomen: soft. Nontender. Bowel sounds positive. RLQ hematoma noted. Extremities:  No clubbing, cyanosis, or edema x 4. Chronic BLE stasis ulcers, red/purple discoloration, dry/scaly. Vasculature: capillary refill < 3 seconds. Palpable LE pulses bilaterally. Skin:  warm and dry. Psych:  Alert and oriented x3. Affect appropriate  Lymph:  No supraclavicular adenopathy. Neurologic:  No focal deficit. No seizures.       Data: (All radiographs, tracings, PFTs, and imaging are personally viewed and interpreted unless otherwise noted)  Labs:   Recent Labs     01/30/21  0342 01/31/21  0343 02/01/21  0400   WBC 8.5 8.6 8.4   HGB 12.6 13.7 13.3   HCT 41.2 45.1 43.9    295 277     Recent Labs     01/30/21  0342 01/31/21  0349 02/01/21  0400   * 144 142   K 5.0 4.7 4.4   * 110 111   CO2 21* 21* 21*   BUN 37* 35* 32*   CREATININE 1.5* 1.6* 1.7*   CALCIUM 8.8 9.4 8.9     No results for input(s): AST, ALT, BILIDIR, BILITOT, ALKPHOS in the last 72 hours. Recent Labs     01/30/21  0342 01/31/21  0349 02/01/21  0400   INR 2.62* 3.31* 3.12*     Recent Labs     01/29/21  1450   CKTOTAL 22*     Urinalysis:   Lab Results   Component Value Date    NITRU POSITIVE 01/29/2021    WBCUA > 100 01/29/2021    BACTERIA MANY 01/29/2021    RBCUA 0-2 01/29/2021    BLOODU NEGATIVE 01/29/2021    SPECGRAV 1.023 06/24/2020    GLUCOSEU NEGATIVE 01/29/2021     Urine culture:   Lab Results   Component Value Date    LABURIN [place] count: >100,000 CFU/mL 06/16/2019     Micro:   Blood culture #1:   Lab Results   Component Value Date    BC No growth-preliminary  01/29/2021     Blood culture #2:No results found for: Neil Porras  Organism:  Lab Results   Component Value Date    ORG Escherichia coli 01/29/2021         Lab Results   Component Value Date    LABGRAM  11/08/2016     Rare segmented neutrophils observed. No epithelial cells observed. No organisms observed. MRSA culture only:No results found for: [facility]  Respiratory culture: No results found for: CULTRESP  Aerobic and Anaerobic :  Lab Results   Component Value Date    LABAERO No growth-preliminary  No growth   11/08/2016     Lab Results   Component Value Date    LABANAE No growth-preliminary  No growth   11/08/2016       Radiology Reports:  CT HEAD WO CONTRAST   Final Result   No acute intracranial findings. Chronic changes as described. This document has been electronically signed by:  Danish Leal MD on    01/29/2021 06:22 AM      All CT scans at this facility use dose modulation, iterative    reconstruction, and/or weight-based   dosing when appropriate to reduce radiation dose to as low as reasonably    achievable. XR CHEST PORTABLE   Final Result   Impression:   No acute pathology. This document has been electronically signed by: Lito Zazueta MD on    01/29/2021 05:42 AM           Ct Head Wo Contrast    Result Date: 1/29/2021  CT head without contrast Comparison:  CT,SR  - CT HEAD WO CONTRAST  - 01/05/2021 12:46 AM EST Findings: No intracranial mass, midline shift, hydrocephalus, or acute hemorrhage. Chronic parenchymal volume loss and white matter changes. No mastoid effusion. No air fluid levels in paranasal sinuses. There is no acute fracture. No acute intracranial findings. Chronic changes as described. This document has been electronically signed by: Lito Zazueta MD on 01/29/2021 06:22 AM All CT scans at this facility use dose modulation, iterative reconstruction, and/or weight-based dosing when appropriate to reduce radiation dose to as low as reasonably achievable. Xr Chest Portable    Result Date: 1/29/2021  Exam: 1V chest Comparison: 01/05/2021 12:15 AM EST (01/04/2021 11:15 PM CST) : CR,SR: XR CHEST PORTABLE Findings: Left sided pacer in place and intact. Mediastinum: No cardiac silhouette enlargement. Lungs: No infiltrate or mass. Pleura: No pneumothorax or significant effusion. Bones: No acute pathology. Impression: No acute pathology. This document has been electronically signed by:  Lito Zazueta MD on 01/29/2021 05:42 AM         Tele:   [x] yes             [] no      Active Hospital Problems    Diagnosis Date Noted    Acute encephalopathy [G93.40] 01/29/2021       Electronically signed by Sabiha Becerra PA-C on 2/1/2021 at 9:49 AM

## 2021-02-01 NOTE — PROGRESS NOTES
Podiatric Progress Note  Kenji Higgins  Subjective :     Patient seen bedside today on behalf of Dr. Lavell Councilman. Patient is sleeping, daughter at bedside. History provided by daughter, she reports that she notices improvement in the legs since admission and thinks the cream they have been applying has been helping. Patient not tolerating her legs being touched, cannot tolerate wraps at this time. HISTORY OF PRESENT ILLNESS:                 The patient is a 81 y.o. female with significant past medical history of PAD and neuropathy who is being seen bedside on behalf of of Dr. Lavell Councilman. Patient is currently obtunded and history was taken from patient's daughter who is at bedside. Patient is currently admitted for UTI infection and has been complaining of left leg pain. Patient has chronic venous stasis dermatitis with history of cellulitis in the past treated with antibiotics. Patient was not seen by Dr. Lavell Councilman since last admission in December but daughter has been caring for lower extremity wounds since then with Ace wraps. Patient also is currently unable to tolerate vascular studies. Review of systems was unable to be obtained due to patient status.      Current Medications:    Current Facility-Administered Medications: 0.9 % sodium chloride infusion, , Intravenous, Continuous  zinc oxide (PINXAV) 30 % ointment, , Topical, 4x Daily PRN  HYDROmorphone (DILAUDID) injection 0.5 mg, 0.5 mg, Intravenous, Q6H PRN  aspirin EC tablet 81 mg, 81 mg, Oral, Daily  docusate sodium (COLACE) capsule 100 mg, 100 mg, Oral, Daily PRN  [Held by provider] furosemide (LASIX) tablet 20 mg, 20 mg, Oral, Daily  HYDROcodone-acetaminophen (NORCO)  MG per tablet 1 tablet, 1 tablet, Oral, Q6H PRN  isosorbide mononitrate (IMDUR) extended release tablet 60 mg, 60 mg, Oral, Daily  metoprolol succinate (TOPROL XL) extended release tablet 50 mg, 50 mg, Oral, Daily  zinc sulfate (ZINCATE) capsule 50 mg, 50 mg, Oral, Daily  sodium chloride flush 0.9 % injection 10 mL, 10 mL, Intravenous, 2 times per day  sodium chloride flush 0.9 % injection 10 mL, 10 mL, Intravenous, PRN  promethazine (PHENERGAN) tablet 12.5 mg, 12.5 mg, Oral, Q6H PRN **OR** ondansetron (ZOFRAN) injection 4 mg, 4 mg, Intravenous, Q6H PRN  acetaminophen (TYLENOL) tablet 650 mg, 650 mg, Oral, Q6H PRN **OR** acetaminophen (TYLENOL) suppository 650 mg, 650 mg, Rectal, Q6H PRN  polyethylene glycol (GLYCOLAX) packet 17 g, 17 g, Oral, Daily PRN  cefepime (MAXIPIME) 1000 mg IVPB minibag, 1,000 mg, Intravenous, Q24H  betamethasone dipropionate (DIPROLENE) 0.05 % cream, , Topical, BID  warfarin (COUMADIN) daily dosing (placeholder), , Other, RX Placeholder  miconazole (MICOTIN) 2 % powder, , Topical, BID  hydrOXYzine (VISTARIL) injection 25 mg, 25 mg, Intramuscular, Q6H PRN    Objective     BP (!) 162/80 Comment: manual  Pulse 88   Temp 97.8 °F (36.6 °C) (Oral)   Resp 22   Ht 5' 8.5\" (1.74 m)   SpO2 96%   BMI 29.82 kg/m²      I/O:    Intake/Output Summary (Last 24 hours) at 2/1/2021 0811  Last data filed at 2/1/2021 0345  Gross per 24 hour   Intake 906.82 ml   Output 750 ml   Net 156.82 ml              Wt Readings from Last 3 Encounters:   01/04/21 199 lb (90.3 kg)   12/21/20 199 lb 12.8 oz (90.6 kg)   03/03/20 226 lb (102.5 kg)       LABS:    Recent Labs     01/31/21  0349 02/01/21  0400   WBC 8.6 8.4   HGB 13.7 13.3   HCT 45.1 43.9    277        Recent Labs     02/01/21  0400      K 4.4      CO2 21*   BUN 32*   CREATININE 1.7*        Recent Labs     01/31/21  0349 02/01/21  0400   INR 3.31* 3.12*        Recent Labs     01/29/21  1450   CKTOTAL 22*         Exam:   Vascular: Dorsalis pedis and posterior tibial pulses are barely palpable bilaterally. Skin temperature is warm to warm from proximal tibial tuberosity to distal digits. CFT <3 seconds to exposed digits. Mild edema to bilateral lower extremity. Hair growth negative.  Quality of skin atrophic     Dermatologic:  No rashes or subcutaneous nodules of note. Patient has red/purple coloration of her feet and legs secondary to a long standing venous stasis dermatitis, left worse than right at this time, clinically improved.  There is a superficial wound to the lateral aspect of the right proximal lower leg, no drainage, no erythema, no purulence, no malodor. Heels are soft, no sign of pressure injury. Neurovascular: Unable to assess as patient obtunded.     Musculoskeletal: Muscle strength testing deferred. Pain on palpation of the bilateral legs, left much more than right.  No gross osseous abnormality noted.       ASSESSMENT  Principle  Chronic venous stasis dermatitis, bilateral lower extremity     Chronic  Patient Active Problem List   Diagnosis    Hyperlipidemia    History of DVT (deep vein thrombosis)    Type 2 diabetes mellitus with complication, without long-term current use of insulin ([facility])    Essential hypertension    Acute [facility] ([facility]    CKD stage 3 due to type 2 diabetes mellitus ([facility] [facility]    History of pulmonary embolism    Coagulopathy (HCC)    NHL (non-Hodgkin's lymphoma)  Dr. Dea Veloz  diagnosed June 2899    Diastolic dysfunction,  EF 55-60%    Anticoagulated on Coumadin    Polycythemia    Dyspnea    Obesity (BMI 35.0-39.9 without comorbidity)    Mild aortic stenosis    Ischemic cardiomyopathy    Lower back pain    Hypoxia    Persistent atrial fibrillation (HCC)    CHF NYHA class II ([address]    Ischemic heart disease    History of embolic stroke    Triple vessel coronary artery disease    S/P drug eluting coronary stent placement    Chronic combined systolic and diastolic CHF (congestive heart failure) (HCC)    RLS (restless legs syndrome)    S/P cardiac pacemaker procedure    Physical deconditioning    Swelling    Pre-ulcerative calluses    Status post total replacement of left hip    Coronary artery disease involving native coronary artery of native heart without angina pectoris    Sick sinus syndrome (HCC)    Calculus of gallbladder without cholecystitis without obstruction    Chronic idiopathic constipation    History of coronary artery stent placement    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  right mid axillary line    Spinal stenosis of lumbar region    Epigastric abdominal pain    Sepsis ([address]    Acute cystitis without hematuria    Kidney cysts    Opacity of lung on imaging study    Nephrolithiasis    Hyperbilirubinemia    History of CVA (cerebrovascular accident)    Long term (current) use of anticoagulants    Hypertensive urgency    Moderate malnutrition (HCC)    Pneumonia    Lethargy    Open wound of foot excluding toes    Abnormal echocardiogram    Cardiomyopathy ([address]    AVELINO (acute kidney injury) ([address]    Acute encephalopathy       PLAN:   Pt. is a 81 y.o. female   Patient was examined and evaluated  Continue daily application of tramcinolone 0.5% cream 2x daily as tolerated  Resume compression wraps as tolerated by patient   Continue to monitor heels and keep offloaded, recommend using pillows at this time as patient would likely not tolerate prevalon boots due to sensitivity in legs. Podiatry will continue to follow    Dispo: continue with triamcinolone cream to legs 2x daily, resume compression wraps when tolerated by patient. OK for discharge from podiatry standpoint, follow up with Dr. Talon Small as outpatient as needed.      Electronically signed by Margaret Melvin DPM on 2/1/2021 at 8:32 AM

## 2021-02-01 NOTE — PROGRESS NOTES
Spoke to primary nurse for consult multiple scattered abrasions and stage 2 pressure injuries poa to buttocks. Primary nurse states they are currently using foams to abrasions and zinc to buttocks. Continue this treatment. Podiatry on case for leg wounds. Call wound ostomy as needed.

## 2021-02-01 NOTE — CARE COORDINATION
DISCHARGE/PLANNING EVALUATION  2/1/21, 2:01 PM EST    Reason for Referral:  Current with [facility]  Mental Status: Not assessed, patient asleep  Decision Making: Spoke with daughter Genevieve Douglas whom is her caregiver  Family/Social/Home Environment: Patient is from home with her daughter and spouse. Daughter assists in her care. She stated that patient has a hospital bed in the living room along with a commode. She stated that the patient uses a walker and a wheelchair to ambulate. She stated that the patient has a raised toilet with grab bars and gets washed up in bed. Current Services including food security, transportation and housekeeping: Daughter assists with household tasks and transportation  Current Equipment: walker, wheelchair, commode  Payment Source: Medicare/BCBS  Concerns or Barriers to Discharge:  NA  Post acute provider list with quality measures, geographic area and applicable managed care information provided. Questions regarding selection process answered: declined wants to continue with [facility]    Teach Back Method used with Genevieve Douglas regarding care plan and options for discharge  Daughter Genevieve Douglas verbalize understanding of the plan of care and contribute to goal setting. Patient goals, treatment preferences and discharge plan: Daughter plans for patient to return home with same services of [facility]. Left a message to verify services.      Electronically signed by MALLORY Hilton on 2/1/2021 at 2:01 PM

## 2021-02-01 NOTE — PROGRESS NOTES
Progress note: Infectious diseases    Patient - Dominic Yeung,  Age - 81 y.o.    - 1935      Room Number - 5K-02/002-A   MRN -  [id number]   Acct # - [de-identified]  Date of Admission -  2021  4:47 AM    SUBJECTIVE:   She is not swallowing. pocketing in the oral cavity. lethargic  OBJECTIVE   VITALS    height is 5' 8.5\" (1.74 m). Her axillary temperature is 97.6 °F (36.4 °C). Her blood pressure is 143/91 (abnormal) and her pulse is 98. Her respiration is 20 and oxygen saturation is 96%. Wt Readings from Last 3 Encounters:   21 199 lb (90.3 kg)   20 199 lb 12.8 oz (90.6 kg)   20 226 lb (102.5 kg)       I/O (24 Hours)    Intake/Output Summary (Last 24 hours) at 2021 1004  Last data filed at 2021 0345  Gross per 24 hour   Intake 906.82 ml   Output 750 ml   Net 156.82 ml       General Appearance  Chronically sick looking.   HEENT - normocephalic, atraumatic, slighlty paleconjunctiva,  anicteric sclera  Neck - Supple, no mass  Lungs -  Bilateral   air entry,    Cardiovascular - Heart sounds are normal.    Abdomen - soft, not distended, nontender, excoriated skin fold  Neurologic -lethargic  Skin - No bruising or bleeding  Extremities - chronic skin changes    MEDICATIONS:      aspirin  81 mg Oral Daily    [Held by provider] furosemide  20 mg Oral Daily    isosorbide mononitrate  60 mg Oral Daily    metoprolol succinate  50 mg Oral Daily    zinc sulfate  50 mg Oral Daily    sodium chloride flush  10 mL Intravenous 2 times per day    cefepime  1,000 mg Intravenous Q24H    betamethasone dipropionate   Topical BID    warfarin (COUMADIN) daily dosing (placeholder)   Other RX Placeholder    miconazole   Topical BID      sodium chloride 50 mL/hr at 21 1228     zinc oxide, HYDROmorphone, docusate sodium, HYDROcodone-acetaminophen, sodium chloride flush, promethazine **OR** ondansetron, acetaminophen **OR** acetaminophen, polyethylene glycol, hydrOXYzine      LABS:     CBC:   Recent Labs     01/30/21  0342 01/31/21  0349 02/01/21  0400   WBC 8.5 8.6 8.4   HGB 12.6 13.7 13.3    295 277     BMP:    Recent Labs     01/30/21  0342 01/31/21  0349 02/01/21  0400   * 144 142   K 5.0 4.7 4.4   * 110 111   CO2 21* 21* 21*   BUN 37* 35* 32*   CREATININE 1.5* 1.6* 1.7*   GLUCOSE 111* 121* 133*     Calcium:  Recent Labs     02/01/21  0400   CALCIUM 8.9     Ionized Calcium:No results for input(s): IONCA in the last 72 hours. Magnesium:  No results for input(s): MG in the last 72 hours. INR:   Recent Labs     01/30/21  0342 01/31/21  0349 02/01/21  0400   INR 2.62* 3.31* 3.12*    Troponin:   Recent Labs     01/29/21  1450   CKTOTAL 22*     BNP: No results for input(s): BNP in the last 72 hours. CULTURES:   UA:   No results for input(s): SPECGRAV, PHUR, COLORU, CLARITYU, MUCUS, PROTEINU, BLOODU, RBCUA, WBCUA, BACTERIA, NITRU, GLUCOSEU, BILIRUBINUR, UROBILINOGEN, KETUA, LABCAST, LABCASTTY, AMORPHOS in the last 72 hours.     Invalid input(s): CRYSTALS  Micro:   Lab Results   Component Value Date    BC No growth-preliminary  01/29/2021          Problem list of patient:     Patient Active Problem List   Diagnosis Code    Hyperlipidemia E78.5    History of DVT (deep vein thrombosis) Z86.718    Type 2 diabetes mellitus with complication, without long-term current use of insulin ([address] E11.8    Essential hypertension I10    Acute kidney injury ([address] N17.9    CKD stage 3 due to type 2 diabetes mellitus ([address] E11.22, N18.30    History of pulmonary embolism Z86.711    Coagulopathy ([facility] D68.9    NHL (non-Hodgkin's lymphoma)  Dr. Jason Arora  diagnosed June 2012 Y45.10    Diastolic dysfunction,  EF 55-60% I51.9    Anticoagulated on Coumadin Z79.01    Polycythemia D75.1    Dyspnea R06.00    Obesity (BMI 35.0-39.9 without comorbidity) E66.9    Mild aortic stenosis I35.0    Ischemic cardiomyopathy I25.5    Lower back pain M54.5    Hypoxia R09.02    Persistent atrial fibrillation (HCC) I48.19    CHF NYHA class II ([facility]) I50.9    Ischemic heart disease I25.9    History of embolic stroke E74.91    Triple vessel coronary artery disease I25.10    S/P drug eluting coronary stent placement Z95.5    Chronic combined systolic and diastolic CHF (congestive heart failure) ([facility]) I50.42    RLS (restless legs syndrome) G25.81    S/P cardiac pacemaker procedure Z95.0    Physical deconditioning R53.81    Swelling R60.9    Pre-ulcerative calluses L84    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    Coronary artery disease involving native coronary artery of native heart without angina pectoris I25.10    Sick sinus syndrome ([facility]) I49.5    Calculus of gallbladder without cholecystitis without obstruction K80.20    Chronic idiopathic constipation K59.04    History of coronary artery stent placement Z95.5    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  right mid axillary line G58.0    Spinal stenosis of lumbar region M48.061    Epigastric abdominal pain R10.13    Sepsis ([facility]) A41.9    Acute cystitis without hematuria N30.00    Kidney cysts N28.1    Opacity of lung on imaging study R91.8    Nephrolithiasis N20.0    Hyperbilirubinemia E80.6    History of CVA (cerebrovascular accident) Z80.78    Long term (current) use of anticoagulants Z79.01    Hypertensive urgency I16.0    Moderate malnutrition ([facility]) E44.0    Pneumonia J18.9    Lethargy R53.83    Open wound of foot excluding toes S91.309A    Abnormal echocardiogram R93.1    Cardiomyopathy (HCC) I42.9    AVELINO (acute kidney injury) ([address] N17.9    Acute encephalopathy G93.40         ASSESSMENT/PLAN   Change in mental state:slighlty better. She is not driniking and swallowing.   Daughter doesn't want NGT  Will stop cefepime    Maddie Cote MD, FACP 2/1/2021 10:04 AM Lima Harvey MA,RD,CHES,CDN  Beeper [phone]

## 2021-02-01 NOTE — CARE COORDINATION
DISASTER CHARTING    2/1/21, 7:37 AM EST    DISCHARGE ONGOING EVALUATION:     Gail Godinez day: 3  Location: -02/002-A Reason for admit: Acute encephalopathy [G93.40]   Barriers to Discharge: Patient presented with altered mental status. ID following, Palliative Care, IV fluids, Lasix on hold, Maxipime, Coumadin per pharmacy dosing, christopher INR's, BMP and CBC, PT/OT/ST, consult to wound/ostomy RN. PCP: LUZ Mckeon CNP  Readmission Risk Score: 30%  Patient Goals/Plan/Treatment Preferences: Met with Mike Stanton. She is from home with her , daughter, and [facility]. She has two daughters. Jose Luis Luevano and Victor Manuel. Jose Luis Luevano present at bedside states she is her mother and father's care-giver, 24/7 in addition to [facility]. Jose Luis Chloé states the plan is for her mother's return as long as she can walk. Jose Luis Luevano will transport her mother home at discharge. She states they have all the DME they need and sufficient services are in place.

## 2021-02-02 NOTE — PROCEDURES
A Bladder scan was performed at 0525 . The residual amount was measured to be 160 ML. Report of results was given to Edel Oquendo,[facility].

## 2021-02-02 NOTE — PROGRESS NOTES
Progress note: Infectious diseases    Patient - Dale Benson,  Age - 81 y.o.    - 1935      Room Number - 5K-02/002-A   MRN -  [id number]   Acct # - [de-identified]  Date of Admission -  2021  4:47 AM    SUBJECTIVE:   She is more awake today,  NPO  Lab reviewed. Her sodium is high  OBJECTIVE   VITALS    height is 5' 8.5\" (1.74 m). Her axillary temperature is 97.5 °F (36.4 °C). Her blood pressure is 162/96 (abnormal) and her pulse is 103. Her respiration is 18 and oxygen saturation is 93%. Wt Readings from Last 3 Encounters:   21 199 lb (90.3 kg)   20 199 lb 12.8 oz (90.6 kg)   20 226 lb (102.5 kg)       I/O (24 Hours)    Intake/Output Summary (Last 24 hours) at 2021 1151  Last data filed at 2021 0416  Gross per 24 hour   Intake 1639 ml   Output 430 ml   Net 1209 ml       General Appearance  Chronically sick looking.   HEENT - normocephalic, atraumatic, slighlty pale conjunctiva,  anicteric sclera dry oral mucosa  Neck - Supple, no mass  Lungs -  Bilateral   air entry, diminished breath sounds on lower lung field  Cardiovascular - Heart sounds are normal.    Abdomen - soft, not distended, nontender, excoriated skin fold  Neurologic -lethargic  Skin - No bruising or bleeding  Extremities - chronic skin changes    MEDICATIONS:      warfarin  0.5 mg Oral Once    aspirin  81 mg Oral Daily    [Held by provider] furosemide  20 mg Oral Daily    isosorbide mononitrate  60 mg Oral Daily    metoprolol succinate  50 mg Oral Daily    zinc sulfate  50 mg Oral Daily    sodium chloride flush  10 mL Intravenous 2 times per day    betamethasone dipropionate   Topical BID    warfarin (COUMADIN) daily dosing (placeholder)   Other RX Placeholder    miconazole   Topical BID      sodium chloride 75 mL/hr at 21 2304     melatonin, HYDROmorphone, metoprolol, zinc oxide, docusate sodium, (BMI 35.0-39.9 without comorbidity) E66.9    Mild aortic stenosis I35.0    Ischemic cardiomyopathy I25.5    Lower back pain M54.5    Hypoxia R09.02    Persistent atrial fibrillation (HCC) I48.19    CHF NYHA class II ([facility]) I50.9    Ischemic heart disease I25.9    History of embolic stroke B43.55    Triple vessel coronary artery disease I25.10    S/P drug eluting coronary stent placement Z95.5    Chronic combined systolic and diastolic CHF (congestive heart failure) ([facility]) I50.42    RLS (restless legs syndrome) G25.81    S/P cardiac pacemaker procedure Z95.0    Physical deconditioning R53.81    Swelling R60.9    Pre-ulcerative calluses L84    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    Coronary artery disease involving native coronary artery of native heart without angina pectoris I25.10    Sick sinus syndrome ([facility]) I49.5    Calculus of gallbladder without cholecystitis without obstruction K80.20    Chronic idiopathic constipation K59.04    History of coronary artery stent placement Z95.5    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  right mid axillary line G58.0    Spinal stenosis of lumbar region M48.061    Epigastric abdominal pain R10.13    Sepsis ([facility]) A41.9    Acute cystitis without hematuria N30.00    Kidney cysts N28.1    Opacity of lung on imaging study R91.8    Nephrolithiasis N20.0    Hyperbilirubinemia E80.6    History of CVA (cerebrovascular accident) Z80.78    Long term (current) use of anticoagulants Z79.01    Hypertensive urgency I16.0    Moderate malnutrition ([facility]) E44.0    Pneumonia J18.9    Lethargy R53.83    Open wound of foot excluding toes S91.309A    Abnormal echocardiogram R93.1    Cardiomyopathy ([facility]) I42.9    AVELINO (acute kidney injury) ([facility] [facility] N17.9    Acute encephalopathy G93.40         ASSESSMENT/PLAN   Change in mental state:slighlty better.   Will ask speech and swallow to evaluate patient   Acute kidney injury with hypernatremia: Consider

## 2021-02-02 NOTE — PROGRESS NOTES
[facility]  INPATIENT SPEECH THERAPY  STR[facility] ONC MED 5K  DAILY NOTE    TIME   SLP Individual Minutes  Time In: 4731  Time Out: 5908  Minutes: 8  Timed Code Treatment Minutes: 0 Minutes       Date: 2021  Patient Name: Molina Romo      CSN: [id number]   : 1935  (81 y.o.)  Gender: female   Referring Physician: Jonah Alexander PA-C  Diagnosis: Acute encephalopathy   Secondary Diagnosis: Dysphagia   Precautions: Fall risk, aspiration precautions, contact isolation, see allergy list    Current Diet: NPO   Swallowing Strategies: Not Applicable and completion of daily routine oral care   Date of Last MBS: Not Applicable    Pain:  No pain reported. Subjective:  Pt seen resting in bed with daughter present. Poor ability to awaken pt despite verbal/tactile cues provided by daughter. Completion of skilled dysphagia education provided to daughter re: pt level of dysphagia, established Goals/POC as well as need to consider alternative means of nutrition. See below for further details. Short-Term Goals:  SHORT TERM GOAL #1:  Goal 1: Pt will consume trials of ice chips, thin liquids by spoon/cup and puree with ST only with appropriate alertness, timely swallow response and without overt difficulty to determine pt appropriateness for instrumental assessment vs initiation of PO diet. INTERVENTIONS: Skilled level education provided to daughter re: barriers towards PO consumption (I.e. pt fatigue, poor direction following, etc) resulting in significant oral holding, absence of pharyngeal swallow trigger and significant oral stasis to follow. At this time, pt inappropriate for medication consumption given above swallowing deficits. Pt waxing/waning in regards to mental status and levels of basic alertness. Recommendations for consideration of NG tube as alternative means of nutrition. Daughter refusing PEG tube at this time. Asking about TPN via IV for supplemental nutrition.  Barriers towards this modality provided. Daughter requesting ST to come back to see pt later this afternoon. Recommended leaving call back number with nursing to notify ST if mental status/alertness levels improve. Daughter agreeable to plan. Number left with RN, Blane Bright. Will provide further follow up this date as indicated. Spoke with nursing re: consideration of NG tube and palliative care consult. Nursing verbalized understanding. Pt to remain NPO at this time. ST to continue follow up for skilled dysphagia management and instrumental assessment as deemed clinically appropriate. SHORT TERM GOAL #2:  Goal 2: Staff/family will verbalize/demonstrate appropriate carryover of rigorious oral care completion in order to reduce colonization of oral bacteria, improve oral hygiene and minimize risks for aspiration pneumonia. INTERVENTIONS: DNT d/t focus on other goals   **poor pt alertness levels for completion of oral care this date. Long-Term Goals:    No established LTG's given short ELOS     EDUCATION:  Learner: daughter   Education:  Reviewed ST goals and Plan of Care, Reviewed recommendations for follow-up and Home Safety Education  Evaluation of Education: Anjali Smith understanding, Demonstrates with assistance and Needs further instruction    ASSESSMENT/PLAN:  Activity Tolerance:  Patient tolerance of  treatment: poor. Poor alertness levels      Assessment/Plan: Patient not progressing toward established goals due to continued fatigue, poor alertness and mental status changes . Will modify plan of care as follows: consideration of NG tube and palliative care consult .      Plan for Next Session: ANA cornell, dysphagia education     Manolo Meyer M.S. [id number] [facility] [address] 2/2/2021

## 2021-02-02 NOTE — PROGRESS NOTES
Called hospice consult to [facility], [facility]. Camilla Valencia RN on unit, stated she will see and evaluate tomorrow.

## 2021-02-02 NOTE — PROGRESS NOTES
Called daughter Luis E Fletcher and hospice referral to be completed morning of 02.03.2021. Daughter will be in room for consult. Primary nurse Moises Cage RN aware of planned referral. Had discussed with provider Meg LEE and samra for referral to be completed in morning.

## 2021-02-02 NOTE — PROGRESS NOTES
Attempted to call Speech therapy again per [facility], PA request, unable to reach at this time.

## 2021-02-02 NOTE — PROGRESS NOTES
Hospitalist Progress Note    Patient:  Genia Bains    Unit/Bed:5K-02/002-A  YOB: 1935  MRN: [id number]   Acct: [de-identified]   PCP: LUZ Persaud CNP  Code Status: DNR-CC  Date of Admission: 1/29/2021    Expected Discharge: 2/3  Disposition: from home with daughter, plans home with hospice     Assessment/Plan:    1. Acute metabolic encephalopathy:  Likely due to UTI and pain medication. UTI treated as stated below. Patient did not do well without pain medication, family opted to change code status to DNR CC.    2. UTI: Started on cefepime (1/29). ID following. Urine culture (+) E coli, sensitive to current abx. Resolved. IV Abx stopped 2/1 per ID. 3. Dysphagia: SLP evaluated and recommended pt to be NPO with consideration of NGT. States pt has severe oral holding following cues to elicit pharyngeal swallow; moderate oral stasis. Family would like to continue p.o. medications and allowing patient to eat. Discussed the risk. CODE STATUS changed to DNR CC.    4. Chronic PVD with venous stasis ulcers: Patient has reported peripheral artery disease and has been having issues with redness and pain in the left lower extremity.    - Podiatry was consulted, recommended triamcinolone cream twice daily and wound dressing. WBAT. Ok to dc from pod standpoint. ID also following, does not appear to be infected. 5. AVELINO on CKD stage III: Creatinine is mildly elevated at 1.9. Baseline is around 1.5. Patient appears to be somewhat dry. - Initially resolved with gentle IV hydration. Back up to 1.9 today (2/2) and Na elevated which may be related to acute fluid overload. IVF stopped. No further treatment or workup and Code status was changed to comfort care. 6. Atrial Fibrillation: anticoagulated on coumadin. Rate controlled on metoprolol. S/p PPM.    - Coumadin stopped as we will no longer be checking pts INR. 7. Essential HTN: BP stable. Continue home metoprolol, Imdur. Lisinopril held d/t #4.     8. Abdominal wall hematoma: Reportedly from Lovenox injections. 9. Chronic HFrEF: EF 25-30% on Echo 03/2020. Currently she appears to be compensated. proBNP was elevated as mentioned above but likely related to chronic kidney disease. Holding lisinopril as mentioned above. Continue beta-blocker and Imdur. 10. Physical deconditioning: PT/OT     11. Code status: DNR-CC        Chief Complaint: Left lower extremity pain and not acting like herself    HPI / Hospital Course: Per HPI: \"Patient is an 81-year-old female with a past medical history of atrial fibrillation, PE, non-Hodgkin's lymphoma, CKD stage III, CHF EF 25 to 30%, CAD. Patient presents with her daughter. Apparently the patient began acting unlike herself yesterday evening. The daughter would attempt to speak with her however the patient would not answer appropriately. Patient also began to have increasing left lower extremity pain around the same time. She presented to the emergency room and a urinalysis was obtained which demonstrated a urinary tract infection. She was started on antibiotics and the original plan was to discharge home with PCP follow-up. However, patient developed increasing pain and more confusion thus she was admitted. She is unable to provide any further review of systems. She has a history of UTI with resistant organisms. She has also been seeing podiatry over the last several months due to a chronic cellulitic process of the left lower extremity. During my exam she is extremely tender to palpation of the lower extremity and cries out immediately upon touching her. Her daughter states that she is generally very touchy in the lower extremities. Patient has also been chilling. She has been afebrile though. \"     1/30: Pt is AOx2. Daughter states her mental state appears to be improving. Reports fatigue. Leg pain is improved.  Denies fever/chills, sob, cp, abd pain, n/v/d.     1/31: Per nursing notes overnight, the patient was in crying out in pain, received IM Vistaril and IV dilaudid. She later was more confused and pulled out her IV. She also had urinary retention, was straight cathed with 300mL output. Per the daughter, mental state still appears to be improving. Pt had minimal leg pain to palpation today. Pt reports fatigue. Denies fever/chills, sob, cp.    2/1: Discussed at length with the daughter. SLP evaluated and recommended pt to be NPO with consideration of NGT. States pt has severe oral holding following cues to elicit pharyngeal swallow; moderate oral stasis. Discussed that part of the problem likely is that she is not awake enough from medication. Advised if we are going to treat aggressively, we would need to remove pain medication to assess mental state and swallowing at that time and consider PEG if she is still not swallowing. Or that we can take a comfort approach, allow her to have pain medication and eat/drink when she wants but we have risk of aspiration and would need to change code status. Daughter still has many thought/questions. Would like to meet with palliative care, consulted. Subjective (past 24 hours): I met with patient and family for >90 minutes today. Patient's daughter, Duke Dash, is present in the room and spoke with her son on the phone. We discussed the patient's CODE STATUS and prognosis at length. The patient has been moaning, grimacing, crying, restless in bed. The daughter was wanting to hold pain medication to see if her cognition would improve enough for a repeat speech eval.  Patient does appear to be more awake but is extremely uncomfortable. Advised that even if we repeated a speech eval while she is awake and she passed, she may not eat much because of pain and not being awake again as we treat the pain. Family further discussed and decided to change the patient's CODE STATUS to DNR CC.   They would like to allow the patient to take oral pain medications if possible and would like to allow her to eat understanding the risk of aspiration and death. They would like to take the patient home with hospice. I discussed with hospice and ordered medications for comfort. Hospice will see tomorrow. ROS: Pertinent positives as noted in HPI. All other systems reviewed and negative. Past medical history, family history, social history and allergies reviewed again and is unchanged since admission. Medications:  Reviewed. Infusion Medications     Scheduled Medications    aspirin  81 mg Oral Daily    [Held by provider] furosemide  20 mg Oral Daily    isosorbide mononitrate  60 mg Oral Daily    metoprolol succinate  50 mg Oral Daily    zinc sulfate  50 mg Oral Daily    sodium chloride flush  10 mL Intravenous 2 times per day    betamethasone dipropionate   Topical BID    miconazole   Topical BID     PRN Meds: morphine, LORazepam, morphine 20MG/ML, diphenhydrAMINE, melatonin, zinc oxide, docusate sodium, HYDROcodone-acetaminophen, sodium chloride flush, promethazine **OR** ondansetron, acetaminophen **OR** acetaminophen, polyethylene glycol    I/O:     Intake/Output Summary (Last 24 hours) at 2/2/2021 1650  Last data filed at 2/2/2021 1259  Gross per 24 hour   Intake 2252 ml   Output 430 ml   Net 1822 ml       Diet:  DIET GENERAL;    Exam:  BP (!) 176/106   Pulse 81   Temp 96 °F (35.6 °C) (Axillary)   Resp 22   Ht 5' 8.5\" (1.74 m)   SpO2 92%   BMI 29.82 kg/m²   General:   Chronically ill appearing female. Moaning, grimacing. HEENT:  normocephalic and atraumatic. No scleral icterus. PERR. Neck: supple. No JVD. No thyromegaly. Lungs: clear to auscultation. No retractions  Cardiac: Irregular rate and rhythm without murmur. Abdomen: soft. Nontender. Bowel sounds positive. RLQ hematoma noted. Extremities:  No clubbing, cyanosis, or edema x 4. Chronic BLE stasis ulcers, red/purple discoloration, dry/scaly.   Vasculature: capillary refill < 3 seconds. Palpable LE pulses bilaterally. Skin:  warm and dry. Psych:  Alert and oriented x3. Affect appropriate  Lymph:  No supraclavicular adenopathy. Neurologic:  No focal deficit. No seizures. Data: (All radiographs, tracings, PFTs, and imaging are personally viewed and interpreted unless otherwise noted)  Labs:   Recent Labs     01/31/21 0349 02/01/21  0400 02/02/21  0404   WBC 8.6 8.4 11.8*   HGB 13.7 13.3 15.0   HCT 45.1 43.9 49.7*    277 334     Recent Labs     01/31/21 0349 02/01/21  0400 02/02/21  0404    142 150*   K 4.7 4.4 5.0    111 117*   CO2 21* 21* 19*   BUN 35* 32* 35*   CREATININE 1.6* 1.7* 1.9*   CALCIUM 9.4 8.9 9.5     No results for input(s): AST, ALT, BILIDIR, BILITOT, ALKPHOS in the last 72 hours. Recent Labs     01/31/21 0349 02/01/21  0400 02/02/21  0404   INR 3.31* 3.12* 3.55*     No results for input(s): CKTOTAL, TROPONINI in the last 72 hours. Urinalysis:   Lab Results   Component Value Date    NITRU POSITIVE 01/29/2021    WBCUA > 100 01/29/2021    BACTERIA MANY 01/29/2021    RBCUA 0-2 01/29/2021    BLOODU NEGATIVE 01/29/2021    SPECGRAV 1.023 06/24/2020    GLUCOSEU NEGATIVE 01/29/2021     Urine culture:   Lab Results   Component Value Date    LABURIN Dickey count: >100,000 CFU/mL 06/16/2019     Micro:   Blood culture #1:   Lab Results   Component Value Date    BC No growth-preliminary  01/29/2021     Blood culture #2:No results found for: Yo Mouse  Organism:  Lab Results   Component Value Date    ORG Escherichia coli 01/29/2021         Lab Results   Component Value Date    LABGRAM  11/08/2016     Rare segmented neutrophils observed. No epithelial cells observed. No organisms observed.        MRSA culture only:No results found for: [address] [place] [address]  Respiratory culture: No results found for: CULTRESP  Aerobic and Anaerobic :  Lab Results   Component Value Date    LABAERO No growth-preliminary  No growth   11/08/2016     Lab Results   Component Value Date    LABANAE No growth-preliminary  No growth   11/08/2016       Radiology Reports:  XR CHEST PORTABLE   Final Result   1. Cardiomegaly. Permanent unipolar pacemaker. 2. Moderate-sized pleural effusion right side. Moderate pneumonia/pulmonary edema right parahilar region and right lung base. Questionable mild infiltrate left parahilar region. **This report has been created using voice recognition software. It may contain minor errors which are inherent in voice recognition technology. **      Final report electronically signed by Dr. Guilherme Figueroa on 2/2/2021 2:01 PM      CT HEAD WO CONTRAST   Final Result   No acute intracranial findings. Chronic changes as described. This document has been electronically signed by: Emily Shelton MD on    01/29/2021 06:22 AM      All CT scans at this facility use dose modulation, iterative    reconstruction, and/or weight-based   dosing when appropriate to reduce radiation dose to as low as reasonably    achievable. XR CHEST PORTABLE   Final Result   Impression:   No acute pathology. This document has been electronically signed by: Emily Shelton MD on    01/29/2021 05:42 AM           Ct Head Wo Contrast    Result Date: 1/29/2021  CT head without contrast Comparison:  CT,SR  - CT HEAD WO CONTRAST  - 01/05/2021 12:46 AM EST Findings: No intracranial mass, midline shift, hydrocephalus, or acute hemorrhage. Chronic parenchymal volume loss and white matter changes. No mastoid effusion. No air fluid levels in paranasal sinuses. There is no acute fracture. No acute intracranial findings. Chronic changes as described. This document has been electronically signed by: Emily Shelton MD on 01/29/2021 06:22 AM All CT scans at this facility use dose modulation, iterative reconstruction, and/or weight-based dosing when appropriate to reduce radiation dose to as low as reasonably achievable.      Xr Chest Portable    Result Date: 1/29/2021  Exam: 1V chest Comparison: 01/05/2021 12:15 AM EST (01/04/2021 11:15 PM CST) : CR,SR: XR CHEST PORTABLE Findings: Left sided pacer in place and intact. Mediastinum: No cardiac silhouette enlargement. Lungs: No infiltrate or mass. Pleura: No pneumothorax or significant effusion. Bones: No acute pathology. Impression: No acute pathology. This document has been electronically signed by:  Yordy Dent MD on 01/29/2021 05:42 AM         Tele:   [x] yes             [] no      Active Hospital Problems    Diagnosis Date Noted    Acute encephalopathy [G93.40] 01/29/2021       Electronically signed by Claudia Barker PA-C on 2/2/2021 at 4:50 PM

## 2021-02-02 NOTE — PROGRESS NOTES
This patient is not a hospice patient at this time. Received call and hospice referral from patient primary nurse Dea Barragan RN. Liam Watson RN from hospice there on the floor and will set up a time for hospice referral tomorrow.

## 2021-02-02 NOTE — PROGRESS NOTES
Dr. Luis Jaime in room, patient slightly more alert, requested to call for ST to see. Attempted to call ST, message left for them to re-evaluate.

## 2021-02-02 NOTE — PROGRESS NOTES
[address]  OCCUPATIONAL THERAPY MISSED TREATMENT NOTE  [facility]  [id number]      Date: 2021  Patient Name: Kathy Agrawal        CSN: [id number]   : 1935  (81 y.o.)  Gender: female                REASON FOR MISSED TREATMENT: Daughter present upon OT arrival. Pt with labored breathing and restlessness. OTR checking vitals per daughter's request. /96 and O2 at 93-90%. RN notified. Per collaboration with daughter, plan to hold therapy this AM and allow pt to rest and check back next available date.

## 2021-02-03 NOTE — PROGRESS NOTES
Hospitalist Progress Note    Patient:  Francesca Spangler    Unit/Bed:5K-02/002-A  YOB: 1935  MRN: [id number]   Acct: [de-identified]   PCP: LUZ Lange CNP  Code Status: DNR-CC  Date of Admission: 1/29/2021    Expected Discharge: 2/3  Disposition: from home with daughter, plans home with hospice     Assessment/Plan:    1. Comfort care status only: Seen by hospice today and the plan is to transition to home with hospice if patient can tolerate oral pain medications. Hospice nurse managing medications  2. Acute metabolic encephalopathy:  Likely due to UTI and pain medication. UTI treated as stated below. Patient did not do well without pain medication, family opted to change code status to DNR CC.  3. Acute cystitis without hematuria: Started on cefepime (1/29). ID following. Urine culture (+) E coli, sensitive to current abx. Resolved. IV Abx stopped 2/1 per ID.   4. Oropharyngeal dysphagia: SLP evaluated and recommended pt to be NPO with consideration of NGT. States pt has severe oral holding following cues to elicit pharyngeal swallow; moderate oral stasis. Family would like to continue p.o. medications and allowing patient to eat. Discussed the risk. CODE STATUS changed to DNR CC.  5. Chronic PVD with venous stasis ulcers: Patient has reported peripheral artery disease and has been having issues with redness and pain in the left lower extremity. 6. - Podiatry was consulted, recommended triamcinolone cream twice daily and wound dressing. WBAT. Ok to dc from pod standpoint. ID also following, does not appear to be infected. 7. AVELINO on CKD stage III: Creatinine is mildly elevated at 1.9. Baseline is around 1.5. Patient appears to be somewhat dry. - Initially resolved with gentle IV hydration. Back up to 1.9 today (2/2) and Na elevated which may be related to acute fluid overload. IVF stopped. No further treatment or workup and Code status was changed to comfort care. 8. Chronic Atrial Fibrillation: anticoagulated on coumadin. Rate controlled on metoprolol. S/p PPM.    9. - Coumadin stopped as we will no longer be checking pts INR. 10. Essential HTN: BP stable. Continue home metoprolol, Imdur. Lisinopril held d/t #4. 11. Abdominal wall hematoma: Reportedly from Lovenox injections. 12. Chronic HFrEF: EF 25-30% on Echo 03/2020. Currently she appears to be compensated. proBNP was elevated as mentioned above but likely related to chronic kidney disease. Holding lisinopril as mentioned above. Continue beta-blocker and Imdur. 13. Physical deconditioning: PT/OT   14. Code status: DNR-CC    Disposition: Plan for home with hospice        Chief Complaint: Left lower extremity pain and not acting like herself    HPI / Hospital Course: Per HPI: \"Patient is an 72-year-old female with a past medical history of atrial fibrillation, PE, non-Hodgkin's lymphoma, CKD stage III, CHF EF 25 to 30%, CAD. Patient presents with her daughter. Apparently the patient began acting unlike herself yesterday evening. The daughter would attempt to speak with her however the patient would not answer appropriately. Patient also began to have increasing left lower extremity pain around the same time. She presented to the emergency room and a urinalysis was obtained which demonstrated a urinary tract infection. She was started on antibiotics and the original plan was to discharge home with PCP follow-up. However, patient developed increasing pain and more confusion thus she was admitted. She is unable to provide any further review of systems. She has a history of UTI with resistant organisms. She has also been seeing podiatry over the last several months due to a chronic cellulitic process of the left lower extremity. During my exam she is extremely tender to palpation of the lower extremity and cries out immediately upon touching her.   Her daughter states that she is generally very touchy in PERR.  Neck: supple. No JVD. No thyromegaly. Lungs: clear to auscultation. No retractions  Cardiac: Irregular rate and rhythm without murmur. Abdomen: soft. Nontender. Bowel sounds positive. RLQ hematoma noted. Extremities:  No clubbing, cyanosis, or edema x 4. Chronic BLE stasis ulcers, red/purple discoloration, dry/scaly. Vasculature: capillary refill < 3 seconds. Palpable LE pulses bilaterally. Skin:  warm and dry. Psych:  Somnolent    Lymph:  No supraclavicular adenopathy. Neurologic:  No focal deficit. No seizures. Data: (All radiographs, tracings, PFTs, and imaging are personally viewed and interpreted unless otherwise noted)  Labs:   Recent Labs     02/01/21  0400 02/02/21  0404   WBC 8.4 11.8*   HGB 13.3 15.0   HCT 43.9 49.7*    334     Recent Labs     02/01/21  0400 02/02/21  0404    150*   K 4.4 5.0    117*   CO2 21* 19*   BUN 32* 35*   CREATININE 1.7* 1.9*   CALCIUM 8.9 9.5     No results for input(s): AST, ALT, BILIDIR, BILITOT, ALKPHOS in the last 72 hours. Recent Labs     02/01/21  0400 02/02/21  0404   INR 3.12* 3.55*     No results for input(s): Towana Jeannie in the last 72 hours. Urinalysis:   Lab Results   Component Value Date    NITRU POSITIVE 01/29/2021    WBCUA > 100 01/29/2021    BACTERIA MANY 01/29/2021    RBCUA 0-2 01/29/2021    BLOODU NEGATIVE 01/29/2021    SPECGRAV 1.023 06/24/2020    GLUCOSEU NEGATIVE 01/29/2021     Urine culture:   Lab Results   Component Value Date    LABURIN Mcgrew count: >100,000 CFU/mL 06/16/2019     Micro:   Blood culture #1:   Lab Results   Component Value Date    BC No growth-preliminary  01/29/2021     Blood culture #2:No results found for: Shira Lakisha  Organism:  Lab Results   Component Value Date    ORG Escherichia coli 01/29/2021         Lab Results   Component Value Date    LABGRAM  11/08/2016     Rare segmented neutrophils observed. No epithelial cells observed. No organisms observed.        MRSA culture only:No results found for: [facility]  Respiratory culture: No results found for: CULTRESP  Aerobic and Anaerobic :  Lab Results   Component Value Date    LABAERO No growth-preliminary  No growth   11/08/2016     Lab Results   Component Value Date    LABANAE No growth-preliminary  No growth   11/08/2016       Radiology Reports:  Ct Abdomen Pelvis Wo Contrast Additional Contrast? None    Result Date: 1/5/2021  Exam: CT abdomen and pelvis without IV contrast. Comparison:  CT,KOSR  - CT ABDOMEN PELVIS W IV CONTRAST  - 06/16/2019 09:23 AM EDT Findings: No free air. No solid liver mass. 5.0 cm calcified fundal gallstone. No clinically significant biliary ductal dilation. No splenomegaly or suspicious splenic lesions. No solid or cystic pancreatic mass. No pancreatic ductal dilation. No acute inflammatory changes. Adrenal glands without acute pathology. No solid renal mass. No hydronephrosis. No significant stranding. Nonobstructing left renal stones. Multiple renal cysts, largest 9.5 cm, left lower pole. Bladder without acute pathology. No bowel obstruction. Dense colonic fecal retention. No evidence for acute appendicitis. No adenopathy. No abdominal aortic aneurysm. IVC filter in place. No suspicious pelvic masses. Multiple subcutaneous body wall hematomas anteriorly, largest 7.1 x 5.0 x 1.8 cm, right lower quadrant, with hematocrit level, 1.8 cm inferior to the umbilicus. No rectus sheath hematoma. No acute osseous pathology. Degenerative changes spine and right hip. Left hip arthroplasty. Subcutaneous body wall hematomas, likely related to medication injection, largest anteriorly on the right as described. No rectus sheath hematoma. No internal hematomas or free fluid. Stable large gallstone. Dense fecal retention without obstruction. This document has been electronically signed by:  Roxanne Weinberg MD on 01/05/2021 01:42 AM All CT scans at this facility use dose modulation, iterative reconstruction, and/or weight-based dosing when appropriate to reduce radiation dose to as low as reasonably achievable. Ct Head Wo Contrast    Result Date: 1/29/2021  CT head without contrast Comparison:  CT,SR  - CT HEAD WO CONTRAST  - 01/05/2021 12:46 AM EST Findings: No intracranial mass, midline shift, hydrocephalus, or acute hemorrhage. Chronic parenchymal volume loss and white matter changes. No mastoid effusion. No air fluid levels in paranasal sinuses. There is no acute fracture. No acute intracranial findings. Chronic changes as described. This document has been electronically signed by: Johana Trimble MD on 01/29/2021 06:22 AM All CT scans at this facility use dose modulation, iterative reconstruction, and/or weight-based dosing when appropriate to reduce radiation dose to as low as reasonably achievable. Ct Head Wo Contrast    Result Date: 1/5/2021  CT head without contrast Comparison:  CT,SR  - CT HEAD WO CONTRAST  - 12/04/2017 08:03 PM EST Findings: No intracranial mass, midline shift, hydrocephalus, or acute hemorrhage. Chronic parenchymal volume loss and white matter changes. No mastoid effusion. No air fluid levels in paranasal sinuses. There is no acute fracture. Stable left sided pili holes. No acute intracranial findings. Chronic changes as described. This document has been electronically signed by: Johana Trimble MD on 01/05/2021 01:31 AM All CT scans at this facility use dose modulation, iterative reconstruction, and/or weight-based dosing when appropriate to reduce radiation dose to as low as reasonably achievable. Xr Chest Portable    Result Date: 2/2/2021  PROCEDURE: XR CHEST PORTABLE CLINICAL INFORMATION: SOB, r/o fluid overload COMPARISON: 1/29/2021 TECHNIQUE: A single mobile view of the chest was obtained. 1. Cardiomegaly. Permanent unipolar pacemaker. 2. Moderate-sized pleural effusion right side. Moderate pneumonia/pulmonary edema right parahilar region and right lung base.

## 2021-02-03 NOTE — CARE COORDINATION
DISASTER CHARTING    2/3/21, 9:34 AM EST    DISCHARGE ONGOING EVALUATION:     Michelle Ag day: 5  Location: -02/002-A Reason for admit: Acute encephalopathy [G93.40]   Barriers to Discharge: Hospice consult today, Spiritual Care following, PT/OT/ST on board, code status to [facility], blood pressure medications on hold, prn IV Ativan, Morphine,  Wound care, turn q 2hr. PCP: LUZ Nuno CNP  Readmission Risk Score: 31%  Patient Goals/Plan/Treatment Preferences: [facility] VALORIE is from home with her , daughter, and current with [facility].

## 2021-02-03 NOTE — PROGRESS NOTES
Patient moaning and grimacing at times. PRN ativan and morphine given per orders.    Electronically signed by Mathew Velasquez RN on 2/3/2021 at 2:44 AM

## 2021-02-03 NOTE — PROGRESS NOTES
Call from daughter Bernadette Duke asking that we set time at some point during day. Voiced that she wishes for friend to be present with her during meeting, \"I thought I could do it alone, but I just can't. \" Told her that difficult time and should let friends/family help her and not to be afraid to let help her. Support given. Asked to let nurse know what will work for her when knowing what time.

## 2021-02-03 NOTE — PROGRESS NOTES
Obtained vital signs, documented in flow sheet. Patient is alert and oriented X4. Oxygen via nasal cannula on 2L. Upper extremities pink warm patient is non verbal unable to assess numbness, tingling. Skin turgor <3 sec, cap refill <3, hand grasp were unable to asses. Respirations are regular and unlabored, lung sounds are clear, diminished; with fine crackles. Bowel sounds are hypoactive, nontender. Patient has trace edema in lower extremities. Legs are Bilaterally red, with pain. She has redness on bilateral heels, bruising scattered throughout.

## 2021-02-03 NOTE — PROGRESS NOTES
Hospice referral with daughter Zhang Ryan and her friend, at bedside. Patient noted to be moaning out at start of referral, dose of IV morphine 2mg given at 1412 for symptoms. Hospice concepts, philosophies, and services explained. Daughter tearful, has taken care of mom last 4 yrs and also taking care of father as well. Very emotional and noted that will need much support through patient dying and after patient gone. Discussed with them different levels of hospice care and criteria for each level. Talked with Zhang Ryan about wishes for care and would like to get patient home- knows that this is where her mother would wish to be for care. Told her that will attempt to do with asking for new medication orders and getting something scheduled ATC but if not working may need to take into consideration of hospice care in hospital. Voiced understanding but does wish to attempt to get home. Zhang Rayn did voiced understanding of disease process and okay with stopping all aggressive treatments and wishes for comfort care. Continued support given. Did discuss possibility of baron insertion if patient goes home to assist with keeping skin dry and to help with cleaning up of patient, daughter agreeable. Will see how patient does and if going home will ask to have inserted prior to going home. Call to pharmacist Leti Griffin and suggestions given for morphine 10 mg ATC SL, leaving on prn dose of morphine, as well as increase in Lorazepam to 1mg prn. Updated Dr. Eveline Zamora of family wishes to get patient home with medication recommendations given. Provider in agreement. Primary nurse Alma Bond RN updated of plan of care at this time. Family and staff denied further needs at this time. Will follow up in morning to assess proper level of care for hospice and provide support.

## 2021-02-03 NOTE — FLOWSHEET NOTE
[place] [address] PROGRESS NOTE      Patient: Maryanne Wheeler  Room #: 8W-55/574-L            YOB: 1935  Age: 81 y.o. Gender: female            Admit Date & Time: 1/29/2021  4:47 AM    Assessment:   in rounding engaged daughter in room of patient.  engaged in reflective listening. Daughter related that patient was just moved to hospice care. We engaged for some time around this decision. Interventions: The  gave encouragement to daughter as she verbalized her feelings around the decision-making process.  offered prayer,whcih was accepted. Outcomes:  Daughter was significantly encouraged and had a stronger sens of how the decisions they made were mature and very caring. Plan:    1. dontinue to visit and encourage the family and patient in this process. Electronically signed by Avel Osorio, on 2/2/2021 at 10:29 PM.  [facility]  [phone]           02/02/21 1830   Encounter Summary   Services provided to: Patient and family together;Patient not available   Referral/Consult From: [facility] Children;Family members   Place of Muslim   (Anabaptism)   Continue Visiting Yes  (02/02/2021  F (P))   Complexity of Encounter Moderate   Length of Encounter 30 minutes   Spiritual Assessment Completed Yes   Routine   Type Initial   Assessment Approachable;Tearful;Grieving; Hopeful;Spiritual struggle;Coping   Intervention Active listening;Explored feelings, thoughts, concerns;Explored coping resources;Nurtured hope;Prayer;Sustaining presence/ Ministry of presence   Outcome Acceptance;Expressed gratitude;Expressed feelings of jimmy, peace, and/or awe;Engaged in conversation;Expressed feelings/needs/concerns; Less anxious, less agitated;Encouraged

## 2021-02-04 NOTE — PROGRESS NOTES
RN administered IV morphine per MAR. Patient has increased respiratory effort, facial grimacing, restless.

## 2021-02-04 NOTE — PROGRESS NOTES
[address] [place] [address]  Notice of Patient Passing      Patient Name- Koko Hinson   Acct Number- [de-identified]   Attending Physician- Dashawn Lewis MD    Admitted on-1/29/2021  4:47 AM     On 2/4/2021 at [address] patient was found in [id number] with:   Absence of vital signs. Absence of neurological response. Confirmed time of death at [address] Physician or On-call Physician notified of time of death- yes    Family present at time of death- yes,      Spiritual care present at time of death- no    Physician was notified and orders were obtained to release the body. Post-Mortem documentation completed; form printed, signed, and given to admitting.     Eugene Salinas RN Nursing Supervisor/ Manager  2/4/21   1:42 AM

## 2021-02-04 NOTE — DISCHARGE SUMMARY
Hospital Medicine Discharge Summary      Patient:  French Stubbs  YOB: 1935  MRN: [id number]   PCP: LUZ Brennan CNP       Acct: [de-identified]     Admit Date: 2021     Discharge Date: 2021      Admitting Physician: Cyndi Limon MD  Discharge Physician: Rose Nuñez MD     Discharge Diagnoses   DISCHARGE DIAGNOSES:  1. Acute cystitis without hematuria as cause of death   2. Acute metabolic encephalopathy  3. Oropharyngeal dysphagia  4. Chronic PVD with venous stasis ulcers   5. AVELINO on CKD stage III/ dehydration/hypernatremia  6. Chronic atrial fibrillation  7. Essential HTN:   8. Abdominal wall hematoma  9. Chronic HFrEF  10. Physical deconditioning  11. Code status: DNR-CC    Disposition:    [x]  on 2021 @ 0105  Condition at Discharge: Stable    The patient was seen and examined on day of discharge and this discharge summary is in conjunction with any daily progress note from day of discharge.     Hospital course     As derived from H&P:   French Stubbs is a 81 y.o. female with a PMH of atrial fibrillation, PE, non-Hodgkin's lymphoma, CKD stage III, CHF EF 25 to 30%, CAD.  Patient presents with her daughter.  Apparently the patient began acting unlike herself yesterday evening.  The daughter would attempt to speak with her however the patient would not answer appropriately. Adelso Wei also began to have increasing left lower extremity pain around the same time.  She presented to the emergency room and a urinalysis was obtained which demonstrated a urinary tract infection.  She was started on antibiotics and the original plan was to discharge home with PCP follow-up. Tho Sánchez, patient developed increasing pain and more confusion thus she was admitted. Jazzy Cullen is unable to provide any further review of systems.  She has a history of UTI with resistant organisms.  She has also been seeing podiatry over the last several months due to a chronic cellulitic process of the left lower extremity.  During my exam she is extremely tender to palpation of the lower extremity and cries out immediately upon touching her.  Her daughter states that she is generally very touchy in the lower extremities.  Patient has also been chilling.  She has been afebrile though. \"     I assumed care on 02/03/2021. Following is an outline of the patient's hospital course as derived from review of the patient's chart and notes    1. Comfort care status only: Hospice care met with patient and the patient passed away   2. Acute metabolic encephalopathy:  Likely due to UTI and pain medication. UTI treated as stated below. Patient did not do well without pain medication, family opted to change code status to DNR CC and the patient was transitioned to hospice care on 02/03/2021 and passed away on 02/04/2021 @ 0105  3. E. coli acute cystitis without hematuria as cause of death: Initially started on Cefepime (1/29). ID was consulted . Urine culture (+) E coli, sensitive. IV Abx stopped 2/1 per ID.   4. Oropharyngeal dysphagia: SLP evaluated and recommended pt to be NPO with consideration of NGT. Per ST, patient had severe oral pocketing despite cues to elicit pharyngeal swallow; moderate oral stasis. 5. Chronic PVD with venous stasis ulcers: Patient has reported peripheral artery disease and has been having issues with redness and pain in the left lower extremity. Podiatry was consulted and recommended triamcinolone cream twice daily and wound dressing. WBAT. Per ID, these did not appear to be infected. 6. AVELINO on CKD 3/dehydration/hypernatremia: Creatinine increased to 1.9. Na to 150. Briefly improved with administration of IV fluids, but then Cr increased again to 1.9. No further treatment or diagnostic testing was performed. CODE STATUS was changed to comfort care. 7. Chronic Atrial Fibrillation: anticoagulated on coumadin. Rate controlled on metoprolol.  S/p PPM.  Coumadin discontinued when the patient was made COMFORT CARE  8. Essential HTN: BP remained stable. Continued home metoprolol, Imdur. Lisinopril held d/t #6   9. Abdominal wall hematoma: Reportedly from Lovenox injections. 10. Chronic HFrEF: EF 25-30% on Echo 03/2020. Currently she appears to be compensated.  ProBNP was elevated as mentioned above but likely related to chronic kidney disease. Holding lisinopril as mentioned above.  Continue beta-blocker and Imdur. 11. Physical deconditioning: PT/OT   12. Code status: DNR-CC      Discharge Medications     There are no discharge medications for this patient. Physical Examination     Vitals:  Vitals:    02/03/21 1522 02/03/21 1645 02/03/21 1646 02/03/21 1945   BP: (!) 144/70   (!) 144/96   Pulse: 100   97   Resp: 20 20 21   Temp: 99 °F (37.2 °C) 98.7 °F (37.1 °C)  98.3 °F (36.8 °C)   TempSrc: Axillary Axillary  Axillary   SpO2: 94%  94% 98%   Height:           Weight:       24 hour intake/output:    Intake/Output Summary (Last 24 hours) at 2/4/2021 1632  Last data filed at 2/4/2021 0100  Gross per 24 hour   Intake 0 ml   Output --   Net 0 ml     Examination based on previous days' examination  General:   Chronically ill appearing female. Peacefully sleepign today  HEENT:  normocephalic and atraumatic. No scleral icterus. PERR. Neck: supple. No JVD. No thyromegaly. Lungs: clear to auscultation. No retractions  Cardiac: Irregular rate and rhythm without murmur. Abdomen: soft. Grimaces with palpation of the abdomen. Bowel sounds positive. RLQ hematoma noted. Extremities:  No clubbing, cyanosis, or edema x 4. Chronic BLE stasis ulcers, red/purple discoloration, dry/scaly. Vasculature: capillary refill < 3 seconds. Palpable LE pulses bilaterally. Skin:  warm and dry. Psych:  Somnolent    Lymph:  No supraclavicular adenopathy. Neurologic:  No focal deficit. No seizures    Labs     Labs:  For convenience and continuity at follow-up the following most recent labs are provided:      Recent Labs     02/02/21  0404   WBC 11.8*   HGB 15.0   HCT 49.7*        Recent Labs     02/02/21  0404   *   K 5.0   *   CO2 19*   BUN 35*   CREATININE 1.9*   CALCIUM 9.5     No results for input(s): AST, ALT, BILIDIR, BILITOT, ALKPHOS in the last 72 hours. Recent Labs     02/02/21  0404   INR 3.55*     No results for input(s): Analia Foster in the last 72 hours. Renal:    Lab Results   Component Value Date     02/02/2021    K 5.0 02/02/2021     02/02/2021    CO2 19 02/02/2021    BUN 35 02/02/2021    CREATININE 1.9 02/02/2021    CALCIUM 9.5 02/02/2021    PHOS 2.7 04/07/2017           Radiology     Radiology:      Ct Head Wo Contrast    Result Date: 1/29/2021  CT head without contrast Comparison:  CT,SR  - CT HEAD WO CONTRAST  - 01/05/2021 12:46 AM EST Findings: No intracranial mass, midline shift, hydrocephalus, or acute hemorrhage. Chronic parenchymal volume loss and white matter changes. No mastoid effusion. No air fluid levels in paranasal sinuses. There is no acute fracture. No acute intracranial findings. Chronic changes as described. This document has been electronically signed by: Danish Leal MD on 01/29/2021 06:22 AM All CT scans at this facility use dose modulation, iterative reconstruction, and/or weight-based dosing when appropriate to reduce radiation dose to as low as reasonably achievable. Xr Chest Portable    Result Date: 2/2/2021  PROCEDURE: XR CHEST PORTABLE CLINICAL INFORMATION: SOB, r/o fluid overload COMPARISON: 1/29/2021 TECHNIQUE: A single mobile view of the chest was obtained. 1. Cardiomegaly. Permanent unipolar pacemaker. 2. Moderate-sized pleural effusion right side. Moderate pneumonia/pulmonary edema right parahilar region and right lung base. Questionable mild infiltrate left parahilar region. **This report has been created using voice recognition software.   It may contain minor errors which are inherent in voice recognition technology. ** Final report electronically signed by Dr. Alpesh Bojorquez on 2/2/2021 2:01 PM    Xr Chest Portable    Result Date: 1/29/2021  Exam: 1V chest Comparison: 01/05/2021 12:15 AM EST (01/04/2021 11:15 PM CST) : CR,SR: XR CHEST PORTABLE Findings: Left sided pacer in place and intact. Mediastinum: No cardiac silhouette enlargement. Lungs: No infiltrate or mass. Pleura: No pneumothorax or significant effusion. Bones: No acute pathology. Impression: No acute pathology. This document has been electronically signed by: Candelaria Mcdonnell MD on 01/29/2021 05:42 AM       Consults     PHARMACY TO DOSE WARFARIN  IP CONSULT TO INFECTIOUS DISEASES  IP CONSULT TO PODIATRY  IP CONSULT TO DIETITIAN  IP CONSULT TO SOCIAL WORK  PALLIATIVE CARE EVAL  IP CONSULT TO HOSPICE       Code status at time of discharge     DNR-CC      Time Spent on discharge is more than 45 minutes in the examination, evaluation, counseling and review of medications and discharge plan. Signed: Thank you LUZ Stewart CNP for the opportunity to be involved in this patient's care.     Electronically signed by Saad Reyes MD on 2/4/2021 at 4:32 PM

## 2021-02-04 NOTE — PROGRESS NOTES
RN called Hospice nurse to notify of patient passing. Stated to call her back if there are any needs.

## 2021-02-04 NOTE — PROGRESS NOTES
RN administered IV morphine and IV Ativan, patient grimacing, increased respiratory effort. See MAR.

## 2021-10-12 NOTE — PROGRESS NOTES
[address] [address]  INPATIENT SPEECH THERAPY  [facility]  DISCHARGE NOTE    TIME   SLP Individual Minutes  Time In: 9361  Time Out: 8172  Minutes: 11  Timed Code Treatment Minutes: 0 Minutes       Date: 2/3/2021  Patient Name: Dilma Banegas      CSN: [id number]   : 1935  (81 y.o.)  Gender: female   Referring Physician: Claudia Barker PA-C  Diagnosis: Acute encephalopathy   Secondary Diagnosis: Dysphagia   Precautions: Fall risk, aspiration precautions, contact isolation, see allergy list    Current Diet: NPO   Swallowing Strategies: Not Applicable and completion of daily routine oral care   Date of Last MBS: Not Applicable    Pain:  No pain reported. Subjective: LEONID Muniz reporting, \"patient with recent change in code status to [facility], general diet and thin liquids order. Patient provided with medication to remain comfortable at this time. Daughter requesting for ST to see this date; daughter upset re: yesterday's events. \"     Upon ST entering room, pt seen resting in bed with daughter and friend present. Patient moaning with open mouth posture. Short-Term Goals:  SHORT TERM GOAL #1:  Goal 1: Pt will consume trials of ice chips, thin liquids by spoon/cup and puree with ST only with appropriate alertness, timely swallow response and without overt difficulty to determine pt appropriateness for instrumental assessment vs initiation of PO diet. GOAL NOT MET. INTERVENTIONS:   Patient's daughter upset, \"ST did not re-attempt a second time yesterday afternoon as discussed. \" Daughter stating, \"My mom was uncomfortable and you guys made her wait. We were waiting to see if you tried again then maybe it would have changed the direction we went. \" ST explained, ST completed treatment session  with recommendation of NPO. As day progressed patient with increasing discomfort and change in medical status to [facility] per [facility]. \" Per change in code status within epic system and general diet order Bedside and Verbal shift change report given to self (oncoming nurse) by Gavin Marcano RN (offgoing nurse). Report included the following information SBAR, Kardex, Procedure Summary, MAR and Recent Results. entered; ST with change in POC with focus on comfort care vs aggressive measures. Patient's daughter continues with high emotions and expression of being upset. ST verbalized understanding and apologized for any miscommunications. ST highlighted importance at this time to assist patient to remain comfortable as aligned with patient's current code status [facility]). ST provided skilled level of education reviewing recommendation to continue oral care with use of toothet + utilize water and toothet to assist with dry mouth. ST explained, \"If the patient is not asking for PO intake then ST recommends to hold. If patient is asking for food or drink ST recommending consideration to present liquid via toothett or teaspoon. ST also recommending consideration of puree foods such as pudding, yogurt, applesauce within small PO presentations. If patient is requesting advanced texture than puree consistency ST recommending patient's family consider small presentations in order to focus on pleasure, comfort and safety. Patient's daughter receptive and verbalized understanding. ST provided patient's family with toothet as requested. Due to change in code status to [facility]; ST explained to daughter [address] will sign off at this time. ST will not follow up at bedside unless RN contact's ST with further questions or new order is entered. Patient's daughter in agreement and verbalized understanding. Daughter requesting \"pain medication at this time d/t patient with increasing expression of discomfort. \"  RN Ned Peng updated and verbalized understanding     Previous tx 02/02: Skilled level education provided to daughter re: barriers towards PO consumption (I.e. pt fatigue, poor direction following, etc) resulting in significant oral holding, absence of pharyngeal swallow trigger and significant oral stasis to follow. At this time, pt inappropriate for medication consumption given above swallowing deficits.  Pt waxing/waning in regards to

## 2024-01-14 NOTE — TELEPHONE ENCOUNTER
----- Message from [employer] sent at 12/3/2020  3:48 PM EST -----  Kenzie's daughter, Avelina Boeck, called to let us know that she took Richarda Linda to Dr. Thu Henley office today And he started her on 2 different meds because he's thinking she still has some infection. He started her on Bactrim DS  BID  and Diflucan 100 mg QD. They have an appt here in the clinic on 12/4/2020. Give her daughter Avelina Boeck a call if she needs to take a different dose.  [phone] [Patient Declined  Services] : - None: Patient declined  services [FreeTextEntry3] : Patient used caregiver as  [TWNoteComboBox1] : Micah

## 2024-05-02 NOTE — PROGRESS NOTES
CLINICAL PHARMACY NOTE: MEDS TO [address] Select Patient?: Yes  Total # of Prescriptions Filled: 2   The following medications were delivered to the patient:    Total # of Interventions Completed: 2  Time Spent (min): 30    Additional Documentation: Take medication on an empty stomach 1 hour prior to other meds or supplements

## 2025-02-13 NOTE — PROGRESS NOTES
Clinical Pharmacy Note    Warfarin consult follow-up    Recent Labs     01/30/21  0342   INR 2.62*     Recent Labs     01/29/21  0500 01/30/21  0342   HGB 13.2 12.6   HCT 42.0 41.2    256       Significant Drug-Drug Interactions:  New warfarin drug-drug interactions: none  Discontinued drug-drug interactions: none  Current warfarin drug-drug interactions: aspirin        Date INR Warfarin Dose   1/29/2021 2.09 2.5 mg    1/30/2021 2.62  1 mg                                                Notes:                     Daily PT/INR until stable within therapeutic range.      Elaine Morales PharmD 1/30/2021 4:00 PM 20